# Patient Record
Sex: FEMALE | Race: WHITE | NOT HISPANIC OR LATINO | Employment: UNEMPLOYED | URBAN - METROPOLITAN AREA
[De-identification: names, ages, dates, MRNs, and addresses within clinical notes are randomized per-mention and may not be internally consistent; named-entity substitution may affect disease eponyms.]

---

## 2017-01-17 ENCOUNTER — ALLSCRIPTS OFFICE VISIT (OUTPATIENT)
Dept: OTHER | Facility: OTHER | Age: 57
End: 2017-01-17

## 2017-01-24 ENCOUNTER — TRANSCRIBE ORDERS (OUTPATIENT)
Dept: ADMINISTRATIVE | Facility: HOSPITAL | Age: 57
End: 2017-01-24

## 2017-01-24 DIAGNOSIS — M76.70 PERONEAL TENDINITIS, UNSPECIFIED LATERALITY: Primary | ICD-10-CM

## 2017-01-31 ENCOUNTER — HOSPITAL ENCOUNTER (OUTPATIENT)
Dept: RADIOLOGY | Facility: HOSPITAL | Age: 57
Discharge: HOME | End: 2017-01-31
Attending: INTERNAL MEDICINE
Payer: COMMERCIAL

## 2017-01-31 ENCOUNTER — TRANSCRIBE ORDERS (OUTPATIENT)
Dept: RADIOLOGY | Facility: HOSPITAL | Age: 57
End: 2017-01-31

## 2017-01-31 DIAGNOSIS — M79.0 RHEUMATISM, UNSPECIFIED AND FIBROSITIS: Primary | ICD-10-CM

## 2017-01-31 DIAGNOSIS — M79.0 RHEUMATISM, UNSPECIFIED AND FIBROSITIS: ICD-10-CM

## 2017-01-31 DIAGNOSIS — M79.7 RHEUMATISM, UNSPECIFIED AND FIBROSITIS: Primary | ICD-10-CM

## 2017-01-31 DIAGNOSIS — M76.70 PERONEAL TENDINITIS, UNSPECIFIED LATERALITY: ICD-10-CM

## 2017-01-31 DIAGNOSIS — M79.7 RHEUMATISM, UNSPECIFIED AND FIBROSITIS: ICD-10-CM

## 2017-01-31 PROCEDURE — 72131 CT LUMBAR SPINE W/O DYE: CPT

## 2017-01-31 PROCEDURE — 71020 HB CHEST X-RAY 2VW FRONTAL&LATL: CPT

## 2017-01-31 PROCEDURE — 72192 CT PELVIS W/O DYE: CPT

## 2017-01-31 PROCEDURE — 71110 X-RAY EXAM RIBS BIL 3 VIEWS: CPT

## 2017-02-07 ENCOUNTER — TRANSCRIBE ORDERS (OUTPATIENT)
Dept: ADMINISTRATIVE | Age: 57
End: 2017-02-07

## 2017-02-07 ENCOUNTER — APPOINTMENT (OUTPATIENT)
Dept: LAB | Age: 57
End: 2017-02-07
Payer: MEDICARE

## 2017-02-07 DIAGNOSIS — D80.1 HYPOGAMMAGLOBULINAEMIA, UNSPECIFIED: Primary | ICD-10-CM

## 2017-02-07 DIAGNOSIS — D80.1 HYPOGAMMAGLOBULINAEMIA, UNSPECIFIED: ICD-10-CM

## 2017-02-07 PROCEDURE — 36415 COLL VENOUS BLD VENIPUNCTURE: CPT

## 2017-02-07 PROCEDURE — 83883 ASSAY NEPHELOMETRY NOT SPEC: CPT

## 2017-02-08 LAB
KAPPA LC FREE SER-MCNC: 22.31 MG/L (ref 3.3–19.4)
KAPPA LC FREE/LAMBDA FREE SER: 1.48 {RATIO} (ref 0.26–1.65)
LAMBDA LC FREE SERPL-MCNC: 15.05 MG/L (ref 5.71–26.3)

## 2017-02-17 ENCOUNTER — ALLSCRIPTS OFFICE VISIT (OUTPATIENT)
Dept: OTHER | Facility: OTHER | Age: 57
End: 2017-02-17

## 2017-05-04 ENCOUNTER — LAB REQUISITION (OUTPATIENT)
Dept: LAB | Facility: HOSPITAL | Age: 57
End: 2017-05-04
Payer: MEDICARE

## 2017-05-04 DIAGNOSIS — N39.0 URINARY TRACT INFECTION: ICD-10-CM

## 2017-05-04 PROCEDURE — 87086 URINE CULTURE/COLONY COUNT: CPT | Performed by: UROLOGY

## 2017-05-05 LAB — BACTERIA UR CULT: NORMAL

## 2017-05-13 ENCOUNTER — TRANSCRIBE ORDERS (OUTPATIENT)
Dept: URGENT CARE | Age: 57
End: 2017-05-13

## 2017-05-13 ENCOUNTER — APPOINTMENT (OUTPATIENT)
Dept: LAB | Age: 57
End: 2017-05-13
Payer: MEDICARE

## 2017-05-13 ENCOUNTER — OFFICE VISIT (OUTPATIENT)
Dept: URGENT CARE | Age: 57
End: 2017-05-13
Payer: MEDICARE

## 2017-05-13 DIAGNOSIS — J02.9 SORE THROAT: Primary | ICD-10-CM

## 2017-05-13 DIAGNOSIS — J02.9 SORE THROAT: ICD-10-CM

## 2017-05-13 DIAGNOSIS — J02.9 ACUTE PHARYNGITIS: ICD-10-CM

## 2017-05-13 PROCEDURE — 87430 STREP A AG IA: CPT | Performed by: FAMILY MEDICINE

## 2017-05-13 PROCEDURE — 87070 CULTURE OTHR SPECIMN AEROBIC: CPT

## 2017-05-13 PROCEDURE — 99213 OFFICE O/P EST LOW 20 MIN: CPT | Performed by: FAMILY MEDICINE

## 2017-05-13 PROCEDURE — G0463 HOSPITAL OUTPT CLINIC VISIT: HCPCS | Performed by: FAMILY MEDICINE

## 2017-05-15 LAB — BACTERIA THROAT CULT: NORMAL

## 2017-06-20 ENCOUNTER — GENERIC CONVERSION - ENCOUNTER (OUTPATIENT)
Dept: OTHER | Facility: OTHER | Age: 57
End: 2017-06-20

## 2017-06-28 ENCOUNTER — GENERIC CONVERSION - ENCOUNTER (OUTPATIENT)
Dept: OTHER | Facility: OTHER | Age: 57
End: 2017-06-28

## 2017-06-28 PROCEDURE — 88342 IMHCHEM/IMCYTCHM 1ST ANTB: CPT | Performed by: INTERNAL MEDICINE

## 2017-06-28 PROCEDURE — 88305 TISSUE EXAM BY PATHOLOGIST: CPT | Performed by: INTERNAL MEDICINE

## 2017-06-28 PROCEDURE — 88313 SPECIAL STAINS GROUP 2: CPT | Performed by: INTERNAL MEDICINE

## 2017-06-29 ENCOUNTER — LAB REQUISITION (OUTPATIENT)
Dept: LAB | Facility: HOSPITAL | Age: 57
End: 2017-06-29
Payer: MEDICARE

## 2017-06-29 DIAGNOSIS — K22.70 BARRETT'S ESOPHAGUS WITHOUT DYSPLASIA: ICD-10-CM

## 2017-06-29 DIAGNOSIS — K21.9 GASTRO-ESOPHAGEAL REFLUX DISEASE WITHOUT ESOPHAGITIS: ICD-10-CM

## 2017-06-29 DIAGNOSIS — R10.13 EPIGASTRIC PAIN: ICD-10-CM

## 2017-07-06 ENCOUNTER — GENERIC CONVERSION - ENCOUNTER (OUTPATIENT)
Dept: OTHER | Facility: OTHER | Age: 57
End: 2017-07-06

## 2017-08-16 ENCOUNTER — APPOINTMENT (OUTPATIENT)
Dept: LAB | Age: 57
End: 2017-08-16
Attending: PHYSICIAN ASSISTANT
Payer: MEDICARE

## 2017-08-16 ENCOUNTER — OFFICE VISIT (OUTPATIENT)
Dept: URGENT CARE | Age: 57
End: 2017-08-16
Payer: MEDICARE

## 2017-08-16 ENCOUNTER — TRANSCRIBE ORDERS (OUTPATIENT)
Dept: URGENT CARE | Age: 57
End: 2017-08-16

## 2017-08-16 DIAGNOSIS — R30.9 PAINFUL MICTURITION: ICD-10-CM

## 2017-08-16 PROCEDURE — G0463 HOSPITAL OUTPT CLINIC VISIT: HCPCS | Performed by: FAMILY MEDICINE

## 2017-08-16 PROCEDURE — 87077 CULTURE AEROBIC IDENTIFY: CPT

## 2017-08-16 PROCEDURE — 87186 SC STD MICRODIL/AGAR DIL: CPT

## 2017-08-16 PROCEDURE — 87086 URINE CULTURE/COLONY COUNT: CPT

## 2017-08-16 PROCEDURE — 99213 OFFICE O/P EST LOW 20 MIN: CPT | Performed by: FAMILY MEDICINE

## 2017-08-18 LAB — BACTERIA UR CULT: NORMAL

## 2017-11-22 ENCOUNTER — OFFICE VISIT (OUTPATIENT)
Dept: URGENT CARE | Age: 57
End: 2017-11-22
Payer: MEDICARE

## 2017-11-22 PROCEDURE — 81002 URINALYSIS NONAUTO W/O SCOPE: CPT | Performed by: FAMILY MEDICINE

## 2017-11-22 PROCEDURE — G0463 HOSPITAL OUTPT CLINIC VISIT: HCPCS | Performed by: FAMILY MEDICINE

## 2017-11-22 PROCEDURE — 99213 OFFICE O/P EST LOW 20 MIN: CPT | Performed by: FAMILY MEDICINE

## 2017-11-23 NOTE — PROGRESS NOTES
Assessment    1  Acute UTI (599 0) (N39 0)    Plan  Acute UTI    · Ciprofloxacin HCl - 250 MG Oral Tablet (Cipro); TAKE 1 TABLET EVERY 12 HOURSDAILY  Sore throat, Urinary frequency    · Rapid StrepA- POC; Source:Throat; Status:Resulted - Requires Verification,RetrospectiveBy Protocol Authorization;   Done: [id number] 01:46PM  Urinary frequency    · Urine Dip Non-Automated- POC; Status:Resulted - Requires Verification,RetrospectiveBy Protocol Authorization;   Done: 05PBX2887 01:43PM    Discussion/Summary  Discussion Summary:   Take antibiotic as directed until completed  Take antibiotic with food and full glass of water  Take a probiotic while taking this medication  Patient fluids and follow-up prevention mechanisms as directed  If symptoms worsen or if not resolving, call PCP or urologist, or go to the ER   encouraged follow-up with PCP or urologist  Precautions given  All questions answered  Medication Side Effects Reviewed: Possible side effects of new medications were reviewed with the patient/guardian today  Understands and agrees with treatment plan: The treatment plan was reviewed with the patient/guardian  The patient/guardian understands and agrees with the treatment plan   Counseling Documentation With Imm: The patient was counseled regarding instructions for management,-- risk factor reductions,-- patient and family education,-- impressions  Follow Up Instructions: Follow Up with your Primary Care Provider in 10 days  If your symptoms worsen, go to the [place] [address] Emergency Department  Chief Complaint    1  Sore Throat   2  Urinary Frequency  Chief Complaint Free Text Note Form: sore throat and urinary frequency for 3 week  History of Present Illness  HPI: Patient is a 64-year-old female compress intake with complaint of dysuria, frequency, and urgency for the past week  she describes a brief postvoid tingling sensation extending into her lateral pelvis   She denies any gross blood in the urine  No radiation of pain into her back  No vaginal discharge or abnormal bleeding  Reports solo kidney and multiple recent UTIs  She is seeing a urologist and has had recent success with [facility] Based Practices Required Assessment:  Pain Assessment  the patient states they have pain  (on a scale of 0 to 10, the patient rates the pain at 0 )  Abuse And Domestic Violence Screen   Yes, the patient is safe at home  -- The patient states no one is hurting them  Depression And Suicide Screen  No, the patient has not had thoughts of hurting themself  No, the patient has not felt depressed in the past 7 days  Prefered Language is  english  Review of Systems  Focused-Female:  Constitutional: as noted in HPI,-- no fever-- and-- no chills  Cardiovascular: no chest pain-- and-- no palpitations  Respiratory: no shortness of breath,-- no cough-- and-- no wheezing  Gastrointestinal: no abdominal pain,-- no nausea,-- no vomiting-- and-- no diarrhea  Genitourinary: as noted in HPI  Integumentary: no rashes  Neurological: as noted in HPI  ROS Reviewed:   ROS reviewed  Active Problems  1  Acute streptococcal tonsillitis, not specified as recurrent or not (034 0) (J03 00)   2  Acute UTI (599 0) (N39 0)   3  Anxiety (300 00) (F41 9)   4  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)   5  Cephalalgia (784 0) (R51)   6  Contact dermatitis (692 9) (L25 9)   7  Dehydration (276 51) (E86 0)   8  Depression (311) (F32 9)   9  Dry skin (701 1) (L85 3)   10  Fibromyalgia (729 1) (M79 7)   11  Hair loss (704 00) (L65 9)   12  Head injury (959 01) (S09 90XA)   13  Muscle tension headache (307 81) (G44 209)   14  Osteoarthritis (715 90) (M19 90)   15  Pelvic pain (R10 2)   16  Sciatica of left side (724 3) (M54 32)   17  Stress-related physiological response affecting medical condition (316) (F54)   18  Urination pain (788 1) (R30 9)   19  Viral pharyngitis (462) (J02 9)   20   Vision changes (368 9) (H53 9) 21  Vision problem (V41 0) (H54 7)   22  Vitamin D deficiency (268 9) (E55 9)    Past Medical History    1  History of Abdominal pain, epigastric (789 06) (R10 13)   2  History of Abdominal pain, left upper quadrant (789 02) (R10 12)   3  History of Abdominal pain, RUQ (789 01) (R10 11)   4  History of Acute cervical adenitis (683) (L04 0)   5  History of Aphthous ulcer (528 2) (K12 0)   6  History of Bunion, unspecified laterality   7  History of Closed head injury (959 01) (S09 90XA)   8  History of Cough (786 2) (R05)   9  History of Dermatitis fungal (111 9) (B36 9)   10  Dysuria (788 1) (R30 0)   11  History of Fall down steps (E880 9) (W10 8XXA)   12  History of Herniated cervical disc (722 0) (M50 20)   13  History of fatigue (V13 89) (Z87 898)   14  History of low back pain (V13 59) (Z87 39)   15  History of migraine (V12 49) (Z86 69)   16  History of tendinitis (V13 59) (Z87 39)   17  History of viral gastroenteritis (V12 09) (Z86 19)   18  History of Intolerance To Milk Products   19  History of Irritable bowel syndrome (564 1) (K58 9)   20  History of Muscle ache (729 1) (M79 1)   21  Nausea with vomiting (787 01) (R11 2)   22  History of Neck pain (723 1) (M54 2)   23  History of Noninflammatory Disorder Of The Vulva And Perineum (624 9)   24  History of Pap test, as part of routine gynecological examination (V76 2) (Z01 419)   25  History of Screening for breast cancer (V76 10) (Z12 31)   26  History of Screening for colon cancer (V76 51) (Z12 11)   27  History of Vaginal Itching (625 8)  Active Problems And Past Medical History Reviewed: The active problems and past medical history were reviewed and updated today  Family History  Mother    1  Family history of diabetes mellitus (V18 0) (Z83 3)   2  Family history of malignant neoplasm (V16 9) (Z80 9)  Father    3  Family history of hypertension (V17 49) (Z82 49)  Brother    4   Family history of malignant neoplasm (V16 9) (Z80 9)  Family History 5  Family history of Arthritis (V17 7)   6  Family history of Cancer   7  Family history of Diabetes Mellitus (V18 0)   8  Family history of Heart Disease (V17 49)   9  Family history of Hypertension (V17 49)   10  Family history of Migraine Headache   11  Family history of Thyroid Disorder (V18 19)  Family History Reviewed: The family history was reviewed and updated today  Social History   · Denied: History of Alcohol Use (History)   · Never A Smoker  Social History Reviewed: The social history was reviewed and updated today  The social history was reviewed and is unchanged  Surgical History    1  History of Foot Surgery   2  History of Total Abdominal Hysterectomy With Removal Of Both Ovaries   3  History of Tubal Ligation   4  History of Varicose Vein Ligation   5  History of Wrist Surgery  Surgical History Reviewed: The surgical history was reviewed and updated today  Current Meds   1  Gabapentin 300 MG Oral Capsule; TAKE 1 CAPSULE 3 TIMES DAILY; Therapy: (Recorded:70Xni9180) to Recorded   2  Omeprazole 40 MG Oral Capsule Delayed Release; Therapy: (Recorded:33Fhw7154) to Recorded   3  Paxil 40 MG Oral Tablet; Therapy: (Recorded:78Ods5936) to Recorded  Medication List Reviewed: The medication list was reviewed and updated today  Allergies  1  Codeine Sulfate TABS   2  Morphine Sulfate SOLN   3  Penicillins    4  Seasonal    Vitals  Signs   Recorded: 22Nov2017 01:28PM   Temperature: 98 3 F, Temporal  Heart Rate: 76  Respiration: 20  Systolic: 693  Diastolic: 72  Height: 5 ft 7 in  Weight: 180 lb   BMI Calculated: 28 19  BSA Calculated: 1 93  O2 Saturation: 98  Pain Scale: 10    Physical Exam   Constitutional  General appearance: No acute distress, well appearing and well nourished  Pulmonary  Respiratory effort: No increased work of breathing or signs of respiratory distress  Auscultation of lungs: Clear to auscultation     Cardiovascular  Auscultation of heart: Normal rate and rhythm, normal S1 and S2, without murmurs  Abdomen  Abdomen: Non-tender, no masses  -- No suprapubic or CVA tenderness  Musculoskeletal  Gait and station: Normal    Digits and nails: Normal without clubbing or cyanosis  Neurologic No focal deficit  Reflexes: 2+ and symmetric  Sensation: No sensory loss     Psychiatric  Orientation to person, place, and time: Normal    Mood and affect: Normal        Results/Data  Rapid StrepA- POC [id number] 01:46PM [facility]     Test Name Result Flag Reference   Rapid Strep Negative                   Urine Dip Non-Automated- POC [id number] 01:43PM [facility]     Test Name Result Flag Reference   Color Yellow       Clarity Transparent     Leukocytes large     Nitrite pos     Blood large     Bilirubin neg     Urobilinogen 0 2     Protein neg     Ph 6 5     Specific Gravity 1 010     Ketone neg     Glucose neg     Color Yellow       Clarity Transparent     Leukocytes large     Nitrite pos     Blood large     Bilirubin neg     Urobilinogen 0 2     Protein neg     Ph 6 5     Specific Gravity 1 010     Ketone neg     Glucose neg                 Signatures   Electronically signed by : Stacy Dias [facility]; Nov 22 2017  3:04PM EST                       (Author)    Electronically signed by : Ninoska Miller DO; Nov 22 2017  4:57PM EST                       (Co-author)

## 2017-12-27 ENCOUNTER — OFFICE VISIT (OUTPATIENT)
Dept: URGENT CARE | Age: 57
End: 2017-12-27
Payer: MEDICARE

## 2017-12-27 PROCEDURE — G0463 HOSPITAL OUTPT CLINIC VISIT: HCPCS | Performed by: FAMILY MEDICINE

## 2017-12-27 PROCEDURE — 99213 OFFICE O/P EST LOW 20 MIN: CPT | Performed by: FAMILY MEDICINE

## 2017-12-29 NOTE — PROGRESS NOTES
Assessment   1  Acute upper respiratory infection (465 9) (J06 9)    Plan   Acute upper respiratory infection    · Fluticasone Propionate 50 MCG/ACT Nasal Suspension (Flonase Allergy Relief);    USE 1 TO 2 SPRAYS IN EACH NOSTRIL ONCE DAILY    Discussion/Summary   Discussion Summary:    Take Mucinex as directed, for congestion  Take Advil or Tylenol as directed for muscle aches and fever  Heat and ice for muscle aches as discussed  Use OTC nasal steroid such as Flonase, and cough medicine as directed  Use cool mist humidifier, turning on hours prior to bedtime for maximum relief  Take all medications with food and a full glass of water  Get plenty of rest and lots of fluids  Use throat lozenges, saltwater gargle, and warm honey water as needed for throat relief  If symptoms worsen or if not resolving, call PCP or go to the ER  Medication Side Effects Reviewed: Possible side effects of new medications were reviewed with the patient/guardian today  Understands and agrees with treatment plan: The treatment plan was reviewed with the patient/guardian  The patient/guardian understands and agrees with the treatment plan    Counseling Documentation With Imm: The patient was counseled regarding instructions for management,-- risk factor reductions,-- patient and family education,-- impressions  Follow Up Instructions: Follow Up with your Primary Care Provider in 7 days  If your symptoms worsen, go to the nearest [place] [address] Emergency Department  Chief Complaint   1  Fever   2  Vomiting  Chief Complaint Free Text Note Form: Fever since Sunday - today 102 - took Tylenol at 8 am  Also has been vomiting - yesterday x 4; today x 1 - last at 11:30 (has been taking liquids)  congested cough and throat feels tight  No chest tightness  Flu vaccine  History of Present Illness   HPI: 61 y/o female with complaint of fever, tmax 102, sore throat, headache, dizziness, and vomiting x 3 days  Treating with tylenol  Denies sore throat, but has glandular pain  Sx  associated with maxillary sinus pressure, PND, dry, nonproductive cough, and generalized muscle aches  She notes her posterior neck feels tight, noting that she slipped on ice falling onto her buttocks days ago, but denies hitting her head or neck  Granddaughter sick with similar illness 2 weeks ago, has now converted to bacterial infection  No other sick contacts  No recent travel  Hospital Based Practices Required Assessment:      Pain Assessment      the patient states they have pain  The pain is located in the throat and cough  (on a scale of 0 to 10, the patient rates the pain at 7 )      Abuse And Domestic Violence Screen       Yes, the patient is safe at home  -- The patient states no one is hurting them  Depression And Suicide Screen  No, the patient has not had thoughts of hurting themself  No, the patient has not felt depressed in the past 7 days  Prefered Language is  [place]  Primary Language is  English  Review of Systems   Focused-Female:      Constitutional: feeling poorly-- and-- feeling tired, but-- as noted in HPI-- and-- no chills  ENT: as noted in HPI-- and-- no earache  Cardiovascular: no chest pain-- and-- no palpitations  Respiratory: no shortness of breath-- and-- no wheezing  Gastrointestinal: nausea, but-- as noted in HPI,-- no abdominal pain-- and-- no diarrhea  Musculoskeletal: no arthralgias  Integumentary: no rashes  Neurological: No weakness, but-- as noted in HPI-- and-- no fainting  ROS Reviewed:    ROS reviewed  Active Problems   1  Acute streptococcal tonsillitis, not specified as recurrent or not (034 0) (J03 00)   2  Acute UTI (599 0) (N39 0)   3  Anxiety (300 00) (F41 9)   4  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)   5  Cephalalgia (784 0) (R51)   6  Contact dermatitis (692 9) (L25 9)   7  Dehydration (276 51) (E86 0)   8  Depression (311) (F32 9)   9   Dry skin (701  1) (L85 3)   10  Fibromyalgia (729 1) (M79 7)   11  Hair loss (704 00) (L65 9)   12  Head injury (959 01) (S09 90XA)   13  Muscle tension headache (307 81) (G44 209)   14  Osteoarthritis (715 90) (M19 90)   15  Pelvic pain (R10 2)   16  Sciatica of left side (724 3) (M54 32)   17  Sore throat (462) (J02 9)   18  Stress-related physiological response affecting medical condition (316) (F54)   19  Urinary frequency (788 41) (R35 0)   20  Urination pain (788 1) (R30 9)   21  Viral pharyngitis (462) (J02 9)   22  Vision changes (368 9) (H53 9)   23  Vision problem (V41 0) (H54 7)   24  Vitamin D deficiency (268 9) (E55 9)    Past Medical History   1  History of Abdominal pain, epigastric (789 06) (R10 13)   2  History of Abdominal pain, left upper quadrant (789 02) (R10 12)   3  History of Abdominal pain, RUQ (789 01) (R10 11)   4  History of Acute cervical adenitis (683) (L04 0)   5  History of Aphthous ulcer (528 2) (K12 0)   6  History of Bunion, unspecified laterality   7  History of Closed head injury (959 01) (S09 90XA)   8  History of Cough (786 2) (R05)   9  History of Dermatitis fungal (111 9) (B36 9)   10  Dysuria (788 1) (R30 0)   11  History of Fall down steps (E880 9) (W10 8XXA)   12  History of Herniated cervical disc (722 0) (M50 20)   13  History of decreased renal function (V13 09) (Z87 448)   14  History of fatigue (V13 89) (Z87 898)   15  History of low back pain (V13 59) (Z87 39)   16  History of migraine (V12 49) (Z86 69)   17  History of sore throat (V12 60) (Z87 09)   18  History of tendinitis (V13 59) (Z87 39)   19  History of viral gastroenteritis (V12 09) (Z86 19)   20  History of Intolerance To Milk Products   21  History of Irritable bowel syndrome (564 1) (K58 9)   22  History of Muscle ache (729 1) (M79 1)   23  Nausea with vomiting (787 01) (R11 2)   24  History of Neck pain (723 1) (M54 2)   25  History of Noninflammatory Disorder Of The Vulva And Perineum (624 9)   26   History of Pap test, as part of routine gynecological examination (V76 2) (Z01 419)   27  History of Screening for breast cancer (V76 10) (Z12 31)   28  History of Screening for colon cancer (V76 51) (Z12 11)   29  History of Vaginal Itching (625 8)  Active Problems And Past Medical History Reviewed: The active problems and past medical history were reviewed and updated today  Family History   Mother    1  Family history of diabetes mellitus (V18 0) (Z83 3)   2  Family history of malignant neoplasm (V16 9) (Z80 9)  Father    3  Family history of hypertension (V17 49) (Z82 49)  Brother    4  Family history of malignant neoplasm (V16 9) (Z80 9)  Family History    5  Family history of Arthritis (V17 7)   6  Family history of Cancer   7  Family history of Diabetes Mellitus (V18 0)   8  Family history of Heart Disease (V17 49)   9  Family history of Hypertension (V17 49)   10  Family history of Migraine Headache   11  Family history of Thyroid Disorder (V18 19)  Family History Reviewed: The family history was reviewed and updated today  Social History    · Denied: History of Alcohol Use (History)   · Denied: History of Drug use   · Never A Smoker  Social History Reviewed: The social history was reviewed and updated today  The social history was reviewed and is unchanged  Surgical History   1  History of Foot Surgery   2  History of Total Abdominal Hysterectomy With Removal Of Both Ovaries   3  History of Tubal Ligation   4  History of Varicose Vein Ligation   5  History of Wrist Surgery  Surgical History Reviewed: The surgical history was reviewed and updated today  Current Meds    1  Gabapentin 300 MG Oral Capsule; TAKE 1 CAPSULE 3 TIMES DAILY; Therapy: (Recorded:80Soj8504) to Recorded   2  Omeprazole 40 MG Oral Capsule Delayed Release; Therapy: (Recorded:70Ety5611) to Recorded   3  Paxil 10 MG Oral Tablet; Therapy: (Recorded:37Rzv4198) to Recorded   4  Paxil 40 MG Oral Tablet;      Therapy: (Recorded:50Egm6017) to Recorded  Medication List Reviewed: The medication list was reviewed and updated today  Allergies   1  Codeine Sulfate TABS   2  Morphine Sulfate SOLN   3  Penicillins  4  Seasonal    Vitals   Signs   Recorded: 69JQL5516 02:57PM   Temperature: 98 8 F, Oral  Heart Rate: 64  Pulse Quality: Regular  Respiration: 18  Systolic: 701, RUE, Sitting  Diastolic: 351, RUE, Sitting  Height: 5 ft 7 in  Weight: 180 lb   BMI Calculated: 28 19  BSA Calculated: 1 93  O2 Saturation: 99  LMP: n/a  Pain Scale: 7    Physical Exam        Constitutional      General appearance: No acute distress, well appearing and well nourished  Eyes      Conjunctiva and lids: No swelling, erythema or discharge  Ears, Nose, Mouth, and Throat      External inspection of ears and nose: Normal        Otoscopic examination: Tympanic membranes translucent with normal light reflex  Canals patent without erythema  -- inflamed nasal mucosa turbinates  Clear nasal drainage present bilaterally  -- Mildly erythematous posterior exudate, edema petechiae lesions  Large amount clear postnasal drainage present in posterior cavity  Pulmonary      Respiratory effort: No increased work of breathing or signs of respiratory distress  Auscultation of lungs: Clear to auscultation  Cardiovascular      Auscultation of heart: Normal rate and rhythm, normal S1 and S2, without murmurs  Abdomen      Abdomen: Non-tender, no masses  Lymphatic Tender anterior and posterior cervical palpable bilaterally  Musculoskeletal      Gait and station: Normal        Digits and nails: Normal without clubbing or cyanosis  Skin      Skin and subcutaneous tissue: Normal without rashes or lesions  Neurologic No focal deficits  Reflexes: 2+ and symmetric         Psychiatric      Orientation to person, place, and time: Normal        Mood and affect: Normal        Provider Comments   Provider Comments: Lengthy discussion regarding symptomatic treatment as well as conversion of viral infections to bacterial infections  Patient was understanding of all information given and in agreement with treatment plan  All questions answered  Precautions given  Message   Return to work or school:    Luis Villarreal is under my professional care  She was seen in my office on 12/27/2017    She is able to return to work on  12/28/2017          MANISH Villafuerte        Signatures    Electronically signed by : Paige Linares, [facility]; Dec 27 2017  4:43PM EST                       (Author)     Electronically signed by : Luis Miguel Winter DO; Dec 28 2017  7:07AM EST                       (Co-author)

## 2018-01-09 ENCOUNTER — LAB REQUISITION (OUTPATIENT)
Dept: LAB | Facility: HOSPITAL | Age: 58
End: 2018-01-09
Payer: MEDICARE

## 2018-01-09 DIAGNOSIS — N39.0 URINARY TRACT INFECTION: ICD-10-CM

## 2018-01-09 PROCEDURE — 87086 URINE CULTURE/COLONY COUNT: CPT | Performed by: UROLOGY

## 2018-01-09 PROCEDURE — 87077 CULTURE AEROBIC IDENTIFY: CPT | Performed by: UROLOGY

## 2018-01-09 PROCEDURE — 87186 SC STD MICRODIL/AGAR DIL: CPT | Performed by: UROLOGY

## 2018-01-11 LAB — BACTERIA UR CULT: ABNORMAL

## 2018-01-11 NOTE — MISCELLANEOUS
Message   Recorded as Task   Date: 06/14/2016 07:54 AM, Created By: Marilu Kenney   Task Name: Go to Result   Assigned To: [facility],Team   Regarding Patient: Chris Zuluaga, Status: In Progress   Comment:    Ashley Vásquez - 14 Jun 2016 7:54 AM     TASK CREATED  Pelvic US is normal but does show a lot of intestinal gas - maybe her pain could be related to gas? 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)   2  Cephalalgia (784 0) (R51)   3  Depression (311) (F32 9)   4  Hair loss (704 00) (L65 9)   5  Hematuria (599 70) (R31 9)   6  Left knee pain (719 46) (M25 562)   7  Osteoarthritis (715 90) (M19 90)   8  Pelvic pain (R10 2)   9  Sciatica of left side (724 3) (M54 32)   10  Stress-related physiological response affecting medical condition (316) (F54)   11  Vitamin D deficiency (268 9) (E55 9)    Current Meds   1  Ciprofloxacin HCl - 250 MG Oral Tablet; Take 1 tablet twice daily; Therapy: 40CQF4330 to (Theodora Kenney)  Requested for: 69SFH0887; Last   Rx:26Gwq0504 Ordered   2  ClonazePAM 0 5 MG Oral Tablet; Therapy: 38Fto7124 to Recorded   3  Effexor  MG Oral Capsule Extended Release 24 Hour (Venlafaxine HCl ER); Therapy: 79Wod0357 to Recorded   4  Gabapentin 300 MG Oral Capsule; TAKE 1 CAPSULE 3 TIMES DAILY; Therapy: (Recorded:36Ped1861) to Recorded   5  LaMICtal 150 MG Oral Tablet (LamoTRIgine); Therapy: (Recorded:05Mar2016) to Recorded   6  Prenatal Vitamins 0 8 MG Oral Tablet; TAKE 1 TABLET DAILY; Therapy: 05CPG4400 to Recorded   7  Vitamin C 500 MG Oral Tablet Chewable; Therapy: (Recorded:03Adi8539) to Recorded   8   Vitamin D 1000 UNIT CAPS; Therapy: (Recorded:87Hjm7565) to Recorded   9  Wellbutrin  MG Oral Tablet Extended Release 12 Hour (BuPROPion HCl ER   (SR)); Therapy: (Recorded:15Pci2083) to Recorded    Allergies    1  Codeine Sulfate TABS   2  Morphine Sulfate SOLN   3  Penicillins    4   Seasonal    Signatures   Electronically signed by : Alek Mcgrath LPN; Jun 14 9926  4:76PV EST                       (Author)

## 2018-01-12 VITALS
SYSTOLIC BLOOD PRESSURE: 122 MMHG | DIASTOLIC BLOOD PRESSURE: 82 MMHG | HEART RATE: 71 BPM | HEIGHT: 67 IN | TEMPERATURE: 97.8 F

## 2018-01-14 VITALS
DIASTOLIC BLOOD PRESSURE: 62 MMHG | HEART RATE: 87 BPM | SYSTOLIC BLOOD PRESSURE: 118 MMHG | OXYGEN SATURATION: 98 % | HEIGHT: 67 IN | TEMPERATURE: 98.4 F

## 2018-01-23 VITALS
RESPIRATION RATE: 18 BRPM | HEART RATE: 64 BPM | SYSTOLIC BLOOD PRESSURE: 130 MMHG | HEIGHT: 67 IN | DIASTOLIC BLOOD PRESSURE: 100 MMHG | BODY MASS INDEX: 28.25 KG/M2 | WEIGHT: 180 LBS | OXYGEN SATURATION: 99 % | TEMPERATURE: 98.8 F

## 2018-01-24 NOTE — MISCELLANEOUS
Message  Return to work or school:   Hammad Karimi is under my professional care  She was seen in my office on 12/27/2017   She is able to return to work on  12/28/2017       Thea Bruno, PAC        Signatures   Electronically signed by : Thea Bruno, [facility]; Dec 27 2017  4:43PM EST                       (Author)

## 2018-03-06 ENCOUNTER — TELEPHONE (OUTPATIENT)
Dept: FAMILY MEDICINE CLINIC | Facility: CLINIC | Age: 58
End: 2018-03-06

## 2018-03-06 NOTE — TELEPHONE ENCOUNTER
Patient called to request results of recent lab tests done 02/20/2018  Test was ordered by psychiatrist Carole Menchaca  Pt has not attempted to call the psychiatrist to get results yet

## 2018-03-09 NOTE — TELEPHONE ENCOUNTER
Called patient per Dr Tony Adair, she stated she did get a call from Dr Taj Poole regarding her lab results and was referred to her urologist  Patient was told if she would like to discuss labs or have any questions, she should schedule an appointment to review  She stated for now, she will see urologist and if she still has questions she will call to schedule with Dr Tony Adair

## 2018-04-23 ENCOUNTER — OFFICE VISIT (OUTPATIENT)
Dept: URGENT CARE | Age: 58
End: 2018-04-23

## 2018-04-23 VITALS
SYSTOLIC BLOOD PRESSURE: 136 MMHG | DIASTOLIC BLOOD PRESSURE: 79 MMHG | HEIGHT: 67 IN | HEART RATE: 77 BPM | TEMPERATURE: 98.1 F

## 2018-04-23 DIAGNOSIS — J02.9 PHARYNGITIS, UNSPECIFIED ETIOLOGY: Primary | ICD-10-CM

## 2018-04-23 DIAGNOSIS — H69.90 DISORDER OF EUSTACHIAN TUBE, UNSPECIFIED LATERALITY: ICD-10-CM

## 2018-04-23 LAB — S PYO AG THROAT QL: NEGATIVE

## 2018-04-23 RX ORDER — GABAPENTIN 300 MG/1
1 CAPSULE ORAL 3 TIMES DAILY
COMMUNITY
End: 2020-02-14 | Stop reason: SDUPTHER

## 2018-04-23 RX ORDER — FEXOFENADINE HCL 180 MG/1
180 TABLET ORAL DAILY
Qty: 30 TABLET | Refills: 0 | Status: SHIPPED | OUTPATIENT
Start: 2018-04-23 | End: 2019-10-11 | Stop reason: ALTCHOICE

## 2018-04-23 RX ORDER — FLUTICASONE PROPIONATE 50 MCG
1 SPRAY, SUSPENSION (ML) NASAL DAILY
Qty: 16 G | Refills: 0 | Status: SHIPPED | OUTPATIENT
Start: 2018-04-23 | End: 2019-10-11 | Stop reason: ALTCHOICE

## 2018-04-23 RX ORDER — HYDROXYZINE 50 MG/1
TABLET, FILM COATED ORAL
COMMUNITY
Start: 2018-03-01

## 2018-04-23 RX ORDER — GABAPENTIN 300 MG/1
CAPSULE ORAL
COMMUNITY
Start: 2018-03-01 | End: 2018-10-26 | Stop reason: SDUPTHER

## 2018-04-23 RX ORDER — PAROXETINE 10 MG/1
TABLET, FILM COATED ORAL
COMMUNITY

## 2018-04-23 RX ORDER — NITROFURANTOIN MACROCRYSTALS 50 MG/1
CAPSULE ORAL
COMMUNITY
Start: 2018-01-25 | End: 2018-12-26

## 2018-04-23 RX ORDER — PAROXETINE HYDROCHLORIDE 40 MG/1
TABLET, FILM COATED ORAL
COMMUNITY

## 2018-04-23 RX ORDER — SPIRONOLACTONE 100 MG/1
TABLET, FILM COATED ORAL
COMMUNITY
Start: 2018-02-12 | End: 2019-10-11 | Stop reason: ALTCHOICE

## 2018-04-23 NOTE — PROGRESS NOTES
[facility]        NAME: Melyssa Neumann is a 59 y o  female  : 1960    MRN: [id number]  DATE: 2018  TIME: 1:54 PM    Assessment and Plan   Pharyngitis, unspecified etiology [J02 9]  1  Pharyngitis, unspecified etiology     2  Disorder of Eustachian tube, unspecified laterality  fluticasone (FLONASE) 50 mcg/act nasal spray    fexofenadine (ALLEGRA) 180 MG tablet         Patient Instructions     Patient Instructions   Rapid strep test is negative  No antibiotic indicated at this time  May continue warm salt water gargles, Tylenol as needed  You may also take an antihistamine plain or with decongestant for any allergy component of your illness  Provider likes the generic Allegra plain or Allegra D (if not contraindicated by your renal doctor)  Also would recommend addition of Flonase or Nasacort AQ spray  Follow up with family doctor as needed  Chief Complaint     Chief Complaint   Patient presents with    Sore Throat     x4 days    Earache         History of Present Illness   Karmen Schmitt Layman presents to the clinic c/o    47-year-old female with sore throat some neck stiffness and postnasal drip that started about 4-5 days ago  Denies any cough but some throat clearing  Ears feel full and she has been doing nasal maneuvers to try equalize her hearing  She notes the headache on the top of her head and in her forehead  She has taken plain Tylenol  Denies fever chills or malaise  Denies history of asthma or pneumonia  She is a nonsmoker  She has been around her granddaughter who was sick with upper respiratory symptoms and an ear infection  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  No strep exposure known  Review of Systems   Review of Systems   Constitutional: Negative  HENT: Positive for ear pain, postnasal drip, sinus pressure and sore throat  Negative for congestion, ear discharge, rhinorrhea and sneezing  Eyes: Negative  Respiratory: Negative  Cardiovascular: Negative  Current Medications     Long-Term Prescriptions   Medication Sig Dispense Refill    fexofenadine (ALLEGRA) 180 MG tablet Take 1 tablet (180 mg total) by mouth daily 30 tablet 0    fluticasone (FLONASE) 50 mcg/act nasal spray 1 spray into each nostril daily 16 g 0    gabapentin (NEURONTIN) 300 mg capsule       gabapentin (NEURONTIN) 300 mg capsule Take 1 capsule by mouth 3 (three) times a day      hydrOXYzine HCL (ATARAX) 50 mg tablet       PARoxetine (PAXIL) 10 mg tablet Take by mouth      PARoxetine (PAXIL) 40 MG tablet Take by mouth      spironolactone (ALDACTONE) 100 mg tablet          Current Allergies     Allergies as of 04/23/2018 - Reviewed 04/23/2018   Allergen Reaction Noted    Codeine  12/27/2016    Morphine  12/27/2016    Penicillins  12/27/2016    Seasonal ic  [cholestatin]  09/11/2013            The following portions of the patient's history were reviewed and updated as appropriate: allergies, current medications, past family history, past medical history, past social history, past surgical history and problem list     Objective   /79   Pulse 77   Temp 98 1 °F (36 7 °C)   Ht 5' 7" (1 702 m)        Physical Exam     Physical Exam   Constitutional: She is oriented to person, place, and time  She appears well-developed and well-nourished  No distress  HENT:   Head: Normocephalic and atraumatic  Right Ear: External ear normal    Left Ear: External ear normal    Mouth/Throat: Oropharynx is clear and moist  No oropharyngeal exudate  Cobblestoning of posterior pharynx with patchy redness  No exudate or fetid breath  Nasal turbinates are pale  Mild decreased patency  No purulent drainage  Eyes: Conjunctivae and EOM are normal  Pupils are equal, round, and reactive to light  Right eye exhibits no discharge  Left eye exhibits no discharge  Neck: Normal range of motion  Neck supple  Cardiovascular: Normal rate, regular rhythm and normal heart sounds  Exam reveals no gallop and no friction rub  No murmur heard  Pulmonary/Chest: Effort normal and breath sounds normal  No respiratory distress  She has no wheezes  She has no rales  Lymphadenopathy:     She has no cervical adenopathy  Neurological: She is alert and oriented to person, place, and time  Skin: Skin is warm and dry  No rash noted  She is not diaphoretic  Psychiatric: She has a normal mood and affect  Nursing note and vitals reviewed

## 2018-04-23 NOTE — LETTER
April 23, 2018     Patient: Ganga Crooks   YOB: 1960   Date of Visit: 4/23/2018       To Whom It May Concern:    Patient seen in office today for medical ailment           Sincerely,        Deepak Blount PA-C    CC: No Recipients

## 2018-04-23 NOTE — PATIENT INSTRUCTIONS
Rapid strep test is negative  No antibiotic indicated at this time  May continue warm salt water gargles, Tylenol as needed  You may also take an antihistamine plain or with decongestant for any allergy component of your illness  Provider likes the generic Allegra plain or Allegra D (if not contraindicated by your renal doctor)  Also would recommend addition of Flonase or Nasacort AQ spray  Follow up with family doctor as needed

## 2018-07-25 ENCOUNTER — OFFICE VISIT (OUTPATIENT)
Dept: URGENT CARE | Age: 58
End: 2018-07-25
Payer: MEDICARE

## 2018-07-25 ENCOUNTER — APPOINTMENT (OUTPATIENT)
Dept: RADIOLOGY | Age: 58
End: 2018-07-25
Payer: MEDICARE

## 2018-07-25 VITALS
SYSTOLIC BLOOD PRESSURE: 140 MMHG | TEMPERATURE: 98.5 F | WEIGHT: 209 LBS | OXYGEN SATURATION: 98 % | DIASTOLIC BLOOD PRESSURE: 85 MMHG | HEIGHT: 67 IN | BODY MASS INDEX: 32.8 KG/M2 | HEART RATE: 90 BPM | RESPIRATION RATE: 16 BRPM

## 2018-07-25 DIAGNOSIS — S90.31XA CONTUSION OF RIGHT FOOT, INITIAL ENCOUNTER: ICD-10-CM

## 2018-07-25 DIAGNOSIS — S99.921A FOOT INJURY, RIGHT, INITIAL ENCOUNTER: ICD-10-CM

## 2018-07-25 DIAGNOSIS — S99.921A FOOT INJURY, RIGHT, INITIAL ENCOUNTER: Primary | ICD-10-CM

## 2018-07-25 PROCEDURE — 99213 OFFICE O/P EST LOW 20 MIN: CPT | Performed by: FAMILY MEDICINE

## 2018-07-25 PROCEDURE — 73630 X-RAY EXAM OF FOOT: CPT

## 2018-07-25 PROCEDURE — G0463 HOSPITAL OUTPT CLINIC VISIT: HCPCS | Performed by: FAMILY MEDICINE

## 2018-07-25 RX ORDER — IBUPROFEN 600 MG/1
600 TABLET ORAL EVERY 6 HOURS PRN
Qty: 30 TABLET | Refills: 0 | Status: ON HOLD | OUTPATIENT
Start: 2018-07-25 | End: 2020-08-03

## 2018-07-25 NOTE — PATIENT INSTRUCTIONS
X-ray right foot performed in office-no acute fractures visualized  Official radiology report pending at this time  Rest, ice, elevate right foot  Prescription sent to pharmacy for ibuprofen 600 mg-use as directed  Follow up with PCP in 3-5 days  Proceed to  ER if symptoms worsen  Foot Contusion   WHAT YOU NEED TO KNOW:   A foot contusion is a bruise to the foot  DISCHARGE INSTRUCTIONS:   Medicines:   · NSAIDs:  These medicines decrease swelling and pain  NSAIDs are available without a doctor's order  Ask your healthcare provider which medicine is right for you  Ask how much to take and when to take it  Take as directed  NSAIDs can cause stomach bleeding and kidney problems if not taken correctly  · Take your medicine as directed  Contact your healthcare provider if you think your medicine is not helping or if you have side effects  Tell him of her if you are allergic to any medicine  Keep a list of the medicines, vitamins, and herbs you take  Include the amounts, and when and why you take them  Bring the list or the pill bottles to follow-up visits  Carry your medicine list with you in case of an emergency  Follow up with your healthcare provider as directed:  Write down your questions so you remember to ask them during your visits  Care for your foot: Follow your treatment plan to help decrease your pain and improve your muscle movement  · Rest:  You will need to rest your foot for 1 to 2 days after your injury  This will help decrease the risk of more damage  · Ice:  Ice helps decrease swelling and pain  Ice may also help prevent tissue damage  Use an ice pack, or put crushed ice in a plastic bag  Cover it with a towel and place it on your foot for 15 to 20 minutes every hour or as directed  · Compression:  Compression (tight hold) provides support and helps decrease swelling and movement so your foot can heal  You may be told to keep your foot wrapped with a tight elastic bandage  Follow instructions about how to apply your bandage  Do not massage your foot  You could cause more damage or pain  · Elevation:  Keep your foot raised above the level of your heart while you are sitting or lying down  This will help decrease or limit swelling  Use pillows, blankets, or rolled towels to elevate your foot comfortably  Exercise your foot:  You may be given gentle exercises to improve your foot movement and help decrease stiffness  Ask when you can return to your normal activities or sports  Prevent another injury:   · Wear equipment to protect yourself when you play sports  · Make sure your shoes fit properly  · Always wear shoes on streets or sidewalks  · Clean spills off the floor right away to avoid slipping or hitting your foot  · Make sure your home is well lit when you get up during the night  This will help you avoid hurting your foot in the dark  Contact your healthcare provider if:   · You have increased swelling on your foot  · You have severe foot pain  · You are not able to move your foot  · You have questions or concerns about your injury or treatment  © 2017 [address] [facility] Information is for End User's use only and may not be sold, redistributed or otherwise used for commercial purposes  All illustrations and images included in CareNotes® are the copyrighted property of [employer]  or [employer]  The above information is an  only  It is not intended as medical advice for individual conditions or treatments  Talk to your doctor, nurse or pharmacist before following any medical regimen to see if it is safe and effective for you

## 2018-07-25 NOTE — LETTER
July 25, 2018     Patient: Keenan Sheikh   YOB: 1960   Date of Visit: 7/25/2018       To Whom it May Concern:    Pinky Heart is under my professional care  She was seen in my office on 7/25/2018  She can return to work 7/26/2018 unless otherwise specified by her physician  If you have any questions or concerns, please don't hesitate to call           Sincerely,          Rekha Epstein PA-C        CC: No Recipients

## 2018-07-25 NOTE — PROGRESS NOTES
[facility]        NAME: Herve Isbell is a 59 y o  female  : 1960    MRN: [id number]  DATE: 2018  TIME: 12:17 PM    Assessment and Plan   Foot injury, right, initial encounter [S99 921A]  1  Foot injury, right, initial encounter  XR foot 3+ vw right         Patient Instructions   X-ray right foot performed in office-no acute fractures visualized  Official radiology report pending at this time  Rest, ice, elevate right foot  Prescription sent to pharmacy for ibuprofen 600 mg-use as directed  Follow up with PCP in 3-5 days  Proceed to  ER if symptoms worsen  Chief Complaint     Chief Complaint   Patient presents with    Foot Injury     right foot,          History of Present Illness   The patient is a 75-year-old female who presents with right foot pain following an injury that occurred yesterday  She states that she dropped a heavy metal gardening hose on to her right foot  Since that time she has had severe pain and difficulty walking  She has a history of lower extremity numbness and tingling and pain secondary to neuropathy and arthritis  She states that aside from the pain the sensation and strength is the same  She denies injury to her ankle or any other part of her body  HPI    Review of Systems   Review of Systems   Respiratory: Negative for shortness of breath  Cardiovascular: Negative for chest pain  Gastrointestinal: Negative for blood in stool  Musculoskeletal:        Right foot pain   Hematological: Does not bruise/bleed easily  All other systems reviewed and are negative          Current Medications       Current Outpatient Prescriptions:     gabapentin (NEURONTIN) 300 mg capsule, , Disp: , Rfl:     gabapentin (NEURONTIN) 300 mg capsule, Take 1 capsule by mouth 3 (three) times a day, Disp: , Rfl:     hydrOXYzine HCL (ATARAX) 50 mg tablet, , Disp: , Rfl:     nitrofurantoin (MACRODANTIN) 50 mg capsule, , Disp: , Rfl:     PARoxetine (PAXIL) 10 mg tablet, Take by mouth, Disp: , Rfl:     PARoxetine (PAXIL) 40 MG tablet, Take by mouth, Disp: , Rfl:     fexofenadine (ALLEGRA) 180 MG tablet, Take 1 tablet (180 mg total) by mouth daily, Disp: 30 tablet, Rfl: 0    fluticasone (FLONASE) 50 mcg/act nasal spray, 1 spray into each nostril daily, Disp: 16 g, Rfl: 0    spironolactone (ALDACTONE) 100 mg tablet, , Disp: , Rfl:     Current Allergies     Allergies as of 07/25/2018 - Reviewed 07/25/2018   Allergen Reaction Noted    Codeine  12/27/2016    Morphine  12/27/2016    Penicillins  12/27/2016    Seasonal ic  [cholestatin]  09/11/2013            The following portions of the patient's history were reviewed and updated as appropriate: allergies, current medications, past family history, past medical history, past social history, past surgical history and problem list      Past Medical History:   Diagnosis Date    Fibromyalgia     Renal disorder        Past Surgical History:   Procedure Laterality Date    FOOT FRACTURE SURGERY      HYSTERECTOMY         Family History   Problem Relation Age of Onset    Heart disease Mother     Cancer Mother     Diabetes Mother     Diabetes Father          Medications have been verified  Objective   /85   Pulse 90   Temp 98 5 °F (36 9 °C)   Resp 16   Ht 5' 7" (1 702 m)   Wt 94 8 kg (209 lb)   SpO2 98%   BMI 32 73 kg/m²        Physical Exam     Physical Exam   Constitutional: She is oriented to person, place, and time  She appears well-developed and well-nourished  No distress  HENT:   Head: Normocephalic and atraumatic  Pulmonary/Chest: Effort normal  No respiratory distress  Musculoskeletal:        Right foot: There is tenderness and bony tenderness  There is normal range of motion, no swelling, normal capillary refill, no crepitus, no deformity and no laceration  Feet:    Neurological: She is alert and oriented to person, place, and time  Skin: Skin is warm and dry  No rash noted   She is not diaphoretic  No erythema  No pallor  Nursing note and vitals reviewed

## 2018-07-27 ENCOUNTER — TRANSCRIBE ORDERS (OUTPATIENT)
Dept: ADMINISTRATIVE | Age: 58
End: 2018-07-27

## 2018-07-27 ENCOUNTER — APPOINTMENT (OUTPATIENT)
Dept: LAB | Age: 58
End: 2018-07-27
Payer: MEDICARE

## 2018-07-27 DIAGNOSIS — N39.0 URINARY TRACT INFECTION WITHOUT HEMATURIA, SITE UNSPECIFIED: Primary | ICD-10-CM

## 2018-07-27 DIAGNOSIS — N39.0 URINARY TRACT INFECTION WITHOUT HEMATURIA, SITE UNSPECIFIED: ICD-10-CM

## 2018-07-27 LAB
BACTERIA UR QL AUTO: ABNORMAL /HPF
BILIRUB UR QL STRIP: NEGATIVE
CLARITY UR: ABNORMAL
COLOR UR: YELLOW
GLUCOSE UR STRIP-MCNC: NEGATIVE MG/DL
HGB UR QL STRIP.AUTO: ABNORMAL
HYALINE CASTS #/AREA URNS LPF: ABNORMAL /LPF
KETONES UR STRIP-MCNC: NEGATIVE MG/DL
LEUKOCYTE ESTERASE UR QL STRIP: ABNORMAL
NITRITE UR QL STRIP: POSITIVE
NON-SQ EPI CELLS URNS QL MICRO: ABNORMAL /HPF
PH UR STRIP.AUTO: 6.5 [PH] (ref 4.5–8)
PROT UR STRIP-MCNC: ABNORMAL MG/DL
RBC #/AREA URNS AUTO: ABNORMAL /HPF
SP GR UR STRIP.AUTO: 1.02 (ref 1–1.03)
UROBILINOGEN UR QL STRIP.AUTO: 0.2 E.U./DL
WBC #/AREA URNS AUTO: ABNORMAL /HPF

## 2018-07-27 PROCEDURE — 87186 SC STD MICRODIL/AGAR DIL: CPT

## 2018-07-27 PROCEDURE — 87086 URINE CULTURE/COLONY COUNT: CPT

## 2018-07-27 PROCEDURE — 81001 URINALYSIS AUTO W/SCOPE: CPT | Performed by: UROLOGY

## 2018-07-27 PROCEDURE — 87077 CULTURE AEROBIC IDENTIFY: CPT

## 2018-07-29 LAB
BACTERIA UR CULT: ABNORMAL
BACTERIA UR CULT: ABNORMAL

## 2018-08-26 ENCOUNTER — APPOINTMENT (OUTPATIENT)
Dept: LAB | Age: 58
End: 2018-08-26
Payer: MEDICARE

## 2018-08-26 DIAGNOSIS — N39.0 URINARY TRACT INFECTION WITHOUT HEMATURIA, SITE UNSPECIFIED: ICD-10-CM

## 2018-08-26 LAB
CREAT SERPL-MCNC: 0.82 MG/DL (ref 0.6–1.3)
GFR SERPL CREATININE-BSD FRML MDRD: 79 ML/MIN/1.73SQ M

## 2018-08-26 PROCEDURE — 36415 COLL VENOUS BLD VENIPUNCTURE: CPT

## 2018-08-26 PROCEDURE — 82565 ASSAY OF CREATININE: CPT

## 2018-10-01 PROCEDURE — 88305 TISSUE EXAM BY PATHOLOGIST: CPT | Performed by: PATHOLOGY

## 2018-10-02 ENCOUNTER — LAB REQUISITION (OUTPATIENT)
Dept: LAB | Facility: HOSPITAL | Age: 58
End: 2018-10-02
Payer: MEDICARE

## 2018-10-02 DIAGNOSIS — K57.30 DIVERTICULOSIS OF LARGE INTESTINE WITHOUT PERFORATION OR ABSCESS WITHOUT BLEEDING: ICD-10-CM

## 2018-10-02 DIAGNOSIS — D12.3 BENIGN NEOPLASM OF TRANSVERSE COLON: ICD-10-CM

## 2018-10-02 DIAGNOSIS — Z86.010 HISTORY OF COLONIC POLYPS: ICD-10-CM

## 2018-10-26 ENCOUNTER — OFFICE VISIT (OUTPATIENT)
Dept: URGENT CARE | Age: 58
End: 2018-10-26
Payer: MEDICARE

## 2018-10-26 VITALS
TEMPERATURE: 97.9 F | SYSTOLIC BLOOD PRESSURE: 141 MMHG | HEIGHT: 67 IN | HEART RATE: 77 BPM | OXYGEN SATURATION: 98 % | WEIGHT: 200 LBS | RESPIRATION RATE: 16 BRPM | BODY MASS INDEX: 31.39 KG/M2 | DIASTOLIC BLOOD PRESSURE: 75 MMHG

## 2018-10-26 DIAGNOSIS — K08.89 PAIN, DENTAL: Primary | ICD-10-CM

## 2018-10-26 DIAGNOSIS — R21 RASH: ICD-10-CM

## 2018-10-26 PROCEDURE — G0463 HOSPITAL OUTPT CLINIC VISIT: HCPCS | Performed by: PHYSICIAN ASSISTANT

## 2018-10-26 PROCEDURE — 99213 OFFICE O/P EST LOW 20 MIN: CPT | Performed by: PHYSICIAN ASSISTANT

## 2018-10-26 RX ORDER — CLINDAMYCIN HYDROCHLORIDE 300 MG/1
300 CAPSULE ORAL 4 TIMES DAILY
Qty: 29 CAPSULE | Refills: 0 | Status: SHIPPED | OUTPATIENT
Start: 2018-10-26 | End: 2018-11-02

## 2018-10-26 RX ORDER — MOMETASONE FUROATE 1 MG/G
CREAM TOPICAL DAILY
Qty: 45 G | Refills: 0 | Status: SHIPPED | OUTPATIENT
Start: 2018-10-26 | End: 2019-10-11 | Stop reason: ALTCHOICE

## 2018-10-26 NOTE — PROGRESS NOTES
[facility]        NAME: Sonia Pelletier is a 59 y o  female  : 1960    MRN: [id number]  DATE: 2018  TIME: 12:49 PM    Assessment and Plan   Pain, dental [K08 89]  1  Pain, dental  clindamycin (CLEOCIN) 300 MG capsule    lidocaine viscous (XYLOCAINE) 2 % mucosal solution   2  Rash  mometasone (ELOCON) 0 1 % cream         Patient Instructions     Use Clindamycin, lidocaine, and Elocon cream as prescribed  Fluids and rest  Salt water gargles   Tylenol for pain/fever  Elocon cream (do not apply for longer than 4 weeks)  Wash hands following application  Follow up with Dentist ASAP  Follow up with PCP in 3-5 days  Proceed to  ER if symptoms worsen  Chief Complaint     Chief Complaint   Patient presents with    Sore Throat     Pt c/o sore throat for a few days  Pt reports she had a temp of 100 2 at 1030 today; did not take any medications  Pt reports right arm pain for a few months  History of Present Illness       Admits to itchy rash over L hand and face without pain  No contacts with similar symptoms, new medications/detergents/foods etc  Last dentist appointment 5 years ago  Admits to R lower tooth pain worsening x 3 days that is not improved with 2 Tylenol and mildly improved with oragel  Dental Pain    This is a new problem  Episode onset: 3 days  The problem occurs constantly  The problem has been waxing and waning  The pain is moderate  Associated symptoms include a fever  Pertinent negatives include no difficulty swallowing, facial pain, oral bleeding, sinus pressure or thermal sensitivity  She has tried acetaminophen (oragel) for the symptoms  The treatment provided mild relief  Review of Systems   Review of Systems   Constitutional: Positive for fever  Negative for activity change, appetite change and chills  HENT: Positive for dental problem  Negative for facial swelling, postnasal drip, rhinorrhea, sinus pain, sinus pressure and sneezing      Eyes: Negative for itching  Cardiovascular: Negative for chest pain and palpitations  Gastrointestinal: Negative for constipation and nausea  Musculoskeletal: Negative for myalgias  Skin: Positive for rash  Neurological: Negative for dizziness, weakness and light-headedness           Current Medications       Current Outpatient Prescriptions:     gabapentin (NEURONTIN) 300 mg capsule, Take 1 capsule by mouth 3 (three) times a day, Disp: , Rfl:     hydrOXYzine HCL (ATARAX) 50 mg tablet, , Disp: , Rfl:     nitrofurantoin (MACRODANTIN) 50 mg capsule, , Disp: , Rfl:     PARoxetine (PAXIL) 10 mg tablet, Take by mouth, Disp: , Rfl:     PARoxetine (PAXIL) 40 MG tablet, Take by mouth, Disp: , Rfl:     clindamycin (CLEOCIN) 300 MG capsule, Take 1 capsule (300 mg total) by mouth 4 (four) times a day for 7 days Take 2 pills (600mg) for first dose , Disp: 29 capsule, Rfl: 0    fexofenadine (ALLEGRA) 180 MG tablet, Take 1 tablet (180 mg total) by mouth daily (Patient not taking: Reported on 10/26/2018 ), Disp: 30 tablet, Rfl: 0    fluticasone (FLONASE) 50 mcg/act nasal spray, 1 spray into each nostril daily (Patient not taking: Reported on 10/26/2018 ), Disp: 16 g, Rfl: 0    ibuprofen (MOTRIN) 600 mg tablet, Take 1 tablet (600 mg total) by mouth every 6 (six) hours as needed for moderate pain (Inflammation) (Patient not taking: Reported on 10/26/2018 ), Disp: 30 tablet, Rfl: 0    lidocaine viscous (XYLOCAINE) 2 % mucosal solution, Swish and spit 15 mL 4 (four) times a day as needed for moderate pain, Disp: 100 mL, Rfl: 0    mometasone (ELOCON) 0 1 % cream, Apply topically daily, Disp: 45 g, Rfl: 0    spironolactone (ALDACTONE) 100 mg tablet, , Disp: , Rfl:     Current Allergies     Allergies as of 10/26/2018 - Reviewed 10/26/2018   Allergen Reaction Noted    Codeine  12/27/2016    Morphine  12/27/2016    Penicillins  12/27/2016    Seasonal ic  [cholestatin]  09/11/2013            The following portions of the patient's history were reviewed and updated as appropriate: allergies, current medications, past family history, past medical history, past social history, past surgical history and problem list      Past Medical History:   Diagnosis Date    Fibromyalgia     Renal disorder        Past Surgical History:   Procedure Laterality Date    FOOT FRACTURE SURGERY      HYSTERECTOMY         Family History   Problem Relation Age of Onset    Heart disease Mother     Cancer Mother     Diabetes Mother     Diabetes Father          Medications have been verified  Objective   /75   Pulse 77   Temp 97 9 °F (36 6 °C) (Tympanic)   Resp 16   Ht 5' 7" (1 702 m)   Wt 90 7 kg (200 lb)   SpO2 98%   BMI 31 32 kg/m²        Physical Exam     Physical Exam   Constitutional: She is oriented to person, place, and time  She appears well-developed and well-nourished  No distress  HENT:   Head: Normocephalic and atraumatic  Right Ear: External ear normal    Left Ear: External ear normal    Mouth/Throat: Oropharynx is clear and moist  No oropharyngeal exudate  Sinuses non-tender to palpation  Cardiovascular: Normal rate, regular rhythm and normal heart sounds  Exam reveals no gallop and no friction rub  No murmur heard  Pulmonary/Chest: Effort normal and breath sounds normal  No respiratory distress  She has no wheezes  She has no rales  She exhibits no tenderness  Lymphadenopathy:     She has no cervical adenopathy  Neurological: She is alert and oriented to person, place, and time  Skin: Skin is warm  Rash noted  Scattered erythematous papules distributed throughout L side of face and L hand over 1st metacarpal     Psychiatric: She has a normal mood and affect  Her behavior is normal  Judgment and thought content normal    Vitals reviewed

## 2018-10-26 NOTE — PATIENT INSTRUCTIONS
Use Clindamycin and Elocon cream as prescribed  Fluids and rest  Salt water gargles   Tylenol for pain/fever  Elocon cream (do not apply for longer than 4 weeks)  Wash hands following application  Follow up with Dentist ASAP  Follow up with PCP in 3-5 days  Proceed to  ER if symptoms worsen  Toothache   WHAT YOU NEED TO KNOW:   A toothache is pain that is caused by irritation of the nerves in the center of your tooth  The irritation may be caused by several problems, such as a cavity, an infection, a cracked tooth, or gum disease  It is very important to follow up with your dentist so the cause of your toothache can be diagnosed and treated  This can help prevent more serious problems  DISCHARGE INSTRUCTIONS:   Medicines: You may  need any of the following:  · NSAIDs  decrease swelling and pain  This medicine can be bought with or without a doctor's order  This medicine can cause stomach bleeding or kidney problems in certain people  If you take blood thinner medicine, always ask your healthcare provider if NSAIDs are safe for you  Always read the medicine label and follow the directions on it before using this medicine  · Acetaminophen  decreases pain  It is available without a doctor's order  Ask how much to take and how often to take it  Follow directions  Acetaminophen can cause liver damage if not taken correctly  · Pain medicine  may be given as a pill or as medicine that you put directly on your tooth or gums  Do not wait until the pain is severe before you take this medicine  · Antibiotics  help fight or prevent an infection caused by bacteria  Take them as directed  · Take your medicine as directed  Contact your healthcare provider if you think your medicine is not helping or if you have side effects  Tell him of her if you are allergic to any medicine  Keep a list of the medicines, vitamins, and herbs you take  Include the amounts, and when and why you take them   Bring the list or the pill bottles to follow-up visits  Carry your medicine list with you in case of an emergency  Follow up with your dentist as directed: You may be referred to a dental surgeon  Write down your questions so you remember to ask them during your visits  Self-care:   · Rinse your mouth with warm salt water 4 times a day or as directed  · You may need to eat soft foods to help relieve pain caused by chewing  Contact your dentist if:   · You have questions or concerns about your condition or care  Return to the emergency department if:   · You have trouble breathing  · You have swelling in your face or neck  · You have a fever and chills  · You have trouble speaking or swallowing  · You have trouble opening or closing your mouth  © 2017 [address] [facility] Information is for End User's use only and may not be sold, redistributed or otherwise used for commercial purposes  All illustrations and images included in CareNotes® are the copyrighted property of [employer]  or [employer]  The above information is an  only  It is not intended as medical advice for individual conditions or treatments  Talk to your doctor, nurse or pharmacist before following any medical regimen to see if it is safe and effective for you

## 2018-11-18 ENCOUNTER — OFFICE VISIT (OUTPATIENT)
Dept: URGENT CARE | Age: 58
End: 2018-11-18
Payer: MEDICARE

## 2018-11-18 VITALS
SYSTOLIC BLOOD PRESSURE: 137 MMHG | TEMPERATURE: 99.2 F | OXYGEN SATURATION: 95 % | HEART RATE: 79 BPM | HEIGHT: 67 IN | RESPIRATION RATE: 18 BRPM | DIASTOLIC BLOOD PRESSURE: 84 MMHG | BODY MASS INDEX: 31.32 KG/M2

## 2018-11-18 DIAGNOSIS — H81.10 BENIGN PAROXYSMAL POSITIONAL VERTIGO, UNSPECIFIED LATERALITY: Primary | ICD-10-CM

## 2018-11-18 PROCEDURE — 99213 OFFICE O/P EST LOW 20 MIN: CPT | Performed by: PHYSICIAN ASSISTANT

## 2018-11-18 PROCEDURE — G0463 HOSPITAL OUTPT CLINIC VISIT: HCPCS | Performed by: PHYSICIAN ASSISTANT

## 2018-11-18 RX ORDER — MECLIZINE HCL 12.5 MG/1
12.5 TABLET ORAL EVERY 8 HOURS PRN
Qty: 30 TABLET | Refills: 0 | Status: SHIPPED | OUTPATIENT
Start: 2018-11-18 | End: 2020-01-28 | Stop reason: SDUPTHER

## 2018-11-18 NOTE — PROGRESS NOTES
[facility]        NAME: Sotero Fagan is a 59 y o  female  : 1960    MRN: [id number]  DATE: 2018  TIME: 10:49 AM    Assessment and Plan   Benign paroxysmal positional vertigo, unspecified laterality [H81 10]  1  Benign paroxysmal positional vertigo, unspecified laterality  meclizine (ANTIVERT) 12 5 MG tablet     Patient is not driving herself home  Patient Instructions     Take Meclizine as prescribed   Stay hydrated (water and pedialyte)  Avoid coffee, caffeinated tea, or drinks high in sugar  Avoid decongestant medications  Trial with Epley Maneuvers 3 times today  Sit up on bed for a few minutes before getting up  Follow up with PCP in 3-5 days  Proceed to  ER if symptoms worsen  Chief Complaint     Chief Complaint   Patient presents with    Dizziness     since yesterday; nausea  Last occurrence was 4 months ago         History of Present Illness       PMH of vertigo, last episode 4 months ago  Dizziness   This is a new problem  The current episode started yesterday  The problem occurs constantly  The problem has been unchanged  Associated symptoms include nausea and vertigo  Pertinent negatives include no abdominal pain, anorexia, arthralgias, change in bowel habit, chest pain, chills, congestion, coughing, diaphoresis, fatigue, fever, headaches, joint swelling, myalgias, neck pain, numbness, rash, sore throat, swollen glands, urinary symptoms, visual change, vomiting or weakness  Exacerbated by: movement, especially turning head  She has tried nothing for the symptoms  Review of Systems   Review of Systems   Constitutional: Negative for activity change, appetite change, chills, diaphoresis, fatigue and fever  HENT: Positive for tinnitus  Negative for congestion, ear discharge, ear pain, hearing loss, postnasal drip, rhinorrhea, sinus pain, sinus pressure, sneezing and sore throat  Eyes: Negative for photophobia and visual disturbance     Respiratory: Negative for cough  Cardiovascular: Negative for chest pain  Gastrointestinal: Positive for nausea  Negative for abdominal pain, anorexia, change in bowel habit and vomiting  Genitourinary: Negative for difficulty urinating  Musculoskeletal: Negative for arthralgias, joint swelling, myalgias, neck pain and neck stiffness  Skin: Negative for rash  Neurological: Positive for dizziness and vertigo  Negative for syncope, facial asymmetry, speech difficulty, weakness, light-headedness, numbness and headaches           Current Medications       Current Outpatient Prescriptions:     fexofenadine (ALLEGRA) 180 MG tablet, Take 1 tablet (180 mg total) by mouth daily (Patient not taking: Reported on 10/26/2018 ), Disp: 30 tablet, Rfl: 0    fluticasone (FLONASE) 50 mcg/act nasal spray, 1 spray into each nostril daily (Patient not taking: Reported on 10/26/2018 ), Disp: 16 g, Rfl: 0    gabapentin (NEURONTIN) 300 mg capsule, Take 1 capsule by mouth 3 (three) times a day, Disp: , Rfl:     hydrOXYzine HCL (ATARAX) 50 mg tablet, , Disp: , Rfl:     ibuprofen (MOTRIN) 600 mg tablet, Take 1 tablet (600 mg total) by mouth every 6 (six) hours as needed for moderate pain (Inflammation) (Patient not taking: Reported on 10/26/2018 ), Disp: 30 tablet, Rfl: 0    lidocaine viscous (XYLOCAINE) 2 % mucosal solution, Swish and spit 15 mL 4 (four) times a day as needed for moderate pain, Disp: 100 mL, Rfl: 0    meclizine (ANTIVERT) 12 5 MG tablet, Take 1 tablet (12 5 mg total) by mouth every 8 (eight) hours as needed for dizziness or nausea, Disp: 30 tablet, Rfl: 0    mometasone (ELOCON) 0 1 % cream, Apply topically daily, Disp: 45 g, Rfl: 0    nitrofurantoin (MACRODANTIN) 50 mg capsule, , Disp: , Rfl:     PARoxetine (PAXIL) 10 mg tablet, Take by mouth, Disp: , Rfl:     PARoxetine (PAXIL) 40 MG tablet, Take by mouth, Disp: , Rfl:     spironolactone (ALDACTONE) 100 mg tablet, , Disp: , Rfl:     Current Allergies Allergies as of 11/18/2018 - Reviewed 10/26/2018   Allergen Reaction Noted    Codeine  12/27/2016    Morphine  12/27/2016    Penicillins  12/27/2016    Seasonal ic  [cholestatin]  09/11/2013            The following portions of the patient's history were reviewed and updated as appropriate: allergies, current medications, past family history, past medical history, past social history, past surgical history and problem list      Past Medical History:   Diagnosis Date    Fibromyalgia     Renal disorder     Vertigo        Past Surgical History:   Procedure Laterality Date    FOOT FRACTURE SURGERY      HYSTERECTOMY         Family History   Problem Relation Age of Onset    Heart disease Mother     Cancer Mother     Diabetes Mother     Diabetes Father          Medications have been verified  Objective   /84   Pulse 79   Temp 99 2 °F (37 3 °C)   Resp 18   Ht 5' 7" (1 702 m)   SpO2 95%   BMI 31 32 kg/m²        Physical Exam     Physical Exam   Constitutional: She is oriented to person, place, and time  She appears well-developed and well-nourished  No distress  HENT:   Head: Normocephalic and atraumatic  Right Ear: External ear normal    Left Ear: External ear normal    Mouth/Throat: Oropharynx is clear and moist  No oropharyngeal exudate  [place]-Hallpike + left beating nystagmus  Epley maneuvers performed 3 times on left side with partial resolution of symptoms  Eyes: Pupils are equal, round, and reactive to light  Conjunctivae and EOM are normal    Neck: Normal range of motion  Cardiovascular: Normal rate, regular rhythm, normal heart sounds and intact distal pulses  Exam reveals no gallop and no friction rub  No murmur heard  Pulmonary/Chest: Effort normal and breath sounds normal  No respiratory distress  She has no wheezes  She has no rales  She exhibits no tenderness  Musculoskeletal: Normal range of motion  UE/LE MS 5/5 bilaterally     Lymphadenopathy:     She has no cervical adenopathy  Neurological: She is alert and oriented to person, place, and time  No cranial nerve deficit  UE/LE light touch sensation intact bilaterally  Skin: Skin is warm and dry  Psychiatric: She has a normal mood and affect  Her behavior is normal  Judgment and thought content normal    Vitals reviewed

## 2018-11-18 NOTE — LETTER
November 18, 2018     Patient: Sue Archuleta   YOB: 1960   Date of Visit: 11/18/2018       To Whom It May Concern:    She was seen on 10/26/2018  It is my medical opinion that Iola Bloch may return to work on 10/27/2018  If you have any questions or concerns, please don't hesitate to call           Sincerely,        Alber Mendoza PA-C    CC: No Recipients

## 2018-11-18 NOTE — LETTER
November 18, 2018     Patient: Shahram Augustin   YOB: 1960   Date of Visit: 11/18/2018       To Whom It May Concern: It is my medical opinion that Regina Navarro may return to work on 11/19/2018  If you have any questions or concerns, please don't hesitate to call           Sincerely,        Brendan Albarran PA-C    CC: No Recipients

## 2018-11-18 NOTE — PATIENT INSTRUCTIONS
Take Meclizine as prescribed   Stay hydrated (water and pedialyte)  Avoid coffee, caffeinated tea, or drinks high in sugar  Avoid decongestant medications  Trial with Epley Maneuvers 3 times today  Sit up on bed for a few minutes before getting up  Follow up with PCP in 3-5 days  Proceed to  ER if symptoms worsen  Benign Paroxysmal Positional Vertigo   WHAT YOU NEED TO KNOW:   BPPV is an inner ear condition that causes you to suddenly feel dizzy  Benign means it is not serious or life-threatening  BPPV is caused by a problem with the nerves and structure of your inner ear  BPPV happens when small pieces of calcium break loose and lump together in one of your inner ear canals  DISCHARGE INSTRUCTIONS:   Return to the emergency department if:   · You fall during a BPPV episode and are injured  · You have a severe headache that does not go away  · You have new changes in your vision or feel weak or confused  · You have problems hearing, or you have ringing or buzzing in your ears  Contact your healthcare provider if:   · Your BPPV symptoms do not go away or they return  · You have problems with your balance, or you are falling often  · You have new or increased nausea or vomiting with vertigo  · You feel anxious or depressed and do not want to leave your home  · You have questions or concerns about your condition or care  Medicines:   · Medicines  may be recommended or prescribed to treat dizziness or nausea  · Take your medicine as directed  Contact your healthcare provider if you think your medicine is not helping or if you have side effects  Tell him of her if you are allergic to any medicine  Keep a list of the medicines, vitamins, and herbs you take  Include the amounts, and when and why you take them  Bring the list or the pill bottles to follow-up visits  Carry your medicine list with you in case of an emergency  Prevent your symptoms:   · Try to avoid sudden head movements  Stand up and lie down slowly  · Raise and support your head when you lie down  Place pillows under your upper back and head or rest in a recliner  · Change your position often when you are lying down  Try not to lie with your head on the same side for long periods of time  Roll over slowly  · Wear protective gear  when you ride a bike or play sports  A helmet helps protect your head from injury  Follow up with your healthcare provider as directed: You may need to return in 1 month to check the progress of your treatment  Write down your questions so you remember to ask them during your visits  © 2017 [employer]  Information is for End User's use only and may not be sold, redistributed or otherwise used for commercial purposes  All illustrations and images included in CareNotes® are the copyrighted property of [employer]  or [employer]  The above information is an  only  It is not intended as medical advice for individual conditions or treatments  Talk to your doctor, nurse or pharmacist before following any medical regimen to see if it is safe and effective for you

## 2018-12-26 ENCOUNTER — OFFICE VISIT (OUTPATIENT)
Dept: URGENT CARE | Age: 58
End: 2018-12-26
Payer: MEDICARE

## 2018-12-26 VITALS
HEART RATE: 96 BPM | TEMPERATURE: 98.6 F | SYSTOLIC BLOOD PRESSURE: 114 MMHG | OXYGEN SATURATION: 96 % | BODY MASS INDEX: 28.25 KG/M2 | DIASTOLIC BLOOD PRESSURE: 71 MMHG | HEIGHT: 67 IN | WEIGHT: 180 LBS

## 2018-12-26 DIAGNOSIS — J11.1 INFLUENZA-LIKE ILLNESS: Primary | ICD-10-CM

## 2018-12-26 PROCEDURE — G0463 HOSPITAL OUTPT CLINIC VISIT: HCPCS | Performed by: NURSE PRACTITIONER

## 2018-12-26 PROCEDURE — 99213 OFFICE O/P EST LOW 20 MIN: CPT | Performed by: NURSE PRACTITIONER

## 2018-12-26 NOTE — PATIENT INSTRUCTIONS
Decongestants, nasal spray  Tylenol / ibuprofen as needed  Cover cough, frequent handwashing  Continue to monitor  Influenza   AMBULATORY CARE:   Influenza  (the flu) is an infection caused by the influenza virus  The flu is easily spread when an infected person coughs, sneezes, or has close contact with others  You may be able to spread the flu to others for 1 week or longer after signs or symptoms appear  Common signs and symptoms include the following:   · Fever and chills    · Headaches, body aches, and muscle or joint pain    · Cough, runny nose, and sore throat    · Loss of appetite, nausea, vomiting, or diarrhea    · Tiredness    · Trouble breathing  Call 911 for any of the following:   · You have trouble breathing, and your lips look purple or blue  · You have a seizure  Seek care immediately if:   · You are dizzy, or you are urinating less or not at all  · You have a headache with a stiff neck, and you feel tired or confused  · You have new pain or pressure in your chest     · Your symptoms, such as shortness of breath, vomiting, or diarrhea, get worse  · Your symptoms, such as fever and coughing, seem to get better, but then get worse  Contact your healthcare provider if:   · You have new muscle pain or weakness  · You have questions or concerns about your condition or care  Treatment for influenza  may include any of the following:  · Acetaminophen  decreases pain and fever  It is available without a doctor's order  Ask how much to take and how often to take it  Follow directions  Acetaminophen can cause liver damage if not taken correctly  · NSAIDs , such as ibuprofen, help decrease swelling, pain, and fever  This medicine is available with or without a doctor's order  NSAIDs can cause stomach bleeding or kidney problems in certain people  If you take blood thinner medicine, always ask your healthcare provider if NSAIDs are safe for you   Always read the medicine label and follow directions  · Antivirals  help fight a viral infection  Manage your symptoms:   · Rest  as much as you can to help you recover  · Drink liquids as directed  to help prevent dehydration  Ask how much liquid to drink each day and which liquids are best for you  Prevent the spread of the flu:   · Wash your hands often  Use soap and water  Wash your hands after you use the bathroom, change a child's diapers, or sneeze  Wash your hands before you prepare or eat food  Use gel hand cleanser when soap and water are not available  Do not touch your eyes, nose, or mouth unless you have washed your hands first            · Cover your mouth when you sneeze or cough  Cough into a tissue or the bend of your arm  · Clean shared items with a germ-killing   Clean table surfaces, doorknobs, and light switches  Do not share towels, silverware, and dishes with people who are sick  Wash bed sheets, towels, silverware, and dishes with soap and water  · Wear a mask  over your mouth and nose if you are sick or are near anyone who is sick  · Stay away from others  if you are sick  · Influenza vaccine  helps prevent influenza (flu)  Everyone older than 6 months should get a yearly influenza vaccine  Get the vaccine as soon as it is available, usually in September or October each year  Follow up with your healthcare provider as directed:  Write down your questions so you remember to ask them during your visits  © 2017 [employer] Information is for End User's use only and may not be sold, redistributed or otherwise used for commercial purposes  All illustrations and images included in CareNotes® are the copyrighted property of [employer]  or [employer]  The above information is an  only  It is not intended as medical advice for individual conditions or treatments   Talk to your doctor, nurse or pharmacist before following any medical regimen to see if it is safe and effective for you

## 2018-12-26 NOTE — LETTER
December 26, 2018     Patient: Morenita Abel   YOB: 1960   Date of Visit: 12/26/2018       To Whom It May Concern:    Please excuse her from work today-Friday  She may return on Monday  If you have any questions or concerns, please don't hesitate to call           Sincerely,        SONIA Baker    CC: No Recipients

## 2018-12-26 NOTE — PROGRESS NOTES
[facility]        NAME: Hunter Sanchez is a 59 y o  female  : 1960    MRN: [id number]  DATE: 2018  TIME: 2:18 PM    Assessment and Plan   Influenza-like illness [R69]  1  Influenza-like illness           Patient Instructions     Decongestants, nasal spray  Tylenol / ibuprofen as needed  Cover cough, frequent handwashing  Continue to monitor  Follow up with PCP in 3-5 days  Proceed to  ER if symptoms worsen  Chief Complaint     Chief Complaint   Patient presents with    Cold Like Symptoms     Pt c/o vomiting, fever, chills, body aches, headache, and stuffiness since yesterday  History of Present Illness       20-year-old female presenting today with c/o body aches, headaches, fatigue, nasal congestion, and f/c starting yesterday  She has been taking Tylenol for discomfort / fevers  She did not receive her influenza vaccination this year  Review of Systems   Review of Systems   Constitutional: Positive for chills, fatigue and fever  HENT: Positive for congestion  Eyes: Negative  Respiratory: Positive for cough  Cardiovascular: Negative  Gastrointestinal: Negative  Genitourinary: Negative  Musculoskeletal: Positive for myalgias           Current Medications       Current Outpatient Prescriptions:     fexofenadine (ALLEGRA) 180 MG tablet, Take 1 tablet (180 mg total) by mouth daily, Disp: 30 tablet, Rfl: 0    fluticasone (FLONASE) 50 mcg/act nasal spray, 1 spray into each nostril daily, Disp: 16 g, Rfl: 0    gabapentin (NEURONTIN) 300 mg capsule, Take 1 capsule by mouth 3 (three) times a day, Disp: , Rfl:     hydrOXYzine HCL (ATARAX) 50 mg tablet, , Disp: , Rfl:     meclizine (ANTIVERT) 12 5 MG tablet, Take 1 tablet (12 5 mg total) by mouth every 8 (eight) hours as needed for dizziness or nausea, Disp: 30 tablet, Rfl: 0    PARoxetine (PAXIL) 10 mg tablet, Take by mouth, Disp: , Rfl:     PARoxetine (PAXIL) 40 MG tablet, Take by mouth, Disp: , Rfl:     ibuprofen (MOTRIN) 600 mg tablet, Take 1 tablet (600 mg total) by mouth every 6 (six) hours as needed for moderate pain (Inflammation) (Patient not taking: Reported on 10/26/2018 ), Disp: 30 tablet, Rfl: 0    lidocaine viscous (XYLOCAINE) 2 % mucosal solution, Swish and spit 15 mL 4 (four) times a day as needed for moderate pain, Disp: 100 mL, Rfl: 0    mometasone (ELOCON) 0 1 % cream, Apply topically daily, Disp: 45 g, Rfl: 0    spironolactone (ALDACTONE) 100 mg tablet, , Disp: , Rfl:     Current Allergies     Allergies as of 12/26/2018 - Reviewed 12/26/2018   Allergen Reaction Noted    Codeine  12/27/2016    Morphine  12/27/2016    Penicillins  12/27/2016    Seasonal ic  [cholestatin]  09/11/2013            The following portions of the patient's history were reviewed and updated as appropriate: allergies, current medications, past family history, past medical history, past social history, past surgical history and problem list      Past Medical History:   Diagnosis Date    Fibromyalgia     Renal disorder     Vertigo        Past Surgical History:   Procedure Laterality Date    FOOT FRACTURE SURGERY      HYSTERECTOMY         Family History   Problem Relation Age of Onset    Heart disease Mother     Cancer Mother     Diabetes Mother     Diabetes Father          Medications have been verified  Objective   /71   Pulse 96   Temp 98 6 °F (37 °C)   Ht 5' 7" (1 702 m)   Wt 81 6 kg (180 lb)   SpO2 96%   BMI 28 19 kg/m²        Physical Exam     Physical Exam   Constitutional: She is oriented to person, place, and time  She appears well-developed and well-nourished  HENT:   Head: Normocephalic  Right Ear: External ear normal    Left Ear: External ear normal    Nose: Nose normal    Mouth/Throat: Oropharynx is clear and moist  No oropharyngeal exudate  Eyes: Conjunctivae are normal    Cardiovascular: Normal rate, regular rhythm and normal heart sounds      Pulmonary/Chest: Effort normal  She has wheezes (mild wheeze RUL)  Abdominal: Soft  Bowel sounds are normal    Lymphadenopathy:     She has no cervical adenopathy  Neurological: She is alert and oriented to person, place, and time  Skin: Skin is warm and dry  Psychiatric: She has a normal mood and affect  Her behavior is normal  Judgment and thought content normal    Nursing note and vitals reviewed

## 2018-12-31 ENCOUNTER — OFFICE VISIT (OUTPATIENT)
Dept: FAMILY MEDICINE CLINIC | Facility: CLINIC | Age: 58
End: 2018-12-31
Payer: MEDICARE

## 2018-12-31 VITALS
HEIGHT: 67 IN | DIASTOLIC BLOOD PRESSURE: 74 MMHG | OXYGEN SATURATION: 97 % | HEART RATE: 100 BPM | BODY MASS INDEX: 28.19 KG/M2 | SYSTOLIC BLOOD PRESSURE: 126 MMHG | TEMPERATURE: 98 F

## 2018-12-31 DIAGNOSIS — R11.2 NAUSEA AND VOMITING, INTRACTABILITY OF VOMITING NOT SPECIFIED, UNSPECIFIED VOMITING TYPE: Primary | ICD-10-CM

## 2018-12-31 DIAGNOSIS — E86.0 DEHYDRATION: ICD-10-CM

## 2018-12-31 PROCEDURE — 99213 OFFICE O/P EST LOW 20 MIN: CPT | Performed by: FAMILY MEDICINE

## 2018-12-31 RX ORDER — ONDANSETRON 4 MG/1
4 TABLET, FILM COATED ORAL EVERY 12 HOURS PRN
Qty: 10 TABLET | Refills: 0 | Status: SHIPPED | OUTPATIENT
Start: 2018-12-31 | End: 2019-10-11 | Stop reason: ALTCHOICE

## 2018-12-31 RX ORDER — OMEPRAZOLE 40 MG/1
40 CAPSULE, DELAYED RELEASE ORAL DAILY
COMMUNITY
Start: 2018-12-01

## 2018-12-31 NOTE — PROGRESS NOTES
Assessment/Plan: pt here today for follow up from [facility] now   Pt stated that she was diagnosed with the flu   Pt c/o vomiting, sob, chest soreness from coughing, chills,body aches and productive cough     No problem-specific Assessment & Plan notes found for this encounter  There are no diagnoses linked to this encounter  Subjective:      Patient ID: Brandon Bennett is a 59 y o  female  Sick for over one week  Today vomiting, nausea, coughing - chest hurts from coughing  Gets light headed with standing, past few days  Not drinking much, some comes back up          The following portions of the patient's history were reviewed and updated as appropriate: allergies, current medications, past family history, past medical history, past social history, past surgical history and problem list     Current Outpatient Prescriptions:     gabapentin (NEURONTIN) 300 mg capsule, Take 1 capsule by mouth 3 (three) times a day, Disp: , Rfl:     GARCINIA CAMBOGIA-CHROMIUM PO, Take by mouth, Disp: , Rfl:     hydrOXYzine HCL (ATARAX) 50 mg tablet, , Disp: , Rfl:     PARoxetine (PAXIL) 10 mg tablet, Take by mouth, Disp: , Rfl:     PARoxetine (PAXIL) 40 MG tablet, Take by mouth, Disp: , Rfl:     fexofenadine (ALLEGRA) 180 MG tablet, Take 1 tablet (180 mg total) by mouth daily (Patient not taking: Reported on 12/31/2018 ), Disp: 30 tablet, Rfl: 0    fluticasone (FLONASE) 50 mcg/act nasal spray, 1 spray into each nostril daily (Patient not taking: Reported on 12/31/2018 ), Disp: 16 g, Rfl: 0    ibuprofen (MOTRIN) 600 mg tablet, Take 1 tablet (600 mg total) by mouth every 6 (six) hours as needed for moderate pain (Inflammation) (Patient not taking: Reported on 10/26/2018 ), Disp: 30 tablet, Rfl: 0    lidocaine viscous (XYLOCAINE) 2 % mucosal solution, Swish and spit 15 mL 4 (four) times a day as needed for moderate pain (Patient not taking: Reported on 12/31/2018 ), Disp: 100 mL, Rfl: 0    meclizine (ANTIVERT) 12 5 MG tablet, Take 1 tablet (12 5 mg total) by mouth every 8 (eight) hours as needed for dizziness or nausea (Patient not taking: Reported on 12/31/2018 ), Disp: 30 tablet, Rfl: 0    mometasone (ELOCON) 0 1 % cream, Apply topically daily (Patient not taking: Reported on 12/31/2018 ), Disp: 45 g, Rfl: 0    omeprazole (PriLOSEC) 40 MG capsule, , Disp: , Rfl:     spironolactone (ALDACTONE) 100 mg tablet, , Disp: , Rfl:     Review of Systems   Constitutional: Positive for chills and fever  HENT: Negative  Respiratory: Positive for cough  Cardiovascular: Negative  Gastrointestinal: Positive for nausea and vomiting  Negative for diarrhea  Neurological: Positive for light-headedness  Psychiatric/Behavioral: Negative  Objective:      /74 (BP Location: Left arm, Patient Position: Sitting, Cuff Size: Standard)   Pulse 100   Temp 98 °F (36 7 °C) (Oral)   Ht 5' 7" (1 702 m)   SpO2 97%   BMI 28 19 kg/m²          Physical Exam   Constitutional: She is oriented to person, place, and time  She appears well-developed and well-nourished  Looks tired  HENT:   Head: Normocephalic and atraumatic  Right Ear: External ear normal    Left Ear: External ear normal    Nose: Nose normal    Mouth/Throat: Oropharynx is clear and moist    Eyes: Pupils are equal, round, and reactive to light  Conjunctivae and EOM are normal    Neck: Normal range of motion  Neck supple  Cardiovascular: Regular rhythm and normal heart sounds  Rate about 100  Pulmonary/Chest: Effort normal and breath sounds normal    Abdominal: Soft  Bowel sounds are normal    Neurological: She is alert and oriented to person, place, and time  She has normal reflexes  Skin: Skin is warm and dry  Psychiatric: She has a normal mood and affect   Her behavior is normal  Judgment and thought content normal

## 2019-01-15 ENCOUNTER — APPOINTMENT (OUTPATIENT)
Dept: LAB | Facility: OTHER | Age: 59
End: 2019-01-15
Payer: MEDICARE

## 2019-01-15 ENCOUNTER — TRANSCRIBE ORDERS (OUTPATIENT)
Dept: LAB | Facility: OTHER | Age: 59
End: 2019-01-15

## 2019-01-15 DIAGNOSIS — N39.0 LOWER URINARY TRACT INFECTIOUS DISEASE: Primary | ICD-10-CM

## 2019-01-15 DIAGNOSIS — N39.0 LOWER URINARY TRACT INFECTIOUS DISEASE: ICD-10-CM

## 2019-01-15 PROCEDURE — 87186 SC STD MICRODIL/AGAR DIL: CPT

## 2019-01-15 PROCEDURE — 87086 URINE CULTURE/COLONY COUNT: CPT

## 2019-01-15 PROCEDURE — 87077 CULTURE AEROBIC IDENTIFY: CPT

## 2019-01-19 LAB
BACTERIA UR CULT: ABNORMAL
BACTERIA UR CULT: ABNORMAL

## 2019-02-19 ENCOUNTER — OFFICE VISIT (OUTPATIENT)
Dept: URGENT CARE | Age: 59
End: 2019-02-19
Payer: MEDICARE

## 2019-02-19 VITALS
RESPIRATION RATE: 16 BRPM | HEART RATE: 102 BPM | HEIGHT: 67 IN | BODY MASS INDEX: 28.19 KG/M2 | DIASTOLIC BLOOD PRESSURE: 72 MMHG | TEMPERATURE: 97.4 F | OXYGEN SATURATION: 97 % | SYSTOLIC BLOOD PRESSURE: 142 MMHG

## 2019-02-19 DIAGNOSIS — R51.9 ACUTE NONINTRACTABLE HEADACHE, UNSPECIFIED HEADACHE TYPE: Primary | ICD-10-CM

## 2019-02-19 PROCEDURE — G0463 HOSPITAL OUTPT CLINIC VISIT: HCPCS | Performed by: NURSE PRACTITIONER

## 2019-02-19 PROCEDURE — 99213 OFFICE O/P EST LOW 20 MIN: CPT | Performed by: NURSE PRACTITIONER

## 2019-02-19 RX ORDER — KETOROLAC TROMETHAMINE 30 MG/ML
30 INJECTION, SOLUTION INTRAMUSCULAR; INTRAVENOUS ONCE
Status: COMPLETED | OUTPATIENT
Start: 2019-02-19 | End: 2019-02-19

## 2019-02-19 RX ORDER — RIZATRIPTAN BENZOATE 5 MG/1
5 TABLET ORAL ONCE AS NEEDED
Qty: 9 TABLET | Refills: 0 | Status: SHIPPED | OUTPATIENT
Start: 2019-02-19 | End: 2019-07-12 | Stop reason: ALTCHOICE

## 2019-02-19 RX ADMIN — KETOROLAC TROMETHAMINE 30 MG: 30 INJECTION, SOLUTION INTRAMUSCULAR; INTRAVENOUS at 12:07

## 2019-02-19 NOTE — PROGRESS NOTES
[facility]        NAME: Pranay Barth is a 59 y o  female  : 1960    MRN: [id number]  DATE: 2019  TIME: 12:13 PM    Assessment and Plan   Acute nonintractable headache, unspecified headache type [R51]  1  Acute nonintractable headache, unspecified headache type  ketorolac (TORADOL) injection 30 mg    rizatriptan (MAXALT) 5 MG tablet         Patient Instructions     Medication as prescribed / discussed  Increase fluids  Continue to monitor  Proceed to ED with worsening symptoms as discussed  Follow up with PCP in 3-5 days  Proceed to  ER if symptoms worsen  Chief Complaint     Chief Complaint   Patient presents with    Headache     Pt c/o headache since Friday, nausea without vomiting, and feeling of "my ears are closed up "         History of Present Illness       68-year-old female presenting today with c/o migraine headache starting 3 days ago  She has been taking tylenol with minimal relief  +photophobia and nausea  She states she feels it's tension-related  History of migraines but has been good for the last 2-3 years, has not seen Neurology recently  She used to be on imitrex but has not needed it and does not have any more  No other complaints  Review of Systems   Review of Systems   Constitutional: Negative  Eyes: Positive for photophobia  Respiratory: Negative  Cardiovascular: Negative  Gastrointestinal: Positive for nausea  Endocrine: Negative  Genitourinary: Negative  Neurological: Positive for headaches  Negative for dizziness, facial asymmetry and weakness           Current Medications       Current Outpatient Medications:     gabapentin (NEURONTIN) 300 mg capsule, Take 1 capsule by mouth 3 (three) times a day, Disp: , Rfl:     hydrOXYzine HCL (ATARAX) 50 mg tablet, , Disp: , Rfl:     meclizine (ANTIVERT) 12 5 MG tablet, Take 1 tablet (12 5 mg total) by mouth every 8 (eight) hours as needed for dizziness or nausea, Disp: 30 tablet, Rfl: 0   omeprazole (PriLOSEC) 40 MG capsule, , Disp: , Rfl:     PARoxetine (PAXIL) 10 mg tablet, Take by mouth, Disp: , Rfl:     PARoxetine (PAXIL) 40 MG tablet, Take by mouth, Disp: , Rfl:     fexofenadine (ALLEGRA) 180 MG tablet, Take 1 tablet (180 mg total) by mouth daily (Patient not taking: Reported on 12/31/2018 ), Disp: 30 tablet, Rfl: 0    fluticasone (FLONASE) 50 mcg/act nasal spray, 1 spray into each nostril daily (Patient not taking: Reported on 12/31/2018 ), Disp: 16 g, Rfl: 0    GARCINIA CAMBOGIA-CHROMIUM PO, Take by mouth, Disp: , Rfl:     ibuprofen (MOTRIN) 600 mg tablet, Take 1 tablet (600 mg total) by mouth every 6 (six) hours as needed for moderate pain (Inflammation) (Patient not taking: Reported on 10/26/2018 ), Disp: 30 tablet, Rfl: 0    lidocaine viscous (XYLOCAINE) 2 % mucosal solution, Swish and spit 15 mL 4 (four) times a day as needed for moderate pain (Patient not taking: Reported on 12/31/2018 ), Disp: 100 mL, Rfl: 0    mometasone (ELOCON) 0 1 % cream, Apply topically daily (Patient not taking: Reported on 12/31/2018 ), Disp: 45 g, Rfl: 0    ondansetron (ZOFRAN) 4 mg tablet, Take 1 tablet (4 mg total) by mouth every 12 (twelve) hours as needed for nausea or vomiting (Patient not taking: Reported on 2/19/2019), Disp: 10 tablet, Rfl: 0    rizatriptan (MAXALT) 5 MG tablet, Take 1 tablet (5 mg total) by mouth once as needed for migraine for up to 1 dose May repeat in 2 hours if needed, Disp: 9 tablet, Rfl: 0    spironolactone (ALDACTONE) 100 mg tablet, , Disp: , Rfl:   No current facility-administered medications for this visit       Current Allergies     Allergies as of 02/19/2019 - Reviewed 02/19/2019   Allergen Reaction Noted    Codeine  12/27/2016    Morphine  12/27/2016    Penicillins  12/27/2016    Seasonal ic  [cholestatin]  09/11/2013            The following portions of the patient's history were reviewed and updated as appropriate: allergies, current medications, past family history, past medical history, past social history, past surgical history and problem list      Past Medical History:   Diagnosis Date    Fibromyalgia     Renal disorder     Vertigo        Past Surgical History:   Procedure Laterality Date    FOOT FRACTURE SURGERY      HYSTERECTOMY         Family History   Problem Relation Age of Onset    Heart disease Mother     Cancer Mother     Diabetes Mother     Diabetes Father          Medications have been verified  Objective   /72   Pulse 102   Temp (!) 97 4 °F (36 3 °C) (Tympanic)   Resp 16   Ht 5' 7" (1 702 m)   SpO2 97%   BMI 28 19 kg/m²        Physical Exam     Physical Exam   Constitutional: She is oriented to person, place, and time  She appears well-developed and well-nourished  Eyes: Pupils are equal, round, and reactive to light  Conjunctivae and EOM are normal    Neck: Normal range of motion  Cardiovascular: Normal rate, regular rhythm and normal heart sounds  Pulmonary/Chest: Effort normal and breath sounds normal    Neurological: She is alert and oriented to person, place, and time  No cranial nerve deficit  Coordination normal    Skin: Skin is warm and dry  Nursing note and vitals reviewed

## 2019-02-19 NOTE — PATIENT INSTRUCTIONS
Medication as prescribed / discussed  Increase fluids  Continue to monitor  Proceed to ED with worsening symptoms as discussed  Migraine Headache   AMBULATORY CARE:   A migraine headache  is a severe headache  The pain can be so severe that it interferes with your daily activities  A migraine can last a few hours up to several days  The exact cause of migraines is not known  Common triggers for a migraine include the following:   · Stress, eye strain, oversleeping, or not getting enough sleep    · Hormone changes in women from birth control pills, pregnancy, menopause, or during a monthly period    · Skipping meals, going too long without eating, or not drinking enough liquids    · Certain foods or drinks such as chocolate, hard cheese, red wine, or drinks that contain caffeine    · Foods that contain gluten, nitrates, MSG, or artificial sweeteners    · Sunlight, bright or flashing lights, loud noises, smoke, or strong smells    · Heat, humidity, or changes in the weather  Common warning signs include the following:  Warning signs usually start 15 to 60 minutes before the headache:  · Visual changes (auras), such as blurred vision, temporary blind or bright spots, lines, or hallucinations    · Unusual tiredness or frequent yawning    · Tingling in an arm or leg  Seek care immediately if:   · You have a headache that seems different or much worse than your usual migraine headache  · You have a severe headache with a fever or a stiff neck  · You have new problems with speech, vision, balance, or movement  · You feel like you are going to faint, you become confused, or you have a seizure  Contact your healthcare provider or neurologist if:   · You have a fever  · Your migraines interfere with your daily activities  · Your medicines or treatments stop working  · You have questions about your condition or care  Treatment:  Migraines cannot be cured   The goal of treatment is to reduce your symptoms  Take medicine as soon as you feel a migraine begin  · Prescription pain medicine  may be given  Do not wait until the pain is severe before you take your medicine  · Migraine medicines  are used to help prevent a migraine or stop it once it starts  · Antinausea medicine  may be given to calm your stomach and to help prevent vomiting  This medicine can also help relieve pain  Manage your symptoms:   · Rest in a dark, quiet room  This will help decrease your pain  Sleep may also help relieve the pain  · Apply ice to decrease pain  Use an ice pack, or put crushed ice in a plastic bag  Cover the ice pack with a towel and place it on your head where it hurts for 15 to 20 minutes every hour  · Apply heat to decrease pain and muscle spasms  Use a small towel dampened with warm water or a heating pad, or sit in a warm bath  Apply heat on the area for 20 to 30 minutes every 2 hours  You may alternate heat and ice  · Keep a migraine record  Write down when your migraines start and stop  Include your symptoms and what you were doing when a migraine began  Record what you ate or drank for 24 hours before the migraine started  Keep track of what you did to treat your migraine and if it worked  Follow up with your healthcare provider as directed:  Bring your migraine record with you  Write down your questions so you remember to ask them during your visits  Prevent another migraine headache:   · Do not smoke  Nicotine and other chemicals in cigarettes and cigars can trigger a migraine and also cause lung damage  Ask your healthcare provider for information if you currently smoke and need help to quit  E-cigarettes or smokeless tobacco still contain nicotine  Talk to your healthcare provider before you use these products  · Do not drink alcohol  Alcohol can trigger a migraine  It can also interfere with the medicines used to treat your migraine  · Exercise regularly    Ask about the best exercise plan for you  · Manage stress  Stress may trigger a migraine  Learn new ways to relax, such as deep breathing  · Follow a sleep schedule  Go to bed and get up at the same time each day  · Eat a variety of healthy foods  Healthy foods include fruit, vegetables, whole-grain breads, low-fat dairy products, beans, lean meats, and fish  Do not have foods or drinks that trigger your migraines  © 2017 [employer] Information is for End User's use only and may not be sold, redistributed or otherwise used for commercial purposes  All illustrations and images included in CareNotes® are the copyrighted property of [employer]  or [employer]  The above information is an  only  It is not intended as medical advice for individual conditions or treatments  Talk to your doctor, nurse or pharmacist before following any medical regimen to see if it is safe and effective for you

## 2019-03-19 ENCOUNTER — TRANSCRIBE ORDERS (OUTPATIENT)
Dept: ADMINISTRATIVE | Facility: HOSPITAL | Age: 59
End: 2019-03-19

## 2019-03-19 DIAGNOSIS — N39.0 URINARY TRACT INFECTION WITHOUT HEMATURIA, SITE UNSPECIFIED: Primary | ICD-10-CM

## 2019-03-21 ENCOUNTER — HOSPITAL ENCOUNTER (OUTPATIENT)
Dept: RADIOLOGY | Facility: HOSPITAL | Age: 59
Discharge: HOME | End: 2019-03-21
Attending: UROLOGY
Payer: MEDICARE

## 2019-03-21 DIAGNOSIS — N39.0 URINARY TRACT INFECTION WITHOUT HEMATURIA, SITE UNSPECIFIED: ICD-10-CM

## 2019-03-21 PROCEDURE — 51798 US URINE CAPACITY MEASURE: CPT

## 2019-03-25 ENCOUNTER — APPOINTMENT (OUTPATIENT)
Dept: LAB | Age: 59
End: 2019-03-25
Payer: MEDICARE

## 2019-03-25 DIAGNOSIS — N39.0 URINARY TRACT INFECTION WITHOUT HEMATURIA, SITE UNSPECIFIED: ICD-10-CM

## 2019-03-25 LAB
BASOPHILS # BLD AUTO: 0.07 THOUSANDS/ΜL (ref 0–0.1)
BASOPHILS NFR BLD AUTO: 1 % (ref 0–1)
BUN SERPL-MCNC: 17 MG/DL (ref 5–25)
CREAT SERPL-MCNC: 0.94 MG/DL (ref 0.6–1.3)
EOSINOPHIL # BLD AUTO: 0.29 THOUSAND/ΜL (ref 0–0.61)
EOSINOPHIL NFR BLD AUTO: 4 % (ref 0–6)
ERYTHROCYTE [DISTWIDTH] IN BLOOD BY AUTOMATED COUNT: 12.6 % (ref 11.6–15.1)
GFR SERPL CREATININE-BSD FRML MDRD: 66 ML/MIN/1.73SQ M
HCT VFR BLD AUTO: 39.3 % (ref 34.8–46.1)
HGB BLD-MCNC: 13.2 G/DL (ref 11.5–15.4)
IMM GRANULOCYTES # BLD AUTO: 0.01 THOUSAND/UL (ref 0–0.2)
IMM GRANULOCYTES NFR BLD AUTO: 0 % (ref 0–2)
LYMPHOCYTES # BLD AUTO: 3.17 THOUSANDS/ΜL (ref 0.6–4.47)
LYMPHOCYTES NFR BLD AUTO: 43 % (ref 14–44)
MCH RBC QN AUTO: 31 PG (ref 26.8–34.3)
MCHC RBC AUTO-ENTMCNC: 33.6 G/DL (ref 31.4–37.4)
MCV RBC AUTO: 92 FL (ref 82–98)
MONOCYTES # BLD AUTO: 0.54 THOUSAND/ΜL (ref 0.17–1.22)
MONOCYTES NFR BLD AUTO: 7 % (ref 4–12)
NEUTROPHILS # BLD AUTO: 3.34 THOUSANDS/ΜL (ref 1.85–7.62)
NEUTS SEG NFR BLD AUTO: 45 % (ref 43–75)
NRBC BLD AUTO-RTO: 0 /100 WBCS
PLATELET # BLD AUTO: 212 THOUSANDS/UL (ref 149–390)
PMV BLD AUTO: 10.2 FL (ref 8.9–12.7)
RBC # BLD AUTO: 4.26 MILLION/UL (ref 3.81–5.12)
WBC # BLD AUTO: 7.42 THOUSAND/UL (ref 4.31–10.16)

## 2019-03-25 PROCEDURE — 84520 ASSAY OF UREA NITROGEN: CPT

## 2019-03-25 PROCEDURE — 85025 COMPLETE CBC W/AUTO DIFF WBC: CPT

## 2019-03-25 PROCEDURE — 82565 ASSAY OF CREATININE: CPT

## 2019-03-25 PROCEDURE — 36415 COLL VENOUS BLD VENIPUNCTURE: CPT

## 2019-05-14 ENCOUNTER — OFFICE VISIT (OUTPATIENT)
Dept: FAMILY MEDICINE CLINIC | Facility: CLINIC | Age: 59
End: 2019-05-14
Payer: MEDICARE

## 2019-05-14 VITALS
HEART RATE: 97 BPM | BODY MASS INDEX: 28.19 KG/M2 | OXYGEN SATURATION: 97 % | HEIGHT: 67 IN | TEMPERATURE: 97.7 F | DIASTOLIC BLOOD PRESSURE: 84 MMHG | SYSTOLIC BLOOD PRESSURE: 132 MMHG

## 2019-05-14 DIAGNOSIS — F43.9 STRESS AT HOME: ICD-10-CM

## 2019-05-14 DIAGNOSIS — R51.9 NONINTRACTABLE EPISODIC HEADACHE, UNSPECIFIED HEADACHE TYPE: ICD-10-CM

## 2019-05-14 DIAGNOSIS — Z91.09 MITE ALLERGY: ICD-10-CM

## 2019-05-14 DIAGNOSIS — L29.9 ITCHY SCALP: Primary | ICD-10-CM

## 2019-05-14 DIAGNOSIS — R09.81 HEAD CONGESTION: ICD-10-CM

## 2019-05-14 PROCEDURE — 99214 OFFICE O/P EST MOD 30 MIN: CPT | Performed by: FAMILY MEDICINE

## 2019-05-14 RX ORDER — SUMATRIPTAN 50 MG/1
50 TABLET, FILM COATED ORAL ONCE AS NEEDED
Qty: 10 TABLET | Refills: 1 | Status: SHIPPED | OUTPATIENT
Start: 2019-05-14 | End: 2019-07-12 | Stop reason: SDUPTHER

## 2019-05-14 RX ORDER — PERMETHRIN 50 MG/G
CREAM TOPICAL ONCE
Qty: 60 G | Refills: 0 | Status: SHIPPED | OUTPATIENT
Start: 2019-05-14 | End: 2019-05-14

## 2019-05-16 ENCOUNTER — TELEPHONE (OUTPATIENT)
Dept: PODIATRY | Facility: CLINIC | Age: 59
End: 2019-05-16

## 2019-05-23 ENCOUNTER — OFFICE VISIT (OUTPATIENT)
Dept: PODIATRY | Facility: CLINIC | Age: 59
End: 2019-05-23
Payer: MEDICARE

## 2019-05-23 VITALS
HEART RATE: 78 BPM | SYSTOLIC BLOOD PRESSURE: 130 MMHG | WEIGHT: 180 LBS | DIASTOLIC BLOOD PRESSURE: 80 MMHG | BODY MASS INDEX: 28.25 KG/M2 | HEIGHT: 67 IN

## 2019-05-23 DIAGNOSIS — G62.9 PERIPHERAL NERVE DISORDER: Primary | ICD-10-CM

## 2019-05-23 PROCEDURE — 99213 OFFICE O/P EST LOW 20 MIN: CPT | Performed by: PODIATRIST

## 2019-05-23 RX ORDER — METHENAMINE HIPPURATE 1000 MG/1
3 TABLET ORAL 2 TIMES DAILY WITH MEALS
COMMUNITY
Start: 2019-05-21

## 2019-05-23 RX ORDER — GABAPENTIN 300 MG/1
900 CAPSULE ORAL 2 TIMES DAILY
Qty: 180 CAPSULE | Refills: 3 | Status: SHIPPED | OUTPATIENT
Start: 2019-05-23 | End: 2019-10-28 | Stop reason: SDUPTHER

## 2019-05-24 ENCOUNTER — OFFICE VISIT (OUTPATIENT)
Dept: FAMILY MEDICINE CLINIC | Facility: CLINIC | Age: 59
End: 2019-05-24
Payer: MEDICARE

## 2019-05-24 VITALS
HEIGHT: 67 IN | DIASTOLIC BLOOD PRESSURE: 74 MMHG | OXYGEN SATURATION: 99 % | BODY MASS INDEX: 28.19 KG/M2 | SYSTOLIC BLOOD PRESSURE: 126 MMHG | TEMPERATURE: 98.1 F | HEART RATE: 83 BPM

## 2019-05-24 DIAGNOSIS — R63.1 EXCESSIVE THIRST: Primary | ICD-10-CM

## 2019-05-24 PROCEDURE — 82948 REAGENT STRIP/BLOOD GLUCOSE: CPT | Performed by: FAMILY MEDICINE

## 2019-05-24 PROCEDURE — 99213 OFFICE O/P EST LOW 20 MIN: CPT | Performed by: FAMILY MEDICINE

## 2019-05-28 LAB — SL AMB POCT GLUCOSE BLD: 117

## 2019-07-12 ENCOUNTER — OFFICE VISIT (OUTPATIENT)
Dept: URGENT CARE | Age: 59
End: 2019-07-12
Payer: MEDICARE

## 2019-07-12 VITALS
HEART RATE: 116 BPM | RESPIRATION RATE: 16 BRPM | OXYGEN SATURATION: 95 % | SYSTOLIC BLOOD PRESSURE: 113 MMHG | DIASTOLIC BLOOD PRESSURE: 83 MMHG | WEIGHT: 180 LBS | TEMPERATURE: 98.4 F | BODY MASS INDEX: 28.25 KG/M2 | HEIGHT: 67 IN

## 2019-07-12 DIAGNOSIS — G43.909 MIGRAINE WITHOUT STATUS MIGRAINOSUS, NOT INTRACTABLE, UNSPECIFIED MIGRAINE TYPE: Primary | ICD-10-CM

## 2019-07-12 DIAGNOSIS — R11.0 NAUSEA: ICD-10-CM

## 2019-07-12 DIAGNOSIS — R51.9 NONINTRACTABLE EPISODIC HEADACHE, UNSPECIFIED HEADACHE TYPE: ICD-10-CM

## 2019-07-12 PROCEDURE — G0463 HOSPITAL OUTPT CLINIC VISIT: HCPCS | Performed by: FAMILY MEDICINE

## 2019-07-12 PROCEDURE — 99213 OFFICE O/P EST LOW 20 MIN: CPT | Performed by: FAMILY MEDICINE

## 2019-07-12 RX ORDER — ONDANSETRON 4 MG/1
4 TABLET, ORALLY DISINTEGRATING ORAL ONCE
Status: COMPLETED | OUTPATIENT
Start: 2019-07-12 | End: 2019-07-12

## 2019-07-12 RX ORDER — SUMATRIPTAN 50 MG/1
50 TABLET, FILM COATED ORAL ONCE AS NEEDED
Qty: 10 TABLET | Refills: 0 | Status: SHIPPED | OUTPATIENT
Start: 2019-07-12 | End: 2020-04-07 | Stop reason: SDUPTHER

## 2019-07-12 RX ORDER — KETOROLAC TROMETHAMINE 30 MG/ML
30 INJECTION, SOLUTION INTRAMUSCULAR; INTRAVENOUS ONCE
Status: COMPLETED | OUTPATIENT
Start: 2019-07-12 | End: 2019-07-12

## 2019-07-12 RX ADMIN — ONDANSETRON 4 MG: 4 TABLET, ORALLY DISINTEGRATING ORAL at 12:59

## 2019-07-12 RX ADMIN — KETOROLAC TROMETHAMINE 30 MG: 30 INJECTION, SOLUTION INTRAMUSCULAR; INTRAVENOUS at 12:59

## 2019-07-12 NOTE — PROGRESS NOTES
[facility]        NAME: Davin Flores is a 59 y o  female  : 1960    MRN: [id number]  DATE: 2019  TIME: 6:48 PM    Assessment and Plan   Migraine without status migrainosus, not intractable, unspecified migraine type [G43 909]  1  Migraine without status migrainosus, not intractable, unspecified migraine type  ketorolac (TORADOL) injection 30 mg   2  Nausea  ondansetron (ZOFRAN-ODT) dispersible tablet 4 mg   3  Nonintractable episodic headache, unspecified headache type  SUMAtriptan (IMITREX) 50 mg tablet     Headache periodically with history of migraines: Patient also started noticing head sores after being diagnosed with psoriasis  On exam, patient appeared to be in pain, with photophobia, erythematous papules with plaques on scalp  Patient received Ketoralac injection and Zofran during this visit  Imitrex was also prescribed during this visit  Advised patient to make sure she follows up with her Dermatology appointment for further evaluation of her scalp source  Patient Instructions     Sumatriptan as directed for headache  Follow up with PCP/dermatology in 3-5 days  Proceed to  ER if symptoms worsen  Chief Complaint     Chief Complaint   Patient presents with    Headache     Pt c/o headche and nausea x 4 days  Pt states "I have sores on my scalp for 4 or 5 months "         History of Present Illness       Headache    This is a chronic problem  The current episode started in the past 7 days  The problem occurs constantly  The problem has been unchanged  The pain is located in the occipital and frontal region  The pain quality is similar to prior headaches  The quality of the pain is described as throbbing  Associated symptoms include nausea, photophobia and scalp tenderness  Pertinent negatives include no eye redness, fever, visual change or weakness  Associated symptoms comments: Patient also reports had so worse for the past 4-5 months    States she was recently diagnosed with psoriasis and is concerned that it might have gone up to her scalp  Patient saw PCP who was not sure of cause of sores  She has an appointment with Dermatology on July 28th  Patient not sure if her headache has anything to do with just her migraine or the source as well  She normally takes Imitrex, has some at home but has not taken them yet, as she only has a few left    Nothing aggravates the symptoms  She has tried acetaminophen for the symptoms  The treatment provided no relief  Her past medical history is significant for migraine headaches  Review of Systems   Review of Systems   Constitutional: Negative for fever  Eyes: Positive for photophobia  Negative for redness  Respiratory: Negative  Cardiovascular: Negative  Gastrointestinal: Positive for nausea  Musculoskeletal: Negative  Skin: Positive for rash  Neurological: Positive for headaches  Negative for weakness and light-headedness           Current Medications       Current Outpatient Medications:     gabapentin (NEURONTIN) 300 mg capsule, Take 1 capsule by mouth 3 (three) times a day, Disp: , Rfl:     gabapentin (NEURONTIN) 300 mg capsule, Take 3 capsules (900 mg total) by mouth 2 (two) times a day for 90 days, Disp: 180 capsule, Rfl: 3    ibuprofen (MOTRIN) 600 mg tablet, Take 1 tablet (600 mg total) by mouth every 6 (six) hours as needed for moderate pain (Inflammation), Disp: 30 tablet, Rfl: 0    meclizine (ANTIVERT) 12 5 MG tablet, Take 1 tablet (12 5 mg total) by mouth every 8 (eight) hours as needed for dizziness or nausea, Disp: 30 tablet, Rfl: 0    methenamine hippurate (HIPREX) 1 g tablet, , Disp: , Rfl:     omeprazole (PriLOSEC) 40 MG capsule, , Disp: , Rfl:     ondansetron (ZOFRAN) 4 mg tablet, Take 1 tablet (4 mg total) by mouth every 12 (twelve) hours as needed for nausea or vomiting, Disp: 10 tablet, Rfl: 0    PARoxetine (PAXIL) 10 mg tablet, Take by mouth, Disp: , Rfl:     PARoxetine (PAXIL) 40 MG tablet, Take by mouth, Disp: , Rfl:     SUMAtriptan (IMITREX) 50 mg tablet, Take 1 tablet (50 mg total) by mouth once as needed for migraine for up to 1 dose May repeat in 2 hours  No more than 200 mg per day , Disp: 10 tablet, Rfl: 0    fexofenadine (ALLEGRA) 180 MG tablet, Take 1 tablet (180 mg total) by mouth daily (Patient not taking: Reported on 12/31/2018 ), Disp: 30 tablet, Rfl: 0    fluticasone (FLONASE) 50 mcg/act nasal spray, 1 spray into each nostril daily (Patient not taking: Reported on 12/31/2018 ), Disp: 16 g, Rfl: 0    GARCINIA CAMBOGIA-CHROMIUM PO, Take by mouth, Disp: , Rfl:     hydrOXYzine HCL (ATARAX) 50 mg tablet, , Disp: , Rfl:     lidocaine viscous (XYLOCAINE) 2 % mucosal solution, Swish and spit 15 mL 4 (four) times a day as needed for moderate pain (Patient not taking: Reported on 12/31/2018 ), Disp: 100 mL, Rfl: 0    mometasone (ELOCON) 0 1 % cream, Apply topically daily (Patient not taking: Reported on 7/12/2019), Disp: 45 g, Rfl: 0    spironolactone (ALDACTONE) 100 mg tablet, , Disp: , Rfl:   No current facility-administered medications for this visit       Current Allergies     Allergies as of 07/12/2019 - Reviewed 07/12/2019   Allergen Reaction Noted    Codeine  12/27/2016    Morphine  12/27/2016    Penicillins  12/27/2016    Seasonal ic  [cholestatin]  09/11/2013            The following portions of the patient's history were reviewed and updated as appropriate: allergies, current medications, past family history, past medical history, past social history, past surgical history and problem list      Past Medical History:   Diagnosis Date    Arthritis     Last assessed 5/3/2011    Cancer [facility] [address] )     Fibromyalgia     Fracture of one or more phalanges of foot     Hypertension     Polyneuropathy     Last assessed 6/23/2016    Renal disorder     Vertigo        Past Surgical History:   Procedure Laterality Date    ABDOMINAL SURGERY  1986    BACK SURGERY      FOOT FRACTURE SURGERY      HYSTERECTOMY      KIDNEY SURGERY  1974       Family History   Problem Relation Age of Onset    Heart disease Mother     Cancer Mother     Diabetes Mother     Diabetes Father     Hypertension Family     Arthritis Family          Medications have been verified  Objective   /83   Pulse (!) 116   Temp 98 4 °F (36 9 °C) (Tympanic)   Resp 16   Ht 5' 7" (1 702 m)   Wt 81 6 kg (180 lb)   SpO2 95%   BMI 28 19 kg/m²        Physical Exam     Physical Exam   Constitutional: She is oriented to person, place, and time  She appears well-developed and well-nourished  No distress  HENT:   Head: Normocephalic and atraumatic  Right Ear: External ear normal    Left Ear: External ear normal    Nose: Nose normal    Mouth/Throat: Oropharynx is clear and moist    Eyes: Pupils are equal, round, and reactive to light  Conjunctivae and EOM are normal    Neck: Normal range of motion  Neck supple  Cardiovascular: Normal rate  Pulmonary/Chest: Effort normal and breath sounds normal  No respiratory distress  She has no wheezes  She has no rales  She exhibits no tenderness  Musculoskeletal: Normal range of motion  She exhibits no edema, tenderness or deformity  Lymphadenopathy:     She has no cervical adenopathy  Neurological: She is alert and oriented to person, place, and time  Skin: Skin is warm and dry  No rash noted  No erythema  No pallor  Erythematous papules with plaques noted on scalp, no purulent drainage noted  Psychiatric: She has a normal mood and affect  Nursing note and vitals reviewed

## 2019-07-12 NOTE — LETTER
July 12, 2019     Patient: Rosy Meade   YOB: 1960   Date of Visit: 7/12/2019       To Whom it May Concern:    Brisa Nora was seen in my clinic on 7/12/2019  If you have any questions or concerns, please don't hesitate to call           Sincerely,          Kati Browne MD        CC: No Recipients

## 2019-09-19 ENCOUNTER — OFFICE VISIT (OUTPATIENT)
Dept: URGENT CARE | Age: 59
End: 2019-09-19
Payer: MEDICARE

## 2019-09-19 VITALS
RESPIRATION RATE: 20 BRPM | HEART RATE: 91 BPM | DIASTOLIC BLOOD PRESSURE: 76 MMHG | BODY MASS INDEX: 28.19 KG/M2 | SYSTOLIC BLOOD PRESSURE: 123 MMHG | TEMPERATURE: 98.5 F | OXYGEN SATURATION: 96 % | HEIGHT: 67 IN

## 2019-09-19 DIAGNOSIS — L23.9 ALLERGIC CONTACT DERMATITIS, UNSPECIFIED TRIGGER: Primary | ICD-10-CM

## 2019-09-19 PROCEDURE — G0463 HOSPITAL OUTPT CLINIC VISIT: HCPCS | Performed by: PHYSICIAN ASSISTANT

## 2019-09-19 PROCEDURE — 99213 OFFICE O/P EST LOW 20 MIN: CPT | Performed by: PHYSICIAN ASSISTANT

## 2019-09-19 RX ORDER — METHYLPREDNISOLONE 4 MG/1
TABLET ORAL
Qty: 21 TABLET | Refills: 0 | Status: SHIPPED | OUTPATIENT
Start: 2019-09-19 | End: 2019-10-11 | Stop reason: ALTCHOICE

## 2019-09-19 RX ORDER — DOXYCYCLINE HYCLATE 20 MG
20 TABLET ORAL 2 TIMES DAILY
Refills: 1 | COMMUNITY
Start: 2019-09-04 | End: 2020-08-18

## 2019-09-19 NOTE — PROGRESS NOTES
[facility]        NAME: oJ Hall is a 59 y o  female  : 1960    MRN: [id number]  DATE: 2019  TIME: 11:03 AM    Assessment and Plan   Allergic contact dermatitis, unspecified trigger [L23 9]  1  Allergic contact dermatitis, unspecified trigger  methylPREDNISolone 4 MG tablet therapy pack         Patient Instructions       Continue to monitor symptoms  If new or worsening symptoms develop, go immediately to Er  Drink plenty of fluids  Follow up with Family Doctor this week  Chief Complaint     Chief Complaint   Patient presents with   [facility] Goodman     Pt reports she was doing yardwork on Saturday and three days ago developed rash on her face, bilateral arms and hands  No ointments applied or Benadryl taken  History of Present Illness       Rash   This is a new problem  Episode onset: 4 days ago, working in yard clearing bushes  The problem has been gradually worsening since onset  Location: chin, hands, arms  Rash characteristics: Red raised itchy  She was exposed to plant contact  Pertinent negatives include no congestion, cough, diarrhea, fatigue, fever, rhinorrhea, shortness of breath or vomiting  Past treatments include nothing  The treatment provided no relief  Review of Systems   Review of Systems   Constitutional: Negative for chills, diaphoresis, fatigue and fever  HENT: Negative for congestion, ear pain, rhinorrhea, sinus pressure and voice change  Eyes: Negative  Respiratory: Negative for cough, chest tightness and shortness of breath  Cardiovascular: Negative for chest pain and palpitations  Gastrointestinal: Negative for constipation, diarrhea, nausea and vomiting  Endocrine: Negative  Genitourinary: Negative for dysuria  Musculoskeletal: Negative for back pain, myalgias and neck pain  Skin: Positive for rash  Negative for pallor  Allergic/Immunologic: Negative  Neurological: Negative for dizziness, syncope and headaches  Hematological: Negative  Psychiatric/Behavioral: Negative            Current Medications       Current Outpatient Medications:     gabapentin (NEURONTIN) 300 mg capsule, Take 1 capsule by mouth 3 (three) times a day, Disp: , Rfl:     hydrOXYzine HCL (ATARAX) 50 mg tablet, , Disp: , Rfl:     omeprazole (PriLOSEC) 40 MG capsule, , Disp: , Rfl:     PARoxetine (PAXIL) 10 mg tablet, Take by mouth, Disp: , Rfl:     PARoxetine (PAXIL) 40 MG tablet, Take by mouth, Disp: , Rfl:     doxycycline (PERIOSTAT) 20 MG tablet, Take 20 mg by mouth 2 (two) times a day, Disp: , Rfl: 1    fexofenadine (ALLEGRA) 180 MG tablet, Take 1 tablet (180 mg total) by mouth daily (Patient not taking: Reported on 12/31/2018 ), Disp: 30 tablet, Rfl: 0    fluticasone (FLONASE) 50 mcg/act nasal spray, 1 spray into each nostril daily (Patient not taking: Reported on 12/31/2018 ), Disp: 16 g, Rfl: 0    gabapentin (NEURONTIN) 300 mg capsule, Take 3 capsules (900 mg total) by mouth 2 (two) times a day for 90 days, Disp: 180 capsule, Rfl: 3    GARCINIA CAMBOGIA-CHROMIUM PO, Take by mouth, Disp: , Rfl:     ibuprofen (MOTRIN) 600 mg tablet, Take 1 tablet (600 mg total) by mouth every 6 (six) hours as needed for moderate pain (Inflammation) (Patient not taking: Reported on 9/19/2019), Disp: 30 tablet, Rfl: 0    lidocaine viscous (XYLOCAINE) 2 % mucosal solution, Swish and spit 15 mL 4 (four) times a day as needed for moderate pain (Patient not taking: Reported on 12/31/2018 ), Disp: 100 mL, Rfl: 0    meclizine (ANTIVERT) 12 5 MG tablet, Take 1 tablet (12 5 mg total) by mouth every 8 (eight) hours as needed for dizziness or nausea (Patient not taking: Reported on 9/19/2019), Disp: 30 tablet, Rfl: 0    methenamine hippurate (HIPREX) 1 g tablet, , Disp: , Rfl:     methylPREDNISolone 4 MG tablet therapy pack, Use as directed on package, Disp: 21 tablet, Rfl: 0    mometasone (ELOCON) 0 1 % cream, Apply topically daily (Patient not taking: Reported on 7/12/2019), Disp: 45 g, Rfl: 0    ondansetron (ZOFRAN) 4 mg tablet, Take 1 tablet (4 mg total) by mouth every 12 (twelve) hours as needed for nausea or vomiting (Patient not taking: Reported on 9/19/2019), Disp: 10 tablet, Rfl: 0    spironolactone (ALDACTONE) 100 mg tablet, , Disp: , Rfl:     SUMAtriptan (IMITREX) 50 mg tablet, Take 1 tablet (50 mg total) by mouth once as needed for migraine for up to 1 dose May repeat in 2 hours  No more than 200 mg per day , Disp: 10 tablet, Rfl: 0    Current Allergies     Allergies as of 09/19/2019 - Reviewed 09/19/2019   Allergen Reaction Noted    Codeine  12/27/2016    Morphine  12/27/2016    Penicillins  12/27/2016    Seasonal ic  [cholestatin]  09/11/2013            The following portions of the patient's history were reviewed and updated as appropriate: allergies, current medications, past family history, past medical history, past social history, past surgical history and problem list      Past Medical History:   Diagnosis Date    Arthritis     Last assessed 5/3/2011    Cancer ([facility] [address] )     Fibromyalgia     Fracture of one or more phalanges of foot     Hypertension     Polyneuropathy     Last assessed 6/23/2016    Renal disorder     Vertigo        Past Surgical History:   Procedure Laterality Date    ABDOMINAL SURGERY  1986    BACK SURGERY      FOOT FRACTURE SURGERY      HYSTERECTOMY      KIDNEY SURGERY  1974       Family History   Problem Relation Age of Onset    Heart disease Mother     Cancer Mother     Diabetes Mother     Diabetes Father     Hypertension Family     Arthritis Family          Medications have been verified  Objective   /76   Pulse 91   Temp 98 5 °F (36 9 °C)   Resp 20   Ht 5' 7" (1 702 m)   SpO2 96%   BMI 28 19 kg/m²        Physical Exam     Physical Exam   Constitutional: She appears well-developed and well-nourished  No distress  HENT:   Head: Normocephalic and atraumatic     Right Ear: External ear normal    Left Ear: External ear normal    Eyes: Conjunctivae are normal  Right eye exhibits no discharge  Left eye exhibits no discharge  Neck: Normal range of motion  Neck supple  Cardiovascular: Normal rate, regular rhythm, normal heart sounds and intact distal pulses  Pulmonary/Chest: Effort normal and breath sounds normal  No respiratory distress  She has no wheezes  She has no rales  Lymphadenopathy:     She has no cervical adenopathy  Skin: Skin is warm  Capillary refill takes less than 2 seconds  Rash (Red raised lash in linear distribution across cheeks and forehead  Hands as well  Pt was wearing gabo jacket at time of contact and pt has no rash to rest of arms or legs) noted  She is not diaphoretic  Nursing note and vitals reviewed

## 2019-09-19 NOTE — LETTER
September 19, 2019     Patient: Nasrin Cerrato   YOB: 1960   Date of Visit: 9/19/2019       To Whom It May Concern: It is my medical opinion that Byron Robertsoner may return to work on 9/20/2019  If you have any questions or concerns, please don't hesitate to call           Sincerely,        Solmon Schwab, PA-C    CC: No Recipients

## 2019-09-19 NOTE — PATIENT INSTRUCTIONS
Take medications as directed  Drink plenty of fluids  Follow up with family doctor this week  Go to ER immediately if new or worsening symptoms occur  Poison Ivy   WHAT YOU NEED TO KNOW:   Poison ivy is a plant that can cause an itchy, uncomfortable rash on your skin  Poison ivy grows as a shrub or vine in woods, fields, and areas of thick [place]  It has 3 bright green leaves on each stem that turn red in jane  DISCHARGE INSTRUCTIONS:   Medicines:   · Antiseptic or drying creams or ointments: These medicines may be used to dry out the rash and decrease the itching  These products may be available without a doctor's order  · Steroids: This medicine helps decrease itching and inflammation  It can be given as a cream to apply to your skin or as a pill  · Antihistamines: This medicine may help decrease itching and help you sleep  It is available without a doctor's order  · Take your medicine as directed  Contact your healthcare provider if you think your medicine is not helping or if you have side effects  Tell him of her if you are allergic to any medicine  Keep a list of the medicines, vitamins, and herbs you take  Include the amounts, and when and why you take them  Bring the list or the pill bottles to follow-up visits  Carry your medicine list with you in case of an emergency  Follow up with your healthcare provider as directed:  Write down your questions so you remember to ask them during your visits  How your poison ivy rash spreads: You cannot spread poison ivy by touching your rash or the liquid from your blisters  Poison ivy is spread only if you scratch your skin while it still has oil on it  You may think your rash is spreading because new rashes appear over a number of days  This happens because areas covered by thin skin break out in a rash first  Your face or forearms may develop a rash before thicker areas, such as the palms of your hands     Self-care:   · Keep your rash clean and dry:  Wash it with soap and water  Gently pat it dry with a clean towel  · Try not to scratch or rub your rash: This can cause your skin to become infected  · Use a compress on your rash:  Dip a clean washcloth in cool water  Wring it out and place it on your rash  Leave the washcloth on your skin for 15 minutes  Do this at least 3 times per day  · Take a cornstarch or oatmeal bath: If your rash is too large to cover with wet washcloths, take 3 or 4 cornstarch baths daily  Mix 1 pound of cornstarch with a little water to make a paste  Add the paste to a tub full of water and mix well  You may also use colloidal oatmeal in the bath water  Use lukewarm water  Avoid hot water because it may cause your itching to increase  Prevent a poison ivy rash in the future:   · Wear skin protection:  Wear long pants, a long-sleeved shirt, and gloves  Use a skin block lotion to protect your skin from poison ivy oil  You can find this at a drugstore without a prescription  · Wash clothing after possible exposure: If you think you have been near a poison ivy plant, wash the clothes you were wearing separately from other clothes  Rinse the washing machine well after you take the clothes out  Scrub boots and shoes with warm, soapy water  Dry clean items and clothing that you cannot wash in water  Poison ivy oil is sticky and can stay on surfaces for a long time  It can cause a new rash even years later  · Bathe your pet:  Use warm water and shampoo on your pet's fur  This will prevent the spread of oil to your skin, car, and home  Wear long sleeves, long pants, and gloves while washing pets or any items that may have oil on them  · Reduce exposure to poison ivy:  Do not touch plants that look like poison ivy  Keep your yard free of poison ivy  While protecting your skin, remove the plant and the roots  Place them in a plastic bag and seal the bag tightly  · Do not burn poison ivy plants:   This can spread the oil through the air  If you breathe the oil into your lungs, you could have swelling and serious breathing problems  Oil that clings to the fire sam can land on your skin and cause a rash  Contact your healthcare provider if:   · You have pus, soft yellow scabs, or tenderness on the rash  · The itching gets worse or keeps you awake at night  · The rash covers more than 1/4 of your skin or spreads to your eyes, mouth, or genital area  · The rash is not better after 2 to 3 weeks  · You have tender, swollen glands on the sides of your neck  · You have swelling in your arms and legs  · You have questions or concerns about your condition or care  Return to the emergency department if:   · You have a fever  · You have redness, swelling, and tenderness around the rash  · You have trouble breathing  © 2017 [employer]  Information is for End User's use only and may not be sold, redistributed or otherwise used for commercial purposes  All illustrations and images included in CareNotes® are the copyrighted property of A D A M [employer] Inc  or [employer]  The above information is an  only  It is not intended as medical advice for individual conditions or treatments  Talk to your doctor, nurse or pharmacist before following any medical regimen to see if it is safe and effective for you

## 2019-10-04 ENCOUNTER — OFFICE VISIT (OUTPATIENT)
Dept: OBGYN CLINIC | Facility: CLINIC | Age: 59
End: 2019-10-04
Payer: MEDICARE

## 2019-10-04 VITALS
DIASTOLIC BLOOD PRESSURE: 82 MMHG | HEIGHT: 66 IN | WEIGHT: 218.6 LBS | BODY MASS INDEX: 35.13 KG/M2 | SYSTOLIC BLOOD PRESSURE: 112 MMHG

## 2019-10-04 DIAGNOSIS — F52.0 HYPOACTIVE SEXUAL DESIRE DISORDER: ICD-10-CM

## 2019-10-04 DIAGNOSIS — Z12.31 ENCOUNTER FOR SCREENING MAMMOGRAM FOR MALIGNANT NEOPLASM OF BREAST: ICD-10-CM

## 2019-10-04 DIAGNOSIS — E89.40 POSTSURGICAL MENOPAUSE: ICD-10-CM

## 2019-10-04 DIAGNOSIS — L90.0 LICHEN SCLEROSUS ET ATROPHICUS: Primary | ICD-10-CM

## 2019-10-04 PROCEDURE — 99203 OFFICE O/P NEW LOW 30 MIN: CPT | Performed by: STUDENT IN AN ORGANIZED HEALTH CARE EDUCATION/TRAINING PROGRAM

## 2019-10-04 RX ORDER — PROPRANOLOL/HYDROCHLOROTHIAZID 40 MG-25MG
TABLET ORAL
COMMUNITY
End: 2019-10-11 | Stop reason: ALTCHOICE

## 2019-10-04 NOTE — PROGRESS NOTES
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  Discussed pelvic pain in setting of Lichen Sclerosus is very common  Would recommend pelvic floor pt once started on steroids to decrease pain associated with therapy  -Given menopausal ~30 years, particularly in setting of hip MSK pain, pt requires DEXA   RTO next available for vulvar biopsy      Diagnoses and all orders for this visit:    Postsurgical menopause  -     DXA bone density spine hip and pelvis; Future    Encounter for screening mammogram for malignant neoplasm of breast  -     Mammo screening bilateral w cad; Future    Encounter for gynecological examination (general) (routine) with abnormal findings    Lichen sclerosus et atrophicus    Other orders  -     Cancel: US pelvis complete w transvaginal; Future  -     APPLE CIDER VINEGAR PO; Take by mouth  -     Turmeric 500 MG CAPS; Take by mouth        Subjective:      Patient ID: Jessica Herrera is a 59 y o  female  She presents today for discussion of multiple symptoms      -She notes severe pelvic discomfort, which she localizes to her inner thighs, but indicates that it radiates into her pelvis  She notes urinary incontinence, which is managed by urology  She denies dysuria/hematuria, constipation  She does have difficulty with diarrhea      -Additionally, she has had no sex drive for the past 6 months  She and her  previously had an active sexual relationship and she is very upset about this change  She does note the association between this change and changes to both her and her 's health   Prior to this change in her desire, intercourse was painful, associated with a tearing sensation and postcoital bleeding  Soon after, her  required a spinal device that made her concerned about the vigor of intercourse  After she became more comfortable with the idea, her desire had evaporated  Despite surgical menopause appx 30 years ago, she notes hot flushes for the past few years that she finds very bothersome  She denies changes in energy, weight, appetite      -Finally, Karmen is frustrated by the full body rash she has been experiencing, for which she is being seen by her primary care physician and a dermatologist      The following portions of the patient's history were reviewed and updated as appropriate: allergies, current medications, past family history, past medical history, past social history, past surgical history and problem list     Review of Systems  as above  Objective:  /82 (BP Location: Left arm, Patient Position: Sitting, Cuff Size: Large)   Ht 5' 6" (1 676 m)   Wt 99 2 kg (218 lb 9 6 oz)   BMI 35 28 kg/m²        Physical Exam   Constitutional: She is oriented to person, place, and time  She appears well-developed and well-nourished  HENT:   Head: Normocephalic  Eyes: EOM are normal    Neck: Normal range of motion  Pulmonary/Chest: Effort normal    Abdominal: Soft  Musculoskeletal: Normal range of motion  Neurological: She is alert and oriented to person, place, and time  Skin: Skin is warm and dry  Psychiatric: She has a normal mood and affect  Her behavior is normal      Pelvic: with thinned, atrophic, crinkled skin overlying bilateral labia  With erosion of the inferior portion of the labia minora bilaterally  Clitoral michel free of adhesions, though retracted  With tethering of posterior fourchette  Anus with atrophic skin, evidence of erosion      Total visit time was 30 minutes, of which >50% was spent in face to face counseling

## 2019-10-11 ENCOUNTER — PROCEDURE VISIT (OUTPATIENT)
Dept: OBGYN CLINIC | Facility: CLINIC | Age: 59
End: 2019-10-11
Payer: MEDICARE

## 2019-10-11 ENCOUNTER — TELEPHONE (OUTPATIENT)
Dept: PODIATRY | Facility: CLINIC | Age: 59
End: 2019-10-11

## 2019-10-11 VITALS — DIASTOLIC BLOOD PRESSURE: 82 MMHG | WEIGHT: 215 LBS | SYSTOLIC BLOOD PRESSURE: 116 MMHG | BODY MASS INDEX: 34.7 KG/M2

## 2019-10-11 DIAGNOSIS — L90.0 LICHEN SCLEROSUS: Primary | ICD-10-CM

## 2019-10-11 DIAGNOSIS — G62.9 PERIPHERAL NERVE DISORDER: Primary | ICD-10-CM

## 2019-10-11 PROCEDURE — 88312 SPECIAL STAINS GROUP 1: CPT | Performed by: PATHOLOGY

## 2019-10-11 PROCEDURE — 56605 BIOPSY OF VULVA/PERINEUM: CPT | Performed by: STUDENT IN AN ORGANIZED HEALTH CARE EDUCATION/TRAINING PROGRAM

## 2019-10-11 PROCEDURE — 88305 TISSUE EXAM BY PATHOLOGIST: CPT | Performed by: PATHOLOGY

## 2019-10-11 RX ORDER — GABAPENTIN 300 MG/1
CAPSULE ORAL
Qty: 540 CAPSULE | Refills: 1 | Status: SHIPPED | OUTPATIENT
Start: 2019-10-11 | End: 2019-10-28 | Stop reason: SDUPTHER

## 2019-10-11 NOTE — PROGRESS NOTES
Biopsy  Date/Time: 10/11/2019 10:17 AM  Performed by: Asa Milton MD  Authorized by: Asa Milton MD     Procedure Details - Skin Biopsy:     Biopsy tissue type: skin and subcutaneous    Biopsy method: punch biopsy      Body area: Anogenital    Anogenital location:  Vulva    Vaginal Lesion: Yes      Initial size (mm):  4    Final defect size (mm):  4    Malignancy: malignancy unknown       Representative area in left posterior vulva (just lateral to tethering of posterior fourchette), in area of resorbed labium, excised  Patient tolerated without any difficulty   No bleeding noted, sliver nitrate applied for hemostasis

## 2019-10-14 ENCOUNTER — TELEPHONE (OUTPATIENT)
Dept: OBGYN CLINIC | Facility: CLINIC | Age: 59
End: 2019-10-14

## 2019-10-14 NOTE — TELEPHONE ENCOUNTER
Called pt back - explained to her that Dr Yanira Flores states the description sounds normal for irritation in the area of the biopsy  Since bleeding has stopped, do not need to intervene  To take NSAIDs for pain and discomfort  Also mention ice pack will help too

## 2019-10-14 NOTE — TELEPHONE ENCOUNTER
We can reassure her that her description sounds normal for irritation in that area after a biopsy  Since the bleeding has stopped, we do not need to intervene   For discomfort, she can take NSAIDs or tylenol

## 2019-10-14 NOTE — TELEPHONE ENCOUNTER
Spoke with pt - she had biopsy on Friday 10/11 with Dr Lizeth Case  On Saturday she had bleeding - bright red blood  No clots  Sunday bleeding was very light  She looked at the biopsy area and stated it had a red ring around it, then white then red  Wants to know if normal  Very minimal bleeding today - saw a spot on her underwear but nothing when she wiped  States she is uncomfortable and worried about biopsy area  Please advise  Thanks!

## 2019-10-14 NOTE — TELEPHONE ENCOUNTER
Patient has a biopsy done on Friday 10/11/2019 and said she is having bleeding and pain  Patient said the area is red and painful and that when she goes to the bathroom there is bright red blood  Please advise

## 2019-10-22 ENCOUNTER — TELEPHONE (OUTPATIENT)
Dept: OBGYN CLINIC | Age: 59
End: 2019-10-22

## 2019-10-23 ENCOUNTER — TELEPHONE (OUTPATIENT)
Dept: OBGYN CLINIC | Facility: CLINIC | Age: 59
End: 2019-10-23

## 2019-10-23 NOTE — TELEPHONE ENCOUNTER
Pt was given results but had more questions about lichen told her it will be discussed at her f/up apt,

## 2019-10-25 ENCOUNTER — OFFICE VISIT (OUTPATIENT)
Dept: OBGYN CLINIC | Facility: CLINIC | Age: 59
End: 2019-10-25
Payer: MEDICARE

## 2019-10-25 ENCOUNTER — TELEPHONE (OUTPATIENT)
Dept: OBGYN CLINIC | Facility: CLINIC | Age: 59
End: 2019-10-25

## 2019-10-25 VITALS — SYSTOLIC BLOOD PRESSURE: 126 MMHG | DIASTOLIC BLOOD PRESSURE: 84 MMHG

## 2019-10-25 DIAGNOSIS — N95.2 POSTMENOPAUSAL ATROPHIC VAGINITIS: Primary | ICD-10-CM

## 2019-10-25 PROCEDURE — 99213 OFFICE O/P EST LOW 20 MIN: CPT | Performed by: STUDENT IN AN ORGANIZED HEALTH CARE EDUCATION/TRAINING PROGRAM

## 2019-10-25 RX ORDER — ESTRADIOL 10 UG/1
1 INSERT VAGINAL DAILY
Qty: 30 TABLET | Refills: 6 | Status: SHIPPED | OUTPATIENT
Start: 2019-10-25 | End: 2020-11-24

## 2019-10-25 RX ORDER — ESTRADIOL 0.1 MG/G
1 CREAM VAGINAL DAILY
Qty: 42.5 G | Refills: 5 | Status: SHIPPED | OUTPATIENT
Start: 2019-10-25 | End: 2019-10-25 | Stop reason: ALTCHOICE

## 2019-10-25 NOTE — TELEPHONE ENCOUNTER
I'll place an Rx for the tablets in a few minutes  If she uses [employer], she can get them much cheaper than the cream  It should have the same effect

## 2019-10-25 NOTE — PATIENT INSTRUCTIONS
Vaginal Atrophy   WHAT YOU NEED TO KNOW:   Vaginal atrophy is a condition that causes thinning, drying, and inflammation of vaginal tissue  This condition is caused by decreased levels of estrogen (a female sex hormone)  Vaginal atrophy can increase your risk for vaginal and urinary tract infections  Vaginal atrophy can worsen over time if not treated  DISCHARGE INSTRUCTIONS:   Contact your healthcare provider if:   · You have a foul-smelling odor coming from your vagina  · You have a thick, cheese-like discharge from your vagina  · You have itching, swelling, or redness in your vagina  · You have pain or burning when you urinate  · Your urine smells bad  · Your symptoms do not improve, or they get worse  · You have questions or concerns about your condition or care  Medicines:   · Over-the counter vaginal moisturizers  can help reduce dryness  Your healthcare provider may recommend that you use a vaginal moisturizer several times each week and during sex  Only use creams that are made for vaginal use  Do  not  use petroleum jelly  Lubricants can be used during sex to decrease pain and discomfort  · Estrogen  may help decrease dryness  It may also lower your risk of vaginal infections if you are going through menopause  It can also help to relieve urinary symptoms  Estrogen may be prescribed in the form of a cream, tablet, or ring  These medicines can be applied or inserted into the vagina  Estrogen can also be prescribed in the form of a pill  · Take your medicine as directed  Contact your healthcare provider if you think your medicine is not helping or if you have side effects  Tell him of her if you are allergic to any medicine  Keep a list of the medicines, vitamins, and herbs you take  Include the amounts, and when and why you take them  Bring the list or the pill bottles to follow-up visits  Carry your medicine list with you in case of an emergency    Follow up with your healthcare provider as directed:  Write down your questions so you remember to ask them during your visits  © 2017 [employer] Information is for End User's use only and may not be sold, redistributed or otherwise used for commercial purposes  All illustrations and images included in CareNotes® are the copyrighted property of A D A M , Inc  or [employer]  The above information is an  only  It is not intended as medical advice for individual conditions or treatments  Talk to your doctor, nurse or pharmacist before following any medical regimen to see if it is safe and effective for you

## 2019-10-25 NOTE — PROGRESS NOTES
Assessment/Plan:    -Discussed vulvovaginal atrophy  Patient upset regarding severe architectural changes  Discussed that this is possible with severe atrophy and unlikely reversible  However, we can improve her symptoms with vaginal estrogen therapy    -Discussed that decreased libido may improve with improvement of atrophic symptoms  Will readdress as necessary   Diagnoses and all orders for this visit:    Postmenopausal atrophic vaginitis  -     estradiol (ESTRACE) 0 1 mg/g vaginal cream; Insert 1 g into the vagina daily For 2 weeks, then twice per week  Use at night before bed and 12 hours before sexual activity       -likely enlarged lymph node on exam, recommended follow up with PCP if this persists    Subjective:      Patient ID: Edgar Izquierdo is a 59 y o  female  HPI She returns to the office today to discuss her pathology results s/p vulvar biopsy performed for severe vulvovaginal atrophy that was concerning for Lichen Sclerosus  She continues to have vaginal dryness, irritation, and dyspareunia  She denies dysuria, hematuria, vaginal bleeding, frequent UTI  The following portions of the patient's history were reviewed and updated as appropriate: allergies, current medications, past family history, past medical history, past social history, past surgical history and problem list     Review of Systems   Constitutional: Negative for activity change, appetite change, chills and fever  HENT:        +right sided neck lump   Respiratory: Negative for shortness of breath  Gastrointestinal: Negative for constipation, diarrhea, nausea and vomiting  Genitourinary: Negative for dysuria, hematuria and urgency  Objective:  /84 (BP Location: Left arm, Patient Position: Sitting, Cuff Size: Standard)   LMP  (LMP Unknown)          Physical Exam   Constitutional: She is oriented to person, place, and time  She appears well-developed and well-nourished  HENT:   Head: Normocephalic         Eyes: EOM are normal    Neck: Normal range of motion  Pulmonary/Chest: Effort normal    Abdominal: Soft  Musculoskeletal: Normal range of motion  Neurological: She is alert and oriented to person, place, and time  Skin: Skin is warm and dry  Psychiatric: She has a normal mood and affect  Her behavior is normal    Vitals reviewed          Total visit time was 15 minutes, of which >50% was spent in face to face counseling

## 2019-10-28 ENCOUNTER — TELEPHONE (OUTPATIENT)
Dept: OBGYN CLINIC | Facility: CLINIC | Age: 59
End: 2019-10-28

## 2019-10-28 ENCOUNTER — OFFICE VISIT (OUTPATIENT)
Dept: FAMILY MEDICINE CLINIC | Facility: CLINIC | Age: 59
End: 2019-10-28
Payer: MEDICARE

## 2019-10-28 VITALS
TEMPERATURE: 97.9 F | OXYGEN SATURATION: 98 % | HEART RATE: 118 BPM | DIASTOLIC BLOOD PRESSURE: 90 MMHG | SYSTOLIC BLOOD PRESSURE: 130 MMHG

## 2019-10-28 DIAGNOSIS — R59.0 POSTERIOR CERVICAL ADENOPATHY: Primary | ICD-10-CM

## 2019-10-28 DIAGNOSIS — Z00.00 MEDICARE ANNUAL WELLNESS VISIT, INITIAL: ICD-10-CM

## 2019-10-28 PROCEDURE — 99213 OFFICE O/P EST LOW 20 MIN: CPT | Performed by: FAMILY MEDICINE

## 2019-10-28 PROCEDURE — G0438 PPPS, INITIAL VISIT: HCPCS | Performed by: FAMILY MEDICINE

## 2019-10-28 NOTE — PROGRESS NOTES
BMI Counseling: There is no height or weight on file to calculate BMI  The BMI is above normal  Nutrition recommendations include consuming healthier snacks, moderation in carbohydrate intake and increasing intake of lean protein

## 2019-10-28 NOTE — PROGRESS NOTES
Assessment and Plan:     Problem List Items Addressed This Visit     None           Preventive health issues were discussed with patient, and age appropriate screening tests were ordered as noted in patient's After Visit Summary  Personalized health advice and appropriate referrals for health education or preventive services given if needed, as noted in patient's After Visit Summary  History of Present Illness:     Patient presents for Initial Medicare visit  Patient Care Team:  Flo Hastings DO as PCP - General  MD Flo Box DO     Review of Systems:     Review of Systems   Constitutional: Negative  HENT: Negative  Eyes: Negative  Respiratory: Negative  Cardiovascular: Negative  Gastrointestinal: Negative  Genitourinary: Negative  Musculoskeletal:        Right posterior lateral upper neck swelling  Skin:        Inflammatory lesions top of head  Neurological: Negative  Psychiatric/Behavioral: Negative         Problem List:     Patient Active Problem List   Diagnosis    Anxiety    Ortega esophagus    Depression      Past Medical and Surgical History:     Past Medical History:   Diagnosis Date    Arthritis     Last assessed 5/3/2011    Cancer ([facility] [address] [facility]     Fibromyalgia     Fracture of one or more phalanges of foot     Hypertension     Polyneuropathy     Last assessed 6/23/2016    Renal disorder     Vertigo      Past Surgical History:   Procedure Laterality Date    ABDOMINAL SURGERY  1986    BACK SURGERY  1990    FOOT FRACTURE SURGERY Bilateral 2004    x 5  surgeries    HYSTERECTOMY  1988    KIDNEY SURGERY  1974      Family History:     Family History   Problem Relation Age of Onset    Heart disease Mother     Cancer Mother     Diabetes Mother     Arthritis Mother     Anxiety disorder Mother     Bleeding Disorder Mother     Clotting disorder Mother     Depression Mother     Hyperlipidemia Mother     Irritable bowel syndrome Mother    Uriel Demarcus Hypertension Mother     Obesity Mother    Jose Luis Flower Osteoporosis Mother     Vaginal cancer Mother     Skin cancer Mother     Thyroid disease Mother     Diabetes Father     Arthritis Father     Hyperlipidemia Father     Vesicoureteral reflux Father     Heart disease Father     Hypertension Father     Migraines Father     Obesity Father     Hypertension Family     Arthritis Family     Arthritis Brother     Anxiety disorder Brother     Diabetes Brother     Hyperlipidemia Brother     Vesicoureteral reflux Brother     Heart disease Brother     Irritable bowel syndrome Brother     Hypertension Brother     Migraines Brother     Thyroid disease Brother     Migraines Daughter     Asthma Son     Migraines Son     Diabetes Maternal Grandmother     Vaginal cancer Maternal Grandmother     Infertility Paternal Grandmother     Arthritis Maternal Aunt     Anxiety disorder Maternal Aunt     Depression Maternal Aunt     Diabetes Maternal Aunt     Vaginal cancer Maternal Aunt     Fibroids Paternal Aunt       Social History:     Social History     Socioeconomic History    Marital status: /Civil Union     Spouse name: None    Number of children: None    Years of education: None    Highest education level: None   Occupational History    Occupation: CNA   Social Needs    Financial resource strain: None    Food insecurity:     Worry: None     Inability: None    Transportation needs:     Medical: None     Non-medical: None   Tobacco Use    Smoking status: Never Smoker    Smokeless tobacco: Never Used   Substance and Sexual Activity    Alcohol use: No    Drug use: No    Sexual activity: Not Currently   Lifestyle    Physical activity:     Days per week: None     Minutes per session: None    Stress: None   Relationships    Social connections:     Talks on phone: None     Gets together: None     Attends [employer] service: None     Active member of club or organization: None     Attends meetings of clubs or organizations: None     Relationship status: None    Intimate partner violence:     Fear of current or ex partner: None     Emotionally abused: None     Physically abused: None     Forced sexual activity: None   Other Topics Concern    None   Social History Narrative    None      Medications and Allergies:     Current Outpatient Medications   Medication Sig Dispense Refill    APPLE CIDER VINEGAR PO Take by mouth      doxycycline (PERIOSTAT) 20 MG tablet Take 20 mg by mouth 2 (two) times a day  1    gabapentin (NEURONTIN) 300 mg capsule Take 1 capsule by mouth 3 (three) times a day      hydrOXYzine HCL (ATARAX) 50 mg tablet       methenamine hippurate (HIPREX) 1 g tablet       omeprazole (PriLOSEC) 40 MG capsule       PARoxetine (PAXIL) 10 mg tablet Take by mouth      PARoxetine (PAXIL) 40 MG tablet Take by mouth      estradiol (VAGIFEM, YUVAFEM) 10 MCG TABS vaginal tablet Insert 1 tablet (10 mcg total) into the vagina daily For two weeks  Then twice weekly at night  Use 12 hours prior to intercourse (Patient not taking: Reported on 10/28/2019) 30 tablet 6    ibuprofen (MOTRIN) 600 mg tablet Take 1 tablet (600 mg total) by mouth every 6 (six) hours as needed for moderate pain (Inflammation) (Patient not taking: Reported on 10/28/2019) 30 tablet 0    meclizine (ANTIVERT) 12 5 MG tablet Take 1 tablet (12 5 mg total) by mouth every 8 (eight) hours as needed for dizziness or nausea (Patient not taking: Reported on 10/25/2019) 30 tablet 0    SUMAtriptan (IMITREX) 50 mg tablet Take 1 tablet (50 mg total) by mouth once as needed for migraine for up to 1 dose May repeat in 2 hours  No more than 200 mg per day  (Patient not taking: Reported on 10/25/2019) 10 tablet 0     No current facility-administered medications for this visit        Allergies   Allergen Reactions    Codeine     Morphine     Penicillins     Seasonal Ic  [Cholestatin]       Immunizations:     Immunization History Administered Date(s) Administered    Tuberculin Skin Test-PPD Intradermal 12/12/2012, 12/26/2012    Zoster 10/12/2016      Health Maintenance:         Topic Date Due    Hepatitis C Screening  1960    MAMMOGRAM  1960    Cervical Cancer Screening  12/30/1979    CRC Screening: Colonoscopy  10/01/2023         Topic Date Due    Pneumococcal Vaccine: Pediatrics (0 to 5 Years) and At-Risk Patients (6 to 59 Years) (1 of 3 - PCV13) 12/30/1964    DTaP,Tdap,and Td Vaccines (1 - Tdap) 12/30/1979      Medicare Screening Tests and Risk Assessments:     Karmen is here for her Initial Wellness visit  Health Risk Assessment:   Patient rates overall health as good  Patient feels that their physical health rating is slightly worse  Eyesight was rated as much worse  Hearing was rated as same  Patient feels that their emotional and mental health rating is slightly better  Pain experienced in the last 7 days has been a lot  Patient's pain rating has been 8/10  Patient states that she has experienced no weight loss or gain in last 6 months  Fall Risk Screening: In the past year, patient has experienced: history of falling in past year    Number of falls: 2 or more  Injured during fall?: Yes    Feels unsteady when standing or walking?: Yes    Worried about falling?: Yes      Urinary Incontinence Screening:   Patient has leaked urine accidently in the last six months  Home Safety:  Patient does not have trouble with stairs inside or outside of their home  Patient has working smoke alarms Home safety hazards include: none  Nutrition:   Current diet is Low Carb, Low Cholesterol and Other (please comment)  Vegetarian diet    Medications:   Patient is currently taking over-the-counter supplements  OTC medications include: see medication list  Patient is able to manage medications       Activities of Daily Living (ADLs)/Instrumental Activities of Daily Living (IADLs):   Walk and transfer into and out of bed and chair?: Yes  Dress and groom yourself?: Yes    Bathe or shower yourself?: Yes    Feed yourself?  Yes  Do your laundry/housekeeping?: Yes  Manage your money, pay your bills and track your expenses?: Yes  Make your own meals?: Yes    Do your own shopping?: Yes    Previous Hospitalizations:   Any hospitalizations or ED visits within the last 12 months?: No      Advance Care Planning:   Living will: No    Durable POA for healthcare: No    Advanced directive: No      PREVENTIVE SCREENINGS      Cardiovascular Screening:    General: Screening Current      Diabetes Screening:     General: Screening Current      Colorectal Cancer Screening:     General: Screening Current      Breast Cancer Screening:     General: Screening Current      Cervical Cancer Screening:    General: Risks and Benefits Discussed      Osteoporosis Screening:    General: Risks and Benefits Discussed and Screening Current      Abdominal Aortic Aneurysm (AAA) Screening:        General: Screening Not Indicated      Lung Cancer Screening:     General: Screening Not Indicated      Hepatitis C Screening:    General: Risks and Benefits Discussed    Hep C Screening Accepted: Yes      No exam data present     Physical Exam:     /90 (BP Location: Left arm, Patient Position: Sitting, Cuff Size: Standard)   Pulse (!) 118   Temp 97 9 °F (36 6 °C) (Oral)   LMP  (LMP Unknown)   SpO2 98%     Physical Exam    Clarissa Willoughby DO

## 2019-10-28 NOTE — PROGRESS NOTES
Chief Complaint   Patient presents with    Neck Pain     has some swelling, pain started 1 week ago       Assessment/Plan:  These are reactive nodes due to lesions on top of head  Reassurance given  Diagnoses and all orders for this visit:    Posterior cervical adenopathy    Medicare annual wellness visit, initial          Subjective:      Patient ID: Sheryl Fraire is a 59 y o  female  Here for right upper cervical neck swelling  Seeing Derm for sore's on top of her head  The following portions of the patient's history were reviewed and updated as appropriate: allergies, current medications, past family history, past medical history, past social history, past surgical history and problem list     Review of Systems   Constitutional: Negative  HENT: Negative  Eyes: Negative  Respiratory: Negative  Cardiovascular: Negative  Gastrointestinal: Negative  Genitourinary: Negative  Skin:        inflammatory lesions on top of head, seeing derm  Neurological: Negative  Psychiatric/Behavioral: Negative            Objective:      /90 (BP Location: Left arm, Patient Position: Sitting, Cuff Size: Standard)   Pulse (!) 118   Temp 97 9 °F (36 6 °C) (Oral)   LMP  (LMP Unknown)   SpO2 98%       Current Outpatient Medications:     APPLE CIDER VINEGAR PO, Take by mouth, Disp: , Rfl:     doxycycline (PERIOSTAT) 20 MG tablet, Take 20 mg by mouth 2 (two) times a day, Disp: , Rfl: 1    gabapentin (NEURONTIN) 300 mg capsule, Take 1 capsule by mouth 3 (three) times a day, Disp: , Rfl:     hydrOXYzine HCL (ATARAX) 50 mg tablet, , Disp: , Rfl:     methenamine hippurate (HIPREX) 1 g tablet, , Disp: , Rfl:     omeprazole (PriLOSEC) 40 MG capsule, , Disp: , Rfl:     PARoxetine (PAXIL) 10 mg tablet, Take by mouth, Disp: , Rfl:     PARoxetine (PAXIL) 40 MG tablet, Take by mouth, Disp: , Rfl:     estradiol (VAGIFEM, YUVAFEM) 10 MCG TABS vaginal tablet, Insert 1 tablet (10 mcg total) into the vagina daily For two weeks  Then twice weekly at night  Use 12 hours prior to intercourse (Patient not taking: Reported on 10/28/2019), Disp: 30 tablet, Rfl: 6    ibuprofen (MOTRIN) 600 mg tablet, Take 1 tablet (600 mg total) by mouth every 6 (six) hours as needed for moderate pain (Inflammation) (Patient not taking: Reported on 10/28/2019), Disp: 30 tablet, Rfl: 0    meclizine (ANTIVERT) 12 5 MG tablet, Take 1 tablet (12 5 mg total) by mouth every 8 (eight) hours as needed for dizziness or nausea (Patient not taking: Reported on 10/25/2019), Disp: 30 tablet, Rfl: 0    SUMAtriptan (IMITREX) 50 mg tablet, Take 1 tablet (50 mg total) by mouth once as needed for migraine for up to 1 dose May repeat in 2 hours  No more than 200 mg per day  (Patient not taking: Reported on 10/25/2019), Disp: 10 tablet, Rfl: 0     Physical Exam   Constitutional: She is oriented to person, place, and time  She appears well-developed and well-nourished  HENT:   Head: Normocephalic and atraumatic  Right Ear: External ear normal    Left Ear: External ear normal    Nose: Nose normal    Mouth/Throat: Oropharynx is clear and moist    Eyes: Pupils are equal, round, and reactive to light  Conjunctivae and EOM are normal    Neck: Normal range of motion  Neck supple  Few posterior cervical nodes BL  Cardiovascular: Normal rate, regular rhythm, normal heart sounds and intact distal pulses  Pulmonary/Chest: Effort normal and breath sounds normal    Abdominal: Soft  Bowel sounds are normal    Lymphadenopathy:     She has cervical adenopathy  Neurological: She is alert and oriented to person, place, and time  She has normal reflexes  Skin: Skin is warm and dry  Dry skin dermatitis scalp  Psychiatric: She has a normal mood and affect   Her behavior is normal  Judgment and thought content normal

## 2019-10-29 ENCOUNTER — TELEPHONE (OUTPATIENT)
Dept: OBGYN CLINIC | Facility: CLINIC | Age: 59
End: 2019-10-29

## 2019-10-29 NOTE — TELEPHONE ENCOUNTER
Called pt insurance and priced out several meds including vagifem, premarin, estring and the Least expensive was the estrace cream for a 90 day supply, told pt we can send to her mail order if she wishes

## 2019-11-27 ENCOUNTER — HOSPITAL ENCOUNTER (OUTPATIENT)
Dept: RADIOLOGY | Age: 59
Discharge: HOME | End: 2019-11-27
Payer: MEDICARE

## 2019-11-27 VITALS — BODY MASS INDEX: 34.55 KG/M2 | WEIGHT: 215 LBS | HEIGHT: 66 IN

## 2019-11-27 DIAGNOSIS — Z12.31 ENCOUNTER FOR SCREENING MAMMOGRAM FOR MALIGNANT NEOPLASM OF BREAST: ICD-10-CM

## 2019-11-27 DIAGNOSIS — E89.40 POSTSURGICAL MENOPAUSE: ICD-10-CM

## 2019-11-27 PROCEDURE — 77067 SCR MAMMO BI INCL CAD: CPT

## 2019-11-27 PROCEDURE — 77080 DXA BONE DENSITY AXIAL: CPT

## 2019-12-31 ENCOUNTER — OFFICE VISIT (OUTPATIENT)
Dept: FAMILY MEDICINE CLINIC | Facility: CLINIC | Age: 59
End: 2019-12-31
Payer: MEDICARE

## 2019-12-31 VITALS
DIASTOLIC BLOOD PRESSURE: 90 MMHG | TEMPERATURE: 98.5 F | OXYGEN SATURATION: 98 % | HEART RATE: 113 BPM | SYSTOLIC BLOOD PRESSURE: 134 MMHG | RESPIRATION RATE: 17 BRPM

## 2019-12-31 DIAGNOSIS — K52.9 GE (GASTROENTERITIS): Primary | ICD-10-CM

## 2019-12-31 DIAGNOSIS — R11.2 NON-INTRACTABLE VOMITING WITH NAUSEA, UNSPECIFIED VOMITING TYPE: ICD-10-CM

## 2019-12-31 PROCEDURE — 99213 OFFICE O/P EST LOW 20 MIN: CPT | Performed by: FAMILY MEDICINE

## 2019-12-31 RX ORDER — ONDANSETRON 4 MG/1
4 TABLET, FILM COATED ORAL EVERY 8 HOURS PRN
Qty: 10 TABLET | Refills: 0 | Status: SHIPPED | OUTPATIENT
Start: 2019-12-31 | End: 2020-11-24

## 2019-12-31 NOTE — PROGRESS NOTES
Chief Complaint   Patient presents with    Chills    Vomiting     started sunday, off and on    Diarrhea    Headache    Low grade fever    Sore Throat         Assessment/Plan:  Water and electrolytes  Immodium AD as directed  Diagnoses and all orders for this visit:    GE (gastroenteritis)    Non-intractable vomiting with nausea, unspecified vomiting type  -     ondansetron (ZOFRAN) 4 mg tablet; Take 1 tablet (4 mg total) by mouth every 8 (eight) hours as needed for nausea or vomiting          Subjective:      Patient ID: Bashir Stallworth is a 62 y o  female  Started Sunday with N/V D  Then sore throat, chills, low garad fevers, achy  The following portions of the patient's history were reviewed and updated as appropriate: allergies, current medications, past social history and problem list     Review of Systems   Constitutional: Positive for chills and fever  HENT: Negative  Eyes: Negative  Respiratory: Negative  Cardiovascular: Negative  Gastrointestinal: Positive for diarrhea, nausea and vomiting  Genitourinary: Negative  Musculoskeletal: Negative  Skin: Negative  Neurological: Negative  Psychiatric/Behavioral: Negative            Objective:      /90 (BP Location: Left arm, Patient Position: Sitting, Cuff Size: Standard)   Pulse (!) 113   Temp 98 5 °F (36 9 °C) (Oral)   Resp 17   LMP  (LMP Unknown)   SpO2 98%       Current Outpatient Medications:     doxycycline (PERIOSTAT) 20 MG tablet, Take 20 mg by mouth 2 (two) times a day, Disp: , Rfl: 1    gabapentin (NEURONTIN) 300 mg capsule, Take 1 capsule by mouth 3 (three) times a day, Disp: , Rfl:     hydrOXYzine HCL (ATARAX) 50 mg tablet, , Disp: , Rfl:     omeprazole (PriLOSEC) 40 MG capsule, , Disp: , Rfl:     PARoxetine (PAXIL) 10 mg tablet, Take by mouth, Disp: , Rfl:     PARoxetine (PAXIL) 40 MG tablet, Take by mouth, Disp: , Rfl:     APPLE CIDER VINEGAR PO, Take by mouth, Disp: , Rfl:    estradiol (VAGIFEM, YUVAFEM) 10 MCG TABS vaginal tablet, Insert 1 tablet (10 mcg total) into the vagina daily For two weeks  Then twice weekly at night  Use 12 hours prior to intercourse (Patient not taking: Reported on 10/28/2019), Disp: 30 tablet, Rfl: 6    ibuprofen (MOTRIN) 600 mg tablet, Take 1 tablet (600 mg total) by mouth every 6 (six) hours as needed for moderate pain (Inflammation) (Patient not taking: Reported on 10/28/2019), Disp: 30 tablet, Rfl: 0    meclizine (ANTIVERT) 12 5 MG tablet, Take 1 tablet (12 5 mg total) by mouth every 8 (eight) hours as needed for dizziness or nausea (Patient not taking: Reported on 10/25/2019), Disp: 30 tablet, Rfl: 0    methenamine hippurate (HIPREX) 1 g tablet, , Disp: , Rfl:     SUMAtriptan (IMITREX) 50 mg tablet, Take 1 tablet (50 mg total) by mouth once as needed for migraine for up to 1 dose May repeat in 2 hours  No more than 200 mg per day  (Patient not taking: Reported on 10/25/2019), Disp: 10 tablet, Rfl: 0     Physical Exam   Constitutional: She is oriented to person, place, and time  She appears well-developed and well-nourished  HENT:   Head: Normocephalic and atraumatic  Right Ear: External ear normal    Left Ear: External ear normal    Nose: Nose normal    Mouth/Throat: Oropharynx is clear and moist    Eyes: Pupils are equal, round, and reactive to light  Conjunctivae and EOM are normal    Neck: Normal range of motion  Neck supple  Cardiovascular: Normal rate, regular rhythm and normal heart sounds  Pulmonary/Chest: Effort normal and breath sounds normal    Abdominal: Soft  Bowel sounds are normal    Neurological: She is alert and oriented to person, place, and time  She has normal reflexes  Skin: Skin is warm and dry  Psychiatric: She has a normal mood and affect   Her behavior is normal  Judgment and thought content normal

## 2020-01-28 ENCOUNTER — TELEPHONE (OUTPATIENT)
Dept: FAMILY MEDICINE CLINIC | Facility: CLINIC | Age: 60
End: 2020-01-28

## 2020-01-28 DIAGNOSIS — H81.10 BENIGN PAROXYSMAL POSITIONAL VERTIGO, UNSPECIFIED LATERALITY: ICD-10-CM

## 2020-01-28 RX ORDER — MECLIZINE HCL 12.5 MG/1
12.5 TABLET ORAL EVERY 8 HOURS PRN
Qty: 30 TABLET | Refills: 0 | Status: SHIPPED | OUTPATIENT
Start: 2020-01-28

## 2020-01-28 NOTE — TELEPHONE ENCOUNTER
Called pt gave instructions to meclizine 12 5 MG tablet take 1 tablet every 8 hours PRN  Pt understood advised pt to call back if symptoms worsen or do not resolve

## 2020-02-01 ENCOUNTER — OFFICE VISIT (OUTPATIENT)
Dept: URGENT CARE | Age: 60
End: 2020-02-01
Payer: MEDICARE

## 2020-02-01 ENCOUNTER — APPOINTMENT (EMERGENCY)
Dept: CT IMAGING | Facility: HOSPITAL | Age: 60
End: 2020-02-01
Payer: MEDICARE

## 2020-02-01 ENCOUNTER — HOSPITAL ENCOUNTER (EMERGENCY)
Facility: HOSPITAL | Age: 60
Discharge: HOME | End: 2020-02-01
Attending: EMERGENCY MEDICINE | Admitting: EMERGENCY MEDICINE
Payer: MEDICARE

## 2020-02-01 VITALS
OXYGEN SATURATION: 95 % | SYSTOLIC BLOOD PRESSURE: 94 MMHG | WEIGHT: 211.86 LBS | RESPIRATION RATE: 18 BRPM | HEART RATE: 86 BPM | DIASTOLIC BLOOD PRESSURE: 52 MMHG | TEMPERATURE: 97.7 F | BODY MASS INDEX: 34.2 KG/M2

## 2020-02-01 VITALS
DIASTOLIC BLOOD PRESSURE: 98 MMHG | TEMPERATURE: 99.8 F | SYSTOLIC BLOOD PRESSURE: 164 MMHG | RESPIRATION RATE: 30 BRPM | OXYGEN SATURATION: 97 % | BODY MASS INDEX: 34.55 KG/M2 | WEIGHT: 215 LBS | HEIGHT: 66 IN | HEART RATE: 102 BPM

## 2020-02-01 DIAGNOSIS — R93.7 ABNORMAL CT SCAN, LUMBAR SPINE: ICD-10-CM

## 2020-02-01 DIAGNOSIS — M54.50 ACUTE LEFT-SIDED LOW BACK PAIN WITHOUT SCIATICA: Primary | ICD-10-CM

## 2020-02-01 DIAGNOSIS — R11.0 NAUSEA: ICD-10-CM

## 2020-02-01 DIAGNOSIS — K80.20 GALLSTONES: ICD-10-CM

## 2020-02-01 DIAGNOSIS — R17 ELEVATED BILIRUBIN: ICD-10-CM

## 2020-02-01 DIAGNOSIS — M54.50 LOW BACK PAIN: Primary | ICD-10-CM

## 2020-02-01 LAB
ALBUMIN SERPL BCP-MCNC: 3.9 G/DL (ref 3.5–5)
ALP SERPL-CCNC: 90 U/L (ref 46–116)
ALT SERPL W P-5'-P-CCNC: 16 U/L (ref 12–78)
ANION GAP SERPL CALCULATED.3IONS-SCNC: 16 MMOL/L (ref 4–13)
AST SERPL W P-5'-P-CCNC: 28 U/L (ref 5–45)
BACTERIA UR QL AUTO: ABNORMAL /HPF
BASOPHILS # BLD AUTO: 0.05 THOUSANDS/ΜL (ref 0–0.1)
BASOPHILS NFR BLD AUTO: 0 % (ref 0–1)
BILIRUB DIRECT SERPL-MCNC: 0.38 MG/DL (ref 0–0.2)
BILIRUB SERPL-MCNC: 2.74 MG/DL (ref 0.2–1)
BILIRUB UR QL STRIP: NEGATIVE
BUN SERPL-MCNC: 12 MG/DL (ref 5–25)
CALCIUM SERPL-MCNC: 9.5 MG/DL (ref 8.3–10.1)
CHLORIDE SERPL-SCNC: 99 MMOL/L (ref 100–108)
CK MB SERPL-MCNC: 1.2 NG/ML (ref 0–5)
CK MB SERPL-MCNC: <1 % (ref 0–2.5)
CK SERPL-CCNC: 216 U/L (ref 26–192)
CLARITY UR: CLEAR
CO2 SERPL-SCNC: 22 MMOL/L (ref 21–32)
COLOR UR: YELLOW
CREAT SERPL-MCNC: 1.13 MG/DL (ref 0.6–1.3)
EOSINOPHIL # BLD AUTO: 0 THOUSAND/ΜL (ref 0–0.61)
EOSINOPHIL NFR BLD AUTO: 0 % (ref 0–6)
ERYTHROCYTE [DISTWIDTH] IN BLOOD BY AUTOMATED COUNT: 13 % (ref 11.6–15.1)
GFR SERPL CREATININE-BSD FRML MDRD: 53 ML/MIN/1.73SQ M
GLUCOSE SERPL-MCNC: 133 MG/DL (ref 65–140)
GLUCOSE UR STRIP-MCNC: NEGATIVE MG/DL
HCT VFR BLD AUTO: 41.3 % (ref 34.8–46.1)
HGB BLD-MCNC: 14.1 G/DL (ref 11.5–15.4)
HGB UR QL STRIP.AUTO: ABNORMAL
IMM GRANULOCYTES # BLD AUTO: 0.07 THOUSAND/UL (ref 0–0.2)
IMM GRANULOCYTES NFR BLD AUTO: 1 % (ref 0–2)
KETONES UR STRIP-MCNC: ABNORMAL MG/DL
LEUKOCYTE ESTERASE UR QL STRIP: ABNORMAL
LIPASE SERPL-CCNC: 158 U/L (ref 73–393)
LYMPHOCYTES # BLD AUTO: 1.94 THOUSANDS/ΜL (ref 0.6–4.47)
LYMPHOCYTES NFR BLD AUTO: 13 % (ref 14–44)
MAGNESIUM SERPL-MCNC: 1.7 MG/DL (ref 1.6–2.6)
MCH RBC QN AUTO: 29.9 PG (ref 26.8–34.3)
MCHC RBC AUTO-ENTMCNC: 34.1 G/DL (ref 31.4–37.4)
MCV RBC AUTO: 88 FL (ref 82–98)
MONOCYTES # BLD AUTO: 1.77 THOUSAND/ΜL (ref 0.17–1.22)
MONOCYTES NFR BLD AUTO: 11 % (ref 4–12)
NEUTROPHILS # BLD AUTO: 11.64 THOUSANDS/ΜL (ref 1.85–7.62)
NEUTS SEG NFR BLD AUTO: 75 % (ref 43–75)
NITRITE UR QL STRIP: NEGATIVE
NON-SQ EPI CELLS URNS QL MICRO: ABNORMAL /HPF
NRBC BLD AUTO-RTO: 0 /100 WBCS
OTHER STN SPEC: ABNORMAL
PH UR STRIP.AUTO: 7 [PH] (ref 4.5–8)
PLATELET # BLD AUTO: 202 THOUSANDS/UL (ref 149–390)
PMV BLD AUTO: 9.8 FL (ref 8.9–12.7)
POTASSIUM SERPL-SCNC: 3.6 MMOL/L (ref 3.5–5.3)
PROT SERPL-MCNC: 8.1 G/DL (ref 6.4–8.2)
PROT UR STRIP-MCNC: ABNORMAL MG/DL
RBC # BLD AUTO: 4.71 MILLION/UL (ref 3.81–5.12)
RBC #/AREA URNS AUTO: ABNORMAL /HPF
SODIUM SERPL-SCNC: 137 MMOL/L (ref 136–145)
SP GR UR STRIP.AUTO: 1.01 (ref 1–1.03)
UROBILINOGEN UR QL STRIP.AUTO: 1 E.U./DL
WBC # BLD AUTO: 15.47 THOUSAND/UL (ref 4.31–10.16)
WBC #/AREA URNS AUTO: ABNORMAL /HPF

## 2020-02-01 PROCEDURE — 74177 CT ABD & PELVIS W/CONTRAST: CPT

## 2020-02-01 PROCEDURE — 99285 EMERGENCY DEPT VISIT HI MDM: CPT | Performed by: EMERGENCY MEDICINE

## 2020-02-01 PROCEDURE — 80076 HEPATIC FUNCTION PANEL: CPT | Performed by: EMERGENCY MEDICINE

## 2020-02-01 PROCEDURE — G0463 HOSPITAL OUTPT CLINIC VISIT: HCPCS | Performed by: PHYSICIAN ASSISTANT

## 2020-02-01 PROCEDURE — 87086 URINE CULTURE/COLONY COUNT: CPT

## 2020-02-01 PROCEDURE — 93005 ELECTROCARDIOGRAM TRACING: CPT

## 2020-02-01 PROCEDURE — 81001 URINALYSIS AUTO W/SCOPE: CPT

## 2020-02-01 PROCEDURE — 99213 OFFICE O/P EST LOW 20 MIN: CPT | Performed by: PHYSICIAN ASSISTANT

## 2020-02-01 PROCEDURE — 96361 HYDRATE IV INFUSION ADD-ON: CPT

## 2020-02-01 PROCEDURE — 82553 CREATINE MB FRACTION: CPT | Performed by: EMERGENCY MEDICINE

## 2020-02-01 PROCEDURE — 80048 BASIC METABOLIC PNL TOTAL CA: CPT | Performed by: EMERGENCY MEDICINE

## 2020-02-01 PROCEDURE — 83735 ASSAY OF MAGNESIUM: CPT | Performed by: EMERGENCY MEDICINE

## 2020-02-01 PROCEDURE — 96374 THER/PROPH/DIAG INJ IV PUSH: CPT

## 2020-02-01 PROCEDURE — 85025 COMPLETE CBC W/AUTO DIFF WBC: CPT | Performed by: EMERGENCY MEDICINE

## 2020-02-01 PROCEDURE — 82550 ASSAY OF CK (CPK): CPT | Performed by: EMERGENCY MEDICINE

## 2020-02-01 PROCEDURE — 83690 ASSAY OF LIPASE: CPT | Performed by: EMERGENCY MEDICINE

## 2020-02-01 PROCEDURE — 96375 TX/PRO/DX INJ NEW DRUG ADDON: CPT

## 2020-02-01 PROCEDURE — 99284 EMERGENCY DEPT VISIT MOD MDM: CPT

## 2020-02-01 PROCEDURE — 36415 COLL VENOUS BLD VENIPUNCTURE: CPT | Performed by: EMERGENCY MEDICINE

## 2020-02-01 RX ORDER — LIDOCAINE 50 MG/G
1 PATCH TOPICAL DAILY
Qty: 7 PATCH | Refills: 0 | Status: ON HOLD | OUTPATIENT
Start: 2020-02-01 | End: 2020-08-03

## 2020-02-01 RX ORDER — CYCLOBENZAPRINE HCL 10 MG
10 TABLET ORAL 3 TIMES DAILY PRN
Qty: 12 TABLET | Refills: 0 | Status: SHIPPED | OUTPATIENT
Start: 2020-02-01 | End: 2020-07-14 | Stop reason: ALTCHOICE

## 2020-02-01 RX ORDER — HYDROMORPHONE HCL/PF 1 MG/ML
1 SYRINGE (ML) INJECTION ONCE
Status: COMPLETED | OUTPATIENT
Start: 2020-02-01 | End: 2020-02-01

## 2020-02-01 RX ORDER — PREDNISONE 20 MG/1
40 TABLET ORAL DAILY
Qty: 10 TABLET | Refills: 0 | Status: SHIPPED | OUTPATIENT
Start: 2020-02-01 | End: 2020-02-06

## 2020-02-01 RX ORDER — FENTANYL CITRATE 50 UG/ML
25 INJECTION, SOLUTION INTRAMUSCULAR; INTRAVENOUS ONCE
Status: COMPLETED | OUTPATIENT
Start: 2020-02-01 | End: 2020-02-01

## 2020-02-01 RX ORDER — KETOROLAC TROMETHAMINE 30 MG/ML
15 INJECTION, SOLUTION INTRAMUSCULAR; INTRAVENOUS ONCE
Status: COMPLETED | OUTPATIENT
Start: 2020-02-01 | End: 2020-02-01

## 2020-02-01 RX ORDER — ONDANSETRON 2 MG/ML
4 INJECTION INTRAMUSCULAR; INTRAVENOUS ONCE
Status: COMPLETED | OUTPATIENT
Start: 2020-02-01 | End: 2020-02-01

## 2020-02-01 RX ADMIN — KETOROLAC TROMETHAMINE 15 MG: 30 INJECTION, SOLUTION INTRAMUSCULAR at 12:00

## 2020-02-01 RX ADMIN — FENTANYL CITRATE 25 MCG: 50 INJECTION INTRAMUSCULAR; INTRAVENOUS at 12:02

## 2020-02-01 RX ADMIN — ONDANSETRON 4 MG: 2 INJECTION INTRAMUSCULAR; INTRAVENOUS at 11:59

## 2020-02-01 RX ADMIN — SODIUM CHLORIDE 1000 ML: 0.9 INJECTION, SOLUTION INTRAVENOUS at 12:45

## 2020-02-01 RX ADMIN — IOHEXOL 100 ML: 350 INJECTION, SOLUTION INTRAVENOUS at 13:03

## 2020-02-01 RX ADMIN — HYDROMORPHONE HYDROCHLORIDE 1 MG: 1 INJECTION, SOLUTION INTRAMUSCULAR; INTRAVENOUS; SUBCUTANEOUS at 12:46

## 2020-02-01 NOTE — ED PROVIDER NOTES
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 Taking? APPLE CIDER VINEGAR PO  Self Yes No   Sig: Take by mouth   PARoxetine (PAXIL) 10 mg tablet  Self Yes No   Sig: Take by mouth   PARoxetine (PAXIL) 40 MG tablet  Self Yes No   Sig: Take by mouth   SUMAtriptan (IMITREX) 50 mg tablet  Self No No   Sig: Take 1 tablet (50 mg total) by mouth once as needed for migraine for up to 1 dose May repeat in 2 hours  No more than 200 mg per day  Patient not taking: Reported on 10/25/2019   doxycycline (PERIOSTAT) 20 MG tablet  Self Yes No   Sig: Take 20 mg by mouth 2 (two) times a day   estradiol (VAGIFEM, YUVAFEM) 10 MCG TABS vaginal tablet   No No   Sig: Insert 1 tablet (10 mcg total) into the vagina daily For two weeks  Then twice weekly at night   Use 12 hours prior to intercourse   gabapentin (NEURONTIN) 300 mg capsule  Self Yes No   Sig: Take 1 capsule by mouth 3 (three) times a day   hydrOXYzine HCL (ATARAX) 50 mg tablet  Self Yes No   ibuprofen (MOTRIN) 600 mg tablet  Self No No   Sig: Take 1 tablet (600 mg total) by mouth every 6 (six) hours as needed for moderate pain (Inflammation)   Patient not taking: Reported on 10/28/2019   meclizine (ANTIVERT) 12 5 MG tablet   No No   Sig: Take 1 tablet (12 5 mg total) by mouth every 8 (eight) hours as needed for dizziness or nausea   methenamine hippurate (HIPREX) 1 g tablet  Self Yes No   omeprazole (PriLOSEC) 40 MG capsule  Self Yes No   ondansetron (ZOFRAN) 4 mg tablet   No No   Sig: Take 1 tablet (4 mg total) by mouth every 8 (eight) hours as needed for nausea or vomiting      Facility-Administered Medications: None       Past Medical History:   Diagnosis Date    Arthritis     Last assessed 5/3/2011    Cancer ([facility] [address] )     Fibromyalgia     Fracture of one or more phalanges of foot     Hypertension     Ovarian cancer [facility]) 1987    age 27    Polyneuropathy     Last assessed 6/23/2016    Renal disorder     Vertigo        Past Surgical History:   Procedure Laterality Date    ABDOMINAL SURGERY  1986    BACK SURGERY  1990    FOOT FRACTURE SURGERY Bilateral 2004    x 5  surgeries    KIDNEY SURGERY  1974    TOTAL ABDOMINAL HYSTERECTOMY  26    age 27   [place] TOTAL ABDOMINAL HYSTERECTOMY W/ BILATERAL SALPINGOOPHORECTOMY Bilateral 1987    age 27       Family History   Problem Relation Age of Onset    Heart disease Mother     Cancer Mother     Diabetes Mother     Arthritis Mother     Anxiety disorder Mother     Bleeding Disorder Mother     Clotting disorder Mother     Depression Mother     Hyperlipidemia Mother     Irritable bowel syndrome Mother     Hypertension Mother     Obesity Mother     Osteoporosis Mother     Vaginal cancer Mother 27    Skin cancer Mother     Thyroid disease Mother     Diabetes Father     Arthritis Father     Hyperlipidemia Father    [place] Vesicoureteral reflux Father     Heart disease Father     Hypertension Father     Migraines Father     Obesity Father     Hypertension Family     Arthritis Family     Arthritis Brother     Anxiety disorder Brother     Diabetes Brother     Hyperlipidemia Brother     Vesicoureteral reflux Brother     Heart disease Brother     Irritable bowel syndrome Brother     Hypertension Brother     Migraines Brother     Thyroid disease Brother     Pancreatic cancer Brother 54    Migraines Daughter     Asthma Son     Migraines Son     Diabetes Maternal Grandmother     Vaginal cancer Maternal Grandmother 48    Infertility Paternal Grandmother     Arthritis Maternal Aunt     Anxiety disorder Maternal Aunt     Depression Maternal Aunt     Diabetes Maternal Aunt     Vaginal cancer Maternal Aunt 48    Fibroids Paternal Aunt     No Known Problems Maternal Grandfather     No Known Problems Paternal Grandfather     No Known Problems Maternal Aunt     No Known Problems Maternal Aunt     No Known Problems Maternal Aunt      I have reviewed and agree with the history as documented  Social History     Tobacco Use    Smoking status: Never Smoker    Smokeless tobacco: Never Used   Substance Use Topics    Alcohol use: No    Drug use: No        Review of Systems   Constitutional: Negative for fever  Respiratory: Negative for cough, chest tightness and shortness of breath  Cardiovascular: Negative for chest pain  Gastrointestinal: Positive for nausea and vomiting  Negative for abdominal pain and diarrhea  Genitourinary: Negative for difficulty urinating and dysuria  Musculoskeletal: Positive for back pain  Negative for gait problem  Skin: Negative for rash  Neurological: Negative for weakness, numbness and headaches  All other systems reviewed and are negative  Physical Exam  Physical Exam   Constitutional: She appears well-developed and well-nourished  She is cooperative    Non-toxic appearance  She does not have a sickly appearance  She does not appear ill  She appears distressed  HENT:   Head: Normocephalic and atraumatic  Right Ear: Hearing normal    Left Ear: Hearing normal    Mouth/Throat: Mucous membranes are normal    Eyes: Conjunctivae and lids are normal  Right eye exhibits no discharge  Left eye exhibits no discharge  Neck: Trachea normal and normal range of motion  Cardiovascular: Normal rate, regular rhythm, normal heart sounds, intact distal pulses and normal pulses  Exam reveals no gallop and no friction rub  No murmur heard  Pulmonary/Chest: Effort normal and breath sounds normal  No stridor  No respiratory distress  She has no wheezes  She has no rales  Abdominal: Soft  Normal appearance  There is tenderness in the left lower quadrant  There is no rebound, no guarding and no CVA tenderness  Musculoskeletal: She exhibits no edema or deformity  Cervical back: Normal         Thoracic back: Normal         Lumbar back: She exhibits decreased range of motion, tenderness and pain  She exhibits no bony tenderness, no swelling, no edema, no deformity and no spasm  Negative straight leg   Neurological: She is alert  She has normal strength and normal reflexes  She displays normal reflexes  No sensory deficit  She exhibits normal muscle tone  GCS eye subscore is 4  GCS verbal subscore is 5  GCS motor subscore is 6  Reflex Scores:       Patellar reflexes are 2+ on the right side and 2+ on the left side  Achilles reflexes are 2+ on the right side and 2+ on the left side  Skin: Skin is warm, dry and intact  No rash noted  She is not diaphoretic  No pallor  Psychiatric: She has a normal mood and affect  Her speech is normal  Cognition and memory are normal    Nursing note and vitals reviewed        Vital Signs  ED Triage Vitals [02/01/20 1050]   Temperature Pulse Respirations Blood Pressure SpO2   97 7 °F (36 5 °C) 91 20 163/96 100 %      Temp Source Heart Rate Source Patient Position - Orthostatic VS BP Location FiO2 (%)   Oral Monitor Lying Right arm --      Pain Score       Worst Possible Pain           Vitals:    02/01/20 1050 02/01/20 1349   BP: 163/96 94/52   Pulse: 91 86   Patient Position - Orthostatic VS: Lying Lying         Visual Acuity      ED Medications  Medications   fentanyl citrate (PF) 100 MCG/2ML 25 mcg (25 mcg Intravenous Given 2/1/20 1202)   ketorolac (TORADOL) injection 15 mg (15 mg Intravenous Given 2/1/20 1200)   ondansetron (ZOFRAN) injection 4 mg (4 mg Intravenous Given 2/1/20 1159)   HYDROmorphone (DILAUDID) injection 1 mg (1 mg Intravenous Given 2/1/20 1246)   sodium chloride 0 9 % bolus 1,000 mL (0 mL Intravenous Stopped 2/1/20 1440)   iohexol (OMNIPAQUE) 350 MG/ML injection (MULTI-DOSE) 100 mL (100 mL Intravenous Given 2/1/20 1303)       Diagnostic Studies  Results Reviewed     Procedure Component Value Units Date/Time    Lipase [[id number]]  (Normal) Collected:  02/01/20 1158    Lab Status:  Final result Specimen:  Blood from Arm, Right Updated:  02/01/20 1251     Lipase 158 u/L     CKMB [[id number]]  (Normal) Collected:  02/01/20 1158    Lab Status:  Final result Specimen:  Blood from Arm, Right Updated:  02/01/20 1251     CK-MB Index <1 0 %      CK-MB 1 2 ng/mL     Hepatic function panel [[id number]]  (Abnormal) Collected:  02/01/20 1158    Lab Status:  Final result Specimen:  Blood from Arm, Right Updated:  02/01/20 1251     Total Bilirubin 2 74 mg/dL      Bilirubin, Direct 0 38 mg/dL      Alkaline Phosphatase 90 U/L      AST 28 U/L      ALT 16 U/L      Total Protein 8 1 g/dL      Albumin 3 9 g/dL     Magnesium [[id number]]  (Normal) Collected:  02/01/20 1158    Lab Status:  Final result Specimen:  Blood from Arm, Right Updated:  02/01/20 1251     Magnesium 1 7 mg/dL     CK Total with Reflex CKMB [[id number]]  (Abnormal) Collected:  02/01/20 1158    Lab Status:  Final result Specimen:  Blood from Arm, Right Updated:  02/01/20 1250     Total CK 216 U/L     Basic metabolic panel [[id number]]  (Abnormal) Collected:  02/01/20 1158    Lab Status:  Final result Specimen:  Blood from Arm, Right Updated:  02/01/20 1231     Sodium 137 mmol/L      Potassium 3 6 mmol/L      Chloride 99 mmol/L      CO2 22 mmol/L      ANION GAP 16 mmol/L      BUN 12 mg/dL      Creatinine 1 13 mg/dL      Glucose 133 mg/dL      Calcium 9 5 mg/dL      eGFR 53 ml/min/1 73sq m     Narrative:       [place] guidelines for Chronic Kidney Disease (CKD):     Stage 1 with normal or high GFR (GFR > 90 mL/min/1 73 square meters)    Stage 2 Mild CKD (GFR = 60-89 mL/min/1 73 square meters)    Stage 3A Moderate CKD (GFR = 45-59 mL/min/1 73 square meters)    Stage 3B Moderate CKD (GFR = 30-44 mL/min/1 73 square meters)    Stage 4 Severe CKD (GFR = 15-29 mL/min/1 73 square meters)    Stage 5 End Stage CKD (GFR <15 mL/min/1 73 square meters)  Note: GFR calculation is accurate only with a steady state creatinine    CBC and differential [[id number]]  (Abnormal) Collected:  02/01/20 1158    Lab Status:  Final result Specimen:  Blood from Arm, Right Updated:  02/01/20 1214     WBC 15 47 Thousand/uL      RBC 4 71 Million/uL      Hemoglobin 14 1 g/dL      Hematocrit 41 3 %      MCV 88 fL      MCH 29 9 pg      MCHC 34 1 g/dL      RDW 13 0 %      MPV 9 8 fL      Platelets 615 Thousands/uL      nRBC 0 /100 WBCs      Neutrophils Relative 75 %      Immat GRANS % 1 %      Lymphocytes Relative 13 %      Monocytes Relative 11 %      Eosinophils Relative 0 %      Basophils Relative 0 %      Neutrophils Absolute 11 64 Thousands/µL      Immature Grans Absolute 0 07 Thousand/uL      Lymphocytes Absolute 1 94 Thousands/µL      Monocytes Absolute 1 77 Thousand/µL      Eosinophils Absolute 0 00 Thousand/µL      Basophils Absolute 0 05 Thousands/µL     Urine Microscopic [[id number]]  (Abnormal) Collected:  02/01/20 1133    Lab Status:  Final result Specimen:  Urine, Clean Catch Updated: 02/01/20 1155     RBC, UA 1-2 /hpf      WBC, UA 10-20 /hpf      Epithelial Cells Occasional /hpf      Bacteria, UA Occasional /hpf      OTHER OBSERVATIONS Renal Epithelial Cells Present    Urine culture [[id number]] Collected:  02/01/20 1133    Lab Status: In process Specimen:  Urine, Clean Catch Updated:  02/01/20 1155    POCT urinalysis dipstick [[id number]]  (Abnormal) Resulted:  02/01/20 1152    Lab Status:  Final result Specimen:  Urine Updated:  02/01/20 1152    Urine Macroscopic, POC [[id number]]  (Abnormal) Collected:  02/01/20 1133    Lab Status:  Final result Specimen:  Urine Updated:  02/01/20 1134     Color, UA Yellow     Clarity, UA Clear     pH, UA 7 0     Leukocytes, UA Small     Nitrite, UA Negative     Protein,  (2+) mg/dl      Glucose, UA Negative mg/dl      Ketones, UA 40 (2+) mg/dl      Urobilinogen, UA 1 0 E U /dl      Bilirubin, UA Negative     Blood, UA Small     Specific Benld, UA 1 015    Narrative:       CLINITEK RESULT                 CT abdomen pelvis with contrast   Final Result by Yin Winters MD (02/01 0526)      Cholelithiasis without cholecystitis  No evidence of diverticulitis  Atrophic right kidney with chronic cortical scarring  Hysterectomy  Workstation performed: [id number]         CT recon only lumbar spine (No Charge)   Final Result by Yin Winters MD (02/01 5629)      No fracture or traumatic subluxation  Degenerative discogenic disease with disc space narrowing and endplate sclerosis with osteophytosis at L5-S1  Lower lumbar degenerative discogenic disease and facet arthropathy as described above  Left foraminal protrusion, correlate for left L3 radiculopathy  Additional discogenic disease resulting in left greater than right subarticular recess stenosis,    correlate for left L5 radiculopathy              Workstation performed: [id number]                    Procedures  Procedures         ED Course MDM  Number of Diagnoses or Management Options  Abnormal CT scan, lumbar spine:   Elevated bilirubin:   Gallstones:   Low back pain:   Diagnosis management comments: I did go over the results of the patient's CT scans with her  She does have gallstones but no abdominal pain  She did have a slight elevation in the bilirubin  She may in the past have had pain in the right upper quadrant but does not have any presently  She can follow up with a surgeon for this  She has low back pain and abnormal CT of the spine which I reviewed with the patient  She currently has no radicular symptoms and is neurovascularly intact  I gave her referral to [facility] in pain seems a little improved after some treatment  Will send her with a muscle relaxer and anti-inflammatory and Lidoderm  She has no signs or symptoms of urinary tract infection  There is no kidney stone noted on CT  Amount and/or Complexity of Data Reviewed  Clinical lab tests: reviewed and ordered  Tests in the radiology section of CPT®: ordered and reviewed    Patient Progress  Patient progress: stable        Disposition  Final diagnoses:   Low back pain   Abnormal CT scan, lumbar spine   Gallstones   Elevated bilirubin     Time reflects when diagnosis was documented in both MDM as applicable and the Disposition within this note     Time User Action Codes Description Comment    2/1/2020  2:39 PM [address] Add [M54 5] Low back pain     2/1/2020  2:39 PM [address] J Add [R93 7] Abnormal CT scan, lumbar spine     2/1/2020  2:39 PM [address] Add [K80 20] Gallstones     2/1/2020  2:40 PM [address] Add [R17] Elevated bilirubin       ED Disposition     ED Disposition Condition Date/Time Comment    Discharge Stable Sat Feb 1, 2020  2:39 PM [address] discharge to home/self care              Follow-up Information     Follow up With Specialties Details Why Contact Info Additional Information    Ailson Turner DO Family Medicine Schedule an appointment as soon as possible for a visit in 1 week For follow-up of your back pain as well as a gallbladder an elevated bilirubin [address]  [facility] Physical Therapy Schedule an appointment as soon as possible for a visit  For your back pain [phone]    Cecil Rodriguez MD General Surgery, Wound Care Schedule an appointment as soon as possible for a visit  For follow-up with your gallbladder [address] [facility]  [phone]             Patient's Medications   Discharge Prescriptions    CYCLOBENZAPRINE (FLEXERIL) 10 MG TABLET    Take 1 tablet (10 mg total) by mouth 3 (three) times a day as needed for muscle spasms for up to 10 days       Start Date: 2/1/2020  End Date: 2/11/2020       Order Dose: 10 mg       Quantity: 12 tablet    Refills: 0    LIDOCAINE (LIDODERM) 5 %    Apply 1 patch topically daily Remove & Discard patch within 12 hours or as directed by MD       Start Date: 2/1/2020  End Date: --       Order Dose: 1 patch       Quantity: 7 patch    Refills: 0    PREDNISONE 20 MG TABLET    Take 2 tablets (40 mg total) by mouth daily for 5 days       Start Date: 2/1/2020  End Date: 2/6/2020       Order Dose: 40 mg       Quantity: 10 tablet    Refills: 0         ED Provider  Electronically Signed by           Nadja Mclain MD  02/01/20 6608

## 2020-02-01 NOTE — PROGRESS NOTES
[facility]      NAME: David Jones is a 62 y o  female  : 1960    MRN: [id number]  DATE: 2020  TIME: 10:14 AM    Assessment and Plan   Acute left-sided low back pain without sciatica [M54 5]  1  Acute left-sided low back pain without sciatica  Transfer to other facility   2  Nausea       Ambulance called at 9:58 a m  Ambulance arrived at 10:12 a m  O2 at 2L administered patient  Patient Instructions     Patient transferred to ED by ambulance  Patient's  initially was resistant to ambulance  Patient ultimately agreed to ambulance transfer    Chief Complaint     Chief Complaint   Patient presents with    Back Pain     Pain feels like a burning pain, primarily in left lower back  Symptoms present for 3 days  Patient sighing inbetween words as she speaks  Reported vomiting due to pain  History of Present Illness       Patient 64-year-old female presenting the office for back pain nausea vomiting  Patient states that symptoms been ongoing past 3 days in duration  Patient states that her pain started off is minor has since progressively got worse over time  Patient states she has been having 3 episodes of vomiting daily  Patient states that she does not know last time she ate anything  Patient states she is able tolerate fluids well  Patient states that her back pain is mostly on her left side  Patient states she has a prior medical history of 3 back surgeries  Patient states that this pain is substantially different than her back pain that she usually has  Patient denies any trauma or injury to the site  Patient does admit to having fevers and chills  Patient denies any problems arise ears nose or throat acutely  Patient does admit to having shortness of breath and chest tightness currently  Patient does admit to having diarrhea as well  Patient states that the diarrhea has "a lot of mucus in it"    Patient states she has been taking Tylenol minimal relief of symptoms  Patient does admit to having numbness tingling in her legs which is not new in severity  Patient offers no other complaints at this time  Back Pain   This is a chronic problem  The current episode started in the past 7 days  The problem occurs constantly  The problem has been rapidly worsening since onset  The pain is present in the lumbar spine  The quality of the pain is described as stabbing and shooting  The pain does not radiate  The pain is at a severity of 10/10  The pain is severe  The pain is the same all the time  Associated symptoms include abdominal pain and a fever  Review of Systems   Review of Systems   Constitutional: Positive for chills, diaphoresis, fatigue and fever  Negative for activity change, appetite change and unexpected weight change  HENT: Negative  Eyes: Negative  Respiratory: Positive for chest tightness and shortness of breath  Negative for apnea, cough, choking, wheezing and stridor  Cardiovascular: Negative  Gastrointestinal: Positive for abdominal pain  Endocrine: Negative  Genitourinary: Negative  Musculoskeletal: Positive for back pain  Negative for arthralgias, gait problem, joint swelling, myalgias, neck pain and neck stiffness  Skin: Negative  Allergic/Immunologic: Negative  Neurological: Negative  Hematological: Negative  Psychiatric/Behavioral: Negative  Current Medications       Current Outpatient Medications:     APPLE CIDER VINEGAR PO, Take by mouth, Disp: , Rfl:     doxycycline (PERIOSTAT) 20 MG tablet, Take 20 mg by mouth 2 (two) times a day, Disp: , Rfl: 1    estradiol (VAGIFEM, YUVAFEM) 10 MCG TABS vaginal tablet, Insert 1 tablet (10 mcg total) into the vagina daily For two weeks  Then twice weekly at night   Use 12 hours prior to intercourse, Disp: 30 tablet, Rfl: 6    gabapentin (NEURONTIN) 300 mg capsule, Take 1 capsule by mouth 3 (three) times a day, Disp: , Rfl:     hydrOXYzine HCL (ATARAX) 50 mg tablet, , Disp: , Rfl:     meclizine (ANTIVERT) 12 5 MG tablet, Take 1 tablet (12 5 mg total) by mouth every 8 (eight) hours as needed for dizziness or nausea, Disp: 30 tablet, Rfl: 0    methenamine hippurate (HIPREX) 1 g tablet, , Disp: , Rfl:     omeprazole (PriLOSEC) 40 MG capsule, , Disp: , Rfl:     ondansetron (ZOFRAN) 4 mg tablet, Take 1 tablet (4 mg total) by mouth every 8 (eight) hours as needed for nausea or vomiting, Disp: 10 tablet, Rfl: 0    PARoxetine (PAXIL) 10 mg tablet, Take by mouth, Disp: , Rfl:     PARoxetine (PAXIL) 40 MG tablet, Take by mouth, Disp: , Rfl:     ibuprofen (MOTRIN) 600 mg tablet, Take 1 tablet (600 mg total) by mouth every 6 (six) hours as needed for moderate pain (Inflammation) (Patient not taking: Reported on 10/28/2019), Disp: 30 tablet, Rfl: 0    SUMAtriptan (IMITREX) 50 mg tablet, Take 1 tablet (50 mg total) by mouth once as needed for migraine for up to 1 dose May repeat in 2 hours  No more than 200 mg per day   (Patient not taking: Reported on 10/25/2019), Disp: 10 tablet, Rfl: 0    Current Allergies     Allergies as of 02/01/2020 - Reviewed 02/01/2020   Allergen Reaction Noted    Codeine  12/27/2016    Morphine  12/27/2016    Penicillins  12/27/2016    Seasonal ic  [cholestatin]  09/11/2013            The following portions of the patient's history were reviewed and updated as appropriate: allergies, current medications, past family history, past medical history, past social history, past surgical history and problem list      Past Medical History:   Diagnosis Date    Arthritis     Last assessed 5/3/2011    Cancer ([facility] [address] )     Fibromyalgia     Fracture of one or more phalanges of foot     Hypertension     Ovarian cancer [facility]) 1987    age 27    Polyneuropathy     Last assessed 6/23/2016    Renal disorder     Vertigo        Past Surgical History:   Procedure Laterality Date   [address] [facility] SURGERY Bilateral 2004    x 5  surgeries    KIDNEY SURGERY  1974    TOTAL ABDOMINAL HYSTERECTOMY  1987    age 27   [facility] TOTAL ABDOMINAL HYSTERECTOMY W/ BILATERAL SALPINGOOPHORECTOMY Bilateral 1987    age 27       Family History   Problem Relation Age of Onset    Heart disease Mother     Cancer Mother     Diabetes Mother     Arthritis Mother     Anxiety disorder Mother     Bleeding Disorder Mother     Clotting disorder Mother     Depression Mother     Hyperlipidemia Mother     Irritable bowel syndrome Mother     Hypertension Mother     Obesity Mother     Osteoporosis Mother     Vaginal cancer Mother 27    Skin cancer Mother     Thyroid disease Mother     Diabetes Father     Arthritis Father     Hyperlipidemia Father     Vesicoureteral reflux Father     Heart disease Father     Hypertension Father     Migraines Father     Obesity Father     Hypertension Family     Arthritis Family     Arthritis Brother     Anxiety disorder Brother     Diabetes Brother     Hyperlipidemia Brother     Vesicoureteral reflux Brother     Heart disease Brother     Irritable bowel syndrome Brother     Hypertension Brother     Migraines Brother     Thyroid disease Brother     Pancreatic cancer Brother 54    Migraines Daughter     Asthma Son     Migraines Son     Diabetes Maternal Grandmother     Vaginal cancer Maternal Grandmother 48    Infertility Paternal Grandmother     Arthritis Maternal Aunt     Anxiety disorder Maternal Aunt     Depression Maternal Aunt     Diabetes Maternal Aunt     Vaginal cancer Maternal Aunt 48    Fibroids Paternal Aunt     No Known Problems Maternal Grandfather     No Known Problems Paternal Grandfather     No Known Problems Maternal Aunt     No Known Problems Maternal Aunt     No Known Problems Maternal Aunt          Medications have been verified          Objective   /98   Pulse 102   Temp 99 8 °F (37 7 °C) (Tympanic)   Resp (!) 30   Ht 5' 6" (1 676 m) Wt 97 5 kg (215 lb)   LMP  (LMP Unknown)   SpO2 97%   BMI 34 70 kg/m²        Physical Exam     Physical Exam   Constitutional: She is oriented to person, place, and time  She appears well-developed and well-nourished  She is not intubated  HENT:   Head: Normocephalic  Eyes: Pupils are equal, round, and reactive to light  Conjunctivae and EOM are normal  Right eye exhibits no discharge  Left eye exhibits no discharge  No scleral icterus  Neck: Normal range of motion  Cardiovascular: Normal rate, regular rhythm, normal heart sounds and intact distal pulses  Pulmonary/Chest: No accessory muscle usage or stridor  Tachypnea noted  No apnea and no bradypnea  She is not intubated  She is in respiratory distress  She has wheezes (ASSOCIATED WITH PAIN)  She has no rhonchi  She has no rales  She exhibits no tenderness  Abdominal: Normal appearance  She exhibits no distension, no pulsatile liver, no fluid wave, no ascites, no pulsatile midline mass and no mass  There is no hepatosplenomegaly, splenomegaly or hepatomegaly  There is tenderness  There is guarding and tenderness at McBurney's point  There is no rigidity, no rebound, no CVA tenderness and negative Ann's sign  No hernia  Hernia confirmed negative in the ventral area, confirmed negative in the right inguinal area and confirmed negative in the left inguinal area  Neurological: She is alert and oriented to person, place, and time  Skin: Skin is warm  Capillary refill takes less than 2 seconds  Psychiatric: She has a normal mood and affect  Her behavior is normal  Judgment and thought content normal    Nursing note and vitals reviewed

## 2020-02-01 NOTE — PATIENT INSTRUCTIONS
Patient transferred to ED by ambulance  Patient's  initially was resistant to ambulance    Patient ultimately agreed to ambulance transfer

## 2020-02-02 LAB — BACTERIA UR CULT: NORMAL

## 2020-02-03 ENCOUNTER — OFFICE VISIT (OUTPATIENT)
Dept: FAMILY MEDICINE CLINIC | Facility: CLINIC | Age: 60
End: 2020-02-03
Payer: MEDICARE

## 2020-02-03 ENCOUNTER — NURSE TRIAGE (OUTPATIENT)
Dept: PHYSICAL THERAPY | Facility: OTHER | Age: 60
End: 2020-02-03

## 2020-02-03 VITALS
SYSTOLIC BLOOD PRESSURE: 150 MMHG | TEMPERATURE: 98.7 F | HEIGHT: 66 IN | OXYGEN SATURATION: 97 % | BODY MASS INDEX: 34.2 KG/M2 | HEART RATE: 92 BPM | DIASTOLIC BLOOD PRESSURE: 90 MMHG

## 2020-02-03 DIAGNOSIS — H25.9 AGE-RELATED CATARACT OF BOTH EYES, UNSPECIFIED AGE-RELATED CATARACT TYPE: ICD-10-CM

## 2020-02-03 DIAGNOSIS — K21.9 GASTROESOPHAGEAL REFLUX DISEASE WITHOUT ESOPHAGITIS: ICD-10-CM

## 2020-02-03 DIAGNOSIS — Z01.818 PREOPERATIVE CLEARANCE: Primary | ICD-10-CM

## 2020-02-03 DIAGNOSIS — M62.830 MUSCLE SPASM OF BACK: ICD-10-CM

## 2020-02-03 DIAGNOSIS — F41.9 ANXIETY: ICD-10-CM

## 2020-02-03 DIAGNOSIS — M54.50 ACUTE LEFT-SIDED LOW BACK PAIN WITHOUT SCIATICA: ICD-10-CM

## 2020-02-03 DIAGNOSIS — K22.719 BARRETT'S ESOPHAGUS WITH DYSPLASIA: ICD-10-CM

## 2020-02-03 DIAGNOSIS — F33.1 MODERATE EPISODE OF RECURRENT MAJOR DEPRESSIVE DISORDER (HCC): ICD-10-CM

## 2020-02-03 LAB
ATRIAL RATE: 95 BPM
P AXIS: 35 DEGREES
PR INTERVAL: 142 MS
QRS AXIS: 11 DEGREES
QRSD INTERVAL: 90 MS
QT INTERVAL: 376 MS
QTC INTERVAL: 472 MS
T WAVE AXIS: -60 DEGREES
VENTRICULAR RATE: 95 BPM

## 2020-02-03 PROCEDURE — 99215 OFFICE O/P EST HI 40 MIN: CPT | Performed by: FAMILY MEDICINE

## 2020-02-03 PROCEDURE — 93010 ELECTROCARDIOGRAM REPORT: CPT | Performed by: INTERNAL MEDICINE

## 2020-02-03 PROCEDURE — 1036F TOBACCO NON-USER: CPT | Performed by: FAMILY MEDICINE

## 2020-02-03 RX ORDER — PREDNISONE 10 MG/1
10 TABLET ORAL DAILY
Qty: 20 TABLET | Refills: 0 | Status: ON HOLD | OUTPATIENT
Start: 2020-02-03 | End: 2020-08-03

## 2020-02-03 NOTE — PROGRESS NOTES
Chief Complaint   Patient presents with    Follow-up    Back Pain     from ED     Pre-op Exam     pt is having catarect surgery on 2/25/2020 and 3/17/2020     Assessment/Plan:  Increased dose of tapering Prednisone  Use the muscle relaxants, schedule for PT  Ice to area  Diagnoses and all orders for this visit:    Preoperative clearance    Acute left-sided low back pain without sciatica  -     predniSONE 10 mg tablet; Take 1 tablet (10 mg total) by mouth daily #4 po day #1, #3 po day 2 and 3, #2 daily 4, 5 , 6 then one daily  Muscle spasm of back    Gastroesophageal reflux disease without esophagitis    Ortega's esophagus with dysplasia    Anxiety    Moderate episode of recurrent major depressive disorder (HCC)          Subjective:      Patient ID: Giuliana Rene is a 62 y o  female  Here for couple reasons  Pre op clearance - cataracts, having back pain, ears hurt  Pain started 4 days ago low back  Sleeping on an old worn out cough  Reviewed EKG from past Saturday at ER  Td about 5 years ago  The following portions of the patient's history were reviewed and updated as appropriate: allergies, current medications, past medical history, past social history, past surgical history and problem list     Review of Systems   Constitutional: Negative  HENT: Negative  Eyes: Positive for visual disturbance  BL cataracts  Respiratory: Negative  Cardiovascular: Negative  Gastrointestinal: Negative  Genitourinary: Negative  Musculoskeletal: Positive for back pain  LLB pain and radiates up some  Skin: Negative  Neurological: Negative  Psychiatric/Behavioral: Negative            Objective:      /90 (BP Location: Left arm, Patient Position: Sitting, Cuff Size: Standard)   Pulse (!) 123   Temp 98 7 °F (37 1 °C) (Oral)   Ht 5' 6" (1 676 m)   LMP  (LMP Unknown)   SpO2 97%   BMI 34 20 kg/m²       Current Outpatient Medications:     APPLE CIDER VINEGAR PO, Take by mouth, Disp: , Rfl:     cyclobenzaprine (FLEXERIL) 10 mg tablet, Take 1 tablet (10 mg total) by mouth 3 (three) times a day as needed for muscle spasms for up to 10 days, Disp: 12 tablet, Rfl: 0    doxycycline (PERIOSTAT) 20 MG tablet, Take 20 mg by mouth 2 (two) times a day, Disp: , Rfl: 1    estradiol (VAGIFEM, YUVAFEM) 10 MCG TABS vaginal tablet, Insert 1 tablet (10 mcg total) into the vagina daily For two weeks  Then twice weekly at night  Use 12 hours prior to intercourse, Disp: 30 tablet, Rfl: 6    gabapentin (NEURONTIN) 300 mg capsule, Take 1 capsule by mouth 3 (three) times a day, Disp: , Rfl:     hydrOXYzine HCL (ATARAX) 50 mg tablet, , Disp: , Rfl:     ibuprofen (MOTRIN) 600 mg tablet, Take 1 tablet (600 mg total) by mouth every 6 (six) hours as needed for moderate pain (Inflammation), Disp: 30 tablet, Rfl: 0    meclizine (ANTIVERT) 12 5 MG tablet, Take 1 tablet (12 5 mg total) by mouth every 8 (eight) hours as needed for dizziness or nausea, Disp: 30 tablet, Rfl: 0    methenamine hippurate (HIPREX) 1 g tablet, , Disp: , Rfl:     omeprazole (PriLOSEC) 40 MG capsule, , Disp: , Rfl:     ondansetron (ZOFRAN) 4 mg tablet, Take 1 tablet (4 mg total) by mouth every 8 (eight) hours as needed for nausea or vomiting, Disp: 10 tablet, Rfl: 0    PARoxetine (PAXIL) 10 mg tablet, Take by mouth, Disp: , Rfl:     PARoxetine (PAXIL) 40 MG tablet, Take by mouth, Disp: , Rfl:     predniSONE 20 mg tablet, Take 2 tablets (40 mg total) by mouth daily for 5 days, Disp: 10 tablet, Rfl: 0    SUMAtriptan (IMITREX) 50 mg tablet, Take 1 tablet (50 mg total) by mouth once as needed for migraine for up to 1 dose May repeat in 2 hours    No more than 200 mg per day , Disp: 10 tablet, Rfl: 0    lidocaine (LIDODERM) 5 %, Apply 1 patch topically daily Remove & Discard patch within 12 hours or as directed by MD (Patient not taking: Reported on 2/3/2020), Disp: 7 patch, Rfl: 0     Allergies   Allergen Reactions    Codeine     Morphine     Penicillins     Seasonal Ic  [Cholestatin]         Physical Exam   Constitutional: She is oriented to person, place, and time  She appears well-developed and well-nourished  HENT:   Head: Normocephalic and atraumatic  Right Ear: External ear normal    Left Ear: External ear normal    Nose: Nose normal    Mouth/Throat: Oropharynx is clear and moist    Eyes: Pupils are equal, round, and reactive to light  Conjunctivae and EOM are normal    Neck: Normal range of motion  Neck supple  Cardiovascular: Regular rhythm, normal heart sounds and intact distal pulses  Rate 100  Pulmonary/Chest: Effort normal and breath sounds normal    Abdominal: Soft  Bowel sounds are normal    Musculoskeletal:   Soft tissue tender, spasm  Neurological: She is alert and oriented to person, place, and time  She has normal reflexes  Skin: Skin is warm and dry  Psychiatric: She has a normal mood and affect   Her behavior is normal  Judgment and thought content normal

## 2020-02-03 NOTE — TELEPHONE ENCOUNTER
Called patient per referral   Patients   radha Etienne answered the pahone and stated his wife was lying down and did not want to speak to anyone  Explained to  what call was regarding and he verbally told the patient and she stated she did not wish to do our program     Referral closed

## 2020-02-03 NOTE — PROGRESS NOTES
Patient is here for pre-op clearance for cataract surgery on 2/25/20 with Dr Espinal Other  Assessment/Plan:  Cleared for surgery, form faxed to Opthalmology  Diagnoses and all orders for this visit:    Preoperative clearance    Acute left-sided low back pain without sciatica  -     predniSONE 10 mg tablet; Take 1 tablet (10 mg total) by mouth daily #4 po day #1, #3 po day 2 and 3, #2 daily 4, 5 , 6 then one daily  Muscle spasm of back    Gastroesophageal reflux disease without esophagitis    Ortega's esophagus with dysplasia    Anxiety    Moderate episode of recurrent major depressive disorder (HCC)    Age-related cataract of both eyes, unspecified age-related cataract type          Subjective:      Patient ID: Michelle Lane is a 62 y o  female  Pre op clearance, cataracts - vision changes  Had EKG completed past weekend - reviewed  Also with low back pain, etio ? CM: Reviewed  PSH: Reviewed  Last TD, 5 years ago at work  Continues with Med's for reflux with esophagitis, depression and anxiety - both controlled  Marleta Press GREGG's are sporiatic, no Med changes  The following portions of the patient's history were reviewed and updated as appropriate: allergies, current medications, past medical history, past social history, past surgical history and problem list     Review of Systems   Constitutional: Negative  HENT: Negative  Eyes: Positive for visual disturbance  Respiratory: Negative  Cardiovascular: Negative  Gastrointestinal: Negative  Genitourinary: Negative  Musculoskeletal: Positive for arthralgias and back pain  Skin: Negative  Neurological: Negative  Psychiatric/Behavioral: Negative            Objective:      /90 (BP Location: Left arm, Patient Position: Sitting, Cuff Size: Standard)   Pulse 92   Temp 98 7 °F (37 1 °C) (Oral)   Ht 5' 6" (1 676 m)   LMP  (LMP Unknown)   SpO2 97%   BMI 34 20 kg/m²       Current Outpatient Medications:     APPLE CIDER VINEGAR PO, Take by mouth, Disp: , Rfl:     cyclobenzaprine (FLEXERIL) 10 mg tablet, Take 1 tablet (10 mg total) by mouth 3 (three) times a day as needed for muscle spasms for up to 10 days, Disp: 12 tablet, Rfl: 0    doxycycline (PERIOSTAT) 20 MG tablet, Take 20 mg by mouth 2 (two) times a day, Disp: , Rfl: 1    estradiol (VAGIFEM, YUVAFEM) 10 MCG TABS vaginal tablet, Insert 1 tablet (10 mcg total) into the vagina daily For two weeks  Then twice weekly at night  Use 12 hours prior to intercourse, Disp: 30 tablet, Rfl: 6    gabapentin (NEURONTIN) 300 mg capsule, Take 1 capsule by mouth 3 (three) times a day, Disp: , Rfl:     hydrOXYzine HCL (ATARAX) 50 mg tablet, , Disp: , Rfl:     ibuprofen (MOTRIN) 600 mg tablet, Take 1 tablet (600 mg total) by mouth every 6 (six) hours as needed for moderate pain (Inflammation), Disp: 30 tablet, Rfl: 0    meclizine (ANTIVERT) 12 5 MG tablet, Take 1 tablet (12 5 mg total) by mouth every 8 (eight) hours as needed for dizziness or nausea, Disp: 30 tablet, Rfl: 0    methenamine hippurate (HIPREX) 1 g tablet, , Disp: , Rfl:     omeprazole (PriLOSEC) 40 MG capsule, , Disp: , Rfl:     ondansetron (ZOFRAN) 4 mg tablet, Take 1 tablet (4 mg total) by mouth every 8 (eight) hours as needed for nausea or vomiting, Disp: 10 tablet, Rfl: 0    PARoxetine (PAXIL) 10 mg tablet, Take by mouth, Disp: , Rfl:     PARoxetine (PAXIL) 40 MG tablet, Take by mouth, Disp: , Rfl:     SUMAtriptan (IMITREX) 50 mg tablet, Take 1 tablet (50 mg total) by mouth once as needed for migraine for up to 1 dose May repeat in 2 hours  No more than 200 mg per day , Disp: 10 tablet, Rfl: 0    lidocaine (LIDODERM) 5 %, Apply 1 patch topically daily Remove & Discard patch within 12 hours or as directed by MD (Patient not taking: Reported on 2/3/2020), Disp: 7 patch, Rfl: 0    predniSONE 10 mg tablet, Take 1 tablet (10 mg total) by mouth daily #4 po day #1, #3 po day 2 and 3, #2 daily 4, 5 , 6 then one daily  , Disp: 20 tablet, Rfl: 0     Physical Exam   Constitutional: She is oriented to person, place, and time  She appears well-developed and well-nourished  HENT:   Head: Normocephalic and atraumatic  Right Ear: External ear normal    Left Ear: External ear normal    Nose: Nose normal    Mouth/Throat: Oropharynx is clear and moist    Eyes: Pupils are equal, round, and reactive to light  Conjunctivae and EOM are normal    BL pupil opacities  Neck: Normal range of motion  Neck supple  Cardiovascular: Regular rhythm, normal heart sounds and intact distal pulses  Pulmonary/Chest: Effort normal and breath sounds normal    Abdominal: Soft  Bowel sounds are normal    Neurological: She is alert and oriented to person, place, and time  She has normal reflexes  Skin: Skin is warm and dry  Psychiatric: She has a normal mood and affect   Her behavior is normal  Judgment and thought content normal

## 2020-02-07 ENCOUNTER — TELEPHONE (OUTPATIENT)
Dept: FAMILY MEDICINE CLINIC | Facility: CLINIC | Age: 60
End: 2020-02-07

## 2020-02-07 NOTE — TELEPHONE ENCOUNTER
Advised patient to schedule with physiatry  Patient agreed but is unable to shruthi up to write down phone number  Patient stated she will call back on Monday to receive phone number  Patient was asking for muscle relaxer, advised patient to be seen with physiatry/pain management since muscle relaxers are for temporary relief  Advised patient to be seen at ER if pain gets worse  Patient verbalized understanding

## 2020-02-07 NOTE — LETTER
February 7, 2020     Patient: Acacia Soria   YOB: 1960   Date of Visit: 2/7/2020       To Whom it May Concern:    Ainsley Del Castillo is under my professional care  She was seen in my office on 2/3/2020  Please excuse her absence from 2/3/2020-2/13/2020  She may return to work on 2/14/2020  If you have any questions or concerns, please don't hesitate to call  Sincerely,              Fredy LOFTON

## 2020-02-07 NOTE — TELEPHONE ENCOUNTER
Patient c/o still having back pain  She states she can't eat or sleep because pain is constant  She finished taking muscle relaxer's that were prescribed in hospital, she was taking them 3 times a day everyday  Patient wants to know if there is something else she can take? She also needs work excuse extended to 2/14/20

## 2020-02-13 ENCOUNTER — EVALUATION (OUTPATIENT)
Dept: PHYSICAL THERAPY | Facility: REHABILITATION | Age: 60
End: 2020-02-13
Payer: MEDICARE

## 2020-02-13 VITALS — SYSTOLIC BLOOD PRESSURE: 140 MMHG | DIASTOLIC BLOOD PRESSURE: 91 MMHG | HEART RATE: 91 BPM

## 2020-02-13 DIAGNOSIS — M54.16 LUMBAR RADICULOPATHY: Primary | ICD-10-CM

## 2020-02-13 PROCEDURE — 97163 PT EVAL HIGH COMPLEX 45 MIN: CPT | Performed by: PHYSICAL THERAPIST

## 2020-02-13 NOTE — PROGRESS NOTES
PT Evaluation     Today's date: 2020  Patient name: Kandice Mora  : 1960  MRN: [id number]  Referring provider: Claudy Jurado DO  Dx:   Encounter Diagnosis     ICD-10-CM    1  Lumbar radiculopathy M54 [id number]                   Assessment  Assessment details: Pt is a 63 yo female experiencing acute low back and LE pain rated 6-10/10 that has been present for 2 weeks  She presents with right LE weakness which she reports is worsening  She ambulates with a foot drop on the right and reflexes are diminished  She is shifted to the left in sitting and standing and she would not tolerate a trial of shift correction  Lordosis is reduced  Due to the severity of the pain evaluation was very limited  She did note that right side lying will abolish right leg pain  It returned as soon as she was upright  She was advised to assume this position frequently throughout the day to reduce nerve irritation  Due to foot drop and the severity of the symptoms I discussed consult with a physician  The patient has attended pain management in the past and wishes to try this route first and I will put in a referral   At this time what I can do for her in PT is limited however is right sidelying reduces symptoms sufficient for her to move and stand somewhat better we will continue treatment on Monday and her prognosis should improve  Impairments: abnormal gait, abnormal muscle firing, abnormal or restricted ROM, activity intolerance, impaired balance, lacks appropriate home exercise program, pain with function and poor body mechanics    Symptom irritability: highUnderstanding of Dx/Px/POC: excellent   Prognosis: good    Goals  STG: in 4 weeks   Centralize pain      Increase LE strength 1/2 grade  Increase lumbar ROM  To WNL  LTG: by discharge  Return to work  Able to perform housekeeping activities  Pain <4/10    Plan  Patient would benefit from: skilled physical therapy  Referral necessary: Yes  Planned modality interventions: TENS and thermotherapy: hydrocollator packs  Planned therapy interventions: neuromuscular re-education, manual therapy, strengthening, therapeutic exercise, home exercise program, stretching, body mechanics training and postural training  Frequency: 2x week  Duration in weeks: 12  Treatment plan discussed with: patient        Subjective Evaluation    History of Present Illness  Date of onset: 2020  Mechanism of injury: 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 and sitting on a poor chair and pain began  Has a history of LBP radiating into the thigh  Had injections in the past with one episode of it hitting the nerve  Pain  Current pain ratin  At best pain ratin  At worst pain rating: 10  Location: Left low back pain that radiates to the knee and right foot pain and swelling  Quality: radiating  Aggravating factors: standing and walking    Social Support  Stairs in house: yes   Lives in: multiple-level home  Lives with: spouse      Diagnostic Tests  CT scan: abnormal  Treatments  Previous treatment: medication  Patient Goals  Patient goals for therapy: independence with ADLs/IADLs and decreased pain  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, 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  Objective     Concurrent Complaints  Positive for disturbed sleep and history of cancer  Negative for night pain, bladder dysfunction, bowel dysfunction and saddle (S4) numbness    Additional Special Questions  Pt did have a CT scan at the ER    Postural Observations  Seated posture: poor  Standing posture: poor  Correction of posture: makes symptoms worse        Palpation   Left   Hypertonic in the erector spinae, lumbar paraspinals and quadratus lumborum  Tenderness of the erector spinae, lumbar paraspinals and quadratus lumborum       Tenderness     Additional Tenderness Details  Tender over L2- S1  Most tender L3-L5    Neurological Testing     Sensation     Lumbar   Left   Intact: light touch    Right   Intact: light touch    Reflexes   Left   Patellar (L4): normal (2+)  Achilles (S1): trace (1+)    Right   Patellar (L4): trace (1+)  Achilles (S1): trace (1+)    Active Range of Motion     Lumbar   Flexion:  Restriction level: moderate  Extension:  Restriction level: maximal  Left lateral flexion:  Restriction level: minimal  Right lateral flexion:  Restriction level: moderate    Strength/Myotome Testing     Left Hip   Planes of Motion   Flexion: 3+    Right Hip   Planes of Motion   Flexion: 3+    Left Knee   Flexion: 5  Extension: 4+    Right Knee   Flexion: 3+  Extension: 3+    Left Ankle/Foot   Dorsiflexion: 4+  Plantar flexion: 4  Great toe flexion: 5  Great toe extension: 5    Right Ankle/Foot   Dorsiflexion: 0  Plantar flexion: 0  Great toe flexion: 2  Great toe extension: 2    Tests     Lumbar     Left   Negative crossed SLR, passive SLR and slump test      Right   Positive passive SLR and slump test      Left Hip   Negative CLAUS and FADIR                Precautions: HTN  Precautions: HTN    Daily Treatment Diary     Assessment             Eval/Reval             FOTO     **     **   HEP Issued              Manuals                                                                 Exercise Diary              Right side lying                                                                                                                                                                                                                Modalities

## 2020-02-14 ENCOUNTER — CONSULT (OUTPATIENT)
Dept: PAIN MEDICINE | Facility: CLINIC | Age: 60
End: 2020-02-14
Payer: MEDICARE

## 2020-02-14 ENCOUNTER — TELEPHONE (OUTPATIENT)
Dept: PAIN MEDICINE | Facility: CLINIC | Age: 60
End: 2020-02-14

## 2020-02-14 VITALS — TEMPERATURE: 98.6 F | SYSTOLIC BLOOD PRESSURE: 127 MMHG | DIASTOLIC BLOOD PRESSURE: 89 MMHG | HEART RATE: 117 BPM

## 2020-02-14 DIAGNOSIS — M51.36 DDD (DEGENERATIVE DISC DISEASE), LUMBAR: ICD-10-CM

## 2020-02-14 DIAGNOSIS — M48.061 SPINAL STENOSIS OF LUMBAR REGION, UNSPECIFIED WHETHER NEUROGENIC CLAUDICATION PRESENT: ICD-10-CM

## 2020-02-14 DIAGNOSIS — M47.816 LUMBAR SPONDYLOSIS: ICD-10-CM

## 2020-02-14 DIAGNOSIS — M54.16 LUMBAR RADICULOPATHY: Primary | ICD-10-CM

## 2020-02-14 PROBLEM — M51.369 DDD (DEGENERATIVE DISC DISEASE), LUMBAR: Status: ACTIVE | Noted: 2020-02-14

## 2020-02-14 PROCEDURE — 99204 OFFICE O/P NEW MOD 45 MIN: CPT | Performed by: ANESTHESIOLOGY

## 2020-02-14 PROCEDURE — 1036F TOBACCO NON-USER: CPT | Performed by: ANESTHESIOLOGY

## 2020-02-14 RX ORDER — GABAPENTIN 300 MG/1
600 CAPSULE ORAL 3 TIMES DAILY
Qty: 180 CAPSULE | Refills: 1 | Status: SHIPPED | OUTPATIENT
Start: 2020-02-14 | End: 2020-09-14

## 2020-02-14 NOTE — TELEPHONE ENCOUNTER
Scheduled pt for B/L L4 Tfesi for 2/18/20  Pt said Dr Bettye Dickson asked she be squeezed in due to upcoming surgery    Pt denies rx blood thinners  Nothing to eat or drink 1 hr prior to procedure  Need to arrange transportation  Proper clothing for procedure  If ill or placed on antibiotics please call to reschedule

## 2020-02-14 NOTE — PROGRESS NOTES
Assessment  1  Lumbar radiculopathy    2  DDD (degenerative disc disease), lumbar    3  Lumbar spondylosis    4  Spinal stenosis of lumbar region, unspecified whether neurogenic claudication present        Plan  75-year-old female presenting for initial consultation regarding a 2 week history of lumbosacral back pain with radiculopathy in bilateral lower extremities worse on the right than the left with associated right foot drop  She denies any trauma or inciting event  CT of the lumbar spine reveals degenerative disc disease from L3-4-L5-S1 and spondylosis  Disc protrusion at L3-4 causing moderate left foraminal stenosis  Disc bulge at L4-5 causing moderate central and mild left foraminal stenosis  Right recess stenosis was noted at this level as well  Mild bilateral foraminal stenosis noted at L5-S1  The patient was evaluated by physical therapy, however considering the severity of the patient's pain as well as right foot drop it was suggested that she was not a candidate for physical therapy at this time until better pain control was achieved  She is currently taking gabapentin 300 mg t i d  With mild relief  She has tried ibuprofen, oral steroids, cyclobenzaprine, and topical lidocaine without any significant relief  The patient's low back pain does seem multifactorial including myofascial and facet mediated components  Radicular symptoms likely stemming from stenosis at L4-5     1  I will schedule the patient for bilateral L4 TFESI to reduce the inflammatory component of her pain  2  I will increase the patient's gabapentin to 600 mg t i d  For neuropathic complaints and the patient was given a titration schedule at today's visit  3  The patient will continue with her home exercise program  4  Once the patient's pain is better controlled will refer back to physical therapy as I feel she may benefit from [facility] based exercises  5   I will follow up the patient in 4 weeks      Complete risks and benefits including bleeding, infection, tissue reaction, nerve injury and allergic reaction were discussed  The approach was demonstrated using models and literature was provided  Verbal and written consent was obtained  My impressions and treatment recommendations were discussed in detail with the patient who verbalized understanding and had no further questions  Discharge instructions were provided  I personally saw and examined the patient and I agree with the above discussed plan of care  No orders of the defined types were placed in this encounter  No orders of the defined types were placed in this encounter  History of Present Illness    Karmen A [facility] is a 62 y o  female presenting for initial consultation regarding a 2 week history of lumbosacral back pain with radiation into the posterolateral aspects of bilateral lower extremities worse on the right than the left with associated right foot drop  She denies any trauma or inciting event  She denies any bladder or bowel incontinence or saddle anesthesia  CT of the lumbar spine reveals degenerative disc disease from L3-4-L5-S1 and spondylosis  Disc protrusion at L3-4 causing moderate left foraminal stenosis  Disc bulge at L4-5 causing moderate central and mild left foraminal stenosis  Right recess stenosis was noted at this level as well  Mild bilateral foraminal stenosis noted at L5-S1  The patient was evaluated by physical therapy, however considering the severity of the patient's pain as well as right foot drop it was suggested that she was not a candidate for physical therapy at this time until better pain control was achieved  She is currently taking gabapentin 300 mg t i d  With mild relief  She has tried ibuprofen, oral steroids, cyclobenzaprine, and topical lidocaine without any significant relief  The patient rates her pain a 10/10 on the pain is constant  The pain is not follow any particular pattern throughout the day    The pain is described as shooting, burning, sharp, and cutting  The pain is increased with standing, bending, and walking  The pain is alleviated with sitting  She has previously had injections more than 20 years ago in her lumbar spine which did provide relief for similar symptoms  She has also found some relief with heat and ice application  I have personally reviewed and/or updated the patient's past medical history, past surgical history, family history, social history, current medications, allergies, and vital signs today  Other than as stated above, the patient denies any interval changes in medications, medical condition, mental condition, symptoms, or allergies since the last office visit  Review of Systems   Constitutional: Positive for chills, fever and unexpected weight change  HENT: Negative for trouble swallowing  Thirsty   Eyes: Negative for visual disturbance  Respiratory: Negative for shortness of breath and wheezing  Cardiovascular: Negative for chest pain and palpitations  Gastrointestinal: Positive for vomiting  Negative for constipation, diarrhea and nausea  Endocrine: Negative for cold intolerance, heat intolerance and polydipsia  Genitourinary: Negative for difficulty urinating and frequency  Musculoskeletal: Positive for joint swelling and myalgias  Negative for arthralgias and gait problem  Skin: Negative for rash  Neurological: Positive for dizziness  Negative for seizures, syncope, weakness and headaches  Hematological: Does not bruise/bleed easily  Psychiatric/Behavioral: Positive for decreased concentration  Negative for dysphoric mood  Anxiet, depression   All other systems reviewed and are negative        Patient Active Problem List   Diagnosis    Anxiety    Ortega esophagus    Depression       Past Medical History:   Diagnosis Date    Arthritis     Last assessed 5/3/2011    Cancer ([facility] [address] )     Fibromyalgia     Fracture of one or more phalanges of foot     Hypertension     Ovarian cancer [facility]) 1987    age 27    Polyneuropathy     Last assessed 6/23/2016    Renal disorder     Vertigo        Past Surgical History:   Procedure Laterality Date    ABDOMINAL SURGERY  1986    BACK SURGERY  1990    FOOT FRACTURE SURGERY Bilateral 2004    x 5  surgeries    KIDNEY SURGERY  1974    TOTAL ABDOMINAL HYSTERECTOMY  24    age 27   Derik Bones TOTAL ABDOMINAL HYSTERECTOMY W/ BILATERAL SALPINGOOPHORECTOMY Bilateral 1987    age 27       Family History   Problem Relation Age of Onset    Heart disease Mother     Cancer Mother     Diabetes Mother     Arthritis Mother     Anxiety disorder Mother     Bleeding Disorder Mother     Clotting disorder Mother     Depression Mother     Hyperlipidemia Mother     Irritable bowel syndrome Mother     Hypertension Mother     Obesity Mother     Osteoporosis Mother     Vaginal cancer Mother 27    Skin cancer Mother     Thyroid disease Mother     Diabetes Father     Arthritis Father     Hyperlipidemia Father     Vesicoureteral reflux Father     Heart disease Father     Hypertension Father     Migraines Father     Obesity Father     Hypertension Family     Arthritis Family     Arthritis Brother     Anxiety disorder Brother     Diabetes Brother     Hyperlipidemia Brother     Vesicoureteral reflux Brother     Heart disease Brother     Irritable bowel syndrome Brother     Hypertension Brother     Migraines Brother     Thyroid disease Brother     Pancreatic cancer Brother 54    Migraines Daughter     Asthma Son     Migraines Son     Diabetes Maternal Grandmother     Vaginal cancer Maternal Grandmother 48    Infertility Paternal Grandmother     Arthritis Maternal Aunt     Anxiety disorder Maternal Aunt     Depression Maternal Aunt     Diabetes Maternal Aunt     Vaginal cancer Maternal Aunt 48    Fibroids Paternal Aunt     No Known Problems Maternal Grandfather     No Known Problems Paternal Grandfather     No Known Problems Maternal Aunt     No Known Problems Maternal Aunt     No Known Problems Maternal Aunt        Social History     Occupational History    Occupation: CNA   Tobacco Use    Smoking status: Never Smoker    Smokeless tobacco: Never Used   Substance and Sexual Activity    Alcohol use: No    Drug use: No    Sexual activity: Not Currently       Current Outpatient Medications on File Prior to Visit   Medication Sig    gabapentin (NEURONTIN) 300 mg capsule Take 1 capsule by mouth 3 (three) times a day    hydrOXYzine HCL (ATARAX) 50 mg tablet     meclizine (ANTIVERT) 12 5 MG tablet Take 1 tablet (12 5 mg total) by mouth every 8 (eight) hours as needed for dizziness or nausea    methenamine hippurate (HIPREX) 1 g tablet     omeprazole (PriLOSEC) 40 MG capsule     PARoxetine (PAXIL) 10 mg tablet Take by mouth    PARoxetine (PAXIL) 40 MG tablet Take by mouth    APPLE CIDER VINEGAR PO Take by mouth    cyclobenzaprine (FLEXERIL) 10 mg tablet Take 1 tablet (10 mg total) by mouth 3 (three) times a day as needed for muscle spasms for up to 10 days    doxycycline (PERIOSTAT) 20 MG tablet Take 20 mg by mouth 2 (two) times a day    estradiol (VAGIFEM, YUVAFEM) 10 MCG TABS vaginal tablet Insert 1 tablet (10 mcg total) into the vagina daily For two weeks  Then twice weekly at night   Use 12 hours prior to intercourse (Patient not taking: Reported on 2/14/2020)    ibuprofen (MOTRIN) 600 mg tablet Take 1 tablet (600 mg total) by mouth every 6 (six) hours as needed for moderate pain (Inflammation) (Patient not taking: Reported on 2/14/2020)    lidocaine (LIDODERM) 5 % Apply 1 patch topically daily Remove & Discard patch within 12 hours or as directed by MD (Patient not taking: Reported on 2/3/2020)    ondansetron (ZOFRAN) 4 mg tablet Take 1 tablet (4 mg total) by mouth every 8 (eight) hours as needed for nausea or vomiting (Patient not taking: Reported on 2/14/2020)    predniSONE 10 mg tablet Take 1 tablet (10 mg total) by mouth daily #4 po day #1, #3 po day 2 and 3, #2 daily 4, 5 , 6 then one daily  (Patient not taking: Reported on 2/14/2020)    SUMAtriptan (IMITREX) 50 mg tablet Take 1 tablet (50 mg total) by mouth once as needed for migraine for up to 1 dose May repeat in 2 hours  No more than 200 mg per day  (Patient not taking: Reported on 2/14/2020)     No current facility-administered medications on file prior to visit  Allergies   Allergen Reactions    Codeine     Morphine     Penicillins     Seasonal Ic  [Cholestatin]        Physical Exam    /89   Pulse (!) 117   Temp 98 6 °F (37 °C) (Oral)   LMP  (LMP Unknown)     Constitutional: normal, well developed, well nourished, alert, in no distress and non-toxic and no overt pain behavior  Eyes: anicteric  HEENT: grossly intact  Neck: supple, symmetric, trachea midline and no masses   Pulmonary:even and unlabored  Cardiovascular:No edema or pitting edema present  Skin:Normal without rashes or lesions and well hydrated  Psychiatric:Mood and affect appropriate  Neurologic:Cranial Nerves II-XII grossly intact  Musculoskeletal:antalgic gait  Bilateral lumbar paraspinals tender to palpation and ropy in texture from L3-L5  Bilateral SI joints minimally tender to palpation  Bilateral patellar and Achilles reflexes were 2/4 and symmetrical   No clonus was noted bilaterally  Left lower extremity strength 5/5 in all muscle groups  Right iliopsoas 5/5 strength  Right knee extension 5/5 strength  Right knee flexion 4/5 strength  Right foot dorsiflexion and EHL 2/5 strength  Right foot inversion, eversion, and plantar flexion was 5/5 strength  Sensation intact to light touch in L3 through S1 dermatomes bilaterally  Equivocal straight leg raise bilaterally  Negative Theo's test bilaterally      Imaging        PACS Images      Show images for CT recon only lumbar spine (No Charge)   Study Result     CT LUMBAR SPINE     INDICATION: Severe low back pain      COMPARISON:  CT lumbar spine study from January 31, 2017      TECHNIQUE: Axial CT examination of the lumbar spine was obtained utilizing reconstructed images from CT of the chest, abdomen and pelvis performed the same day  Images were reformatted in the sagittal and coronal planes      This examination, like all CT scans performed in the [facility], was performed utilizing techniques to minimize radiation dose exposure, including the use of iterative reconstruction and automated exposure control        FINDINGS:     ALIGNMENT: Mild dextroscoliosis of the lumbar spine without lateral subluxation, spondylolisthesis or spondylolysis  VERTEBRAL BODIES: No fracture  No acute osseous abnormality      DEGENERATIVE CHANGES: Disc space narrowing and endplate sclerosis at B2-D2  Previously noted degenerative intervertebral discal gas at the L4-5 level has resolved  There is mild L4-5 disc space narrowing      Mild disc bulging at L1-L2 and L2-L3 with mild spinal stenosis      L3-L4: Left foraminal protrusion at L3-L4 with moderate left foraminal stenosis, correlate for left L3 radiculopathy  There is bilateral facet arthropathy, more pronounced on the right than the left which is not significantly changed when compared to   the prior study      L4-L5: Disc bulge, eccentric to the left with mild bilateral facet arthropathy  There is mild to moderate spinal stenosis and mild left foraminal encroachment  There is mild left greater than right subarticular recess stenosis, correlate for left L5   radiculopathy      L5-S1: Disc space narrowing, endplate osteophytosis and mild disc bulge without significant spinal canal stenosis    Mild bilateral inferior foraminal encroachment      PREVERTEBRAL AND PARASPINAL SOFT TISSUES: Atrophic appearing right kidney with cortical scarring      IMPRESSION:     No fracture or traumatic subluxation      Degenerative discogenic disease with disc space narrowing and endplate sclerosis with osteophytosis at L5-S1      Lower lumbar degenerative discogenic disease and facet arthropathy as described above  Left foraminal protrusion, correlate for left L3 radiculopathy  Additional discogenic disease resulting in left greater than right subarticular recess stenosis,   correlate for left L5 radiculopathy            Workstation performed: [id number]             PACS Images      Show images for DXA bone density spine hip and pelvis   Study Result     CENTRAL  DXA SCAN     CLINICAL HISTORY:   59year old post-menopausal  female risk factors include history of ovarian carcinoma      TECHNIQUE: Bone densitometry was performed using a H[employer] Horizon A bone densitometer  Regions of interest appear properly placed  There are no obvious fractures or other confounding variables which could limit the study  Degenerative changes of the   lumbar spine and hip  This will falsely elevate the bone mineral densities in these regions       COMPARISON:  June 26, 2008     RESULTS:   LUMBAR SPINE:  L1-L4:  BMD 0 915 gm/cm2  T-score -1 2  Z-score 0 3     LUMBAR SPINE L1 and L3 (average) :   BMD 0 806 gm/sq-cm  T-score is -1 9   Z-score is -0 5            LEFT TOTAL HIP:  BMD 0 849 gm/cm2  T-score -0 8  Z-score 0 2     LEFT FEMORAL NECK:  BMD 0 793 gm/cm2  T-score -0 5  Z-score 0 8           IMPRESSION:  1  Based on the [facility] classification, the T-score of -1 9 in the lumbar spine is consistent with low bone mineral density (OSTEOPENIA)        2  When compared to the prior examination, there has been a 5  % DECREASE in the total bone mineral density of the lumbar spine and a  5 % DECREASE in the total bone mineral density of the left hip         3  Any secondary causes of low bone mineral density should be excluded prior to treatment, if clinically indicated    4   A daily intake of at least 1200 mg calcium and 800 to 1000 IU of Vitamin D, as well as weight bearing and muscle strengthening exercise, fall prevention and avoidance of tobacco and excessive alcohol intake as basic preventive measures are suggested  5   Repeat DXA  in 18 - 24 months, on the same machine, as clinically indicated            The 10 year risk of hip fracture is 0 2%, with the 10 year risk of major osteoporotic fracture being 7%, as calculated by the WHO fracture risk assessment tool (FRAX)    The current NOF guidelines recommend treating patients with FRAX 10 year risk score   of >3% for hip fracture and >20% for major osteoporotic fracture             WHO CLASSIFICATION:  Normal (a T-score of -1 0 or higher)  Low bone mineral density (a T-score of less than -1 0 but higher than -2 5)  Osteoporosis (a T-score of -2 5 or less)  Severe osteoporosis (a T-score of -2 5 or less with a fragility fracture)                     Workstation performed: [id number]

## 2020-02-17 ENCOUNTER — APPOINTMENT (OUTPATIENT)
Dept: PHYSICAL THERAPY | Facility: REHABILITATION | Age: 60
End: 2020-02-17
Payer: MEDICARE

## 2020-02-18 ENCOUNTER — HOSPITAL ENCOUNTER (OUTPATIENT)
Dept: RADIOLOGY | Facility: CLINIC | Age: 60
Discharge: HOME | End: 2020-02-18
Admitting: ANESTHESIOLOGY
Payer: MEDICARE

## 2020-02-18 VITALS
SYSTOLIC BLOOD PRESSURE: 122 MMHG | TEMPERATURE: 98.7 F | DIASTOLIC BLOOD PRESSURE: 78 MMHG | OXYGEN SATURATION: 97 % | HEART RATE: 80 BPM | RESPIRATION RATE: 18 BRPM

## 2020-02-18 DIAGNOSIS — M54.16 LUMBAR RADICULOPATHY: ICD-10-CM

## 2020-02-18 PROCEDURE — 64483 NJX AA&/STRD TFRM EPI L/S 1: CPT | Performed by: ANESTHESIOLOGY

## 2020-02-18 RX ORDER — LIDOCAINE HYDROCHLORIDE 10 MG/ML
5 INJECTION, SOLUTION EPIDURAL; INFILTRATION; INTRACAUDAL; PERINEURAL ONCE
Status: COMPLETED | OUTPATIENT
Start: 2020-02-18 | End: 2020-02-18

## 2020-02-18 RX ORDER — PAPAVERINE HCL 150 MG
20 CAPSULE, EXTENDED RELEASE ORAL ONCE
Status: COMPLETED | OUTPATIENT
Start: 2020-02-18 | End: 2020-02-18

## 2020-02-18 RX ADMIN — DEXAMETHASONE SODIUM PHOSPHATE 15 MG: 10 INJECTION, SOLUTION INTRAMUSCULAR; INTRAVENOUS at 13:58

## 2020-02-18 RX ADMIN — LIDOCAINE HYDROCHLORIDE 2 ML: 20 INJECTION, SOLUTION EPIDURAL; INFILTRATION; INTRACAUDAL; PERINEURAL at 13:58

## 2020-02-18 RX ADMIN — LIDOCAINE HYDROCHLORIDE 4 ML: 10 INJECTION, SOLUTION EPIDURAL; INFILTRATION; INTRACAUDAL; PERINEURAL at 13:52

## 2020-02-18 RX ADMIN — IOHEXOL 2 ML: 300 INJECTION, SOLUTION INTRAVENOUS at 13:56

## 2020-02-18 NOTE — H&P
History of Present Illness: The patient is a 62 y o  female who presents with complaints of low back and leg pain  Patient Active Problem List   Diagnosis    Anxiety    Ortega esophagus    Depression    Lumbar radiculopathy    DDD (degenerative disc disease), lumbar    Lumbar spondylosis    Spinal stenosis of lumbar region       Past Medical History:   Diagnosis Date    Arthritis     Last assessed 5/3/2011    Cancer ([facility] [address]     Fibromyalgia     Fracture of one or more phalanges of foot     Hypertension     Ovarian cancer [facility]) 1987    age 27    Polyneuropathy     Last assessed 6/23/2016    Renal disorder     Vertigo        Past Surgical History:   Procedure Laterality Date    ABDOMINAL SURGERY  1986    BACK SURGERY  1990    FOOT FRACTURE SURGERY Bilateral 2004    x 5  surgeries    KIDNEY SURGERY  1974    TOTAL ABDOMINAL HYSTERECTOMY  26    age 27   [place] TOTAL ABDOMINAL HYSTERECTOMY W/ BILATERAL SALPINGOOPHORECTOMY Bilateral 1987    age 27         Current Outpatient Medications:     APPLE CIDER VINEGAR PO, Take by mouth, Disp: , Rfl:     cyclobenzaprine (FLEXERIL) 10 mg tablet, Take 1 tablet (10 mg total) by mouth 3 (three) times a day as needed for muscle spasms for up to 10 days, Disp: 12 tablet, Rfl: 0    doxycycline (PERIOSTAT) 20 MG tablet, Take 20 mg by mouth 2 (two) times a day, Disp: , Rfl: 1    estradiol (VAGIFEM, YUVAFEM) 10 MCG TABS vaginal tablet, Insert 1 tablet (10 mcg total) into the vagina daily For two weeks  Then twice weekly at night   Use 12 hours prior to intercourse (Patient not taking: Reported on 2/14/2020), Disp: 30 tablet, Rfl: 6    gabapentin (NEURONTIN) 300 mg capsule, Take 2 capsules (600 mg total) by mouth 3 (three) times a day, Disp: 180 capsule, Rfl: 1    hydrOXYzine HCL (ATARAX) 50 mg tablet, , Disp: , Rfl:     ibuprofen (MOTRIN) 600 mg tablet, Take 1 tablet (600 mg total) by mouth every 6 (six) hours as needed for moderate pain (Inflammation) (Patient not taking: Reported on 2/14/2020), Disp: 30 tablet, Rfl: 0    lidocaine (LIDODERM) 5 %, Apply 1 patch topically daily Remove & Discard patch within 12 hours or as directed by MD (Patient not taking: Reported on 2/3/2020), Disp: 7 patch, Rfl: 0    meclizine (ANTIVERT) 12 5 MG tablet, Take 1 tablet (12 5 mg total) by mouth every 8 (eight) hours as needed for dizziness or nausea, Disp: 30 tablet, Rfl: 0    methenamine hippurate (HIPREX) 1 g tablet, , Disp: , Rfl:     omeprazole (PriLOSEC) 40 MG capsule, , Disp: , Rfl:     ondansetron (ZOFRAN) 4 mg tablet, Take 1 tablet (4 mg total) by mouth every 8 (eight) hours as needed for nausea or vomiting (Patient not taking: Reported on 2/14/2020), Disp: 10 tablet, Rfl: 0    PARoxetine (PAXIL) 10 mg tablet, Take by mouth, Disp: , Rfl:     PARoxetine (PAXIL) 40 MG tablet, Take by mouth, Disp: , Rfl:     predniSONE 10 mg tablet, Take 1 tablet (10 mg total) by mouth daily #4 po day #1, #3 po day 2 and 3, #2 daily 4, 5 , 6 then one daily  (Patient not taking: Reported on 2/14/2020), Disp: 20 tablet, Rfl: 0    SUMAtriptan (IMITREX) 50 mg tablet, Take 1 tablet (50 mg total) by mouth once as needed for migraine for up to 1 dose May repeat in 2 hours  No more than 200 mg per day  (Patient not taking: Reported on 2/14/2020), Disp: 10 tablet, Rfl: 0    Allergies   Allergen Reactions    Codeine     Morphine     Penicillins     Seasonal Ic  [Cholestatin]        Physical Exam:   Vitals:    02/18/20 1323   BP: 113/73   Pulse: 84   Resp: 20   Temp: 98 7 °F (37 1 °C)   SpO2: 96%     General: Awake, Alert, Oriented x 3, Mood and affect appropriate  Respiratory: Respirations even and unlabored  Cardiovascular: Peripheral pulses intact; no edema  Musculoskeletal Exam:  Bilateral lumbar paraspinals tender to palpation  ASA Score: 2    Patient/Chart Verification  Patient ID Verified: Verbal  ID Band Applied: No  Consents Confirmed: Procedural  H&P( within 30 days) Verified:  To be obtained in the Pre-Procedure area  Interval H&P(within 24 hr) Complete (required for Outpatients and Surgery Admit only): To be obtained in the Pre-Procedure area  Allergies Reviewed: Yes  Anticoag/NSAID held?: No  Currently on antibiotics?: No  Pre-op Lab/Test Results Available: N/A  Pregnancy Lab Collected: N/A comment    Assessment:   1   Lumbar radiculopathy        Plan: Bilateral L4 TFESI

## 2020-02-18 NOTE — DISCHARGE INSTRUCTIONS
Epidural Steroid Injection   WHAT YOU NEED TO KNOW:   An epidural steroid injection (DANIELLA) is a procedure to inject steroid medicine into the epidural space  The epidural space is between your spinal cord and vertebrae  Steroids reduce inflammation and fluid buildup in your spine that may be causing pain  You may be given pain medicine along with the steroids  ACTIVITY  · Do not drive or operate machinery today  · No strenuous activity today - bending, lifting, etc   · You may resume normal activites starting tomorrow - start slowly and as tolerated  · You may shower today, but no tub baths or hot tubs  · You may have numbness for several hours from the local anesthetic  Please use caution and common sense, especially with weight-bearing activities  CARE OF THE INJECTION SITE  · If you have soreness or pain, apply ice to the area today (20 minutes on/20 minutes off)  · Starting tomorrow, you may use warm, moist heat or ice if needed  · You may have an increase or change in your discomfort for 36-48 hours after your treatment  · Apply ice and continue with any pain medication you have been prescribed  · Notify [facility] if you have any of the following: redness, drainage, swelling, headache, stiff neck or fever above 100°F     SPECIAL INSTRUCTIONS  · Our office will contact you in approximately 7 days for a progress report  MEDICATIONS  · Continue to take all routine medications  · Our office may have instructed you to hold some medications  If you have a problem specifically related to your procedure, please call our office at [phone]  Problems not related to your procedure should be directed to your primary care physician 
no abrasions, no jaundice, no lesions, no pruritis, and no rashes.

## 2020-02-25 ENCOUNTER — TELEPHONE (OUTPATIENT)
Dept: PAIN MEDICINE | Facility: CLINIC | Age: 60
End: 2020-02-25

## 2020-02-26 ENCOUNTER — TELEPHONE (OUTPATIENT)
Dept: FAMILY MEDICINE CLINIC | Facility: CLINIC | Age: 60
End: 2020-02-26

## 2020-02-26 NOTE — TELEPHONE ENCOUNTER
Patient was instructed to come in for a clearance at a certain time because she needed cataract surgery to both eyes but patient came in too soon  [address] called to ask if patient still needs to come in to get cleared for the second eye scheduled on 03/17/2020

## 2020-02-27 NOTE — TELEPHONE ENCOUNTER
Pt reports 90% improvement post inj   Pt said she has no pain on the right side just on the left   Pain level 5 5/10

## 2020-03-02 NOTE — TELEPHONE ENCOUNTER
Patient told needs to schedule an appointment for H&P due to form requires recent date and will not accept form to have last DOS  Patient scheduled for Monday 03/09/2020

## 2020-03-09 ENCOUNTER — OFFICE VISIT (OUTPATIENT)
Dept: FAMILY MEDICINE CLINIC | Facility: CLINIC | Age: 60
End: 2020-03-09
Payer: MEDICARE

## 2020-03-09 VITALS
TEMPERATURE: 98.3 F | HEART RATE: 104 BPM | DIASTOLIC BLOOD PRESSURE: 92 MMHG | SYSTOLIC BLOOD PRESSURE: 126 MMHG | OXYGEN SATURATION: 98 %

## 2020-03-09 DIAGNOSIS — F33.1 MODERATE EPISODE OF RECURRENT MAJOR DEPRESSIVE DISORDER (HCC): ICD-10-CM

## 2020-03-09 DIAGNOSIS — K22.719 BARRETT'S ESOPHAGUS WITH DYSPLASIA: ICD-10-CM

## 2020-03-09 DIAGNOSIS — M48.061 SPINAL STENOSIS OF LUMBAR REGION, UNSPECIFIED WHETHER NEUROGENIC CLAUDICATION PRESENT: ICD-10-CM

## 2020-03-09 DIAGNOSIS — Z01.818 PRE-OPERATIVE CLEARANCE: Primary | ICD-10-CM

## 2020-03-09 DIAGNOSIS — F41.9 ANXIETY: ICD-10-CM

## 2020-03-09 DIAGNOSIS — M54.16 LUMBAR RADICULOPATHY: ICD-10-CM

## 2020-03-09 DIAGNOSIS — H25.9 AGE-RELATED CATARACT OF BOTH EYES, UNSPECIFIED AGE-RELATED CATARACT TYPE: ICD-10-CM

## 2020-03-09 PROCEDURE — 99215 OFFICE O/P EST HI 40 MIN: CPT | Performed by: FAMILY MEDICINE

## 2020-03-09 RX ORDER — GENTAMICIN SULFATE 3 MG/ML
SOLUTION/ DROPS OPHTHALMIC
Status: ON HOLD | COMMUNITY
Start: 2020-03-02 | End: 2020-08-03

## 2020-03-09 RX ORDER — PREDNISOLONE ACETATE 10 MG/ML
SUSPENSION/ DROPS OPHTHALMIC
Status: ON HOLD | COMMUNITY
Start: 2020-03-02 | End: 2020-08-03

## 2020-03-09 NOTE — PROGRESS NOTES
Chief Complaint   Patient presents with    Pre-op Exam     right eye cataract surgery on 3/17/20 with Dr Chandan Coronado       Assessment/Plan:  Cleared for surgery  Reviewed prior labs and EKG  Diagnoses and all orders for this visit:    Pre-operative clearance    Age-related cataract of both eyes, unspecified age-related cataract type    Ortega's esophagus with dysplasia    Lumbar radiculopathy    Anxiety    Moderate episode of recurrent major depressive disorder ([address] )    Spinal stenosis of lumbar region, unspecified whether neurogenic claudication present    Other orders  -     gentamicin (GARAMYCIN) 0 3 % ophthalmic solution  -     prednisoLONE acetate (PRED FORTE) 1 % ophthalmic suspension          Subjective:      Patient ID: Giuliana Rene is a 62 y o  female  Pre op for lens implant right eye  S/P left eye, doing well  Contuinues with Med's for depression, HA's and reflux - all stable  Back pain resolved with local injection  The following portions of the patient's history were reviewed and updated as appropriate: allergies, current medications, past medical history, past social history, past surgical history and problem list   I have spent 40 minutes with Patient and family today in which greater than 50% of this time was spent in counseling/coordination of care regarding Diagnostic results, Intructions for management, Risk factor reductions and Impressions  Review of Systems   Constitutional: Negative  HENT: Negative  Eyes: Positive for visual disturbance  Right eye  Respiratory: Negative  Cardiovascular: Negative  Gastrointestinal: Negative  Genitourinary: Negative  Musculoskeletal: Negative  Skin: Negative  Neurological: Negative  Psychiatric/Behavioral: Negative            Objective:      /92 (BP Location: Left arm, Patient Position: Sitting, Cuff Size: Standard)   Pulse 104   Temp 98 3 °F (36 8 °C) (Oral)   LMP  (LMP Unknown)   SpO2 98% Current Outpatient Medications:     APPLE CIDER VINEGAR PO, Take by mouth, Disp: , Rfl:     doxycycline (PERIOSTAT) 20 MG tablet, Take 20 mg by mouth 2 (two) times a day, Disp: , Rfl: 1    gabapentin (NEURONTIN) 300 mg capsule, Take 2 capsules (600 mg total) by mouth 3 (three) times a day, Disp: 180 capsule, Rfl: 1    gentamicin (GARAMYCIN) 0 3 % ophthalmic solution, , Disp: , Rfl:     hydrOXYzine HCL (ATARAX) 50 mg tablet, , Disp: , Rfl:     methenamine hippurate (HIPREX) 1 g tablet, , Disp: , Rfl:     omeprazole (PriLOSEC) 40 MG capsule, , Disp: , Rfl:     PARoxetine (PAXIL) 10 mg tablet, Take by mouth, Disp: , Rfl:     PARoxetine (PAXIL) 40 MG tablet, Take by mouth, Disp: , Rfl:     prednisoLONE acetate (PRED FORTE) 1 % ophthalmic suspension, , Disp: , Rfl:     cyclobenzaprine (FLEXERIL) 10 mg tablet, Take 1 tablet (10 mg total) by mouth 3 (three) times a day as needed for muscle spasms for up to 10 days, Disp: 12 tablet, Rfl: 0    estradiol (VAGIFEM, YUVAFEM) 10 MCG TABS vaginal tablet, Insert 1 tablet (10 mcg total) into the vagina daily For two weeks  Then twice weekly at night   Use 12 hours prior to intercourse (Patient not taking: Reported on 2/14/2020), Disp: 30 tablet, Rfl: 6    ibuprofen (MOTRIN) 600 mg tablet, Take 1 tablet (600 mg total) by mouth every 6 (six) hours as needed for moderate pain (Inflammation) (Patient not taking: Reported on 2/14/2020), Disp: 30 tablet, Rfl: 0    lidocaine (LIDODERM) 5 %, Apply 1 patch topically daily Remove & Discard patch within 12 hours or as directed by MD (Patient not taking: Reported on 2/3/2020), Disp: 7 patch, Rfl: 0    meclizine (ANTIVERT) 12 5 MG tablet, Take 1 tablet (12 5 mg total) by mouth every 8 (eight) hours as needed for dizziness or nausea, Disp: 30 tablet, Rfl: 0    ondansetron (ZOFRAN) 4 mg tablet, Take 1 tablet (4 mg total) by mouth every 8 (eight) hours as needed for nausea or vomiting (Patient not taking: Reported on 2/14/2020), Disp: 10 tablet, Rfl: 0    predniSONE 10 mg tablet, Take 1 tablet (10 mg total) by mouth daily #4 po day #1, #3 po day 2 and 3, #2 daily 4, 5 , 6 then one daily  (Patient not taking: Reported on 2/14/2020), Disp: 20 tablet, Rfl: 0    SUMAtriptan (IMITREX) 50 mg tablet, Take 1 tablet (50 mg total) by mouth once as needed for migraine for up to 1 dose May repeat in 2 hours  No more than 200 mg per day  (Patient not taking: Reported on 2/14/2020), Disp: 10 tablet, Rfl: 0     Physical Exam   Constitutional: She is oriented to person, place, and time  She appears well-developed and well-nourished  HENT:   Head: Normocephalic and atraumatic  Right Ear: External ear normal    Left Ear: External ear normal    Nose: Nose normal    Mouth/Throat: Oropharynx is clear and moist    Eyes: Pupils are equal, round, and reactive to light  Conjunctivae and EOM are normal    Opacity right eye - pupil  Neck: Normal range of motion  Neck supple  Cardiovascular: Normal rate, regular rhythm, normal heart sounds and intact distal pulses  Pulmonary/Chest: Effort normal and breath sounds normal    Abdominal: Soft  Bowel sounds are normal    Neurological: She is alert and oriented to person, place, and time  She has normal reflexes  Skin: Skin is warm and dry  Psychiatric: She has a normal mood and affect   Her behavior is normal  Judgment and thought content normal

## 2020-03-24 ENCOUNTER — TELEMEDICINE (OUTPATIENT)
Dept: PAIN MEDICINE | Facility: CLINIC | Age: 60
End: 2020-03-24
Payer: MEDICARE

## 2020-03-24 DIAGNOSIS — M48.061 SPINAL STENOSIS OF LUMBAR REGION, UNSPECIFIED WHETHER NEUROGENIC CLAUDICATION PRESENT: ICD-10-CM

## 2020-03-24 DIAGNOSIS — M51.36 DDD (DEGENERATIVE DISC DISEASE), LUMBAR: ICD-10-CM

## 2020-03-24 DIAGNOSIS — M54.16 LUMBAR RADICULOPATHY: Primary | ICD-10-CM

## 2020-03-24 PROCEDURE — G2012 BRIEF CHECK IN BY MD/QHP: HCPCS | Performed by: NURSE PRACTITIONER

## 2020-03-24 NOTE — PATIENT INSTRUCTIONS
Plan:  1  We can repeat bilateral L4 TFESI which can be performed once procedures are able to safely be performed  2  The patient may continue gabapentin 600 mg t i d  She does not need a refill of this medication at this time  3  The patient will continue with her home exercise program  4  At this time, the patient wishes to follow up on an as-needed basis  The patient was advised to contact the office should their symptoms worsen in the interim  The patient was agreeable and verbalized an understanding

## 2020-03-24 NOTE — PROGRESS NOTES
Virtual Regular Visit    Problem List Items Addressed This Visit        Nervous and Auditory    Lumbar radiculopathy - Primary       Musculoskeletal and Integument    DDD (degenerative disc disease), lumbar       Other    Spinal stenosis of lumbar region               Reason for visit is for follow-up after injection    Encounter provider SONIA Beauchamp    Provider located at [address] [place] [address]      Recent Visits  No visits were found meeting these conditions  Showing recent visits within past 7 days and meeting all other requirements     Future Appointments  No visits were found meeting these conditions  Showing future appointments within next 150 days and meeting all other requirements        After connecting through telephone, the patient was identified by name and date of birth  Uriah Lai was informed that this is a telemedicine visit and that the visit is being conducted through telephone which may not be secure and therefore, might not be HIPAA-compliant  My office door was closed  No one else was in the room  She acknowledged consent and understanding of privacy and security of the video platform  The patient has agreed to participate and understands they can discontinue the visit at any time  Subjective  Karmen Villasenor is a 62 y o  female last seen in the office on February 14, 2020 for low back pain that radiates into the bilateral lower extremities, right greater than left secondary to lumbar degenerative disc disease, lumbar spondylosis and stenosis who is currently concerned about the risks of COVID-19, and although not experiencing signs or symptoms of COVID-19, out of fear of exposure, the patient preferred to proceed with virtual visit today  The patient is status post bilateral L3 TFESI with Dr Corby Albert on February 18, 2020 and reports 90% improvement of her pain which is ongoing at this time    She denies bowel or bladder incontinence or saddle anesthesia  She does continue with some left-sided low back pain that will radiate into the buttock, however denies radiation further into her lower extremity  The patient has not participated in physical therapy at this time because her pain was to severe  She is currently taking gabapentin 600 mg t i d  With relief  The patient currently rates her pain a 2/10 on the numeric pain rating scale  Past Medical History:   Diagnosis Date    Arthritis     Last assessed 5/3/2011    Cancer ([facility] [address] )     Fibromyalgia     Fracture of one or more phalanges of foot     Hypertension     Ovarian cancer [facility]) 1987    age 27    Polyneuropathy     Last assessed 6/23/2016    Renal disorder     Vertigo        Past Surgical History:   Procedure Laterality Date    ABDOMINAL SURGERY  1986    BACK SURGERY  1990    FOOT FRACTURE SURGERY Bilateral 2004    x 5  surgeries    KIDNEY SURGERY  1974    TOTAL ABDOMINAL HYSTERECTOMY  26    age 27   Ahmet Hernández TOTAL ABDOMINAL HYSTERECTOMY W/ BILATERAL SALPINGOOPHORECTOMY Bilateral 1987    age 27       Current Outpatient Medications   Medication Sig Dispense Refill    APPLE CIDER VINEGAR PO Take by mouth      cyclobenzaprine (FLEXERIL) 10 mg tablet Take 1 tablet (10 mg total) by mouth 3 (three) times a day as needed for muscle spasms for up to 10 days 12 tablet 0    doxycycline (PERIOSTAT) 20 MG tablet Take 20 mg by mouth 2 (two) times a day  1    estradiol (VAGIFEM, YUVAFEM) 10 MCG TABS vaginal tablet Insert 1 tablet (10 mcg total) into the vagina daily For two weeks  Then twice weekly at night   Use 12 hours prior to intercourse (Patient not taking: Reported on 2/14/2020) 30 tablet 6    gabapentin (NEURONTIN) 300 mg capsule Take 2 capsules (600 mg total) by mouth 3 (three) times a day 180 capsule 1    gentamicin (GARAMYCIN) 0 3 % ophthalmic solution       hydrOXYzine HCL (ATARAX) 50 mg tablet       ibuprofen (MOTRIN) 600 mg tablet Take 1 tablet (600 mg total) by mouth every 6 (six) hours as needed for moderate pain (Inflammation) (Patient not taking: Reported on 2/14/2020) 30 tablet 0    lidocaine (LIDODERM) 5 % Apply 1 patch topically daily Remove & Discard patch within 12 hours or as directed by MD (Patient not taking: Reported on 2/3/2020) 7 patch 0    meclizine (ANTIVERT) 12 5 MG tablet Take 1 tablet (12 5 mg total) by mouth every 8 (eight) hours as needed for dizziness or nausea 30 tablet 0    methenamine hippurate (HIPREX) 1 g tablet       omeprazole (PriLOSEC) 40 MG capsule       ondansetron (ZOFRAN) 4 mg tablet Take 1 tablet (4 mg total) by mouth every 8 (eight) hours as needed for nausea or vomiting (Patient not taking: Reported on 2/14/2020) 10 tablet 0    PARoxetine (PAXIL) 10 mg tablet Take by mouth      PARoxetine (PAXIL) 40 MG tablet Take by mouth      prednisoLONE acetate (PRED FORTE) 1 % ophthalmic suspension       predniSONE 10 mg tablet Take 1 tablet (10 mg total) by mouth daily #4 po day #1, #3 po day 2 and 3, #2 daily 4, 5 , 6 then one daily  (Patient not taking: Reported on 2/14/2020) 20 tablet 0    SUMAtriptan (IMITREX) 50 mg tablet Take 1 tablet (50 mg total) by mouth once as needed for migraine for up to 1 dose May repeat in 2 hours  No more than 200 mg per day  (Patient not taking: Reported on 2/14/2020) 10 tablet 0     No current facility-administered medications for this visit  Allergies   Allergen Reactions    Codeine     Morphine     Penicillins     Seasonal Ic  [Cholestatin]        Plan:  1  We can repeat bilateral L4 TFESI which can be performed once procedures are able to safely be performed  2  The patient may continue gabapentin 600 mg t i d  She does not need a refill of this medication at this time  3  The patient will continue with her home exercise program  4  At this time, the patient wishes to follow up on an as-needed basis    The patient was advised to contact the office should their symptoms worsen in the interim  The patient was agreeable and verbalized an understanding  I spent 8 minutes with the patient during this visit

## 2020-04-07 DIAGNOSIS — R51.9 NONINTRACTABLE EPISODIC HEADACHE, UNSPECIFIED HEADACHE TYPE: ICD-10-CM

## 2020-04-08 RX ORDER — SUMATRIPTAN 50 MG/1
50 TABLET, FILM COATED ORAL ONCE AS NEEDED
Qty: 10 TABLET | Refills: 0 | Status: SHIPPED | OUTPATIENT
Start: 2020-04-08 | End: 2020-05-08 | Stop reason: SDUPTHER

## 2020-05-08 ENCOUNTER — TELEPHONE (OUTPATIENT)
Dept: FAMILY MEDICINE CLINIC | Facility: CLINIC | Age: 60
End: 2020-05-08

## 2020-05-08 ENCOUNTER — TELEPHONE (OUTPATIENT)
Dept: PAIN MEDICINE | Facility: MEDICAL CENTER | Age: 60
End: 2020-05-08

## 2020-05-08 DIAGNOSIS — R51.9 NONINTRACTABLE EPISODIC HEADACHE, UNSPECIFIED HEADACHE TYPE: ICD-10-CM

## 2020-05-08 RX ORDER — SUMATRIPTAN 50 MG/1
50 TABLET, FILM COATED ORAL ONCE AS NEEDED
Qty: 10 TABLET | Refills: 0 | Status: CANCELLED | OUTPATIENT
Start: 2020-05-08

## 2020-05-08 RX ORDER — SUMATRIPTAN 50 MG/1
50 TABLET, FILM COATED ORAL ONCE AS NEEDED
Qty: 10 TABLET | Refills: 2 | Status: SHIPPED | OUTPATIENT
Start: 2020-05-08 | End: 2022-05-16 | Stop reason: SDUPTHER

## 2020-05-09 ENCOUNTER — APPOINTMENT (OUTPATIENT)
Dept: RADIOLOGY | Age: 60
End: 2020-05-09
Payer: OTHER MISCELLANEOUS

## 2020-05-09 ENCOUNTER — APPOINTMENT (OUTPATIENT)
Dept: URGENT CARE | Age: 60
End: 2020-05-09
Payer: OTHER MISCELLANEOUS

## 2020-05-09 DIAGNOSIS — M25.559 HIP PAIN: ICD-10-CM

## 2020-05-09 DIAGNOSIS — M54.50 ACUTE RIGHT-SIDED LOW BACK PAIN WITHOUT SCIATICA: ICD-10-CM

## 2020-05-09 DIAGNOSIS — M54.50 ACUTE RIGHT-SIDED LOW BACK PAIN WITHOUT SCIATICA: Primary | ICD-10-CM

## 2020-05-09 PROCEDURE — 72100 X-RAY EXAM L-S SPINE 2/3 VWS: CPT

## 2020-05-09 PROCEDURE — G0382 LEV 3 HOSP TYPE B ED VISIT: HCPCS | Performed by: PREVENTIVE MEDICINE

## 2020-05-09 PROCEDURE — 99283 EMERGENCY DEPT VISIT LOW MDM: CPT | Performed by: PREVENTIVE MEDICINE

## 2020-05-09 PROCEDURE — 73502 X-RAY EXAM HIP UNI 2-3 VIEWS: CPT

## 2020-05-14 ENCOUNTER — APPOINTMENT (OUTPATIENT)
Dept: URGENT CARE | Age: 60
End: 2020-05-14
Payer: OTHER MISCELLANEOUS

## 2020-05-14 PROCEDURE — 99213 OFFICE O/P EST LOW 20 MIN: CPT | Performed by: PHYSICIAN ASSISTANT

## 2020-05-19 ENCOUNTER — APPOINTMENT (OUTPATIENT)
Dept: URGENT CARE | Age: 60
End: 2020-05-19
Payer: OTHER MISCELLANEOUS

## 2020-05-19 PROCEDURE — 99213 OFFICE O/P EST LOW 20 MIN: CPT | Performed by: NURSE PRACTITIONER

## 2020-05-26 ENCOUNTER — TELEPHONE (OUTPATIENT)
Dept: PAIN MEDICINE | Facility: CLINIC | Age: 60
End: 2020-05-26

## 2020-05-26 ENCOUNTER — TELEMEDICINE (OUTPATIENT)
Dept: PAIN MEDICINE | Facility: CLINIC | Age: 60
End: 2020-05-26
Payer: MEDICARE

## 2020-05-26 DIAGNOSIS — M54.16 LUMBAR RADICULOPATHY: ICD-10-CM

## 2020-05-26 DIAGNOSIS — M48.061 SPINAL STENOSIS OF LUMBAR REGION, UNSPECIFIED WHETHER NEUROGENIC CLAUDICATION PRESENT: ICD-10-CM

## 2020-05-26 DIAGNOSIS — M47.816 LUMBAR SPONDYLOSIS: ICD-10-CM

## 2020-05-26 DIAGNOSIS — M51.36 DDD (DEGENERATIVE DISC DISEASE), LUMBAR: ICD-10-CM

## 2020-05-26 DIAGNOSIS — G89.4 CHRONIC PAIN SYNDROME: Primary | ICD-10-CM

## 2020-05-26 PROCEDURE — 99442 PR PHYS/QHP TELEPHONE EVALUATION 11-20 MIN: CPT | Performed by: NURSE PRACTITIONER

## 2020-06-01 ENCOUNTER — TELEPHONE (OUTPATIENT)
Dept: PAIN MEDICINE | Facility: CLINIC | Age: 60
End: 2020-06-01

## 2020-06-09 ENCOUNTER — HOSPITAL ENCOUNTER (OUTPATIENT)
Dept: RADIOLOGY | Facility: CLINIC | Age: 60
Discharge: HOME | End: 2020-06-09
Attending: ANESTHESIOLOGY | Admitting: ANESTHESIOLOGY
Payer: MEDICARE

## 2020-06-09 VITALS
HEART RATE: 85 BPM | TEMPERATURE: 98.3 F | OXYGEN SATURATION: 96 % | DIASTOLIC BLOOD PRESSURE: 86 MMHG | SYSTOLIC BLOOD PRESSURE: 139 MMHG | RESPIRATION RATE: 20 BRPM

## 2020-06-09 DIAGNOSIS — M54.16 LUMBAR RADICULOPATHY: ICD-10-CM

## 2020-06-09 PROCEDURE — 64483 NJX AA&/STRD TFRM EPI L/S 1: CPT | Performed by: ANESTHESIOLOGY

## 2020-06-09 RX ORDER — PAPAVERINE HCL 150 MG
20 CAPSULE, EXTENDED RELEASE ORAL ONCE
Status: COMPLETED | OUTPATIENT
Start: 2020-06-09 | End: 2020-06-09

## 2020-06-09 RX ORDER — LIDOCAINE HYDROCHLORIDE 10 MG/ML
5 INJECTION, SOLUTION EPIDURAL; INFILTRATION; INTRACAUDAL; PERINEURAL ONCE
Status: COMPLETED | OUTPATIENT
Start: 2020-06-09 | End: 2020-06-09

## 2020-06-09 RX ADMIN — DEXAMETHASONE SODIUM PHOSPHATE 15 MG: 10 INJECTION, SOLUTION INTRAMUSCULAR; INTRAVENOUS at 10:28

## 2020-06-09 RX ADMIN — IOHEXOL 2 ML: 300 INJECTION, SOLUTION INTRAVENOUS at 10:27

## 2020-06-09 RX ADMIN — LIDOCAINE HYDROCHLORIDE 2 ML: 20 INJECTION, SOLUTION EPIDURAL; INFILTRATION; INTRACAUDAL; PERINEURAL at 10:28

## 2020-06-09 RX ADMIN — LIDOCAINE HYDROCHLORIDE 4 ML: 10 INJECTION, SOLUTION EPIDURAL; INFILTRATION; INTRACAUDAL; PERINEURAL at 10:25

## 2020-06-10 ENCOUNTER — TELEPHONE (OUTPATIENT)
Dept: PAIN MEDICINE | Facility: CLINIC | Age: 60
End: 2020-06-10

## 2020-06-10 ENCOUNTER — APPOINTMENT (OUTPATIENT)
Dept: URGENT CARE | Age: 60
End: 2020-06-10
Payer: OTHER MISCELLANEOUS

## 2020-06-10 PROCEDURE — 99213 OFFICE O/P EST LOW 20 MIN: CPT | Performed by: PREVENTIVE MEDICINE

## 2020-06-11 ENCOUNTER — TELEPHONE (OUTPATIENT)
Dept: PAIN MEDICINE | Facility: CLINIC | Age: 60
End: 2020-06-11

## 2020-06-15 ENCOUNTER — TELEPHONE (OUTPATIENT)
Dept: PAIN MEDICINE | Facility: MEDICAL CENTER | Age: 60
End: 2020-06-15

## 2020-06-16 ENCOUNTER — TELEPHONE (OUTPATIENT)
Dept: PAIN MEDICINE | Facility: CLINIC | Age: 60
End: 2020-06-16

## 2020-06-23 ENCOUNTER — TRANSCRIBE ORDERS (OUTPATIENT)
Dept: ADMINISTRATIVE | Facility: HOSPITAL | Age: 60
End: 2020-06-23

## 2020-06-23 ENCOUNTER — APPOINTMENT (OUTPATIENT)
Dept: URGENT CARE | Age: 60
End: 2020-06-23
Payer: OTHER MISCELLANEOUS

## 2020-06-23 ENCOUNTER — APPOINTMENT (OUTPATIENT)
Dept: RADIOLOGY | Age: 60
End: 2020-06-23
Payer: OTHER MISCELLANEOUS

## 2020-06-23 DIAGNOSIS — T14.90XA INJURY: ICD-10-CM

## 2020-06-23 DIAGNOSIS — T14.90XA INJURY: Primary | ICD-10-CM

## 2020-06-23 PROCEDURE — 73564 X-RAY EXAM KNEE 4 OR MORE: CPT

## 2020-06-23 PROCEDURE — 99213 OFFICE O/P EST LOW 20 MIN: CPT | Performed by: PREVENTIVE MEDICINE

## 2020-06-29 ENCOUNTER — TRANSCRIBE ORDERS (OUTPATIENT)
Dept: LAB | Facility: CLINIC | Age: 60
End: 2020-06-29

## 2020-06-29 DIAGNOSIS — Z01.818 OTHER SPECIFIED PRE-OPERATIVE EXAMINATION: Primary | ICD-10-CM

## 2020-07-02 ENCOUNTER — APPOINTMENT (OUTPATIENT)
Dept: URGENT CARE | Age: 60
End: 2020-07-02
Payer: OTHER MISCELLANEOUS

## 2020-07-02 DIAGNOSIS — Z01.818 OTHER SPECIFIED PRE-OPERATIVE EXAMINATION: ICD-10-CM

## 2020-07-02 PROCEDURE — 99213 OFFICE O/P EST LOW 20 MIN: CPT | Performed by: PREVENTIVE MEDICINE

## 2020-07-02 PROCEDURE — U0003 INFECTIOUS AGENT DETECTION BY NUCLEIC ACID (DNA OR RNA); SEVERE ACUTE RESPIRATORY SYNDROME CORONAVIRUS 2 (SARS-COV-2) (CORONAVIRUS DISEASE [COVID-19]), AMPLIFIED PROBE TECHNIQUE, MAKING USE OF HIGH THROUGHPUT TECHNOLOGIES AS DESCRIBED BY CMS-2020-01-R: HCPCS | Performed by: OBSTETRICS & GYNECOLOGY

## 2020-07-07 LAB — SARS-COV-2 RNA SPEC QL NAA+PROBE: NOT DETECTED

## 2020-07-14 ENCOUNTER — OFFICE VISIT (OUTPATIENT)
Dept: FAMILY MEDICINE CLINIC | Facility: CLINIC | Age: 60
End: 2020-07-14
Payer: MEDICARE

## 2020-07-14 VITALS
DIASTOLIC BLOOD PRESSURE: 98 MMHG | SYSTOLIC BLOOD PRESSURE: 142 MMHG | TEMPERATURE: 97.9 F | OXYGEN SATURATION: 97 % | HEART RATE: 86 BPM

## 2020-07-14 DIAGNOSIS — H92.01 RIGHT EAR PAIN: Primary | ICD-10-CM

## 2020-07-14 PROCEDURE — 1036F TOBACCO NON-USER: CPT | Performed by: FAMILY MEDICINE

## 2020-07-14 PROCEDURE — 99213 OFFICE O/P EST LOW 20 MIN: CPT | Performed by: FAMILY MEDICINE

## 2020-07-14 RX ORDER — BUSPIRONE HYDROCHLORIDE 5 MG/1
5 TABLET ORAL 2 TIMES DAILY
COMMUNITY
Start: 2020-06-30 | End: 2021-08-12

## 2020-07-14 NOTE — PROGRESS NOTES
Chief Complaint   Patient presents with    Earache     right outter ear pain x 2 weeks       Assessment/Plan:  Sleep on other side  Need pressure off the ear  BMI Counseling: There is no height or weight on file to calculate BMI  The BMI is above normal  Nutrition recommendations include consuming healthier snacks, moderation in carbohydrate intake and increasing intake of lean protein  Diagnoses and all orders for this visit:    Right ear pain    Other orders  -     busPIRone (BUSPAR) 5 mg tablet; Take 5 mg by mouth 2 (two) times a day          Subjective:      Patient ID: Sonia Pelletier is a 62 y o  female  Right outer ear, tragus with pain that comes and goes  The following portions of the patient's history were reviewed and updated as appropriate: allergies, current medications, past medical history, past social history and problem list     Review of Systems   Constitutional: Negative  HENT: Positive for ear pain  Outer pain   Respiratory: Negative  Cardiovascular: Negative            Objective:      /98 (BP Location: Left arm, Patient Position: Sitting, Cuff Size: Standard)   Pulse 86   Temp 97 9 °F (36 6 °C) (Tympanic)   LMP  (LMP Unknown)   SpO2 97%       Current Outpatient Medications:     gabapentin (NEURONTIN) 300 mg capsule, Take 2 capsules (600 mg total) by mouth 3 (three) times a day, Disp: 180 capsule, Rfl: 1    hydrOXYzine HCL (ATARAX) 50 mg tablet, , Disp: , Rfl:     methenamine hippurate (HIPREX) 1 g tablet, , Disp: , Rfl:     omeprazole (PriLOSEC) 40 MG capsule, Take 40 mg by mouth 2 (two) times a day , Disp: , Rfl:     PARoxetine (PAXIL) 10 mg tablet, Take by mouth, Disp: , Rfl:     PARoxetine (PAXIL) 40 MG tablet, Take by mouth, Disp: , Rfl:     busPIRone (BUSPAR) 5 mg tablet, Take 5 mg by mouth 2 (two) times a day, Disp: , Rfl:     doxycycline (PERIOSTAT) 20 MG tablet, Take 20 mg by mouth 2 (two) times a day, Disp: , Rfl: 1    estradiol (VAGIFEM, YUVAFEM) 10 MCG TABS vaginal tablet, Insert 1 tablet (10 mcg total) into the vagina daily For two weeks  Then twice weekly at night  Use 12 hours prior to intercourse (Patient not taking: Reported on 2/14/2020), Disp: 30 tablet, Rfl: 6    gentamicin (GARAMYCIN) 0 3 % ophthalmic solution, , Disp: , Rfl:     ibuprofen (MOTRIN) 600 mg tablet, Take 1 tablet (600 mg total) by mouth every 6 (six) hours as needed for moderate pain (Inflammation) (Patient not taking: Reported on 2/14/2020), Disp: 30 tablet, Rfl: 0    lidocaine (LIDODERM) 5 %, Apply 1 patch topically daily Remove & Discard patch within 12 hours or as directed by MD (Patient not taking: Reported on 2/3/2020), Disp: 7 patch, Rfl: 0    meclizine (ANTIVERT) 12 5 MG tablet, Take 1 tablet (12 5 mg total) by mouth every 8 (eight) hours as needed for dizziness or nausea (Patient not taking: Reported on 7/14/2020), Disp: 30 tablet, Rfl: 0    ondansetron (ZOFRAN) 4 mg tablet, Take 1 tablet (4 mg total) by mouth every 8 (eight) hours as needed for nausea or vomiting (Patient not taking: Reported on 2/14/2020), Disp: 10 tablet, Rfl: 0    prednisoLONE acetate (PRED FORTE) 1 % ophthalmic suspension, , Disp: , Rfl:     predniSONE 10 mg tablet, Take 1 tablet (10 mg total) by mouth daily #4 po day #1, #3 po day 2 and 3, #2 daily 4, 5 , 6 then one daily  (Patient not taking: Reported on 2/14/2020), Disp: 20 tablet, Rfl: 0    SUMAtriptan (IMITREX) 50 mg tablet, Take 1 tablet (50 mg total) by mouth once as needed for migraine for up to 1 dose May repeat in 2 hours  No more than 200 mg per day  (Patient not taking: Reported on 7/14/2020), Disp: 10 tablet, Rfl: 2      Physical Exam   Constitutional: She appears well-developed and well-nourished  HENT:   Head: Normocephalic and atraumatic  Left Ear: External ear normal    Tragus mild irritation and swelling  Skin: Skin is warm

## 2020-07-15 ENCOUNTER — OFFICE VISIT (OUTPATIENT)
Dept: OBGYN CLINIC | Facility: OTHER | Age: 60
End: 2020-07-15
Payer: OTHER MISCELLANEOUS

## 2020-07-15 VITALS — HEIGHT: 66 IN | WEIGHT: 211 LBS | BODY MASS INDEX: 33.91 KG/M2

## 2020-07-15 DIAGNOSIS — S83.241A OTHER TEAR OF MEDIAL MENISCUS, CURRENT INJURY, RIGHT KNEE, INITIAL ENCOUNTER: Primary | ICD-10-CM

## 2020-07-15 PROCEDURE — 1036F TOBACCO NON-USER: CPT | Performed by: ORTHOPAEDIC SURGERY

## 2020-07-15 PROCEDURE — 99204 OFFICE O/P NEW MOD 45 MIN: CPT | Performed by: ORTHOPAEDIC SURGERY

## 2020-07-15 PROCEDURE — 3008F BODY MASS INDEX DOCD: CPT | Performed by: ORTHOPAEDIC SURGERY

## 2020-07-15 RX ORDER — CEFAZOLIN SODIUM 2 G/50ML
2000 SOLUTION INTRAVENOUS ONCE
Status: CANCELLED | OUTPATIENT
Start: 2020-08-03 | End: 2020-07-15

## 2020-07-15 NOTE — PROGRESS NOTES
Orthopaedic Surgery - Office Note  Shahram Augustin (60 y o  female)   : 1960   MRN: [id number]  Encounter Date: 7/15/2020    Chief Complaint   Patient presents with    Right Knee - Pain       Assessment / Plan  R knee medial and lateral meniscus tears    Due to duration of her symptoms and failure of conservative treatment we are recommending Arthroscopy of the R knee for partial menisectomy  After discussion of risk and benefits the patient agreed to proceed with surgery and consent was signed in the office at this time  Surgery will be scheduled in the near future  Continue with activity as tolerated, continue with light duty as per Work Comp restrictions  Continue with ice and analgesics as needed  Return for Follow-up 4-5 days postoperatively  History of Present Illness  Karmen Lainez is a 62 y o  female who presents for evaluation of R knee pain which started after 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 landing on the R side and possibly twisting her knee  She did go through workers comp where she was placed on light duty and sent for PT  PT seemed to only aggravate her knee and she feels her knee stays swollen constantly occasionally worsening  Most of her pain at this time is in the lateral part of the knee  Occasionally she has some discomfort that goes to the lateral aspect of the R hip  Patient denies any numbness or knee tingling at this time distally  She denies any radiation from her back  Review of Systems  Pertinent items are noted in HPI  All other systems were reviewed and are negative  Physical Exam  Ht 5' 6" (1 676 m)   Wt 95 7 kg (211 lb)   LMP  (LMP Unknown)   BMI 34 06 kg/m²   Cons: Appears well  No apparent distress  Psych: Alert  Oriented x3  Mood and affect normal   Eyes: PERRLA, EOMI  Resp: Normal effort  No audible wheezing or stridor  CV: Palpable pulse  No discernable arrhythmia  No LE edema    Lymph:  No palpable cervical, axillary, or inguinal lymphadenopathy  Skin: Warm  No palpable masses  No visible lesions  Neuro: Normal muscle tone  Normal and symmetric DTR's  Right Knee Exam  Alignment:  Normal knee alignment  Inspection:  Moderate right knee swelling  No erythema  No ecchymosis  No deformity  Palpation:  Moderate tenderness at Lateral joint line  Mild tenderness to the medial joint line  ROM:  Knee Extension 0°  Knee Flexion 120°  Strength:  5/5 quadriceps and hamstrings  Stability:  No objective knee instability  Stable Varus / Valgus stress, Lachman, and Posterior drawer  Tests:  (+) Saúl  Patella:  Patella tracks centrally with crepitus  Neurovascular:  Sensation intact in DP/SP/Saucedo/Sa/T nerve distributions  Sensation intact in all digital nerve distributions  Toes warm and perfused  Gait:  Antalgic  Studies Reviewed  I have personally reviewed pertinent films in PACS  XR of right knee - Shows no notable arthritic changes  No fracture dislocation seen  MRI of right knee - From 06/30/2020 shows joint effusion with medial meniscus tearing and lateral meniscus tearing in the posterior horn  Mild DJD is noted in the medial femoral tibial and patellofemoral joint per radiology  Procedures  No procedures today  Medical, Surgical, Family, and Social History  The patient's medical history, family history, and social history, were reviewed and updated as appropriate      Past Medical History:   Diagnosis Date    Arthritis     Last assessed 5/3/2011    Cancer ([facility] [address] )     Fibromyalgia     Fracture of one or more phalanges of foot     Hypertension     Ovarian cancer [facility]) 1987    age 27    Polyneuropathy     Last assessed 6/23/2016    Renal disorder     Vertigo        Past Surgical History:   Procedure Laterality Date    ABDOMINAL SURGERY  1986    BACK SURGERY  1990    FOOT FRACTURE SURGERY Bilateral 2004    x 5  surgeries    KIDNEY SURGERY  1974    TOTAL ABDOMINAL HYSTERECTOMY  1987    age 29    TOTAL ABDOMINAL HYSTERECTOMY W/ BILATERAL SALPINGOOPHORECTOMY Bilateral 1987    age 27       Family History   Problem Relation Age of Onset    Heart disease Mother     Cancer Mother     Diabetes Mother     Arthritis Mother     Anxiety disorder Mother     Bleeding Disorder Mother     Clotting disorder Mother     Depression Mother     Hyperlipidemia Mother     Irritable bowel syndrome Mother     Hypertension Mother     Obesity Mother     Osteoporosis Mother     Vaginal cancer Mother 27    Skin cancer Mother     Thyroid disease Mother     Diabetes Father     Arthritis Father     Hyperlipidemia Father     Vesicoureteral reflux Father     Heart disease Father     Hypertension Father     Migraines Father     Obesity Father     Hypertension Family     Arthritis Family     Arthritis Brother     Anxiety disorder Brother     Diabetes Brother     Hyperlipidemia Brother     Vesicoureteral reflux Brother     Heart disease Brother     Irritable bowel syndrome Brother     Hypertension Brother     Migraines Brother     Thyroid disease Brother     Pancreatic cancer Brother 54    Migraines Daughter     Asthma Son     Migraines Son     Diabetes Maternal Grandmother     Vaginal cancer Maternal Grandmother 48    Infertility Paternal Grandmother     Arthritis Maternal Aunt     Anxiety disorder Maternal Aunt     Depression Maternal Aunt     Diabetes Maternal Aunt     Vaginal cancer Maternal Aunt 48    Fibroids Paternal Aunt     No Known Problems Maternal Grandfather     No Known Problems Paternal Grandfather     No Known Problems Maternal Aunt     No Known Problems Maternal Aunt     No Known Problems Maternal Aunt        Social History     Occupational History    Occupation: CNA   Tobacco Use    Smoking status: Never Smoker    Smokeless tobacco: Never Used   Substance and Sexual Activity    Alcohol use: No    Drug use: No    Sexual activity: Not Currently       Allergies Allergen Reactions    Codeine     Morphine     Penicillins     Seasonal Ic  [Cholestatin]          Current Outpatient Medications:     busPIRone (BUSPAR) 5 mg tablet, Take 5 mg by mouth 2 (two) times a day, Disp: , Rfl:     doxycycline (PERIOSTAT) 20 MG tablet, Take 20 mg by mouth 2 (two) times a day, Disp: , Rfl: 1    gabapentin (NEURONTIN) 300 mg capsule, Take 2 capsules (600 mg total) by mouth 3 (three) times a day, Disp: 180 capsule, Rfl: 1    gentamicin (GARAMYCIN) 0 3 % ophthalmic solution, , Disp: , Rfl:     hydrOXYzine HCL (ATARAX) 50 mg tablet, , Disp: , Rfl:     methenamine hippurate (HIPREX) 1 g tablet, , Disp: , Rfl:     omeprazole (PriLOSEC) 40 MG capsule, Take 40 mg by mouth 2 (two) times a day , Disp: , Rfl:     PARoxetine (PAXIL) 10 mg tablet, Take by mouth, Disp: , Rfl:     PARoxetine (PAXIL) 40 MG tablet, Take by mouth, Disp: , Rfl:     prednisoLONE acetate (PRED FORTE) 1 % ophthalmic suspension, , Disp: , Rfl:     estradiol (VAGIFEM, YUVAFEM) 10 MCG TABS vaginal tablet, Insert 1 tablet (10 mcg total) into the vagina daily For two weeks  Then twice weekly at night   Use 12 hours prior to intercourse (Patient not taking: Reported on 2/14/2020), Disp: 30 tablet, Rfl: 6    ibuprofen (MOTRIN) 600 mg tablet, Take 1 tablet (600 mg total) by mouth every 6 (six) hours as needed for moderate pain (Inflammation) (Patient not taking: Reported on 2/14/2020), Disp: 30 tablet, Rfl: 0    lidocaine (LIDODERM) 5 %, Apply 1 patch topically daily Remove & Discard patch within 12 hours or as directed by MD (Patient not taking: Reported on 2/3/2020), Disp: 7 patch, Rfl: 0    meclizine (ANTIVERT) 12 5 MG tablet, Take 1 tablet (12 5 mg total) by mouth every 8 (eight) hours as needed for dizziness or nausea (Patient not taking: Reported on 7/14/2020), Disp: 30 tablet, Rfl: 0    ondansetron (ZOFRAN) 4 mg tablet, Take 1 tablet (4 mg total) by mouth every 8 (eight) hours as needed for nausea or vomiting (Patient not taking: Reported on 2/14/2020), Disp: 10 tablet, Rfl: 0    predniSONE 10 mg tablet, Take 1 tablet (10 mg total) by mouth daily #4 po day #1, #3 po day 2 and 3, #2 daily 4, 5 , 6 then one daily  (Patient not taking: Reported on 2/14/2020), Disp: 20 tablet, Rfl: 0    SUMAtriptan (IMITREX) 50 mg tablet, Take 1 tablet (50 mg total) by mouth once as needed for migraine for up to 1 dose May repeat in 2 hours  No more than 200 mg per day   (Patient not taking: Reported on 7/14/2020), Disp: 10 tablet, Rfl: 2      Andrés Lewis PA-C    Scribe Attestation    I,:    am acting as a scribe while in the presence of the attending physician :        I,:    personally performed the services described in this documentation    as scribed in my presence :

## 2020-07-15 NOTE — H&P (VIEW-ONLY)
Orthopaedic Surgery - Office Note  Ann Marie Sommers (60 y o  female)   : 1960   MRN: [id number]  Encounter Date: 7/15/2020    Chief Complaint   Patient presents with    Right Knee - Pain       Assessment / Plan  R knee medial and lateral meniscus tears    Due to duration of her symptoms and failure of conservative treatment we are recommending Arthroscopy of the R knee for partial menisectomy  After discussion of risk and benefits the patient agreed to proceed with surgery and consent was signed in the office at this time  Surgery will be scheduled in the near future  Continue with activity as tolerated, continue with light duty as per Work Comp restrictions  Continue with ice and analgesics as needed  Return for Follow-up 4-5 days postoperatively  History of Present Illness  Karmen Alvarez is a 62 y o  female who presents for evaluation of R knee pain which started after 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 landing on the R side and possibly twisting her knee  She did go through workers comp where she was placed on light duty and sent for PT  PT seemed to only aggravate her knee and she feels her knee stays swollen constantly occasionally worsening  Most of her pain at this time is in the lateral part of the knee  Occasionally she has some discomfort that goes to the lateral aspect of the R hip  Patient denies any numbness or knee tingling at this time distally  She denies any radiation from her back  Review of Systems  Pertinent items are noted in HPI  All other systems were reviewed and are negative  Physical Exam  Ht 5' 6" (1 676 m)   Wt 95 7 kg (211 lb)   LMP  (LMP Unknown)   BMI 34 06 kg/m²   Cons: Appears well  No apparent distress  Psych: Alert  Oriented x3  Mood and affect normal   Eyes: PERRLA, EOMI  Resp: Normal effort  No audible wheezing or stridor  CV: Palpable pulse  No discernable arrhythmia  No LE edema    Lymph:  No palpable cervical, axillary, or inguinal lymphadenopathy  Skin: Warm  No palpable masses  No visible lesions  Neuro: Normal muscle tone  Normal and symmetric DTR's  Right Knee Exam  Alignment:  Normal knee alignment  Inspection:  Moderate right knee swelling  No erythema  No ecchymosis  No deformity  Palpation:  Moderate tenderness at Lateral joint line  Mild tenderness to the medial joint line  ROM:  Knee Extension 0°  Knee Flexion 120°  Strength:  5/5 quadriceps and hamstrings  Stability:  No objective knee instability  Stable Varus / Valgus stress, Lachman, and Posterior drawer  Tests:  (+) Saúl  Patella:  Patella tracks centrally with crepitus  Neurovascular:  Sensation intact in DP/SP/Saucedo/Sa/T nerve distributions  Sensation intact in all digital nerve distributions  Toes warm and perfused  Gait:  Antalgic  Studies Reviewed  I have personally reviewed pertinent films in PACS  XR of right knee - Shows no notable arthritic changes  No fracture dislocation seen  MRI of right knee - From 06/30/2020 shows joint effusion with medial meniscus tearing and lateral meniscus tearing in the posterior horn  Mild DJD is noted in the medial femoral tibial and patellofemoral joint per radiology  Procedures  No procedures today  Medical, Surgical, Family, and Social History  The patient's medical history, family history, and social history, were reviewed and updated as appropriate      Past Medical History:   Diagnosis Date    Arthritis     Last assessed 5/3/2011    Cancer ([facility] [address]     Fibromyalgia     Fracture of one or more phalanges of foot     Hypertension     Ovarian cancer [facility]) 1987    age 27    Polyneuropathy     Last assessed 6/23/2016    Renal disorder     Vertigo        Past Surgical History:   Procedure Laterality Date    ABDOMINAL SURGERY  1986    BACK SURGERY  1990    FOOT FRACTURE SURGERY Bilateral 2004    x 5  surgeries    KIDNEY SURGERY  1974    TOTAL ABDOMINAL HYSTERECTOMY  1987    age 29    TOTAL ABDOMINAL HYSTERECTOMY W/ BILATERAL SALPINGOOPHORECTOMY Bilateral 1987    age 27       Family History   Problem Relation Age of Onset    Heart disease Mother     Cancer Mother     Diabetes Mother     Arthritis Mother     Anxiety disorder Mother     Bleeding Disorder Mother     Clotting disorder Mother     Depression Mother     Hyperlipidemia Mother     Irritable bowel syndrome Mother     Hypertension Mother     Obesity Mother     Osteoporosis Mother     Vaginal cancer Mother 27    Skin cancer Mother     Thyroid disease Mother     Diabetes Father     Arthritis Father     Hyperlipidemia Father     Vesicoureteral reflux Father     Heart disease Father     Hypertension Father     Migraines Father     Obesity Father     Hypertension Family     Arthritis Family     Arthritis Brother     Anxiety disorder Brother     Diabetes Brother     Hyperlipidemia Brother     Vesicoureteral reflux Brother     Heart disease Brother     Irritable bowel syndrome Brother     Hypertension Brother     Migraines Brother     Thyroid disease Brother     Pancreatic cancer Brother 54    Migraines Daughter     Asthma Son     Migraines Son     Diabetes Maternal Grandmother     Vaginal cancer Maternal Grandmother 48    Infertility Paternal Grandmother     Arthritis Maternal Aunt     Anxiety disorder Maternal Aunt     Depression Maternal Aunt     Diabetes Maternal Aunt     Vaginal cancer Maternal Aunt 48    Fibroids Paternal Aunt     No Known Problems Maternal Grandfather     No Known Problems Paternal Grandfather     No Known Problems Maternal Aunt     No Known Problems Maternal Aunt     No Known Problems Maternal Aunt        Social History     Occupational History    Occupation: CNA   Tobacco Use    Smoking status: Never Smoker    Smokeless tobacco: Never Used   Substance and Sexual Activity    Alcohol use: No    Drug use: No    Sexual activity: Not Currently       Allergies Allergen Reactions    Codeine     Morphine     Penicillins     Seasonal Ic  [Cholestatin]          Current Outpatient Medications:     busPIRone (BUSPAR) 5 mg tablet, Take 5 mg by mouth 2 (two) times a day, Disp: , Rfl:     doxycycline (PERIOSTAT) 20 MG tablet, Take 20 mg by mouth 2 (two) times a day, Disp: , Rfl: 1    gabapentin (NEURONTIN) 300 mg capsule, Take 2 capsules (600 mg total) by mouth 3 (three) times a day, Disp: 180 capsule, Rfl: 1    gentamicin (GARAMYCIN) 0 3 % ophthalmic solution, , Disp: , Rfl:     hydrOXYzine HCL (ATARAX) 50 mg tablet, , Disp: , Rfl:     methenamine hippurate (HIPREX) 1 g tablet, , Disp: , Rfl:     omeprazole (PriLOSEC) 40 MG capsule, Take 40 mg by mouth 2 (two) times a day , Disp: , Rfl:     PARoxetine (PAXIL) 10 mg tablet, Take by mouth, Disp: , Rfl:     PARoxetine (PAXIL) 40 MG tablet, Take by mouth, Disp: , Rfl:     prednisoLONE acetate (PRED FORTE) 1 % ophthalmic suspension, , Disp: , Rfl:     estradiol (VAGIFEM, YUVAFEM) 10 MCG TABS vaginal tablet, Insert 1 tablet (10 mcg total) into the vagina daily For two weeks  Then twice weekly at night   Use 12 hours prior to intercourse (Patient not taking: Reported on 2/14/2020), Disp: 30 tablet, Rfl: 6    ibuprofen (MOTRIN) 600 mg tablet, Take 1 tablet (600 mg total) by mouth every 6 (six) hours as needed for moderate pain (Inflammation) (Patient not taking: Reported on 2/14/2020), Disp: 30 tablet, Rfl: 0    lidocaine (LIDODERM) 5 %, Apply 1 patch topically daily Remove & Discard patch within 12 hours or as directed by MD (Patient not taking: Reported on 2/3/2020), Disp: 7 patch, Rfl: 0    meclizine (ANTIVERT) 12 5 MG tablet, Take 1 tablet (12 5 mg total) by mouth every 8 (eight) hours as needed for dizziness or nausea (Patient not taking: Reported on 7/14/2020), Disp: 30 tablet, Rfl: 0    ondansetron (ZOFRAN) 4 mg tablet, Take 1 tablet (4 mg total) by mouth every 8 (eight) hours as needed for nausea or vomiting (Patient not taking: Reported on 2/14/2020), Disp: 10 tablet, Rfl: 0    predniSONE 10 mg tablet, Take 1 tablet (10 mg total) by mouth daily #4 po day #1, #3 po day 2 and 3, #2 daily 4, 5 , 6 then one daily  (Patient not taking: Reported on 2/14/2020), Disp: 20 tablet, Rfl: 0    SUMAtriptan (IMITREX) 50 mg tablet, Take 1 tablet (50 mg total) by mouth once as needed for migraine for up to 1 dose May repeat in 2 hours  No more than 200 mg per day   (Patient not taking: Reported on 7/14/2020), Disp: 10 tablet, Rfl: 2      Josefa Rosales PA-C    Scribe Attestation    I,:    am acting as a scribe while in the presence of the attending physician :        I,:    personally performed the services described in this documentation    as scribed in my presence :

## 2020-07-23 ENCOUNTER — TELEPHONE (OUTPATIENT)
Dept: OBGYN CLINIC | Facility: HOSPITAL | Age: 60
End: 2020-07-23

## 2020-07-23 DIAGNOSIS — S83.241A OTHER TEAR OF MEDIAL MENISCUS, CURRENT INJURY, RIGHT KNEE, INITIAL ENCOUNTER: ICD-10-CM

## 2020-07-23 PROCEDURE — U0003 INFECTIOUS AGENT DETECTION BY NUCLEIC ACID (DNA OR RNA); SEVERE ACUTE RESPIRATORY SYNDROME CORONAVIRUS 2 (SARS-COV-2) (CORONAVIRUS DISEASE [COVID-19]), AMPLIFIED PROBE TECHNIQUE, MAKING USE OF HIGH THROUGHPUT TECHNOLOGIES AS DESCRIBED BY CMS-2020-01-R: HCPCS

## 2020-07-23 NOTE — TELEPHONE ENCOUNTER
Tasia Miller  [phone]    Dr Tashi Corrales    Patient would like call back to verify surgery has been approved by [facility], states she is unable to contact her

## 2020-07-23 NOTE — TELEPHONE ENCOUNTER
Called and spoke to Karmen's   I let them know that I sent all the clinical notes and surgery info to the adjustor last week  I also advised them that I would call the adjustor on 7/27 when she gets back from vacation  Patient satisfied

## 2020-07-24 DIAGNOSIS — M54.16 LUMBAR RADICULOPATHY: ICD-10-CM

## 2020-07-24 DIAGNOSIS — G62.9 PERIPHERAL NERVE DISORDER: Primary | ICD-10-CM

## 2020-07-24 RX ORDER — GABAPENTIN 300 MG/1
300 CAPSULE ORAL 3 TIMES DAILY
Qty: 90 CAPSULE | Refills: 0 | Status: SHIPPED | OUTPATIENT
Start: 2020-07-24 | End: 2021-08-04 | Stop reason: HOSPADM

## 2020-07-24 NOTE — TELEPHONE ENCOUNTER
Looks like we last saw her back in May of last year but there were orders put in back in October   She wanted her gabapentin

## 2020-07-25 LAB — SARS-COV-2 RNA SPEC QL NAA+PROBE: NOT DETECTED

## 2020-07-27 DIAGNOSIS — Z11.59 SCREENING FOR VIRAL DISEASE: ICD-10-CM

## 2020-07-27 PROCEDURE — U0003 INFECTIOUS AGENT DETECTION BY NUCLEIC ACID (DNA OR RNA); SEVERE ACUTE RESPIRATORY SYNDROME CORONAVIRUS 2 (SARS-COV-2) (CORONAVIRUS DISEASE [COVID-19]), AMPLIFIED PROBE TECHNIQUE, MAKING USE OF HIGH THROUGHPUT TECHNOLOGIES AS DESCRIBED BY CMS-2020-01-R: HCPCS

## 2020-07-28 ENCOUNTER — TELEPHONE (OUTPATIENT)
Dept: PODIATRY | Facility: CLINIC | Age: 60
End: 2020-07-28

## 2020-07-28 LAB — SARS-COV-2 RNA SPEC QL NAA+PROBE: NOT DETECTED

## 2020-07-28 RX ORDER — UBIDECARENONE 75 MG
CAPSULE ORAL DAILY
COMMUNITY

## 2020-07-28 NOTE — PRE-PROCEDURE INSTRUCTIONS
Pre-Surgery Instructions:   Medication Instructions    busPIRone (BUSPAR) 5 mg tablet Instructed patient per Anesthesia Guidelines   calcium-vitamin D 250-100 MG-UNIT per tablet Instructed patient per Anesthesia Guidelines   cyanocobalamin (VITAMIN B-12) 100 mcg tablet Instructed patient per Anesthesia Guidelines   gabapentin (NEURONTIN) 300 mg capsule Instructed patient per Anesthesia Guidelines   hydrOXYzine HCL (ATARAX) 50 mg tablet Instructed patient per Anesthesia Guidelines   methenamine hippurate (HIPREX) 1 g tablet Instructed patient per Anesthesia Guidelines   omeprazole (PriLOSEC) 40 MG capsule Instructed patient per Anesthesia Guidelines   PARoxetine (PAXIL) 10 mg tablet Instructed patient per Anesthesia Guidelines   PARoxetine (PAXIL) 40 MG tablet Instructed patient per Anesthesia Guidelines   SUMAtriptan (IMITREX) 50 mg tablet Instructed patient per Anesthesia Guidelines  Pre-op and bathing instructions given  Advised patient to purchase hibiclens

## 2020-07-28 NOTE — TELEPHONE ENCOUNTER
called stating she needs gabapentin 300mg you only gave her 1 capsule 3 times a day and she stated she take 2 capsule 3 times a day   Fabiola Vazquez She does have appointment with you [phone] and she also stated she having knee sx on 8  3 2020

## 2020-07-31 ENCOUNTER — TELEPHONE (OUTPATIENT)
Dept: OBGYN CLINIC | Facility: HOSPITAL | Age: 60
End: 2020-07-31

## 2020-07-31 ENCOUNTER — ANESTHESIA EVENT (OUTPATIENT)
Dept: PERIOP | Facility: HOSPITAL | Age: 60
End: 2020-07-31
Payer: OTHER MISCELLANEOUS

## 2020-07-31 NOTE — TELEPHONE ENCOUNTER
Rachel Omer from [employer] is calling to speak to you  In regards to her surgery on 8/3      #[phone]

## 2020-07-31 NOTE — TELEPHONE ENCOUNTER
Called and spoke to Cj, adjustor  As of now, Workers [employer] can't say whether or not they'll approve the surgery  They need to see the operative report after surgery in order to decide if it is a work related problem  Cj told me that she would be reaching out to Karmen to let her know all of this as well

## 2020-08-03 ENCOUNTER — HOSPITAL ENCOUNTER (OUTPATIENT)
Facility: HOSPITAL | Age: 60
Setting detail: OUTPATIENT SURGERY
Discharge: HOME | End: 2020-08-03
Attending: ORTHOPAEDIC SURGERY | Admitting: ORTHOPAEDIC SURGERY
Payer: OTHER MISCELLANEOUS

## 2020-08-03 ENCOUNTER — ANESTHESIA (OUTPATIENT)
Dept: PERIOP | Facility: HOSPITAL | Age: 60
End: 2020-08-03
Payer: OTHER MISCELLANEOUS

## 2020-08-03 VITALS
BODY MASS INDEX: 28.25 KG/M2 | HEART RATE: 70 BPM | RESPIRATION RATE: 16 BRPM | TEMPERATURE: 97.8 F | DIASTOLIC BLOOD PRESSURE: 60 MMHG | HEIGHT: 67 IN | SYSTOLIC BLOOD PRESSURE: 115 MMHG | WEIGHT: 180 LBS | OXYGEN SATURATION: 98 %

## 2020-08-03 DIAGNOSIS — Z11.59 SCREENING FOR VIRAL DISEASE: Primary | ICD-10-CM

## 2020-08-03 DIAGNOSIS — S83.241A OTHER TEAR OF MEDIAL MENISCUS, CURRENT INJURY, RIGHT KNEE, INITIAL ENCOUNTER: ICD-10-CM

## 2020-08-03 PROBLEM — Z98.890 PONV (POSTOPERATIVE NAUSEA AND VOMITING): Chronic | Status: ACTIVE | Noted: 2020-08-03

## 2020-08-03 PROBLEM — R11.2 PONV (POSTOPERATIVE NAUSEA AND VOMITING): Chronic | Status: ACTIVE | Noted: 2020-08-03

## 2020-08-03 PROCEDURE — 29880 ARTHRS KNE SRG MNISECTMY M&L: CPT | Performed by: ORTHOPAEDIC SURGERY

## 2020-08-03 RX ORDER — LIDOCAINE HYDROCHLORIDE 20 MG/ML
INJECTION, SOLUTION EPIDURAL; INFILTRATION; INTRACAUDAL; PERINEURAL AS NEEDED
Status: DISCONTINUED | OUTPATIENT
Start: 2020-08-03 | End: 2020-08-03

## 2020-08-03 RX ORDER — ASPIRIN 325 MG
325 TABLET, DELAYED RELEASE (ENTERIC COATED) ORAL 2 TIMES DAILY
Qty: 28 TABLET | Refills: 0 | Status: SHIPPED | OUTPATIENT
Start: 2020-08-03 | End: 2020-12-29 | Stop reason: ALTCHOICE

## 2020-08-03 RX ORDER — CYCLOBENZAPRINE HCL 10 MG
10 TABLET ORAL ONCE AS NEEDED
Status: COMPLETED | OUTPATIENT
Start: 2020-08-03 | End: 2020-08-03

## 2020-08-03 RX ORDER — ACETAMINOPHEN 325 MG/1
975 TABLET ORAL ONCE AS NEEDED
Status: COMPLETED | OUTPATIENT
Start: 2020-08-03 | End: 2020-08-03

## 2020-08-03 RX ORDER — SODIUM CHLORIDE 9 MG/ML
125 INJECTION, SOLUTION INTRAVENOUS CONTINUOUS
Status: DISCONTINUED | OUTPATIENT
Start: 2020-08-03 | End: 2020-08-03 | Stop reason: HOSPADM

## 2020-08-03 RX ORDER — NAPROXEN 500 MG/1
500 TABLET ORAL 2 TIMES DAILY WITH MEALS
Qty: 60 TABLET | Refills: 0 | Status: SHIPPED | OUTPATIENT
Start: 2020-08-03 | End: 2020-08-03 | Stop reason: HOSPADM

## 2020-08-03 RX ORDER — PROPOFOL 10 MG/ML
INJECTION, EMULSION INTRAVENOUS AS NEEDED
Status: DISCONTINUED | OUTPATIENT
Start: 2020-08-03 | End: 2020-08-03

## 2020-08-03 RX ORDER — CEFAZOLIN SODIUM 2 G/50ML
2000 SOLUTION INTRAVENOUS ONCE
Status: COMPLETED | OUTPATIENT
Start: 2020-08-03 | End: 2020-08-03

## 2020-08-03 RX ORDER — OXYCODONE HYDROCHLORIDE 5 MG/1
10 TABLET ORAL EVERY 4 HOURS PRN
Status: DISCONTINUED | OUTPATIENT
Start: 2020-08-03 | End: 2020-08-03 | Stop reason: HOSPADM

## 2020-08-03 RX ORDER — OXYCODONE HYDROCHLORIDE 5 MG/1
5 TABLET ORAL EVERY 4 HOURS PRN
Status: DISCONTINUED | OUTPATIENT
Start: 2020-08-03 | End: 2020-08-03 | Stop reason: HOSPADM

## 2020-08-03 RX ORDER — KETOROLAC TROMETHAMINE 30 MG/ML
30 INJECTION, SOLUTION INTRAMUSCULAR; INTRAVENOUS ONCE AS NEEDED
Status: COMPLETED | OUTPATIENT
Start: 2020-08-03 | End: 2020-08-03

## 2020-08-03 RX ORDER — MIDAZOLAM HYDROCHLORIDE 2 MG/2ML
INJECTION, SOLUTION INTRAMUSCULAR; INTRAVENOUS AS NEEDED
Status: DISCONTINUED | OUTPATIENT
Start: 2020-08-03 | End: 2020-08-03

## 2020-08-03 RX ORDER — ONDANSETRON 4 MG/1
4 TABLET, ORALLY DISINTEGRATING ORAL EVERY 8 HOURS PRN
Qty: 15 TABLET | Refills: 0 | Status: SHIPPED | OUTPATIENT
Start: 2020-08-03 | End: 2020-11-24

## 2020-08-03 RX ORDER — OXYCODONE HYDROCHLORIDE 5 MG/1
5 TABLET ORAL EVERY 4 HOURS PRN
Qty: 30 TABLET | Refills: 0 | Status: SHIPPED | OUTPATIENT
Start: 2020-08-03 | End: 2020-08-13

## 2020-08-03 RX ORDER — FENTANYL CITRATE/PF 50 MCG/ML
25 SYRINGE (ML) INJECTION
Status: DISCONTINUED | OUTPATIENT
Start: 2020-08-03 | End: 2020-08-03 | Stop reason: HOSPADM

## 2020-08-03 RX ORDER — ONDANSETRON 2 MG/ML
4 INJECTION INTRAMUSCULAR; INTRAVENOUS ONCE AS NEEDED
Status: COMPLETED | OUTPATIENT
Start: 2020-08-03 | End: 2020-08-03

## 2020-08-03 RX ORDER — SCOLOPAMINE TRANSDERMAL SYSTEM 1 MG/1
1 PATCH, EXTENDED RELEASE TRANSDERMAL ONCE AS NEEDED
Status: DISCONTINUED | OUTPATIENT
Start: 2020-08-03 | End: 2020-08-03 | Stop reason: HOSPADM

## 2020-08-03 RX ORDER — FENTANYL CITRATE 50 UG/ML
INJECTION, SOLUTION INTRAMUSCULAR; INTRAVENOUS AS NEEDED
Status: DISCONTINUED | OUTPATIENT
Start: 2020-08-03 | End: 2020-08-03

## 2020-08-03 RX ORDER — HYDROMORPHONE HCL/PF 1 MG/ML
0.5 SYRINGE (ML) INJECTION
Status: DISCONTINUED | OUTPATIENT
Start: 2020-08-03 | End: 2020-08-03 | Stop reason: HOSPADM

## 2020-08-03 RX ORDER — ONDANSETRON 2 MG/ML
INJECTION INTRAMUSCULAR; INTRAVENOUS AS NEEDED
Status: DISCONTINUED | OUTPATIENT
Start: 2020-08-03 | End: 2020-08-03

## 2020-08-03 RX ORDER — LIDOCAINE HYDROCHLORIDE AND EPINEPHRINE 20; 5 MG/ML; UG/ML
INJECTION, SOLUTION EPIDURAL; INFILTRATION; INTRACAUDAL; PERINEURAL AS NEEDED
Status: DISCONTINUED | OUTPATIENT
Start: 2020-08-03 | End: 2020-08-03

## 2020-08-03 RX ORDER — ROPIVACAINE HYDROCHLORIDE 5 MG/ML
INJECTION, SOLUTION EPIDURAL; INFILTRATION; PERINEURAL AS NEEDED
Status: DISCONTINUED | OUTPATIENT
Start: 2020-08-03 | End: 2020-08-03

## 2020-08-03 RX ADMIN — ACETAMINOPHEN 975 MG: 325 TABLET ORAL at 15:06

## 2020-08-03 RX ADMIN — LIDOCAINE HYDROCHLORIDE 100 MG: 20 INJECTION, SOLUTION EPIDURAL; INFILTRATION; INTRACAUDAL; PERINEURAL at 13:14

## 2020-08-03 RX ADMIN — FENTANYL CITRATE 25 MCG: 50 INJECTION, SOLUTION INTRAMUSCULAR; INTRAVENOUS at 14:13

## 2020-08-03 RX ADMIN — CYCLOBENZAPRINE HYDROCHLORIDE 10 MG: 10 TABLET, FILM COATED ORAL at 15:07

## 2020-08-03 RX ADMIN — KETOROLAC TROMETHAMINE 30 MG: 30 INJECTION, SOLUTION INTRAMUSCULAR at 14:27

## 2020-08-03 RX ADMIN — ONDANSETRON 4 MG: 2 INJECTION INTRAMUSCULAR; INTRAVENOUS at 13:37

## 2020-08-03 RX ADMIN — SODIUM CHLORIDE 125 ML/HR: 0.9 INJECTION, SOLUTION INTRAVENOUS at 10:00

## 2020-08-03 RX ADMIN — CEFAZOLIN SODIUM 2000 MG: 2 SOLUTION INTRAVENOUS at 13:01

## 2020-08-03 RX ADMIN — ONDANSETRON 4 MG: 2 INJECTION INTRAMUSCULAR; INTRAVENOUS at 14:12

## 2020-08-03 RX ADMIN — ROPIVACAINE HYDROCHLORIDE 30 ML: 5 INJECTION, SOLUTION EPIDURAL; INFILTRATION; PERINEURAL at 12:28

## 2020-08-03 RX ADMIN — SCOPALAMINE 1 PATCH: 1 PATCH, EXTENDED RELEASE TRANSDERMAL at 10:07

## 2020-08-03 RX ADMIN — LIDOCAINE HYDROCHLORIDE,EPINEPHRINE BITARTRATE 20 ML: 20; .005 INJECTION, SOLUTION EPIDURAL; INFILTRATION; INTRACAUDAL; PERINEURAL at 12:28

## 2020-08-03 RX ADMIN — PROPOFOL 200 MG: 10 INJECTION, EMULSION INTRAVENOUS at 13:14

## 2020-08-03 RX ADMIN — MIDAZOLAM 2 MG: 1 INJECTION INTRAMUSCULAR; INTRAVENOUS at 12:24

## 2020-08-03 RX ADMIN — FENTANYL CITRATE 100 MCG: 50 INJECTION, SOLUTION INTRAMUSCULAR; INTRAVENOUS at 12:24

## 2020-08-03 RX ADMIN — SODIUM CHLORIDE: 0.9 INJECTION, SOLUTION INTRAVENOUS at 13:36

## 2020-08-03 NOTE — ANESTHESIA PROCEDURE NOTES
Peripheral Block    Patient location during procedure: holding area  Start time: 8/3/2020 12:24 PM  Reason for block: at surgeon's request and post-op pain management  Staffing  Anesthesiologist: Roddy Hi DO  Performed: anesthesiologist   Preanesthetic Checklist  Completed: patient identified, site marked, surgical consent, pre-op evaluation, timeout performed, IV checked, risks and benefits discussed and monitors and equipment checked  Peripheral Block  Patient position: supine  Prep: ChloraPrep  Patient monitoring: heart rate, continuous pulse ox and frequent blood pressure checks  Block type:  Intra-articular  Laterality: right  Injection technique: single-shot  Needle  Needle type: short-bevel   Needle gauge: 18   Needle localization: anatomical landmarks  Assessment  Injection assessment: incremental injection, negative aspiration for heme and no paresthesia on injection  Paresthesia pain: none  Heart rate change: no  Slow fractionated injection: yes  Post-procedure:  site cleaned  patient tolerated the procedure well with no immediate complications

## 2020-08-03 NOTE — ANESTHESIA POSTPROCEDURE EVALUATION
Post-Op Assessment Note    CV Status:  Stable    Pain management: adequate     Mental Status:  Alert and awake   Hydration Status:  Euvolemic   PONV Controlled:  Controlled   Airway Patency:  Patent      Post Op Vitals Reviewed: Yes      Staff: Anesthesiologist         No complications documented      /62 (08/03/20 1351)    Temp 97 5 °F (36 4 °C) (08/03/20 1351)    Pulse 73 (08/03/20 1351)   Resp 14 (08/03/20 1351)    SpO2 94 % (08/03/20 1351)

## 2020-08-03 NOTE — DISCHARGE INSTRUCTIONS
POSTOPERATIVE INSTRUCTIONS following KNEE SURGERY    MEDICATIONS:  · Resume all home medications unless otherwise instructed by your surgeon  · Pain Medication:  Oxycodone 5 mg, 1-3 tablets every 3 hours as needed  · If you were given a regional anesthetic (nerve block), please begin taking the pain medication as soon as you get home, even if you have minimal or no pain  DO NOT WAIT FOR THE NERVE BLOCK TO WEAR OFF  · Possible side effects include nausea, constipation, and urinary retention  If you experience these side effects, please call our office for assistance  · Pain med refills are authorized only during office hours (8am-4pm, Mon-Fri)  · Anti-Inflammatory:  Resume your home anti-inflammatory medication  · Take with food  Stop if you experience nausea, reflux, or stomach pain  · Nausea Medication:  Zofran ODT 4 mg, 1 tablet every 6 hours as needed for nausea  · Fill prescription ONLY if you expericnce severe nausea  · Blood Clot Prevention:  Aspirin 325 mg, 1 tablet twice daily for 2 weeks  · Pump your foot up and down 20 times per hour while you are less mobile  WOUND CARE:  · Keep the dressing clean and dry  Light drainage may occur the first 2 days postop  · You may remove the dressings and get the incision wet in the shower 72 hours after surgery  DO NOT remove steri-strips or sutures  DO NOT immerse the incision under water  Carefully pat the incision dry  If there is wound drainage, re-apply a fresh dry gauze dressing  · Please call our office ([phone]) if you experience either of the following:  · Sudden increase in swelling, redness, or warmth at the surgical site  · Excessive incisional drainage that persists beyond the 3rd day after surgery  · Oral temperature greater than 101 degrees, not relieved with Tylenol  · Shortness of breath, chest pain, nausea, or any other concerning symptoms    SWELLING CONTROL:  · Cold Therapy:   The cold therapy device may be used either continuously or only as needed, according to your preference  Do not let the pad directly touch your skin  Alternatively, apply ice (20 min on, 20 min off) as often as you feel is necessary  · Elevation:  Elevate the entire leg above heart level  Place pillows under your ankle to keep your knee straight  · Compression:  Apply ACE wraps or a thigh-length compression stocking as needed  RANGE OF MOTION:  · You are allowed FULL RANGE OF MOTION as tolerated  IMMOBILIZATION:  · None  You are allowed full range of motion as tolerated  ACTIVITY:   · BEAR FULL WEIGHT AS TOLERATED on the operative leg  Use crutches to assist only as needed  · Using Crutches on Stairs:  Going up, lead with your "good" (nonoperative) leg  Going down, lead with your "bad" (operative) leg  Use a hand rail when available  · Knee Extension:  Place a rolled towel or pillow under your ankle for 20-30 minutes 3-5 times per day  This will help to maintain full knee extension  · Quad Sets:  Sit or lie with your knee straight  Tighten your quadriceps (front thigh) muscle  Hold for 3 seconds, then relax  Repeat 20 times per hour while awake  PHYSICAL THERAPY:  · You will be given a physical therapy prescription when you are seen in the office for your postoperative appointment  FOLLOW-UP APPOINTMENT:  · 4-5 days after surgery with:    Dr Jim Chan, [address] [facility]  [address] [place] [address], [address] [place] [address], [facility], [address] [facility]  [phone] ([facility])  [phone] (After-Hours)

## 2020-08-03 NOTE — INTERVAL H&P NOTE
H&P reviewed  After examining the patient I find no changes in the patients condition since the H&P had been written      Vitals:    08/03/20 1212   BP: 121/70   Pulse: 63   Resp: 16   Temp:    SpO2: 100%

## 2020-08-03 NOTE — OP NOTE
OPERATIVE REPORT    PATIENT NAME: Jessica Nava   :  1960  MRN: [id number]  Pt Location: [facility] OR ROOM 02    SURGERY DATE: 8/3/2020    SURGEON(S) and ROLE:  Primary: Barbara Aceves MD    NOTE:  No qualified resident or physician assistant was available for the case  PREOPERATIVE DIAGNOSES:  Right Knee  Medial Meniscus Tear  Lateral Meniscus Tear    POSTOPERATIVE DIAGNOSES:  Right Knee  Same as Preoperative Diagnosis    PROCEDURES:  Right Knee Arthroscopy with:  Partial Medial Meniscectomy  Partial Lateral Meniscectomy      ANESTHESIA TYPE:  General LMA and Intra-articular block    ANESTHESIA STAFF:   Anesthesiologist: Fallon James DO    ESTIMATED BLOOD LOSS:  5 mL    TOURNIQUET TIME:  Not used    PERIOPERATIVE ANTIBIOTICS:  cefazolin, 2 grams    IMPLANTS:  none    * No implants in log *    SPECIMENS:  * No specimens in log *    DRAINS:  None      OPERATIVE INDICATIONS:  The patient is a 62 y o  female with right knee pain and medial and lateral meniscus tears  Surgical treatment was indicated due to persistent symptoms despite non-surgical treatment  After a thorough discussion of the potential risks, benefits, and alternative treatments, the patient agreed to proceed with surgery  The patient understands that the risks of surgery include, but are not limited to: infection, neurovascular injury, wound healing complications, venous thromboembolism, persistent pain, stiffness, instability, recurrence of symptoms, potential need for additional surgeries, and loss of limb or life  Oral and written consent for surgery was obtained from the patient preoperatively  EXAM UNDER ANESTHESIA:  Neutral alignment  ROM:  0-135 degrees  Ligaments stable to varus stress / valgus stress / Lachman / posterior drawer; negative pivot-shift  Patella tracking normal  without crepitus  PROCEDURE AND TECHNIQUE:  On the day of surgery, the patient was identified in the preoperative holding area    The operative site was marked by the surgeon  The patient was taken into the operating room  A time-out was conducted to confirm the patient's identity, the operative site, and the proposed procedure  The patient was anesthetized, and perioperative antibiotics were administered  The patient was positioned supine on the OR table  All bony prominences were padded  A tourniquet was not used  The operative site was prepped and draped using standard sterile technique  An anterolateral portal was established, and the arthroscope was inserted into the knee joint  An anteromedial portal was established under direct visualization, and diagnostic arthroscopy was performed  The joint demonstrated mild synovitis  In the patellofemoral compartment, the trochlea demonstrated pristine articular cartilage  The patella demonstrated pristine articular cartilage  The patella tracking was normal        In the medial compartment, the medial femoral condyle demonstrated a focal grade 3 chondral defect in the weightbearing portion measuring 6mm x 7 mm, which  was treated with chondroplasty to remove all loose flaps of tissue   The medial tibial plateau demonstrated pristine articular cartilage  The medial meniscus had a complex tear involving the posterior horn  The torn portion of the meniscus was removed and debrided to a stable base with a basket and motorized shaver  60% of the meniscus width remained intact  In the lateral compartment, the lateral femoral condyle demonstrated pristine articular cartilage  The lateral tibial plateau demonstrated pristine articular cartilage  The lateral meniscus had a radial tear involving the posterior horn and posterior root  The torn portion of the meniscus was removed and debrided to a stable base with a basket and motorized shaver  10% of the meniscus width remained intact at the posterior root  In the intercondylar notch, the PCL was intact  The ACL was intact      At the conclusion of the procedure, the instruments were withdrawn  The portals and incisions were closed with absorbable sutures and steri-strips  A sterile dressing was applied  The surgical drapes were removed  A soft bandage was applied to the operative knee  The patient was awakened from anesthesia and transported to the PACU in stable condition        COMPLICATIONS:  None    PATIENT DISPOSITION:  PACU       SIGNATURE:  Wilbert Alejandre MD  DATE:  August 3, 2020  TIME:  1:41 PM

## 2020-08-03 NOTE — ANESTHESIA PREPROCEDURE EVALUATION
Procedure:  KNEE ARTHROSCOPY, partial med  & lat  meniscectomies (Right Knee)    Relevant Problems   ANESTHESIA   (+) PONV (postoperative nausea and vomiting)      MUSCULOSKELETAL   (+) DDD (degenerative disc disease), lumbar   (+) Lumbar spondylosis      NEURO/PSYCH   (+) Anxiety   (+) Depression        Physical Exam    Airway    Mallampati score: II  TM Distance: >3 FB  Neck ROM: full     Dental   No notable dental hx     Cardiovascular  Rhythm: regular, Rate: normal, Cardiovascular exam normal    Pulmonary  Pulmonary exam normal Breath sounds clear to auscultation,     Other Findings        Anesthesia Plan  ASA Score- 2     Anesthesia Type- general and regional with ASA Monitors  Additional Monitors:   Airway Plan: LMA  Comment: SCOP patch ordered  Plan Factors-    Chart reviewed  Patient is not a current smoker  Patient not instructed to abstain from smoking on day of procedure  Patient did not smoke on day of surgery  Induction- intravenous  Postoperative Plan- Plan for postoperative opioid use  Planned trial extubation    Informed Consent- Anesthetic plan and risks discussed with patient and spouse

## 2020-08-05 ENCOUNTER — TELEPHONE (OUTPATIENT)
Dept: OBGYN CLINIC | Facility: MEDICAL CENTER | Age: 60
End: 2020-08-05

## 2020-08-05 NOTE — TELEPHONE ENCOUNTER
Dru sees Dr Lisa Pisano  Patient calling in stating she is in so much pain from her surgery (10)  She cannot get off the couch, she had a fever of 101 7 last evening, feels congested, fell twice last evening  She is asking for a call back       # [phone]

## 2020-08-05 NOTE — TELEPHONE ENCOUNTER
I spoke with patient and she states the temperature has improved 99 9  She is having problems with weakness to her good leg, admits to over doing it yesterday with cleaning up the house  Nerve block wore off and then pain increased  She is having a hard-time bearing weight without pain  Advised that she needs to offload with walker and get herself in a chair that will lift her bottom up higher so she has leverage and can use her good leg and arms to elevate herself from seated position  She will try the recliner as she has been on the couch  Advised that she can loosen the outer ace wrap if it feels too tight for comfort  She should be icing and elevating 20 min on 20 min off, Tylenol 1000mg TID, Oxycodone 5mg every 4hrs prn, ASA 325mg BID  Patient was only taking ASA once daily she will start BID  She will start stool softener 100mg BID to guard against constipation  She will call if pain does not improve

## 2020-08-05 NOTE — TELEPHONE ENCOUNTER
I did speak with the patient who feels her symptoms continue to improve  She is going to take oxycodone 15 mg at this time and then wean down  I also instructed her to remove the bandages so ice can get more directly to her knee  She is going to continue to elevate like Shana Garcia instructed and if her swelling or pain in the lower leg and calf do not improve with rest will consider sending her to the emergency room or order further evaluation with Doppler  She also notify us if any chest pain or shortness of breath occurs since she did feel this briefly last night but now is not at which time it will also be warranted that she goes to the emergency room  Overall she has been improving with regimen that Shana Garcia instructed her

## 2020-08-06 ENCOUNTER — APPOINTMENT (EMERGENCY)
Dept: NON INVASIVE DIAGNOSTICS | Facility: HOSPITAL | Age: 60
End: 2020-08-06
Payer: OTHER MISCELLANEOUS

## 2020-08-06 ENCOUNTER — APPOINTMENT (EMERGENCY)
Dept: RADIOLOGY | Facility: HOSPITAL | Age: 60
End: 2020-08-06
Payer: OTHER MISCELLANEOUS

## 2020-08-06 ENCOUNTER — HOSPITAL ENCOUNTER (EMERGENCY)
Facility: HOSPITAL | Age: 60
Discharge: HOME | End: 2020-08-06
Attending: EMERGENCY MEDICINE | Admitting: EMERGENCY MEDICINE
Payer: OTHER MISCELLANEOUS

## 2020-08-06 VITALS
OXYGEN SATURATION: 97 % | DIASTOLIC BLOOD PRESSURE: 57 MMHG | RESPIRATION RATE: 16 BRPM | SYSTOLIC BLOOD PRESSURE: 115 MMHG | HEART RATE: 71 BPM | TEMPERATURE: 98.2 F

## 2020-08-06 DIAGNOSIS — Z98.890 S/P ARTHROSCOPY OF RIGHT KNEE: ICD-10-CM

## 2020-08-06 DIAGNOSIS — M25.561 ACUTE PAIN OF RIGHT KNEE: ICD-10-CM

## 2020-08-06 DIAGNOSIS — M25.461 EFFUSION, RIGHT KNEE: Primary | ICD-10-CM

## 2020-08-06 LAB
ALBUMIN SERPL BCP-MCNC: 3.2 G/DL (ref 3.5–5)
ALP SERPL-CCNC: 56 U/L (ref 46–116)
ALT SERPL W P-5'-P-CCNC: 16 U/L (ref 12–78)
ANION GAP SERPL CALCULATED.3IONS-SCNC: 7 MMOL/L (ref 4–13)
APPEARANCE FLD: ABNORMAL
AST SERPL W P-5'-P-CCNC: 22 U/L (ref 5–45)
BASOPHILS # BLD AUTO: 0.07 THOUSANDS/ΜL (ref 0–0.1)
BASOPHILS NFR BLD AUTO: 1 % (ref 0–1)
BILIRUB SERPL-MCNC: 0.67 MG/DL (ref 0.2–1)
BUN SERPL-MCNC: 8 MG/DL (ref 5–25)
CALCIUM SERPL-MCNC: 8.7 MG/DL (ref 8.3–10.1)
CHLORIDE SERPL-SCNC: 102 MMOL/L (ref 100–108)
CO2 SERPL-SCNC: 29 MMOL/L (ref 21–32)
COLOR FLD: ABNORMAL
CREAT SERPL-MCNC: 0.99 MG/DL (ref 0.6–1.3)
CRP SERPL QL: 63.1 MG/L
CRYSTALS SNV QL MICRO: NORMAL
EOSINOPHIL # BLD AUTO: 0.39 THOUSAND/ΜL (ref 0–0.61)
EOSINOPHIL NFR BLD AUTO: 5 % (ref 0–6)
ERYTHROCYTE [DISTWIDTH] IN BLOOD BY AUTOMATED COUNT: 13.7 % (ref 11.6–15.1)
ERYTHROCYTE [SEDIMENTATION RATE] IN BLOOD: 15 MM/HOUR (ref 0–29)
GFR SERPL CREATININE-BSD FRML MDRD: 62 ML/MIN/1.73SQ M
GLUCOSE SERPL-MCNC: 95 MG/DL (ref 65–140)
GRAM STN SPEC: NORMAL
HCT VFR BLD AUTO: 36.5 % (ref 34.8–46.1)
HGB BLD-MCNC: 11.7 G/DL (ref 11.5–15.4)
HISTIOCYTES NFR SNV MANUAL: 2 %
IMM GRANULOCYTES # BLD AUTO: 0.03 THOUSAND/UL (ref 0–0.2)
IMM GRANULOCYTES NFR BLD AUTO: 0 % (ref 0–2)
LYMPHOCYTES # BLD AUTO: 2.57 THOUSANDS/ΜL (ref 0.6–4.47)
LYMPHOCYTES # SNV MANUAL: 1 %
LYMPHOCYTES NFR BLD AUTO: 30 % (ref 14–44)
MCH RBC QN AUTO: 30.5 PG (ref 26.8–34.3)
MCHC RBC AUTO-ENTMCNC: 32.1 G/DL (ref 31.4–37.4)
MCV RBC AUTO: 95 FL (ref 82–98)
MONOCYTES # BLD AUTO: 0.76 THOUSAND/ΜL (ref 0.17–1.22)
MONOCYTES NFR BLD AUTO: 9 % (ref 4–12)
MONOCYTES NFR SNV MANUAL: 3 %
NEUTROPHILS # BLD AUTO: 4.9 THOUSANDS/ΜL (ref 1.85–7.62)
NEUTROPHILS NFR SNV MANUAL: 94 %
NEUTS SEG NFR BLD AUTO: 55 % (ref 43–75)
NRBC BLD AUTO-RTO: 0 /100 WBCS
PLATELET # BLD AUTO: 189 THOUSANDS/UL (ref 149–390)
PMV BLD AUTO: 9.5 FL (ref 8.9–12.7)
POTASSIUM SERPL-SCNC: 4.1 MMOL/L (ref 3.5–5.3)
PROT SERPL-MCNC: 6.5 G/DL (ref 6.4–8.2)
RBC # BLD AUTO: 3.83 MILLION/UL (ref 3.81–5.12)
RBC # SNV MANUAL: ABNORMAL /UL (ref 0–10)
SITE: ABNORMAL
SODIUM SERPL-SCNC: 138 MMOL/L (ref 136–145)
TOTAL CELLS COUNTED SPEC: 100
WBC # BLD AUTO: 8.72 THOUSAND/UL (ref 4.31–10.16)
WBC # FLD MANUAL: 2983 /UL (ref 0–200)

## 2020-08-06 PROCEDURE — 99285 EMERGENCY DEPT VISIT HI MDM: CPT | Performed by: EMERGENCY MEDICINE

## 2020-08-06 PROCEDURE — 96374 THER/PROPH/DIAG INJ IV PUSH: CPT

## 2020-08-06 PROCEDURE — 86140 C-REACTIVE PROTEIN: CPT | Performed by: PHYSICIAN ASSISTANT

## 2020-08-06 PROCEDURE — 89050 BODY FLUID CELL COUNT: CPT | Performed by: PHYSICIAN ASSISTANT

## 2020-08-06 PROCEDURE — 85025 COMPLETE CBC W/AUTO DIFF WBC: CPT | Performed by: PHYSICIAN ASSISTANT

## 2020-08-06 PROCEDURE — 87205 SMEAR GRAM STAIN: CPT | Performed by: PHYSICIAN ASSISTANT

## 2020-08-06 PROCEDURE — 89060 EXAM SYNOVIAL FLUID CRYSTALS: CPT | Performed by: PHYSICIAN ASSISTANT

## 2020-08-06 PROCEDURE — 20611 DRAIN/INJ JOINT/BURSA W/US: CPT | Performed by: EMERGENCY MEDICINE

## 2020-08-06 PROCEDURE — 36415 COLL VENOUS BLD VENIPUNCTURE: CPT | Performed by: PHYSICIAN ASSISTANT

## 2020-08-06 PROCEDURE — 93971 EXTREMITY STUDY: CPT

## 2020-08-06 PROCEDURE — 85652 RBC SED RATE AUTOMATED: CPT | Performed by: PHYSICIAN ASSISTANT

## 2020-08-06 PROCEDURE — 89051 BODY FLUID CELL COUNT: CPT | Performed by: PHYSICIAN ASSISTANT

## 2020-08-06 PROCEDURE — 73560 X-RAY EXAM OF KNEE 1 OR 2: CPT

## 2020-08-06 PROCEDURE — 93971 EXTREMITY STUDY: CPT | Performed by: SURGERY

## 2020-08-06 PROCEDURE — 80053 COMPREHEN METABOLIC PANEL: CPT | Performed by: PHYSICIAN ASSISTANT

## 2020-08-06 PROCEDURE — 87070 CULTURE OTHR SPECIMN AEROBIC: CPT | Performed by: PHYSICIAN ASSISTANT

## 2020-08-06 PROCEDURE — 99284 EMERGENCY DEPT VISIT MOD MDM: CPT

## 2020-08-06 RX ORDER — ONDANSETRON 2 MG/ML
4 INJECTION INTRAMUSCULAR; INTRAVENOUS ONCE
Status: COMPLETED | OUTPATIENT
Start: 2020-08-06 | End: 2020-08-06

## 2020-08-06 RX ORDER — LIDOCAINE HYDROCHLORIDE AND EPINEPHRINE 10; 10 MG/ML; UG/ML
1 INJECTION, SOLUTION INFILTRATION; PERINEURAL ONCE
Status: COMPLETED | OUTPATIENT
Start: 2020-08-06 | End: 2020-08-06

## 2020-08-06 RX ORDER — IBUPROFEN 600 MG/1
600 TABLET ORAL ONCE
Status: COMPLETED | OUTPATIENT
Start: 2020-08-06 | End: 2020-08-06

## 2020-08-06 RX ADMIN — ONDANSETRON 4 MG: 2 INJECTION INTRAMUSCULAR; INTRAVENOUS at 13:37

## 2020-08-06 RX ADMIN — IBUPROFEN 600 MG: 600 TABLET, FILM COATED ORAL at 14:15

## 2020-08-06 RX ADMIN — LIDOCAINE HYDROCHLORIDE,EPINEPHRINE BITARTRATE 1 ML: 10; .01 INJECTION, SOLUTION INFILTRATION; PERINEURAL at 15:29

## 2020-08-06 NOTE — TELEPHONE ENCOUNTER
Patient is calling stating that her pain is still very bad  She states that it is a 10/10  She also is asking for her work note to be changed so that she can stay out next week as well

## 2020-08-06 NOTE — ED ATTENDING ATTESTATION
8/6/2020  I, Mark Hess MD, saw and evaluated the patient  I have discussed the patient with the resident/non-physician practitioner and agree with the resident's/non-physician practitioner's findings, Plan of Care, and MDM as documented in the resident's/non-physician practitioner's note, except where noted  All available labs and Radiology studies were reviewed  I was present for key portions of any procedure(s) performed by the resident/non-physician practitioner and I was immediately available to provide assistance  At this point I agree with the current assessment done in the Emergency Department  I have conducted an independent evaluation of this patient a history and physical is as follows:    Final Diagnosis:  1  Effusion, right knee    2  Acute pain of right knee    3  S/P arthroscopy of right knee      Chief Complaint   Patient presents with    Knee Pain     Pt has orthoscopic knee surgery to right knee on Monday  Pt reports on and off fevers and increased pain  Pt denies redness, but swelling from knee down to foot  Dr Pricila Vo was surgeon and office wanted pt to be seen here  This is a 24-year-old female with a history of fibromyalgia, migraines who presents with right lower extremity pain  The patient had a right knee arthroscopic be with partial medial meniscectomy and partial lateral meniscectomy on 8/3/20  The patient was doing well postoperatively  However, several days ago, she started to experience right lower extremity swelling and pain  The patient has been experiencing difficulty bearing weight on the knee  Pain is mainly located in the right calf and right knee  However, she does experience shooting pain up the right leg into the hip  She also admits to fevers as high as 101 7 yesterday  She is taking Tylenol  She did have a fever of 100 2 for which she took Tylenol    Denies nausea/vomiting, lightheadedness/dizziness, numbness/weakness, headache, change in vision, URI symptoms, neck pain, chest pain, palpitations, shortness of breath, cough, back pain, flank pain, abdominal pain, diarrhea, hematochezia, melena, dysuria, hematuria, abnormal vaginal discharge/bleeding  PMH:  - fibromyalgia, migraine  PSH:  - arthroscopy of right knee    PE:   Vitals:    08/06/20 1200 08/06/20 1415 08/06/20 1530 08/06/20 1742   BP: 115/59 115/67 108/61 115/57   BP Location: Right arm Right arm Right arm Right arm   Pulse: 66 73 71 71   Resp: 18 18 16 16   Temp: 98 2 °F (36 8 °C)      TempSrc: Oral      SpO2: 99% 96% 97% 97%       Constitutional: Vital signs are normal  She appears well-developed  She is cooperative  No distress  HENT:   Mouth/Throat: Uvula is midline, oropharynx is clear and moist and mucous membranes are normal    Eyes: Pupils are equal, round, and reactive to light  Conjunctivae and EOM are normal    Neck: Trachea normal  No thyroid mass and no thyromegaly present  Cardiovascular: Normal rate, regular rhythm, normal heart sounds, intact distal pulses and normal pulses  No murmur heard  Pulmonary/Chest: Effort normal and breath sounds normal    Abdominal: Soft  Normal appearance and bowel sounds are normal  There is no tenderness  There is no rebound, no guarding and no CVA tenderness  MSK:  Surgical sites appear clean, dry, intact  Noticeable swelling to right knee and right calf  Erythema noticed anteriorly on right knee and tibia  Tenderness throughout right knee and right calf  Right foot is pink, warm, perfusing well  Neurological: She is alert  Skin: Skin is warm, dry and intact  Psychiatric: She has a normal mood and affect  Her speech is normal and behavior is normal  Thought content normal        A:  - this is a 75-year-old female postop from arthrocentesis who presents with right knee swelling/calf swelling and pain  P:  - plan to check labs, x-ray of the right knee, duplex  Arthrocentesis  Speak with Orthopedics      - 13 point ROS was performed and all are normal unless stated in the history above  - Nursing note reviewed  Vitals reviewed  - Orders placed by myself and/or advanced practitioner / resident     - Previous chart was reviewed  - No language barrier    - History obtained from patient  - There are no limitations to the history obtained  - Critical care time: Not applicable for this patient  Medications   ondansetron (ZOFRAN) injection 4 mg (4 mg Intravenous Given 8/6/20 1337)   ibuprofen (MOTRIN) tablet 600 mg (600 mg Oral Given 8/6/20 1415)   lidocaine-epinephrine (XYLOCAINE/EPINEPHRINE) 1 %-1:100,000 injection 1 mL (1 mL Infiltration Given 8/6/20 4649)     VAS lower limb venous duplex study, unilateral/limited   Final Result      XR knee 1 or 2 views right   Final Result      Degenerative change  Moderate-sized joint effusion  No acute osseous abnormality        Workstation performed: [id number]           Orders Placed This Encounter   Procedures    Arthrocentesis    Synovial fluid, Culture and Gram stain    STAT Gram Stain    XR knee 1 or 2 views right    CBC and differential    Sedimentation rate, automated    C-reactive protein    Comprehensive metabolic panel    Synovial fluid, white cell count w/ diff    Synovial fluid, crystal    Synovial fluid, RBC count    Synovial Fluid Diff    Insert peripheral IV     Labs Reviewed   C-REACTIVE PROTEIN - Abnormal       Result Value Ref Range Status    CRP 63 1 (*) <3 0 mg/L Final    Comment: 15   COMPREHENSIVE METABOLIC PANEL - Abnormal    Sodium 138  136 - 145 mmol/L Final    Potassium 4 1  3 5 - 5 3 mmol/L Final    Chloride 102  100 - 108 mmol/L Final    CO2 29  21 - 32 mmol/L Final    ANION GAP 7  4 - 13 mmol/L Final    BUN 8  5 - 25 mg/dL Final    Creatinine 0 99  0 60 - 1 30 mg/dL Final    Comment: Standardized to IDMS reference method    Glucose 95  65 - 140 mg/dL Final    Comment: If the patient is fasting, the ADA then defines impaired fasting glucose as > 100 mg/dL and diabetes as > or equal to 123 mg/dL  Specimen collection should occur prior to Sulfasalazine administration due to the potential for falsely depressed results  Specimen collection should occur prior to Sulfapyridine administration due to the potential for falsely elevated results  Calcium 8 7  8 3 - 10 1 mg/dL Final    AST 22  5 - 45 U/L Final    Comment: Specimen collection should occur prior to Sulfasalazine administration due to the potential for falsely depressed results  ALT 16  12 - 78 U/L Final    Comment: Specimen collection should occur prior to Sulfasalazine administration due to the potential for falsely depressed results  Alkaline Phosphatase 56  46 - 116 U/L Final    Total Protein 6 5  6 4 - 8 2 g/dL Final    Albumin 3 2 (*) 3 5 - 5 0 g/dL Final    Total Bilirubin 0 67  0 20 - 1 00 mg/dL Final    Comment: Use of this assay is not recommended for patients undergoing treatment with eltrombopag due to the potential for falsely elevated results      eGFR 62  ml/min/1 73sq m Final    Narrative:     [place] guidelines for Chronic Kidney Disease (CKD):     Stage 1 with normal or high GFR (GFR > 90 mL/min/1 73 square meters)    Stage 2 Mild CKD (GFR = 60-89 mL/min/1 73 square meters)    Stage 3A Moderate CKD (GFR = 45-59 mL/min/1 73 square meters)    Stage 3B Moderate CKD (GFR = 30-44 mL/min/1 73 square meters)    Stage 4 Severe CKD (GFR = 15-29 mL/min/1 73 square meters)    Stage 5 End Stage CKD (GFR <15 mL/min/1 73 square meters)  Note: GFR calculation is accurate only with a steady state creatinine   SYNOVIAL FLUID WHITE CELL COUNT W/ DIFF - Abnormal    Site Synovial   Final    Color, Fluid Red (*) Clear, Colorless,Yellow Final    Clarity, Fluid Cloudy (*) Clear Final    WBC, Fluid 2,983 (*) 0 - 200 /ul Final   RED CELL COUNT,SYNOVIAL FLUID - Abnormal    RBC, SYNOVIAL 57,000 (*) 0 - 10 Final   SEDIMENTATION RATE - Normal    Sed Rate 15  0 - 29 mm/hour Final    Narrative:     New method- Test performed using  automated Rheology Technology  If following a patient's inflammatory disease during treatment, a new baseline should be established     BODY FLUID CULTURE AND GRAM STAIN   CBC AND DIFFERENTIAL    WBC 8 72  4 31 - 10 16 Thousand/uL Final    RBC 3 83  3 81 - 5 12 Million/uL Final    Hemoglobin 11 7  11 5 - 15 4 g/dL Final    Hematocrit 36 5  34 8 - 46 1 % Final    MCV 95  82 - 98 fL Final    MCH 30 5  26 8 - 34 3 pg Final    MCHC 32 1  31 4 - 37 4 g/dL Final    RDW 13 7  11 6 - 15 1 % Final    MPV 9 5  8 9 - 12 7 fL Final    Platelets 179  863 - 390 Thousands/uL Final    nRBC 0  /100 WBCs Final    Neutrophils Relative 55  43 - 75 % Final    Immat GRANS % 0  0 - 2 % Final    Lymphocytes Relative 30  14 - 44 % Final    Monocytes Relative 9  4 - 12 % Final    Eosinophils Relative 5  0 - 6 % Final    Basophils Relative 1  0 - 1 % Final    Neutrophils Absolute 4 90  1 85 - 7 62 Thousands/µL Final    Immature Grans Absolute 0 03  0 00 - 0 20 Thousand/uL Final    Lymphocytes Absolute 2 57  0 60 - 4 47 Thousands/µL Final    Monocytes Absolute 0 76  0 17 - 1 22 Thousand/µL Final    Eosinophils Absolute 0 39  0 00 - 0 61 Thousand/µL Final    Basophils Absolute 0 07  0 00 - 0 10 Thousands/µL Final   SYNOVIAL FLUID DIFF    Total Counted 100   Final    Neutrophil % Synovial 94  % Final    Lymph % Synovial 1  % Final    Monocyte % Synovial 3  % Final    Histiocyte % Synovial 2  % Final   SYNOVIAL FLUID, CRYSTAL     Time reflects when diagnosis was documented in both MDM as applicable and the Disposition within this note     Time User Action Codes Description Comment    8/6/2020  5:45 PM Yair Darian Add [M25 [id number]] Effusion, right knee     8/6/2020  5:45 PM Yair Holiday Add [M25 [id number]] Acute pain of right knee     8/6/2020  5:45 PM Yair Holiday Add [Z98 [id number]] S/P arthroscopy of right knee       ED Disposition     ED Disposition Condition Date/Time Comment Discharge Stable Thu Aug 6, 2020  5:45 PM Jamshid Bennett 141 discharge to home/self care  Follow-up Information     Follow up With Specialties Details Why Contact Info Additional [address], DO Family Medicine  As needed [address] [phone]       [facility] Emergency Department Emergency Medicine  If symptoms worsen [facility] [id number]  [phone] [place] ED, [address] , Mj Mansfield MD Orthopedic Surgery Schedule an appointment as soon as possible for a visit   [address] [facility]  [address] [place] [address] [address] [facility]  [phone]           Discharge Medication List as of 8/6/2020  5:47 PM      CONTINUE these medications which have NOT CHANGED    Details   aspirin (ECOTRIN) 325 mg EC tablet Take 1 tablet (325 mg total) by mouth 2 (two) times a day for 14 days, Starting Mon 8/3/2020, Until Mon 8/17/2020, Normal      busPIRone (BUSPAR) 5 mg tablet Take 5 mg by mouth 2 (two) times a day, Starting Tue 6/30/2020, Historical Med      calcium-vitamin D 250-100 MG-UNIT per tablet Take 1 tablet by mouth 2 (two) times a day, Historical Med      cyanocobalamin (VITAMIN B-12) 100 mcg tablet Take by mouth daily, Historical Med      doxycycline (PERIOSTAT) 20 MG tablet Take 20 mg by mouth 2 (two) times a day, Starting Wed 9/4/2019, Historical Med      estradiol (VAGIFEM, YUVAFEM) 10 MCG TABS vaginal tablet Insert 1 tablet (10 mcg total) into the vagina daily For two weeks  Then twice weekly at night   Use 12 hours prior to intercourse, Starting Fri 10/25/2019, Normal      !! gabapentin (NEURONTIN) 300 mg capsule Take 2 capsules (600 mg total) by mouth 3 (three) times a day, Starting Fri 2/14/2020, Normal      !! gabapentin (NEURONTIN) 300 mg capsule Take 1 capsule (300 mg total) by mouth 3 (three) times a day, Starting Fri 7/24/2020, Until Sun 8/23/2020, Normal      hydrOXYzine HCL (ATARAX) 50 mg tablet Starting Thu 3/1/2018, Historical Med      meclizine (ANTIVERT) 12 5 MG tablet Take 1 tablet (12 5 mg total) by mouth every 8 (eight) hours as needed for dizziness or nausea, Starting Tue 1/28/2020, Normal      methenamine hippurate (HIPREX) 1 g tablet 1 g 2 (two) times a day with meals , Starting Tue 5/21/2019, Historical Med      omeprazole (PriLOSEC) 40 MG capsule Take 40 mg by mouth daily , Starting Sat 12/1/2018, Historical Med      ondansetron (ZOFRAN) 4 mg tablet Take 1 tablet (4 mg total) by mouth every 8 (eight) hours as needed for nausea or vomiting, Starting Tue 12/31/2019, Normal      ondansetron (ZOFRAN-ODT) 4 mg disintegrating tablet Take 1 tablet (4 mg total) by mouth every 8 (eight) hours as needed for nausea or vomiting, Starting Mon 8/3/2020, Normal      oxyCODONE (ROXICODONE) 5 mg immediate release tablet Take 1 tablet (5 mg total) by mouth every 4 (four) hours as needed for moderate pain for up to 10 daysMax Daily Amount: 30 mg, Starting Mon 8/3/2020, Until Thu 8/13/2020, Normal      !! PARoxetine (PAXIL) 10 mg tablet Take by mouth, Historical Med      !! PARoxetine (PAXIL) 40 MG tablet Take by mouth, Historical Med      SUMAtriptan (IMITREX) 50 mg tablet Take 1 tablet (50 mg total) by mouth once as needed for migraine for up to 1 dose May repeat in 2 hours  No more than 200 mg per day , Starting Fri 5/8/2020, Normal       !! - Potential duplicate medications found  Please discuss with provider  No discharge procedures on file  Prior to Admission Medications   Prescriptions Last Dose Informant Patient Reported? Taking? PARoxetine (PAXIL) 10 mg tablet  Self Yes No   Sig: Take by mouth   PARoxetine (PAXIL) 40 MG tablet  Self Yes No   Sig: Take by mouth   SUMAtriptan (IMITREX) 50 mg tablet   No No   Sig: Take 1 tablet (50 mg total) by mouth once as needed for migraine for up to 1 dose May repeat in 2 hours  No more than 200 mg per day     aspirin (ECOTRIN) 325 mg EC tablet No No   Sig: Take 1 tablet (325 mg total) by mouth 2 (two) times a day for 14 days   busPIRone (BUSPAR) 5 mg tablet   Yes No   Sig: Take 5 mg by mouth 2 (two) times a day   calcium-vitamin D 250-100 MG-UNIT per tablet   Yes No   Sig: Take 1 tablet by mouth 2 (two) times a day   cyanocobalamin (VITAMIN B-12) 100 mcg tablet   Yes No   Sig: Take by mouth daily   doxycycline (PERIOSTAT) 20 MG tablet  Self Yes No   Sig: Take 20 mg by mouth 2 (two) times a day   estradiol (VAGIFEM, YUVAFEM) 10 MCG TABS vaginal tablet   No No   Sig: Insert 1 tablet (10 mcg total) into the vagina daily For two weeks  Then twice weekly at night   Use 12 hours prior to intercourse   Patient not taking: Reported on 2020   gabapentin (NEURONTIN) 300 mg capsule   No No   Sig: Take 2 capsules (600 mg total) by mouth 3 (three) times a day   gabapentin (NEURONTIN) 300 mg capsule   No No   Sig: Take 1 capsule (300 mg total) by mouth 3 (three) times a day   hydrOXYzine HCL (ATARAX) 50 mg tablet  Self Yes No   meclizine (ANTIVERT) 12 5 MG tablet   No No   Sig: Take 1 tablet (12 5 mg total) by mouth every 8 (eight) hours as needed for dizziness or nausea   Patient not taking: Reported on 2020   methenamine hippurate (HIPREX) 1 g tablet  Self Yes No   Si g 2 (two) times a day with meals    omeprazole (PriLOSEC) 40 MG capsule  Self Yes No   Sig: Take 40 mg by mouth daily    ondansetron (ZOFRAN) 4 mg tablet   No No   Sig: Take 1 tablet (4 mg total) by mouth every 8 (eight) hours as needed for nausea or vomiting   Patient not taking: Reported on 2020   ondansetron (ZOFRAN-ODT) 4 mg disintegrating tablet   No No   Sig: Take 1 tablet (4 mg total) by mouth every 8 (eight) hours as needed for nausea or vomiting   oxyCODONE (ROXICODONE) 5 mg immediate release tablet   No No   Sig: Take 1 tablet (5 mg total) by mouth every 4 (four) hours as needed for moderate pain for up to 10 daysMax Daily Amount: 30 mg      Facility-Administered Medications: None       Portions of the record may have been created with voice recognition software  Occasional wrong word or "sound a like" substitutions may have occurred due to the inherent limitations of voice recognition software  Read the chart carefully and recognize, using context, where substitutions have occurred        ED Course         Critical Care Time  Procedures

## 2020-08-06 NOTE — ED PROVIDER NOTES
History  Chief Complaint   Patient presents with    Knee Pain     Pt has orthoscopic knee surgery to right knee on Monday  Pt reports on and off fevers and increased pain  Pt denies redness, but swelling from knee down to foot  Dr Aris Garay was surgeon and office wanted pt to be seen here  This is a 78-year-old female with a history of fibromyalgia, migraines who presents with right lower extremity pain  The patient had a right knee arthroscopic be with partial medial meniscectomy and partial lateral meniscectomy on 8/3/20  The patient was doing well postoperatively  However, several days ago, she started to experience right lower extremity swelling and pain  The patient has been experiencing difficulty bearing weight on the knee  Pain is mainly located in the right calf and right knee  However, she does experience shooting pain up the right leg into the hip  She also admits to fevers as high as 101 7 yesterday  She is taking Tylenol  She did have a fever of 100 2 for which she took Tylenol  Denies nausea/vomiting, lightheadedness/dizziness, numbness/weakness, headache, change in vision, URI symptoms, neck pain, chest pain, palpitations, shortness of breath, cough, back pain, flank pain, abdominal pain, diarrhea, hematochezia, melena, dysuria, hematuria, abnormal vaginal discharge/bleeding  Prior to Admission Medications   Prescriptions Last Dose Informant Patient Reported? Taking? PARoxetine (PAXIL) 10 mg tablet  Self Yes No   Sig: Take by mouth   PARoxetine (PAXIL) 40 MG tablet  Self Yes No   Sig: Take by mouth   SUMAtriptan (IMITREX) 50 mg tablet   No No   Sig: Take 1 tablet (50 mg total) by mouth once as needed for migraine for up to 1 dose May repeat in 2 hours  No more than 200 mg per day     aspirin (ECOTRIN) 325 mg EC tablet   No No   Sig: Take 1 tablet (325 mg total) by mouth 2 (two) times a day for 14 days   busPIRone (BUSPAR) 5 mg tablet   Yes No   Sig: Take 5 mg by mouth 2 (two) times a day   calcium-vitamin D 250-100 MG-UNIT per tablet   Yes No   Sig: Take 1 tablet by mouth 2 (two) times a day   cyanocobalamin (VITAMIN B-12) 100 mcg tablet   Yes No   Sig: Take by mouth daily   doxycycline (PERIOSTAT) 20 MG tablet  Self Yes No   Sig: Take 20 mg by mouth 2 (two) times a day   estradiol (VAGIFEM, YUVAFEM) 10 MCG TABS vaginal tablet   No No   Sig: Insert 1 tablet (10 mcg total) into the vagina daily For two weeks  Then twice weekly at night   Use 12 hours prior to intercourse   Patient not taking: Reported on 2020   gabapentin (NEURONTIN) 300 mg capsule   No No   Sig: Take 2 capsules (600 mg total) by mouth 3 (three) times a day   gabapentin (NEURONTIN) 300 mg capsule   No No   Sig: Take 1 capsule (300 mg total) by mouth 3 (three) times a day   hydrOXYzine HCL (ATARAX) 50 mg tablet  Self Yes No   meclizine (ANTIVERT) 12 5 MG tablet   No No   Sig: Take 1 tablet (12 5 mg total) by mouth every 8 (eight) hours as needed for dizziness or nausea   Patient not taking: Reported on 2020   methenamine hippurate (HIPREX) 1 g tablet  Self Yes No   Si g 2 (two) times a day with meals    omeprazole (PriLOSEC) 40 MG capsule  Self Yes No   Sig: Take 40 mg by mouth daily    ondansetron (ZOFRAN) 4 mg tablet   No No   Sig: Take 1 tablet (4 mg total) by mouth every 8 (eight) hours as needed for nausea or vomiting   Patient not taking: Reported on 2020   ondansetron (ZOFRAN-ODT) 4 mg disintegrating tablet   No No   Sig: Take 1 tablet (4 mg total) by mouth every 8 (eight) hours as needed for nausea or vomiting   oxyCODONE (ROXICODONE) 5 mg immediate release tablet   No No   Sig: Take 1 tablet (5 mg total) by mouth every 4 (four) hours as needed for moderate pain for up to 10 daysMax Daily Amount: 30 mg      Facility-Administered Medications: None       Past Medical History:   Diagnosis Date    Anxiety     Arthritis     Last assessed 5/3/2011    Ortega's esophagus     Cancer ([facility] [address] ) ovarian cancer at age 26 yo- REMOVAL  B/L    Colon polyp     DDD (degenerative disc disease), lumbar     Depression     Fall     5/2020 right knee meniscus tear- OR repair today 8/3/2020    Fibromyalgia     Fibromyalgia, primary     Fracture of one or more phalanges of foot     GERD (gastroesophageal reflux disease)     H/O bladder infections     chronic    Hypertension     Kidney infection     occasional    Migraines     Ovarian cancer ([facility] [address] ) 1987    age 27    Polyneuropathy     Last assessed 6/23/2016 knees to feet    PONV (postoperative nausea and vomiting)     Patient requests to be pre-medicated prior to surgery prior to surgery    PONV (postoperative nausea and vomiting) 8/3/2020    Renal disorder     Spinal stenosis     Vertigo     Wears glasses     reading       Past Surgical History:   Procedure Laterality Date    ABDOMINAL SURGERY  1986    several   [address] [place]    COLONOSCOPY      FOOT FRACTURE SURGERY Bilateral 2004    x 5  surgeries    KIDNEY SURGERY  1974    at age 13 yo    ID KNEE SCOPE,MED/LAT MENISECTOMY Right 8/3/2020    Procedure: [address];  Surgeon: Rose Dial MD;  Location: [facility];  Service: Orthopedics    TOTAL ABDOMINAL HYSTERECTOMY  1987    age 27   Brigitte Kidd TOTAL ABDOMINAL HYSTERECTOMY W/ BILATERAL SALPINGOOPHORECTOMY Bilateral 1987    age 27       Family History   Problem Relation Age of Onset    Heart disease Mother     Cancer Mother     Diabetes Mother     Arthritis Mother     Anxiety disorder Mother     Bleeding Disorder Mother     Clotting disorder Mother     Depression Mother     Hyperlipidemia Mother     Irritable bowel syndrome Mother     Hypertension Mother     Obesity Mother     Osteoporosis Mother     Vaginal cancer Mother 27    Skin cancer Mother     Thyroid disease Mother     Diabetes Father     Arthritis Father     Hyperlipidemia Father     Vesicoureteral reflux Father    Brigitte Kidd Heart disease Father     Hypertension Father     Migraines Father     Obesity Father     Hypertension Family     Arthritis Family     Arthritis Brother     Anxiety disorder Brother     Diabetes Brother     Hyperlipidemia Brother     Vesicoureteral reflux Brother     Heart disease Brother     Irritable bowel syndrome Brother     Hypertension Brother     Migraines Brother     Thyroid disease Brother     Pancreatic cancer Brother 54    Migraines Daughter     Asthma Son     Migraines Son     Diabetes Maternal Grandmother     Vaginal cancer Maternal Grandmother 48    Infertility Paternal Grandmother     Arthritis Maternal Aunt     Anxiety disorder Maternal Aunt     Depression Maternal Aunt     Diabetes Maternal Aunt     Vaginal cancer Maternal Aunt 48    Fibroids Paternal Aunt     No Known Problems Maternal Grandfather     No Known Problems Paternal Grandfather     No Known Problems Maternal Aunt     No Known Problems Maternal Aunt     No Known Problems Maternal Aunt      I have reviewed and agree with the history as documented  E-Cigarette/Vaping     E-Cigarette/Vaping Substances     Social History     Tobacco Use    Smoking status: Never Smoker    Smokeless tobacco: Never Used   Substance Use Topics    Alcohol use: No    Drug use: No       Review of Systems   Constitutional: Positive for fever  Negative for chills  HENT: Negative for rhinorrhea, sore throat and trouble swallowing  Eyes: Negative for photophobia and visual disturbance  Respiratory: Negative for cough, chest tightness and shortness of breath  Cardiovascular: Negative for chest pain, palpitations and leg swelling  Gastrointestinal: Negative for abdominal pain, blood in stool, diarrhea, nausea and vomiting  Endocrine: Negative for polyuria  Genitourinary: Negative for dysuria, flank pain, hematuria, vaginal bleeding and vaginal discharge  Musculoskeletal: Positive for arthralgias   Negative for back pain and neck pain  Skin: Negative for color change and rash  Allergic/Immunologic: Negative for immunocompromised state  Neurological: Negative for dizziness, weakness, light-headedness, numbness and headaches  All other systems reviewed and are negative  Physical Exam  Physical Exam  Constitutional:       General: She is not in acute distress  Appearance: Normal appearance  She is well-developed  HENT:      Mouth/Throat:      Pharynx: Uvula midline  Eyes:      Conjunctiva/sclera: Conjunctivae normal       Pupils: Pupils are equal, round, and reactive to light  Neck:      Thyroid: No thyroid mass or thyromegaly  Trachea: Trachea normal    Cardiovascular:      Rate and Rhythm: Normal rate and regular rhythm  Pulses: Normal pulses  Heart sounds: Normal heart sounds  No murmur  Pulmonary:      Effort: Pulmonary effort is normal       Breath sounds: Normal breath sounds  Abdominal:      General: Bowel sounds are normal       Palpations: Abdomen is soft  Tenderness: There is no abdominal tenderness  There is no guarding or rebound  Musculoskeletal:      Comments: Surgical sites appear clean, dry, intact  She does have noticeable swelling to right knee and right calf with mild erythema anteriorly over knee  Tenderness over right calf and right knee  Right foot is pink, warm, perfusing well  Skin:     General: Skin is warm and dry  Neurological:      Mental Status: She is alert  Psychiatric:         Speech: Speech normal          Behavior: Behavior normal  Behavior is cooperative  Thought Content:  Thought content normal          Vital Signs  ED Triage Vitals [08/06/20 1200]   Temperature Pulse Respirations Blood Pressure SpO2   98 2 °F (36 8 °C) 66 18 115/59 99 %      Temp Source Heart Rate Source Patient Position - Orthostatic VS BP Location FiO2 (%)   Oral Monitor Sitting Right arm --      Pain Score       Worst Possible Pain           Vitals:    08/06/20 1200   BP: 115/59   Pulse: 66   Patient Position - Orthostatic VS: Sitting         Visual Acuity      ED Medications  Medications   ondansetron (ZOFRAN) injection 4 mg (has no administration in time range)   ibuprofen (MOTRIN) tablet 600 mg (has no administration in time range)       Diagnostic Studies  Results Reviewed     Procedure Component Value Units Date/Time    CBC and differential [[id number]]     Lab Status:  No result Specimen:  Blood     Sedimentation rate, automated [[id number]]     Lab Status:  No result Specimen:  Blood     C-reactive protein [[id number]]     Lab Status:  No result Specimen:  Blood     Comprehensive metabolic panel [[id number]]     Lab Status:  No result Specimen:  Blood                  XR knee 1 or 2 views right    (Results Pending)   VAS lower limb venous duplex study, unilateral/limited    (Results Pending)              Procedures  Procedures         ED Course       US AUDIT      Most Recent Value   Initial Alcohol Screen: US AUDIT-C    1  How often do you have a drink containing alcohol?  0 Filed at: 08/06/2020 1201   Audit-C Score  0 Filed at: 08/06/2020 1201                  JENNIFER/DAST-10      Most Recent Value   How many times in the past year have you    Used an illegal drug or used a prescription medication for non-medical reasons? Never Filed at: 08/06/2020 1201                                MDM  Number of Diagnoses or Management Options  Diagnosis management comments: Plan to check basic labs with CRP and ESR  Will check a duplex of the right lower extremity  X-ray right knee  Will speak with orthopedics regarding for further management and possible arthrocentesis  Disposition  Final diagnoses:   None     ED Disposition     None      Follow-up Information    None         Patient's Medications   Discharge Prescriptions    No medications on file     No discharge procedures on file      PDMP Review       Value Time User    PDMP Reviewed  Yes 8/3/2020  1:49 PM Narciso Hahn Aubrey Ulloa PA-C          ED Provider  Electronically Signed by

## 2020-08-06 NOTE — TELEPHONE ENCOUNTER
I spoke to the patient and she states she is having selling to calf and foot  Pain is going from knee to hip area  She is unable to get up due to pain and weakness  Her  is unable to get her up  I advised that she will need to call 911 to be transported to ER for evaluation and treatment  Patient verbalized understanding and will get to ER

## 2020-08-06 NOTE — DISCHARGE INSTRUCTIONS
Your US today was negative for DVT  Your X-ray showed a moderate effusion which was aspirated  Please continue pain regimen per Orthopedics, icing, and elevating  Follow up with Dr Sae Sandhu as soon as possible  Return to the ED with any new or worsening symptoms

## 2020-08-06 NOTE — ED PROVIDER NOTES
History  Chief Complaint   Patient presents with    Knee Pain     Pt has orthoscopic knee surgery to right knee on Monday  Pt reports on and off fevers and increased pain  Pt denies redness, but swelling from knee down to foot  Dr Kaylynn Astudillo was surgeon and office wanted pt to be seen here  Bart Meade is a 62year old female with PMHx of right medial/lateral meniscus tear s/p right knee arthroscopy with partial medial and lateral meniscectomy on 8/3 presenting to the ED for evaluation of right knee pain and right lower extremity swelling since the procedure  Patient relates that she is experiencing pain diffusely around the right knee which has not significantly changed or worsened since the procedure  Patient additionally reports that she has been having fevers with Tmax 101 7 yesterday evening and another fever this morning of 100 2F for which she took Tylenol  Patient denies extremity numbness but admits that both lower extremities feel weak  Patient admits to being sedentary throughout the week secondary to pain and discomfort  She states that she did experience chest pressure earlier this week which lasted the entire day on Monday and again on Tuesday but is not experiencing any chest pain or pressure currently  She denies shortness of breath or cough  Patient has been eating and drinking well without issues  She admits to some nausea which has been controlled with prescribed zofran, and denies any episodes of vomiting or diarrhea, denies abdominal pain  Denies headache, dizziness, palpitations  Patient has been icing/elevating as instructed without significant improvement  Denies any falls or trauma at home since the procedure        History provided by:  Patient  Knee Pain   Location:  Knee  Knee location:  R knee  Pain details:     Quality:  Aching and pressure    Radiates to:  Does not radiate    Severity:  Moderate    Onset quality:  Gradual    Timing:  Constant    Progression: Unchanged  Chronicity:  New  Dislocation: no    Foreign body present:  No foreign bodies  Prior injury to area: Recent right knee arthroscopy  Worsened by:  Bearing weight  Ineffective treatments: Ice  Associated symptoms: fever    Associated symptoms: no back pain        Prior to Admission Medications   Prescriptions Last Dose Informant Patient Reported? Taking? PARoxetine (PAXIL) 10 mg tablet  Self Yes No   Sig: Take by mouth   PARoxetine (PAXIL) 40 MG tablet  Self Yes No   Sig: Take by mouth   SUMAtriptan (IMITREX) 50 mg tablet   No No   Sig: Take 1 tablet (50 mg total) by mouth once as needed for migraine for up to 1 dose May repeat in 2 hours  No more than 200 mg per day  aspirin (ECOTRIN) 325 mg EC tablet   No No   Sig: Take 1 tablet (325 mg total) by mouth 2 (two) times a day for 14 days   busPIRone (BUSPAR) 5 mg tablet   Yes No   Sig: Take 5 mg by mouth 2 (two) times a day   calcium-vitamin D 250-100 MG-UNIT per tablet   Yes No   Sig: Take 1 tablet by mouth 2 (two) times a day   cyanocobalamin (VITAMIN B-12) 100 mcg tablet   Yes No   Sig: Take by mouth daily   doxycycline (PERIOSTAT) 20 MG tablet  Self Yes No   Sig: Take 20 mg by mouth 2 (two) times a day   estradiol (VAGIFEM, YUVAFEM) 10 MCG TABS vaginal tablet   No No   Sig: Insert 1 tablet (10 mcg total) into the vagina daily For two weeks  Then twice weekly at night   Use 12 hours prior to intercourse   Patient not taking: Reported on 2/14/2020   gabapentin (NEURONTIN) 300 mg capsule   No No   Sig: Take 2 capsules (600 mg total) by mouth 3 (three) times a day   gabapentin (NEURONTIN) 300 mg capsule   No No   Sig: Take 1 capsule (300 mg total) by mouth 3 (three) times a day   hydrOXYzine HCL (ATARAX) 50 mg tablet  Self Yes No   meclizine (ANTIVERT) 12 5 MG tablet   No No   Sig: Take 1 tablet (12 5 mg total) by mouth every 8 (eight) hours as needed for dizziness or nausea   Patient not taking: Reported on 7/14/2020   methenamine hippurate (HIPREX) 1 g tablet  Self Yes No   Si g 2 (two) times a day with meals    omeprazole (PriLOSEC) 40 MG capsule  Self Yes No   Sig: Take 40 mg by mouth daily    ondansetron (ZOFRAN) 4 mg tablet   No No   Sig: Take 1 tablet (4 mg total) by mouth every 8 (eight) hours as needed for nausea or vomiting   Patient not taking: Reported on 2020   ondansetron (ZOFRAN-ODT) 4 mg disintegrating tablet   No No   Sig: Take 1 tablet (4 mg total) by mouth every 8 (eight) hours as needed for nausea or vomiting   oxyCODONE (ROXICODONE) 5 mg immediate release tablet   No No   Sig: Take 1 tablet (5 mg total) by mouth every 4 (four) hours as needed for moderate pain for up to 10 daysMax Daily Amount: 30 mg      Facility-Administered Medications: None       Past Medical History:   Diagnosis Date    Anxiety     Arthritis     Last assessed 5/3/2011    Ortega's esophagus     [facility]     ovarian cancer at age 28 yo- REMOVAL  B/L    Colon polyp     DDD (degenerative disc disease), lumbar     Depression     Fall     2020 right knee meniscus tear- OR repair today 8/3/2020    Fibromyalgia     Fibromyalgia, primary     Fracture of one or more phalanges of foot     GERD (gastroesophageal reflux disease)     H/O bladder infections     chronic    Hypertension     Kidney infection     occasional    Migraines     Ovarian cancer ([facility] ) 26    age 27    Polyneuropathy     Last assessed 2016 knees to feet    PONV (postoperative nausea and vomiting)     Patient requests to be pre-medicated prior to surgery prior to surgery    PONV (postoperative nausea and vomiting) 8/3/2020    Renal disorder     Spinal stenosis     Vertigo     Wears glasses     reading       Past Surgical History:   Procedure Laterality Date    ABDOMINAL SURGERY      several    BACK SURGERY      COLONOSCOPY      FOOT FRACTURE SURGERY Bilateral 2004    x 5  surgeries    KIDNEY SURGERY  1974    at age 13 yo    NH KNEE SCOPE,MED/LAT MENISECTOMY Right 8/3/2020    Procedure: KNEE ARTHROSCOPY,PARTIAL MEDIAL & LATERAL MENISECTOMIES;  Surgeon: Gladys Rogers MD;  Location: [facility];  Service: Orthopedics    TOTAL ABDOMINAL HYSTERECTOMY  1987    age 27    TOTAL ABDOMINAL HYSTERECTOMY W/ BILATERAL SALPINGOOPHORECTOMY Bilateral 1987    age 27       Family History   Problem Relation Age of Onset    Heart disease Mother     Cancer Mother     Diabetes Mother     Arthritis Mother     Anxiety disorder Mother     Bleeding Disorder Mother     Clotting disorder Mother     Depression Mother     Hyperlipidemia Mother     Irritable bowel syndrome Mother     Hypertension Mother     Obesity Mother     Osteoporosis Mother     Vaginal cancer Mother 27    Skin cancer Mother     Thyroid disease Mother     Diabetes Father     Arthritis Father     Hyperlipidemia Father     Vesicoureteral reflux Father     Heart disease Father     Hypertension Father     Migraines Father     Obesity Father     Hypertension Family     Arthritis Family     Arthritis Brother     Anxiety disorder Brother     Diabetes Brother     Hyperlipidemia Brother     Vesicoureteral reflux Brother     Heart disease Brother     Irritable bowel syndrome Brother     Hypertension Brother     Migraines Brother     Thyroid disease Brother     Pancreatic cancer Brother 54    Migraines Daughter     Asthma Son     Migraines Son     Diabetes Maternal Grandmother     Vaginal cancer Maternal Grandmother 48    Infertility Paternal Grandmother     Arthritis Maternal Aunt     Anxiety disorder Maternal Aunt     Depression Maternal Aunt     Diabetes Maternal Aunt     Vaginal cancer Maternal Aunt 48    Fibroids Paternal Aunt     No Known Problems Maternal Grandfather     No Known Problems Paternal Grandfather     No Known Problems Maternal Aunt     No Known Problems Maternal Aunt     No Known Problems Maternal Aunt      I have reviewed and agree with the history as documented  E-Cigarette/Vaping     E-Cigarette/Vaping Substances     Social History     Tobacco Use    Smoking status: Never Smoker    Smokeless tobacco: Never Used   Substance Use Topics    Alcohol use: No    Drug use: No       Review of Systems   Constitutional: Positive for fever  Negative for chills  Respiratory: Negative for shortness of breath  Cardiovascular: Positive for chest pain (chest pressure, resolved) and leg swelling  Negative for palpitations  Gastrointestinal: Positive for nausea  Negative for abdominal pain, diarrhea and vomiting  Musculoskeletal: Positive for arthralgias and joint swelling  Negative for back pain  Right knee pain and swelling   Skin: Positive for wound (incisions from recent right knee arthroscopy)  Neurological: Positive for weakness (ble weakness)  Negative for dizziness and headaches  All other systems reviewed and are negative  Physical Exam  Physical Exam  Vitals signs and nursing note reviewed  Constitutional:       General: She is not in acute distress  Appearance: Normal appearance  She is normal weight  She is not ill-appearing or toxic-appearing  Comments: Well-appearing 62year-old female resting on exam bed in no significant distress   HENT:      Head: Normocephalic and atraumatic  Mouth/Throat:      Mouth: Mucous membranes are moist    Eyes:      Extraocular Movements: Extraocular movements intact  Conjunctiva/sclera: Conjunctivae normal    Neck:      Musculoskeletal: Normal range of motion and neck supple  Cardiovascular:      Rate and Rhythm: Normal rate and regular rhythm  Pulses: Normal pulses  Heart sounds: No murmur  Comments: Bilateral lower extremities appear well-perfused without pallor  Palpable DP pulses bilaterally  Pulmonary:      Effort: Pulmonary effort is normal  No respiratory distress  Breath sounds: Normal breath sounds  No rhonchi or rales     Chest:      Chest wall: No tenderness  Abdominal:      General: Bowel sounds are normal  There is no distension  Palpations: Abdomen is soft  Tenderness: There is no abdominal tenderness  Musculoskeletal:         General: Swelling present  Comments: Generalized edema surrounding the right knee with tenderness to palpation throughout  Right calf circumference greater than left   Skin:     General: Skin is warm and dry  Capillary Refill: Capillary refill takes less than 2 seconds  Findings: No rash  Comments: Incisions to the anterior right knee healing well  The right knee is slightly warm to the touch without significant erythema   Neurological:      General: No focal deficit present  Mental Status: She is alert and oriented to person, place, and time  Mental status is at baseline  Sensory: No sensory deficit        Comments: Wiggles toes bilaterally, sensation intact in bilateral lower extremities   Psychiatric:         Mood and Affect: Mood normal          Vital Signs  ED Triage Vitals [08/06/20 1200]   Temperature Pulse Respirations Blood Pressure SpO2   98 2 °F (36 8 °C) 66 18 115/59 99 %      Temp Source Heart Rate Source Patient Position - Orthostatic VS BP Location FiO2 (%)   Oral Monitor Sitting Right arm --      Pain Score       Worst Possible Pain           Vitals:    08/06/20 1200 08/06/20 1415 08/06/20 1530 08/06/20 1742   BP: 115/59 115/67 108/61 115/57   Pulse: 66 73 71 71   Patient Position - Orthostatic VS: Sitting Lying Lying Lying         Visual Acuity      ED Medications  Medications   ondansetron (ZOFRAN) injection 4 mg (4 mg Intravenous Given 8/6/20 1337)   ibuprofen (MOTRIN) tablet 600 mg (600 mg Oral Given 8/6/20 1415)   lidocaine-epinephrine (XYLOCAINE/EPINEPHRINE) 1 %-1:100,000 injection 1 mL (1 mL Infiltration Given 8/6/20 1529)       Diagnostic Studies  Results Reviewed     Procedure Component Value Units Date/Time    STAT Gram Stain [[id number]] Collected:  08/06/20 2792 Lab Status:  Final result Specimen:  Other from Joint, Right Knee Updated:  08/06/20 1814     Gram Stain Result 2+ Polys      Rare Mononuclear Cells      No bacteria seen    Synovial Fluid Diff [[id number]] Collected:  08/06/20 1611    Lab Status:  Final result Specimen:  Synovial Fluid from Joint, Right Knee Updated:  08/06/20 1729     Total Counted 100     Neutrophil % Synovial 94 %      Lymph % Synovial 1 %      Monocyte % Synovial 3 %      Histiocyte % Synovial 2 %     Synovial fluid, white cell count w/ diff [[id number]]  (Abnormal) Collected:  08/06/20 1611    Lab Status:  Final result Specimen:  Synovial Fluid from Joint, Right Knee Updated:  08/06/20 1728     Site Synovial     Color, Fluid Red     Clarity, Fluid Cloudy     WBC, Fluid 2,983 /ul     Synovial fluid, RBC count [[id number]]  (Abnormal) Collected:  08/06/20 1611    Lab Status:  Final result Specimen:  Synovial Fluid from Joint, Right Knee Updated:  08/06/20 1728     RBC, SYNOVIAL 57,000    Synovial fluid, crystal [[id number]] Collected:  08/06/20 1611    Lab Status: In process Specimen:  Synovial Fluid from Joint, Right Knee Updated:  08/06/20 1622    Synovial fluid, Culture and Gram stain [[id number]] Collected:  08/06/20 1611    Lab Status: In process Specimen:   Body Fluid from Joint, Right Knee Updated:  08/06/20 1621    C-reactive protein [[id number]]  (Abnormal) Collected:  08/06/20 1336    Lab Status:  Final result Specimen:  Blood from Arm, Left Updated:  08/06/20 1431     CRP 63 1 mg/L     Comprehensive metabolic panel [[id number]]  (Abnormal) Collected:  08/06/20 1336    Lab Status:  Final result Specimen:  Blood from Arm, Left Updated:  08/06/20 1401     Sodium 138 mmol/L      Potassium 4 1 mmol/L      Chloride 102 mmol/L      CO2 29 mmol/L      ANION GAP 7 mmol/L      BUN 8 mg/dL      Creatinine 0 99 mg/dL      Glucose 95 mg/dL      Calcium 8 7 mg/dL      AST 22 U/L      ALT 16 U/L      Alkaline Phosphatase 56 U/L      Total Protein 6 5 g/dL Albumin 3 2 g/dL      Total Bilirubin 0 67 mg/dL      eGFR 62 ml/min/1 73sq m     Narrative:       [place] guidelines for Chronic Kidney Disease (CKD):     Stage 1 with normal or high GFR (GFR > 90 mL/min/1 73 square meters)    Stage 2 Mild CKD (GFR = 60-89 mL/min/1 73 square meters)    Stage 3A Moderate CKD (GFR = 45-59 mL/min/1 73 square meters)    Stage 3B Moderate CKD (GFR = 30-44 mL/min/1 73 square meters)    Stage 4 Severe CKD (GFR = 15-29 mL/min/1 73 square meters)    Stage 5 End Stage CKD (GFR <15 mL/min/1 73 square meters)  Note: GFR calculation is accurate only with a steady state creatinine    Sedimentation rate, automated [[id number]]  (Normal) Collected:  08/06/20 1336    Lab Status:  Final result Specimen:  Blood from Arm, Left Updated:  08/06/20 1350     Sed Rate 15 mm/hour     Narrative:       New method- Test performed using  automated Rheology Technology  If following a patient's inflammatory disease during treatment, a new baseline should be established      CBC and differential [[id number]] Collected:  08/06/20 1336    Lab Status:  Final result Specimen:  Blood from Arm, Left Updated:  08/06/20 1346     WBC 8 72 Thousand/uL      RBC 3 83 Million/uL      Hemoglobin 11 7 g/dL      Hematocrit 36 5 %      MCV 95 fL      MCH 30 5 pg      MCHC 32 1 g/dL      RDW 13 7 %      MPV 9 5 fL      Platelets 978 Thousands/uL      nRBC 0 /100 WBCs      Neutrophils Relative 55 %      Immat GRANS % 0 %      Lymphocytes Relative 30 %      Monocytes Relative 9 %      Eosinophils Relative 5 %      Basophils Relative 1 %      Neutrophils Absolute 4 90 Thousands/µL      Immature Grans Absolute 0 03 Thousand/uL      Lymphocytes Absolute 2 57 Thousands/µL      Monocytes Absolute 0 76 Thousand/µL      Eosinophils Absolute 0 39 Thousand/µL      Basophils Absolute 0 07 Thousands/µL                  VAS lower limb venous duplex study, unilateral/limited   Final Result by Deysi Garrett MD (08/06 1513)      XR knee 1 or 2 views right   Final Result by Lucio Azar MD (08/06 1341)      Degenerative change  Moderate-sized joint effusion  No acute osseous abnormality  Workstation performed: [id number]                    Procedures  Arthrocentesis    Date/Time: 8/6/2020 3:45 PM  Performed by: Modesta Link PA-C  Authorized by: Modesta Link PA-C     Location:  ED and bedside  Verbal consent obtained?: Yes    Risks and benefits: Risks, benefits and alternatives were discussed    Consent given by:  Patient  Patient states understanding of procedure being performed: Yes    Patient's understanding of procedure matches consent: Yes    Procedure consent matches procedure scheduled: Yes    Test results available and properly labeled: Yes    Site marked: Yes    Required items: Required blood products, implants, devices and special equipment available    Patient identity confirmed:  Verbally with patient  Time out: Immediately prior to the procedure a time out was called    Indications:  Pain, possible septic joint, diagnostic evaluation, joint swelling and therapeutic  Body area:  Knee  Joint:  Right knee  Local anesthesia used?: Yes    Local anesthetic:  Lidocaine 1% with epinephrine  Anesthetic total (ml):  8  Preparation: Patient was prepped and draped in usual sterile fashion    Needle size:  18 G  Ultrasound guidance: Yes    Approach:  Superior  Aspirate:  Blood-tinged  Patient tolerance:  Patient tolerated the procedure well with no immediate complications             ED Course  ED Course as of Aug 06 2048   Thu Aug 06, 2020   1400 Right knee X-ray IMPRESSION:     Degenerative change  Moderate-sized joint effusion  No acute osseous abnormality        [address] unremarkable, Duplex negative for DVT      1554 Reached out to Dr Lindalee Cockayne to discuss patient's case but no response on TT      1555 Synovial fluid obtained from aspiration, awaiting results      1730 WBC, Fluid(!): 2,983 ED Course as of Aug 06 2027   u Aug 06, 2020   1400 Right knee X-ray IMPRESSION:     Degenerative change  Moderate-sized joint effusion  No acute osseous abnormality  1429 Labs unremarkable, Duplex negative for DVT in the right lower extremity      1554 Reached out to Dr Itz Grady to discuss patient's case but no response on TT      1555 Arthrocentesis performed at bedside with attending physician present  Synovial fluid obtained from aspiration, awaiting results  1730 WBC, Fluid(!): 2,983   1745 Case discussed with Ortho AP Anil Becerril, advised of arthrocentesis findings  He states that these findings are to be anticipated in the immediate post-operative state  No acute concerns  He recommends discharge home with early Orthopedic follow up    1800 Patient and spouse advised of findings and discussion with Orthopedics  They are comfortable and agreeable with plan for discharge with Orthopedic follow up  Patient relates that she has scheduled follow up with Dr Itz Grady on 8/12  Advised to call their office now to schedule sooner follow up if possible but to keep this appointment if not  Return parameters discussed at length  Patient stable for discharge home  US AUDIT      Most Recent Value   Initial Alcohol Screen: US AUDIT-C    1  How often do you have a drink containing alcohol?  0 Filed at: 08/06/2020 1201   Audit-C Score  0 Filed at: 08/06/2020 1201            JENNIFER/DAST-10      Most Recent Value   How many times in the past year have you    Used an illegal drug or used a prescription medication for non-medical reasons? Never Filed at: 08/06/2020 1201                MDM  Number of Diagnoses or Management Options  Acute pain of right knee: new and requires workup  Effusion, right knee: new and requires workup  S/P arthroscopy of right knee: new and does not require workup  Diagnosis management comments:  This is a 62year-old female presenting to the ED for evaluation of right knee pain and right lower extremity swelling s/p arthroscopy on 8/3  Patient reports that there is associated discomfort with ambulating causing her to be more sedentary at home over the past week  She also reports fevers at home with Tmax 101 7 yesterday evening and a fever of 100 2 this morning for which she has been taking Tylenol  Denies any extremity weakness, change in color or sensation, appetite change, chest pain, palpitations, or shortness of breath  Differential diagnosis includes but not limited to: effusion, ligamentous injury, strain, sprain, post-operative pain and swelling, rule out septic joint, rule out DVT    Initial ED plan: basic labs, x-ray, arthrocentesis    Final ED Assessment: Vital signs stable on arrival, afebrile  Patient overall well-appearing  Labs without significant findings, though CRP elevated at 63, sed rate wnl  Duplex negative for acute or chronic DVT in the right lower extremity  X-ray right knee reports moderate sized joint effusion  Arthrocentesis performed by me with US guidance, attending physician present for entire procedure  Procedure was well-tolerated with no immediate complications  Synovial fluid WBC 2,983  This was discussed with Orthopedics AP who related this is to be expected in the immediate post-operative phase with no acute concerns  Patient and spouse advised of all lab and imaging findings with plan for discharge with close Orthopedic follow up  Return parameters discussed at length  Patient and spouse are agreeable with plan of care  Advised continued ice, elevation, and pain control  Patient is stable for discharge home in good condition         Amount and/or Complexity of Data Reviewed  Clinical lab tests: ordered and reviewed  Tests in the radiology section of CPT®: ordered and reviewed  Review and summarize past medical records: yes  Discuss the patient with other providers: yes  Independent visualization of images, tracings, or specimens: yes    Risk of Complications, Morbidity, and/or Mortality  Presenting problems: low  Diagnostic procedures: low  Management options: low    Patient Progress  Patient progress: stable        Disposition  Final diagnoses:   Effusion, right knee   Acute pain of right knee   S/P arthroscopy of right knee     Time reflects when diagnosis was documented in both MDM as applicable and the Disposition within this note     Time User Action Codes Description Comment    8/6/2020  5:45 PM Mikey Colin Add [M25 [id number]] Effusion, right knee     8/6/2020  5:45 PM Mikey Colin Add [M25 [id number]] Acute pain of right knee     8/6/2020  5:45 PM Mikey Colin Add [Y83 [id number]] S/P arthroscopy of right knee       ED Disposition     ED Disposition Condition Date/Time Comment    Discharge Stable u Aug 6, 2020  5:45 PM [address] discharge to home/self care              Follow-up Information     Follow up With Specialties Details Why Contact Info Additional [address]  [facility], DO Family Medicine  As needed [address] [facility] [phone]       [facility] Emergency Department Emergency Medicine  If symptoms worsen [facility] [address]  [phone] [address] [facility] ED, [address] , Laureano Nuñez MD Orthopedic Surgery Schedule an appointment as soon as possible for a visit   [address] [facility]  [address] [place] [id number] [phone]             Discharge Medication List as of 8/6/2020  5:47 PM      CONTINUE these medications which have NOT CHANGED    Details   aspirin (ECOTRIN) 325 mg EC tablet Take 1 tablet (325 mg total) by mouth 2 (two) times a day for 14 days, Starting Mon 8/3/2020, Until Mon 8/17/2020, Normal      busPIRone (BUSPAR) 5 mg tablet Take 5 mg by mouth 2 (two) times a day, Starting Tue 6/30/2020, Historical Med      calcium-vitamin D 250-100 MG-UNIT per tablet Take 1 tablet by mouth 2 (two) times a day, Historical Med cyanocobalamin (VITAMIN B-12) 100 mcg tablet Take by mouth daily, Historical Med      doxycycline (PERIOSTAT) 20 MG tablet Take 20 mg by mouth 2 (two) times a day, Starting Wed 9/4/2019, Historical Med      estradiol (VAGIFEM, YUVAFEM) 10 MCG TABS vaginal tablet Insert 1 tablet (10 mcg total) into the vagina daily For two weeks  Then twice weekly at night  Use 12 hours prior to intercourse, Starting Fri 10/25/2019, Normal      !! gabapentin (NEURONTIN) 300 mg capsule Take 2 capsules (600 mg total) by mouth 3 (three) times a day, Starting Fri 2/14/2020, Normal      !! gabapentin (NEURONTIN) 300 mg capsule Take 1 capsule (300 mg total) by mouth 3 (three) times a day, Starting Fri 7/24/2020, Until Sun 8/23/2020, Normal      hydrOXYzine HCL (ATARAX) 50 mg tablet Starting Thu 3/1/2018, Historical Med      meclizine (ANTIVERT) 12 5 MG tablet Take 1 tablet (12 5 mg total) by mouth every 8 (eight) hours as needed for dizziness or nausea, Starting Tue 1/28/2020, Normal      methenamine hippurate (HIPREX) 1 g tablet 1 g 2 (two) times a day with meals , Starting Tue 5/21/2019, Historical Med      omeprazole (PriLOSEC) 40 MG capsule Take 40 mg by mouth daily , Starting Sat 12/1/2018, Historical Med      ondansetron (ZOFRAN) 4 mg tablet Take 1 tablet (4 mg total) by mouth every 8 (eight) hours as needed for nausea or vomiting, Starting Tue 12/31/2019, Normal      ondansetron (ZOFRAN-ODT) 4 mg disintegrating tablet Take 1 tablet (4 mg total) by mouth every 8 (eight) hours as needed for nausea or vomiting, Starting Mon 8/3/2020, Normal      oxyCODONE (ROXICODONE) 5 mg immediate release tablet Take 1 tablet (5 mg total) by mouth every 4 (four) hours as needed for moderate pain for up to 10 daysMax Daily Amount: 30 mg, Starting Mon 8/3/2020, Until Thu 8/13/2020, Normal      !! PARoxetine (PAXIL) 10 mg tablet Take by mouth, Historical Med      !!  PARoxetine (PAXIL) 40 MG tablet Take by mouth, Historical Med      SUMAtriptan (IMITREX) 50 mg tablet Take 1 tablet (50 mg total) by mouth once as needed for migraine for up to 1 dose May repeat in 2 hours  No more than 200 mg per day , Starting Fri 5/8/2020, Normal       !! - Potential duplicate medications found  Please discuss with provider  No discharge procedures on file      PDMP Review       Value Time User    PDMP Reviewed  Yes 8/3/2020  1:49 PM Regino Kruse PA-C          ED Provider  Electronically Signed by           Zaheer Molina PA-C  08/06/20 2050

## 2020-08-06 NOTE — TELEPHONE ENCOUNTER
Called patient and left VM, in regards where she want us to send the letter  Patient need to provide with fax number or mail address

## 2020-08-06 NOTE — TELEPHONE ENCOUNTER
I did leave a message for the patient stating that Derian Mitchell is recommendation to go to the emergency room should be completed to rule blood clot in for overall evaluation at this time  I also put in a letter stating that the patient should be out at least for next week for recovery purposes  Please contact the patient to find out where they need the letter sent

## 2020-08-07 NOTE — TELEPHONE ENCOUNTER
Patient is calling to have the paperwork mailed to :[address] [facility] [address] [facility] [address]

## 2020-08-09 LAB
BACTERIA SPEC BFLD CULT: NO GROWTH
GRAM STN SPEC: NORMAL

## 2020-08-10 ENCOUNTER — TELEPHONE (OUTPATIENT)
Dept: OBGYN CLINIC | Facility: HOSPITAL | Age: 60
End: 2020-08-10

## 2020-08-10 NOTE — TELEPHONE ENCOUNTER
Patient given an earlier post op appt for evaluation 8/11 in the [facility] office with Dr Cuba Last  She will continue to ice 20 min on 20 min off, elevated, tylenol 1000mg TID and use assistive device for ambulation  Patient was having some serosanguinous drainage  Advised that she should keep the incision covered if draining  Verbalized understanding

## 2020-08-10 NOTE — TELEPHONE ENCOUNTER
Patient is post op, R knee surgery with Dr Delfin Fernandes on 08/03/20  Patient is feeling sick in her stomach  She still has right knee pain  She can't get out of bed (she does so sometimes using the cane and walker) and sometimes can't make it to the bathroom on time and has a bucket by her bed  She can't do exercises  Her next appt is 8/12/20  She would like to speak with the nurse      [phone]

## 2020-08-11 ENCOUNTER — TELEPHONE (OUTPATIENT)
Dept: OBGYN CLINIC | Facility: MEDICAL CENTER | Age: 60
End: 2020-08-11

## 2020-08-11 ENCOUNTER — OFFICE VISIT (OUTPATIENT)
Dept: OBGYN CLINIC | Facility: MEDICAL CENTER | Age: 60
End: 2020-08-11

## 2020-08-11 VITALS
WEIGHT: 180 LBS | SYSTOLIC BLOOD PRESSURE: 133 MMHG | HEART RATE: 77 BPM | DIASTOLIC BLOOD PRESSURE: 90 MMHG | TEMPERATURE: 98.2 F | BODY MASS INDEX: 28.19 KG/M2

## 2020-08-11 DIAGNOSIS — S83.241A OTHER TEAR OF MEDIAL MENISCUS, CURRENT INJURY, RIGHT KNEE, INITIAL ENCOUNTER: Primary | ICD-10-CM

## 2020-08-11 PROCEDURE — 99024 POSTOP FOLLOW-UP VISIT: CPT | Performed by: ORTHOPAEDIC SURGERY

## 2020-08-11 RX ORDER — OXYCODONE HYDROCHLORIDE 5 MG/1
5 TABLET ORAL EVERY 4 HOURS PRN
Qty: 30 TABLET | Refills: 0 | Status: SHIPPED | OUTPATIENT
Start: 2020-08-11 | End: 2020-08-21

## 2020-08-11 NOTE — LETTER
August 11, 2020     Patient: Meeta Amaya   YOB: 1960   Date of Visit: 8/11/2020       To Whom it May Concern:    Noe Henderson is under my professional care  She was seen in my office on 8/11/2020  She may not return to work at this time  She may be out of work for 8-12 weeks  If you have any questions or concerns, please don't hesitate to call           Sincerely,          Yamilex Jarvis MD        CC: No Recipients

## 2020-08-11 NOTE — PROGRESS NOTES
Orthopaedic Surgery - Office Note  Lazarus Coho (60 y o  female)   : 1960   MRN: [id number]  Encounter Date: 2020    Chief Complaint   Patient presents with    Right Knee - Post-op       Assessment / Plan  S/p right knee medial and lateral menisectomy with appropriate progression      · Activity as tolerated  · Begin outpatient PT for Right knee medial and lateral meniscectomy  · Focus on progressing ROM  · Prescription given for Oxycodone prn severe pain  · At this time the patient is not cleared to return to work  I did explain to her that it may be 8-12 weeks before she is ready to return to full duty  The patient was provided with a work note at today's appointment  Return in about 4 weeks (around 2020)  History of Present Illness  Karmen Caballero is a 62 y o  female who presents for follow-up evaluation of right knee, she is status post right knee medial and lateral meniscectomy performed on August 3, 2020  At today's appointment she is in significant pain  She states she has finished her prescription for oxycodone, which did provide her with pain relief  She states her knee is very stiff and feels really tight  She denies any further injury or trauma to her right knee  She denies any distal paresthesias  She denies any fevers or chills  Review of Systems  Pertinent items are noted in HPI  All other systems were reviewed and are negative  Physical Exam  /90   Pulse 77   Temp 98 2 °F (36 8 °C)   Wt 81 6 kg (180 lb)   LMP  (LMP Unknown)   BMI 28 19 kg/m²   Cons: Appears well  No apparent distress  Psych: Alert  Oriented x3  Mood and affect normal   Eyes: PERRLA, EOMI  Resp: Normal effort  No audible wheezing or stridor  CV: Palpable pulse  No discernable arrhythmia  No LE edema  Lymph:  No palpable cervical, axillary, or inguinal lymphadenopathy  Skin: Warm  No palpable masses  No visible lesions  Neuro: Normal muscle tone  Normal and symmetric DTR's  Right Knee Exam  Alignment:  Normal knee alignment  Inspection:  Incisions clean and dry  Palpation:  moderate diffuse knee tenderness  ROM:  Knee Extension 10  Knee Flexion 100  Strength:  Able to actively extend knee against gravity  Stability:  No objective knee instability  Stable Varus / Valgus stress, Lachman, and Posterior drawer  Tests:  No pertinent positive or negative tests  Patella:  Normal patellar mobility  Neurovascular:  Sensation intact in DP/SP/Saucedo/Sa/T nerve distributions  2+ DP & PT pulses  Gait:  Antalgic  Studies Reviewed  No studies to review    Procedures  No procedures today  Medical, Surgical, Family, and Social History  The patient's medical history, family history, and social history, were reviewed and updated as appropriate      Past Medical History:   Diagnosis Date    Anxiety     Arthritis     Last assessed 5/3/2011    Ortega's esophagus     Cancer ([facility] [address] )     ovarian cancer at age 28 yo- REMOVAL  B/L    Colon polyp     DDD (degenerative disc disease), lumbar     Depression     Fall     5/2020 right knee meniscus tear- OR repair today 8/3/2020    Fibromyalgia     Fibromyalgia, primary     Fracture of one or more phalanges of foot     GERD (gastroesophageal reflux disease)     H/O bladder infections     chronic    Hypertension     Kidney infection     occasional    Migraines     Ovarian cancer ([facility] [address] ) 1987    age 27    Polyneuropathy     Last assessed 6/23/2016 knees to feet    PONV (postoperative nausea and vomiting)     Patient requests to be pre-medicated prior to surgery prior to surgery    PONV (postoperative nausea and vomiting) 8/3/2020    Renal disorder     Spinal stenosis     Vertigo     Wears glasses     reading       Past Surgical History:   Procedure Laterality Date   [address]    several    BACK SURGERY  1990    COLONOSCOPY      FOOT FRACTURE SURGERY Bilateral 2004    x 5  surgeries    KIDNEY SURGERY  1974    at age 13 yo    [place] KNEE SCOPE,MED/LAT MENISECTOMY Right 8/3/2020    Procedure: KNEE ARTHROSCOPY,PARTIAL MEDIAL & LATERAL MENISECTOMIES;  Surgeon: Satnam Banegas MD;  Location: [facility];  Service: Orthopedics    TOTAL ABDOMINAL HYSTERECTOMY  1987    age 27   [place] TOTAL ABDOMINAL HYSTERECTOMY W/ BILATERAL SALPINGOOPHORECTOMY Bilateral 1987    age 27       Family History   Problem Relation Age of Onset    Heart disease Mother     Cancer Mother     Diabetes Mother     Arthritis Mother     Anxiety disorder Mother     Bleeding Disorder Mother     Clotting disorder Mother     Depression Mother     Hyperlipidemia Mother     Irritable bowel syndrome Mother     Hypertension Mother     Obesity Mother     Osteoporosis Mother     Vaginal cancer Mother 27    Skin cancer Mother     Thyroid disease Mother     Diabetes Father     Arthritis Father     Hyperlipidemia Father     Vesicoureteral reflux Father     Heart disease Father     Hypertension Father     Migraines Father     Obesity Father     Hypertension Family     Arthritis Family     Arthritis Brother     Anxiety disorder Brother     Diabetes Brother     Hyperlipidemia Brother     Vesicoureteral reflux Brother     Heart disease Brother     Irritable bowel syndrome Brother     Hypertension Brother     Migraines Brother     Thyroid disease Brother     Pancreatic cancer Brother 54    Migraines Daughter     Asthma Son     Migraines Son     Diabetes Maternal Grandmother     Vaginal cancer Maternal Grandmother 48    Infertility Paternal Grandmother     Arthritis Maternal Aunt     Anxiety disorder Maternal Aunt     Depression Maternal Aunt     Diabetes Maternal Aunt     Vaginal cancer Maternal Aunt 48    Fibroids Paternal Aunt     No Known Problems Maternal Grandfather     No Known Problems Paternal Grandfather     No Known Problems Maternal Aunt     No Known Problems Maternal Aunt     No Known Problems Maternal Aunt        Social History     Occupational History    Occupation: CNA   Tobacco Use    Smoking status: Never Smoker    Smokeless tobacco: Never Used   Substance and Sexual Activity    Alcohol use: No    Drug use: No    Sexual activity: Yes       Allergies   Allergen Reactions    Morphine      Acts not like herself and feels agitated and restless    Penicillins Hives     Tongue swells    Codeine Rash         Current Outpatient Medications:     aspirin (ECOTRIN) 325 mg EC tablet, Take 1 tablet (325 mg total) by mouth 2 (two) times a day for 14 days, Disp: 28 tablet, Rfl: 0    busPIRone (BUSPAR) 5 mg tablet, Take 5 mg by mouth 2 (two) times a day, Disp: , Rfl:     calcium-vitamin D 250-100 MG-UNIT per tablet, Take 1 tablet by mouth 2 (two) times a day, Disp: , Rfl:     cyanocobalamin (VITAMIN B-12) 100 mcg tablet, Take by mouth daily, Disp: , Rfl:     doxycycline (PERIOSTAT) 20 MG tablet, Take 20 mg by mouth 2 (two) times a day, Disp: , Rfl: 1    estradiol (VAGIFEM, YUVAFEM) 10 MCG TABS vaginal tablet, Insert 1 tablet (10 mcg total) into the vagina daily For two weeks  Then twice weekly at night   Use 12 hours prior to intercourse, Disp: 30 tablet, Rfl: 6    gabapentin (NEURONTIN) 300 mg capsule, Take 2 capsules (600 mg total) by mouth 3 (three) times a day, Disp: 180 capsule, Rfl: 1    gabapentin (NEURONTIN) 300 mg capsule, Take 1 capsule (300 mg total) by mouth 3 (three) times a day, Disp: 90 capsule, Rfl: 0    hydrOXYzine HCL (ATARAX) 50 mg tablet, , Disp: , Rfl:     meclizine (ANTIVERT) 12 5 MG tablet, Take 1 tablet (12 5 mg total) by mouth every 8 (eight) hours as needed for dizziness or nausea, Disp: 30 tablet, Rfl: 0    methenamine hippurate (HIPREX) 1 g tablet, 1 g 2 (two) times a day with meals , Disp: , Rfl:     omeprazole (PriLOSEC) 40 MG capsule, Take 40 mg by mouth daily , Disp: , Rfl:     ondansetron (ZOFRAN) 4 mg tablet, Take 1 tablet (4 mg total) by mouth every 8 (eight) hours as needed for nausea or vomiting, Disp: 10 tablet, Rfl: 0    ondansetron (ZOFRAN-ODT) 4 mg disintegrating tablet, Take 1 tablet (4 mg total) by mouth every 8 (eight) hours as needed for nausea or vomiting, Disp: 15 tablet, Rfl: 0    oxyCODONE (ROXICODONE) 5 mg immediate release tablet, Take 1 tablet (5 mg total) by mouth every 4 (four) hours as needed for moderate pain for up to 10 daysMax Daily Amount: 30 mg, Disp: 30 tablet, Rfl: 0    PARoxetine (PAXIL) 10 mg tablet, Take by mouth, Disp: , Rfl:     PARoxetine (PAXIL) 40 MG tablet, Take by mouth, Disp: , Rfl:     SUMAtriptan (IMITREX) 50 mg tablet, Take 1 tablet (50 mg total) by mouth once as needed for migraine for up to 1 dose May repeat in 2 hours    No more than 200 mg per day , Disp: 10 tablet, Rfl: 2      Jasmin Palangio    Scribe Attestation    I,:   Nancy Rojas am acting as a scribe while in the presence of the attending physician :        I,:   Gómez Vazquez MD personally performed the services described in this documentation    as scribed in my presence :

## 2020-08-12 ENCOUNTER — TELEPHONE (OUTPATIENT)
Dept: PODIATRY | Facility: CLINIC | Age: 60
End: 2020-08-12

## 2020-08-12 ENCOUNTER — TELEPHONE (OUTPATIENT)
Dept: OBGYN CLINIC | Facility: HOSPITAL | Age: 60
End: 2020-08-12

## 2020-08-12 DIAGNOSIS — G62.9 PERIPHERAL NERVE DISORDER: Primary | ICD-10-CM

## 2020-08-12 RX ORDER — GABAPENTIN 300 MG/1
CAPSULE ORAL
Qty: 180 CAPSULE | Refills: 0 | Status: SHIPPED | OUTPATIENT
Start: 2020-08-12 | End: 2020-09-14

## 2020-08-12 NOTE — TELEPHONE ENCOUNTER
Chase Almanzar [phone]    Jhon Bro is asking for the script for the patient's post op pt  Jhon Bro would like that faxed to [phone]  Script is faxed

## 2020-08-13 NOTE — TELEPHONE ENCOUNTER
Amina Almazan from [employer] called to let Dr Noe Silence team know to addend her work note  They cannot accept a note covering her for the entirety of 8-12 weeks  They can accept a note saying she can be out of work until her next appt for eval on 9/9/2020 and go from there   Once done, I can fax to [phone]

## 2020-08-18 ENCOUNTER — OFFICE VISIT (OUTPATIENT)
Dept: PODIATRY | Facility: CLINIC | Age: 60
End: 2020-08-18
Payer: MEDICARE

## 2020-08-18 VITALS — HEART RATE: 93 BPM | DIASTOLIC BLOOD PRESSURE: 85 MMHG | SYSTOLIC BLOOD PRESSURE: 133 MMHG

## 2020-08-18 DIAGNOSIS — G62.9 PERIPHERAL NERVE DISORDER: Primary | ICD-10-CM

## 2020-08-18 DIAGNOSIS — M54.16 LUMBAR RADICULOPATHY: ICD-10-CM

## 2020-08-18 PROCEDURE — 1036F TOBACCO NON-USER: CPT | Performed by: PODIATRIST

## 2020-08-18 PROCEDURE — 99213 OFFICE O/P EST LOW 20 MIN: CPT | Performed by: PODIATRIST

## 2020-08-18 RX ORDER — GABAPENTIN 300 MG/1
CAPSULE ORAL
Qty: 540 CAPSULE | Refills: 3 | Status: SHIPPED | OUTPATIENT
Start: 2020-08-18 | End: 2020-10-27

## 2020-08-18 NOTE — PROGRESS NOTES
Assessment/Plan:  Explained to patient that her peripheral neuropathy is due to her back disorder in all likelihood  Patient given prescription for gabapentin as follows:  2 tablets t i d  She will be rescheduled for 1 year  No problem-specific Assessment & Plan notes found for this encounter  Diagnoses and all orders for this visit:    Peripheral nerve disorder  -     gabapentin (NEURONTIN) 300 mg capsule; Take 2 tablets tid    Lumbar radiculopathy  -     gabapentin (NEURONTIN) 300 mg capsule; Take 2 tablets tid          Subjective:      Patient ID: Ling Morfin is a 62 y o  female  HPI     Patient presents for assessment  Patient is a 64-year-old female with known history of lower lumbar radiculopathy  He has burning and tingling in her feet  It is controlled with gabapentin 1800 mg daily  Patient takes 2-300 mg tablets t i d   Patient is here so that she may have medication for the next year  The following portions of the patient's history were reviewed and updated as appropriate: allergies, current medications, past family history, past medical history, past social history, past surgical history and problem list     Review of Systems   Cardiovascular: Negative  Gastrointestinal: Negative  Musculoskeletal:        Right knee pain   Neurological:        Paresthesia             Objective:      /85   Pulse 93   LMP  (LMP Unknown)          Physical Exam  Cardiovascular:      Pulses: Normal pulses  Comments: Temperature and turgor within normal limits  Musculoskeletal: Normal range of motion  General: No deformity  Skin:     General: Skin is warm  Findings: No erythema or rash  Neurological:      General: No focal deficit present  Mental Status: She is oriented to person, place, and time  Comments: Sensorium intact per [place] monofilament with the exception of each heel which is numb

## 2020-09-02 ENCOUNTER — TELEPHONE (OUTPATIENT)
Dept: OBGYN CLINIC | Facility: HOSPITAL | Age: 60
End: 2020-09-02

## 2020-09-02 NOTE — TELEPHONE ENCOUNTER
Patient sees Dr Ara Feng  She had surgery on 08/03  She is still having pain especially after PT  She cancelled her appt for today  Also, she fell on her knee two days ago on the stairs  She is asking what to do?

## 2020-09-02 NOTE — TELEPHONE ENCOUNTER
Left msg for patient to call office to schedule earlier appt  Advised there is currently a 3:30 available in [facility]

## 2020-09-02 NOTE — TELEPHONE ENCOUNTER
I did call and speak to the patient  She is going to be more diligent with the ice  She is also going to add an anti-inflammatory to her regimen she has currently been taking only Tylenol  I did offer a sooner appointment but she feels that her appointment on 09/09/2020 will be sufficient  She will call us back if there is any change  I also did recommend to continue with physical therapy but let them know if anything is bothering her specifically while she is doing at

## 2020-09-09 ENCOUNTER — OFFICE VISIT (OUTPATIENT)
Dept: OBGYN CLINIC | Facility: OTHER | Age: 60
End: 2020-09-09

## 2020-09-09 ENCOUNTER — APPOINTMENT (OUTPATIENT)
Dept: RADIOLOGY | Facility: OTHER | Age: 60
End: 2020-09-09
Payer: MEDICARE

## 2020-09-09 ENCOUNTER — TELEPHONE (OUTPATIENT)
Dept: PAIN MEDICINE | Facility: CLINIC | Age: 60
End: 2020-09-09

## 2020-09-09 VITALS — TEMPERATURE: 97.9 F | BODY MASS INDEX: 28.19 KG/M2 | WEIGHT: 180 LBS

## 2020-09-09 DIAGNOSIS — S83.241D TEAR OF MEDIAL MENISCUS OF RIGHT KNEE, CURRENT, SUBSEQUENT ENCOUNTER: ICD-10-CM

## 2020-09-09 DIAGNOSIS — M25.561 CHRONIC PAIN OF RIGHT KNEE: ICD-10-CM

## 2020-09-09 DIAGNOSIS — Z01.89 ENCOUNTER FOR LOWER EXTREMITY COMPARISON IMAGING STUDY: Primary | ICD-10-CM

## 2020-09-09 DIAGNOSIS — G89.29 CHRONIC PAIN OF RIGHT KNEE: ICD-10-CM

## 2020-09-09 DIAGNOSIS — Z01.89 ENCOUNTER FOR LOWER EXTREMITY COMPARISON IMAGING STUDY: ICD-10-CM

## 2020-09-09 PROCEDURE — 73562 X-RAY EXAM OF KNEE 3: CPT

## 2020-09-09 PROCEDURE — 99024 POSTOP FOLLOW-UP VISIT: CPT | Performed by: ORTHOPAEDIC SURGERY

## 2020-09-09 NOTE — TELEPHONE ENCOUNTER
PT called in  to schedule her next round of injections  Please be advise thank you        Please call patient back @ [phone]

## 2020-09-09 NOTE — PROGRESS NOTES
Orthopaedic Surgery - Office Note  Pranay Tab (56 y o  female)   : 1960   MRN: [id number]  Encounter Date: 2020    Chief Complaint   Patient presents with    Right Knee - Follow-up       Assessment / Plan  S/p right knee medial and lateral menisectomy (DOS: 2020)    · Ordered bilateral knee xrays  Mild right knee arthritis noted with no significant change since procedure when compared to prior study  Results reviewed and discussed with patient  · Continue outpatient PT and home stretching/exercise regimen  · Focus on progressing strength  · Focus on progressing ROM   · Ice right knee as needed for swelling  · Consider repeat back injection as back pain is affecting knee rehab  · Patient not cleared at this time to return to work  Will provide note for an additional 2 weeks until patient can be re-evaluated  · Instructed patient to bring MRI disc to review during next visit  Return in about 2 weeks (around 2020)  History of Present Illness  Karmen Willis is a 62 y o  female who presents for follow up s/p right knee medial and lateral menisectomy  Patient states that she has been experiencing terrible pain since Monday, , after working with PT  States that they had her working on squatting, which caused 10/10 pain at the right knee  Patient states that pain is mostly lateral to joint line with occasional tingling down her right leg  States that her back pain has gotten worse since starting PT  Denies swelling  She takes Motrin and Tylenol for pain, which is effective at times  Review of Systems  Pertinent items are noted in HPI  All other systems were reviewed and are negative  Physical Exam  Temp 97 9 °F (36 6 °C)   Wt 81 6 kg (180 lb)   LMP  (LMP Unknown)   BMI 28 19 kg/m²   Cons: Appears well  No apparent distress  Psych: Alert  Oriented x3  Mood and affect normal   Eyes: PERRLA, EOMI  Resp: Normal effort  No audible wheezing or stridor  CV: Palpable pulse    No discernable arrhythmia  No LE edema  Lymph:  No palpable cervical, axillary, or inguinal lymphadenopathy  Skin: Warm  No palpable masses  No visible lesions  Neuro: Normal muscle tone  Normal and symmetric DTR's  Right Knee Exam  Alignment:  Mild genu valgus  Inspection:  No edema  No erythema  No ecchymosis  Incisions healed  No signs of infection  Palpation:  Lateral joint line tenderness  Small effusion  ROM:  Knee Extension 10  Knee Flexion 105  Strength:  5/5 quadriceps and hamstrings  Able to SLR without lag  Stability:  No objective knee instability  Stable Varus / Valgus stress, Lachman, and Posterior drawer  Tests:  (-) Straight leg raise  Patella:  Patella tracks centrally without crepitus  Neurovascular:  Sensation intact in DP/SP/Saucedo/Sa/T nerve distributions  2+ DP & PT pulses  Gait:  Mild limp  Ambulates with cane  Studies Reviewed  XR knee series - 09/09/2020 reveals mild right knee arthritis  There is no significant change since procedure when compared to prior studies  Procedures  No procedures today  Medical, Surgical, Family, and Social History  The patient's medical history, family history, and social history, were reviewed and updated as appropriate      Past Medical History:   Diagnosis Date    Anxiety     Arthritis     Last assessed 5/3/2011    Ortega's esophagus     [facility]     ovarian cancer at age 28 yo- REMOVAL  B/L    Colon polyp     DDD (degenerative disc disease), lumbar     Depression     Fall     5/2020 right knee meniscus tear- OR repair today 8/3/2020    Fibromyalgia     Fibromyalgia, primary     Fracture of one or more phalanges of foot     GERD (gastroesophageal reflux disease)     H/O bladder infections     chronic    Hypertension     Kidney infection     occasional    Migraines     Ovarian cancer ([facility] ) 1987    age 27    Polyneuropathy     Last assessed 6/23/2016 knees to feet    PONV (postoperative nausea and vomiting) Patient requests to be pre-medicated prior to surgery prior to surgery    PONV (postoperative nausea and vomiting) 8/3/2020    Renal disorder     Spinal stenosis     Vertigo     Wears glasses     reading       Past Surgical History:   Procedure Laterality Date    ABDOMINAL SURGERY  1986    several    BACK SURGERY  1990    COLONOSCOPY      FOOT FRACTURE SURGERY Bilateral 2004    x 5  surgeries    KIDNEY SURGERY  1974    at age 13 yo    WI KNEE SCOPE,MED/LAT MENISECTOMY Right 8/3/2020    Procedure: KNEE ARTHROSCOPY,PARTIAL MEDIAL & LATERAL MENISECTOMIES;  Surgeon: Alicia Kirk MD;  [facility]: [facility];  Service: Orthopedics    TOTAL ABDOMINAL HYSTERECTOMY  1987    age 27   Fred Polio TOTAL ABDOMINAL HYSTERECTOMY W/ BILATERAL SALPINGOOPHORECTOMY Bilateral 1987    age 27       Family History   Problem Relation Age of Onset    Heart disease Mother     Cancer Mother     Diabetes Mother     Arthritis Mother     Anxiety disorder Mother     Bleeding Disorder Mother     Clotting disorder Mother     Depression Mother     Hyperlipidemia Mother     Irritable bowel syndrome Mother     Hypertension Mother     Obesity Mother     Osteoporosis Mother     Vaginal cancer Mother 27    Skin cancer Mother     Thyroid disease Mother     Diabetes Father     Arthritis Father     Hyperlipidemia Father     Vesicoureteral reflux Father     Heart disease Father     Hypertension Father     Migraines Father     Obesity Father     Hypertension Family     Arthritis Family     Arthritis Brother     Anxiety disorder Brother     Diabetes Brother     Hyperlipidemia Brother     Vesicoureteral reflux Brother     Heart disease Brother     Irritable bowel syndrome Brother     Hypertension Brother     Migraines Brother     Thyroid disease Brother     Pancreatic cancer Brother 54    Migraines Daughter     Asthma Son     Migraines Son     Diabetes Maternal Grandmother     Vaginal cancer Maternal Grandmother 48  Infertility Paternal Grandmother     Arthritis Maternal Aunt     Anxiety disorder Maternal Aunt     Depression Maternal Aunt     Diabetes Maternal Aunt     Vaginal cancer Maternal Aunt 48    Fibroids Paternal Aunt     No Known Problems Maternal Grandfather     No Known Problems Paternal Grandfather     No Known Problems Maternal Aunt     No Known Problems Maternal Aunt     No Known Problems Maternal Aunt        Social History     Occupational History    Occupation: CNA   Tobacco Use    Smoking status: Never Smoker    Smokeless tobacco: Never Used   Substance and Sexual Activity    Alcohol use: No    Drug use: No    Sexual activity: Yes       Allergies   Allergen Reactions    Morphine      Acts not like herself and feels agitated and restless    Penicillins Hives     Tongue swells    Codeine Rash         Current Outpatient Medications:     busPIRone (BUSPAR) 5 mg tablet, Take 5 mg by mouth 2 (two) times a day, Disp: , Rfl:     calcium-vitamin D 250-100 MG-UNIT per tablet, Take 1 tablet by mouth 2 (two) times a day, Disp: , Rfl:     cyanocobalamin (VITAMIN B-12) 100 mcg tablet, Take by mouth daily, Disp: , Rfl:     estradiol (VAGIFEM, YUVAFEM) 10 MCG TABS vaginal tablet, Insert 1 tablet (10 mcg total) into the vagina daily For two weeks  Then twice weekly at night   Use 12 hours prior to intercourse, Disp: 30 tablet, Rfl: 6    gabapentin (NEURONTIN) 300 mg capsule, Take 2 capsules (600 mg total) by mouth 3 (three) times a day, Disp: 180 capsule, Rfl: 1    gabapentin (NEURONTIN) 300 mg capsule, Take 2 tablets tid, Disp: 180 capsule, Rfl: 0    gabapentin (NEURONTIN) 300 mg capsule, Take 2 tablets tid, Disp: 540 capsule, Rfl: 3    hydrOXYzine HCL (ATARAX) 50 mg tablet, , Disp: , Rfl:     meclizine (ANTIVERT) 12 5 MG tablet, Take 1 tablet (12 5 mg total) by mouth every 8 (eight) hours as needed for dizziness or nausea, Disp: 30 tablet, Rfl: 0    methenamine hippurate (HIPREX) 1 g tablet, 1 g 2 (two) times a day with meals , Disp: , Rfl:     omeprazole (PriLOSEC) 40 MG capsule, Take 40 mg by mouth daily , Disp: , Rfl:     ondansetron (ZOFRAN) 4 mg tablet, Take 1 tablet (4 mg total) by mouth every 8 (eight) hours as needed for nausea or vomiting, Disp: 10 tablet, Rfl: 0    ondansetron (ZOFRAN-ODT) 4 mg disintegrating tablet, Take 1 tablet (4 mg total) by mouth every 8 (eight) hours as needed for nausea or vomiting, Disp: 15 tablet, Rfl: 0    PARoxetine (PAXIL) 10 mg tablet, Take by mouth, Disp: , Rfl:     PARoxetine (PAXIL) 40 MG tablet, Take by mouth, Disp: , Rfl:     SUMAtriptan (IMITREX) 50 mg tablet, Take 1 tablet (50 mg total) by mouth once as needed for migraine for up to 1 dose May repeat in 2 hours    No more than 200 mg per day , Disp: 10 tablet, Rfl: 2    aspirin (ECOTRIN) 325 mg EC tablet, Take 1 tablet (325 mg total) by mouth 2 (two) times a day for 14 days, Disp: 28 tablet, Rfl: 0    gabapentin (NEURONTIN) 300 mg capsule, Take 1 capsule (300 mg total) by mouth 3 (three) times a day, Disp: 90 capsule, Rfl: 0      Zena Schilder, DPM    Scribe Attestation    I,:    am acting as a scribe while in the presence of the attending physician :        I,:    personally performed the services described in this documentation    as scribed in my presence :

## 2020-09-09 NOTE — TELEPHONE ENCOUNTER
Last injection was in June  Looks like she was supposed to make a F/U office visit  Please advise   thanks

## 2020-09-09 NOTE — LETTER
September 9, 2020     Patient: Sonia Pelletier   YOB: 1960   Date of Visit: 9/9/2020       To Whom it May Concern:    Yousif Leonela is under my professional care  She was seen in my office on 9/9/2020  She should not return to work for 2 weeks or until cleared by a physician  If you have any questions or concerns, please don't hesitate to call  Sincerely,          Anatoliy Doss MD        CC: Karmen Pittman

## 2020-09-11 ENCOUNTER — TELEPHONE (OUTPATIENT)
Dept: OBGYN CLINIC | Facility: MEDICAL CENTER | Age: 60
End: 2020-09-11

## 2020-09-11 DIAGNOSIS — S83.241D TEAR OF MEDIAL MENISCUS OF RIGHT KNEE, CURRENT, SUBSEQUENT ENCOUNTER: Primary | ICD-10-CM

## 2020-09-11 RX ORDER — TRAMADOL HYDROCHLORIDE 50 MG/1
50 TABLET ORAL EVERY 8 HOURS PRN
Qty: 30 TABLET | Refills: 0 | Status: SHIPPED | OUTPATIENT
Start: 2020-09-11 | End: 2020-11-27 | Stop reason: HOSPADM

## 2020-09-11 NOTE — TELEPHONE ENCOUNTER
Patient sees Dr Merrill Cifuentes  Patient is calling in with a lot of pain  She is taking Tylenol and Motrin and the prescriptions is not helping, pain is about a 10, she needs something stronger  She is receiving a shot on Monday for her back  She uses [facility] in [place] on file      CB # [phone]

## 2020-09-11 NOTE — TELEPHONE ENCOUNTER
Dariusz Martinez from [facility] has some questions she needs to ask about the Tramadol ordered for [facility]  Please call [phone]    Thank you

## 2020-09-14 ENCOUNTER — OFFICE VISIT (OUTPATIENT)
Dept: PAIN MEDICINE | Facility: CLINIC | Age: 60
End: 2020-09-14
Payer: MEDICARE

## 2020-09-14 VITALS
WEIGHT: 180 LBS | HEIGHT: 67 IN | SYSTOLIC BLOOD PRESSURE: 139 MMHG | HEART RATE: 97 BPM | DIASTOLIC BLOOD PRESSURE: 95 MMHG | TEMPERATURE: 98.6 F | BODY MASS INDEX: 28.25 KG/M2

## 2020-09-14 DIAGNOSIS — M47.816 LUMBAR SPONDYLOSIS: ICD-10-CM

## 2020-09-14 DIAGNOSIS — M48.061 SPINAL STENOSIS OF LUMBAR REGION, UNSPECIFIED WHETHER NEUROGENIC CLAUDICATION PRESENT: ICD-10-CM

## 2020-09-14 DIAGNOSIS — M51.36 DDD (DEGENERATIVE DISC DISEASE), LUMBAR: ICD-10-CM

## 2020-09-14 DIAGNOSIS — M54.16 LUMBAR RADICULOPATHY: Primary | ICD-10-CM

## 2020-09-14 PROCEDURE — 99214 OFFICE O/P EST MOD 30 MIN: CPT | Performed by: NURSE PRACTITIONER

## 2020-09-14 NOTE — PROGRESS NOTES
Assessment:  1  Lumbar radiculopathy    2  DDD (degenerative disc disease), lumbar    3  Lumbar spondylosis    4  Spinal stenosis of lumbar region, unspecified whether neurogenic claudication present        Plan:  1  I will schedule the patient for a right L4 and L5 TFESI to address the inflammatory component the patient's pain  Complete risks and benefits including bleeding, infection, tissue reaction, nerve injury and allergic reaction were discussed  The patient was agreeable and verbalized an understanding  2  Patient may continue gabapentin 600 mg 3 times a day as prescribed by podiatry  3  Patient may benefit from a trial of duloxetine, however she is currently on paroxetine by her PCP for mood therefore this rotation will be up to their discretion  4  The patient will continue with physical therapy for her right knee as directed by Orthopedics  5  The patient will follow-up in 4 weeks after the procedure for medication prescription refill and reevaluation  The patient was advised to contact the office should their symptoms worsen in the interim  The patient was agreeable and verbalized an understanding  M*[employer] software was used to dictate this note  It may contain errors with dictating incorrect words or incorrect spelling  Please contact the provider directly with any questions  History of Present Illness: The patient is a 62 y o  female last seen on 5/26/20 who presents for a follow up office visit in regards to chronic lumbosacral back pain that radiates in the L4 and L5 distributions of the right lower extremity secondary to lumbar degenerative disc disease, lumbar spondylosis and stenosis  The patient denies bowel or bladder incontinence or saddle anesthesia  The patient recently had right partial medial and lateral menisectomies with orthopedics on 8/3/20    She has been in physical therapy for her right knee as directed by Orthopedics, however states that the pain in the right leg has been inhibiting her knee rehab  Per Orthopedics note, they do feel she would benefit from a repeat epidural injection to improve her pain to also in turn improve her knee rehab  Patient has had bilateral L4 TFESI in the past with a 90% improvement of her pain  She reports today that her left lower extremity pain is mild and very intermittent and would like to focus on her right lower extremity pain  The patient rates her pain a 6 to 7/10 in her knee on the numeric pain rating scale  She states her pain is constant nature bothersome the morning at night  She characterizes the pain as sharp, throbbing and shooting  Current pain medications includes:  Gabapentin 600 mg 3 times a day as prescribed by podiatry, and tramadol 50 mg p r n  As prescribed by Orthopedics    The patient reports that this regimen is providing 50% pain relief  The patient is reporting no side effects from this pain medication regimen  I have personally reviewed and/or updated the patient's past medical history, past surgical history, family history, social history, current medications, allergies, and vital signs today  Review of Systems:    Review of Systems   Respiratory: Negative for shortness of breath  Cardiovascular: Negative for chest pain  Gastrointestinal: Negative for constipation, diarrhea, nausea and vomiting  Musculoskeletal: Negative for arthralgias, gait problem, joint swelling and myalgias  Skin: Negative for rash  Neurological: Negative for dizziness, seizures and weakness  All other systems reviewed and are negative          Past Medical History:   Diagnosis Date    Anxiety     Arthritis     Last assessed 5/3/2011    Ortega's esophagus     Cancer ([facility] )     ovarian cancer at age 28 yo- REMOVAL  B/L    Colon polyp     DDD (degenerative disc disease), lumbar     Depression     Fall     5/2020 right knee meniscus tear- OR repair today 8/3/2020    Fibromyalgia     Fibromyalgia, primary     Fracture of one or more phalanges of foot     GERD (gastroesophageal reflux disease)     H/O bladder infections     chronic    Hypertension     Kidney infection     occasional    Migraines     O[facility] ([facility] 1987    age 27    Polyneuropathy     Last assessed 6/23/2016 knees to feet    PONV (postoperative nausea and vomiting)     Patient requests to be pre-medicated prior to surgery prior to surgery    PONV (postoperative nausea and vomiting) 8/3/2020    Renal disorder     Spinal stenosis     Vertigo     Wears glasses     reading       Past Surgical History:   Procedure Laterality Date    ABDOMINAL SURGERY  1986    several   [facility]      FOOT FRACTURE SURGERY Bilateral 2004    x 5  surgeries    KIDNEY SURGERY  1974    at age 13 yo    [place] KNEE SCOPE,MED/LAT MENISECTOMY Right 8/3/2020    Procedure: [address];  Surgeon: Josh Willams MD;  [facility];  Service: Orthopedics    [address] [place] [address]    age 27   Patrice Vasquez TOTAL ABDOMINAL HYSTERECTOMY W/ BILATERAL SALPINGOOPHORECTOMY Bilateral 1987    age 27       Family History   Problem Relation Age of Onset    Heart disease Mother     Cancer Mother     Diabetes Mother     Arthritis Mother     Anxiety disorder Mother     Bleeding Disorder Mother     Clotting disorder Mother     Depression Mother     Hyperlipidemia Mother     Irritable bowel syndrome Mother    Patrice Vasquez Hypertension Mother     Obesity Mother     Osteoporosis Mother     Vaginal cancer Mother 27    Skin cancer Mother     Thyroid disease Mother     Diabetes Father     Arthritis Father     Hyperlipidemia Father     Vesicoureteral reflux Father     Heart disease Father     Hypertension Father     Migraines Father     Obesity Father     Hypertension Family     Arthritis Family     Arthritis Brother     Anxiety disorder Brother     Diabetes Brother     Hyperlipidemia Brother     Vesicoureteral reflux Brother     Heart disease Brother     Irritable bowel syndrome Brother     Hypertension Brother     Migraines Brother     Thyroid disease Brother     Pancreatic cancer Brother 54    Migraines Daughter     Asthma Son     Migraines Son     Diabetes Maternal Grandmother     Vaginal cancer Maternal Grandmother 48    Infertility Paternal Grandmother     Arthritis Maternal Aunt     Anxiety disorder Maternal Aunt     Depression Maternal Aunt     Diabetes Maternal Aunt     Vaginal cancer Maternal Aunt 48    Fibroids Paternal Aunt     No Known Problems Maternal Grandfather     No Known Problems Paternal Grandfather     No Known Problems Maternal Aunt     No Known Problems Maternal Aunt     No Known Problems Maternal Aunt        Social History     Occupational History    Occupation: CNA   Tobacco Use    Smoking status: Never Smoker    Smokeless tobacco: Never Used   Substance and Sexual Activity    Alcohol use: No    Drug use: No    Sexual activity: Yes         Current Outpatient Medications:     gabapentin (NEURONTIN) 300 mg capsule, Take 2 tablets tid, Disp: 540 capsule, Rfl: 3    PARoxetine (PAXIL) 10 mg tablet, Take by mouth, Disp: , Rfl:     PARoxetine (PAXIL) 40 MG tablet, Take by mouth, Disp: , Rfl:     SUMAtriptan (IMITREX) 50 mg tablet, Take 1 tablet (50 mg total) by mouth once as needed for migraine for up to 1 dose May repeat in 2 hours    No more than 200 mg per day , Disp: 10 tablet, Rfl: 2    traMADol (ULTRAM) 50 mg tablet, Take 1 tablet (50 mg total) by mouth every 8 (eight) hours as needed for moderate pain, Disp: 30 tablet, Rfl: 0    aspirin (ECOTRIN) 325 mg EC tablet, Take 1 tablet (325 mg total) by mouth 2 (two) times a day for 14 days, Disp: 28 tablet, Rfl: 0    busPIRone (BUSPAR) 5 mg tablet, Take 5 mg by mouth 2 (two) times a day, Disp: , Rfl:     calcium-vitamin D 250-100 MG-UNIT per tablet, Take 1 tablet by mouth 2 (two) times a day, Disp: , Rfl:     cyanocobalamin (VITAMIN B-12) 100 mcg tablet, Take by mouth daily, Disp: , Rfl:     estradiol (VAGIFEM, YUVAFEM) 10 MCG TABS vaginal tablet, Insert 1 tablet (10 mcg total) into the vagina daily For two weeks  Then twice weekly at night  Use 12 hours prior to intercourse, Disp: 30 tablet, Rfl: 6    gabapentin (NEURONTIN) 300 mg capsule, Take 1 capsule (300 mg total) by mouth 3 (three) times a day, Disp: 90 capsule, Rfl: 0    hydrOXYzine HCL (ATARAX) 50 mg tablet, , Disp: , Rfl:     meclizine (ANTIVERT) 12 5 MG tablet, Take 1 tablet (12 5 mg total) by mouth every 8 (eight) hours as needed for dizziness or nausea (Patient not taking: Reported on 9/14/2020), Disp: 30 tablet, Rfl: 0    methenamine hippurate (HIPREX) 1 g tablet, 1 g 2 (two) times a day with meals , Disp: , Rfl:     omeprazole (PriLOSEC) 40 MG capsule, Take 40 mg by mouth daily , Disp: , Rfl:     ondansetron (ZOFRAN) 4 mg tablet, Take 1 tablet (4 mg total) by mouth every 8 (eight) hours as needed for nausea or vomiting (Patient not taking: Reported on 9/14/2020), Disp: 10 tablet, Rfl: 0    ondansetron (ZOFRAN-ODT) 4 mg disintegrating tablet, Take 1 tablet (4 mg total) by mouth every 8 (eight) hours as needed for nausea or vomiting (Patient not taking: Reported on 9/14/2020), Disp: 15 tablet, Rfl: 0    Allergies   Allergen Reactions    Morphine      Acts not like herself and feels agitated and restless    Penicillins Hives     Tongue swells    Codeine Rash       Physical Exam:    /95   Pulse 97   Temp 98 6 °F (37 °C)   Ht 5' 7" (1 702 m)   Wt 81 6 kg (180 lb)   LMP  (LMP Unknown)   BMI 28 19 kg/m²     Constitutional:normal, well developed, well nourished, alert, in no distress and non-toxic and no overt pain behavior    Eyes:anicteric  HEENT:grossly intact  Neck:supple, symmetric, trachea midline and no masses   Pulmonary:even and unlabored  Cardiovascular:No edema or pitting edema present  Skin:Normal without rashes or lesions and well hydrated  Psychiatric:Mood and affect appropriate  Neurologic:Cranial Nerves II-XII grossly intact  Musculoskeletal:Antalgic gait but steady without the use of assistive devices  Equivocal seated straight leg raise on the right and negative on the left      Imaging  FL spine and pain procedure    (Results Pending)     Study Result     CT LUMBAR SPINE     INDICATION: Severe low back pain      COMPARISON:  CT lumbar spine study from January 31, 2017      TECHNIQUE: Axial CT examination of the lumbar spine was obtained utilizing reconstructed images from CT of the chest, abdomen and pelvis performed the same day  Images were reformatted in the sagittal and coronal planes      This examination, like all CT scans performed in the [facility], was performed utilizing techniques to minimize radiation dose exposure, including the use of iterative reconstruction and automated exposure control        FINDINGS:     ALIGNMENT: Mild dextroscoliosis of the lumbar spine without lateral subluxation, spondylolisthesis or spondylolysis  VERTEBRAL BODIES: No fracture  No acute osseous abnormality      DEGENERATIVE CHANGES: Disc space narrowing and endplate sclerosis at A3-Z2  Previously noted degenerative intervertebral discal gas at the L4-5 level has resolved  There is mild L4-5 disc space narrowing      Mild disc bulging at L1-L2 and L2-L3 with mild spinal stenosis      L3-L4: Left foraminal protrusion at L3-L4 with moderate left foraminal stenosis, correlate for left L3 radiculopathy  There is bilateral facet arthropathy, more pronounced on the right than the left which is not significantly changed when compared to   the prior study      L4-L5: Disc bulge, eccentric to the left with mild bilateral facet arthropathy  There is mild to moderate spinal stenosis and mild left foraminal encroachment    There is mild left greater than right subarticular recess stenosis, correlate for left L5 radiculopathy      L5-S1: Disc space narrowing, endplate osteophytosis and mild disc bulge without significant spinal canal stenosis  Mild bilateral inferior foraminal encroachment      PREVERTEBRAL AND PARASPINAL SOFT TISSUES: Atrophic appearing right kidney with cortical scarring      IMPRESSION:     No fracture or traumatic subluxation      Degenerative discogenic disease with disc space narrowing and endplate sclerosis with osteophytosis at L5-S1      Lower lumbar degenerative discogenic disease and facet arthropathy as described above  Left foraminal protrusion, correlate for left L3 radiculopathy  Additional discogenic disease resulting in left greater than right subarticular recess stenosis,   correlate for left L5 radiculopathy           Orders Placed This Encounter   Procedures    FL spine and pain procedure

## 2020-09-15 ENCOUNTER — TELEPHONE (OUTPATIENT)
Dept: RADIOLOGY | Facility: CLINIC | Age: 60
End: 2020-09-15

## 2020-09-15 NOTE — TELEPHONE ENCOUNTER
Patient contacted and scheduled for  Right L4 and L5 TFESI  All pre procedure instructions were given to patient   Nothing to eat or drink for 1 hour prior  Loose fitting clothing   Denies NSAIDS   Denies Antibx   Needs    Patient instructed to contact our office if becomes sick   Refrain from any vaccines 2 weeks before & 2 weeks after  Insurance auth received but is not a guarantee of payment per your insurance company's authorization disclaimer and it is your responsibility to verify your benefits   COVID -19 screening complete

## 2020-09-16 ENCOUNTER — HOSPITAL ENCOUNTER (OUTPATIENT)
Dept: RADIOLOGY | Facility: CLINIC | Age: 60
Discharge: HOME | End: 2020-09-16
Attending: ANESTHESIOLOGY | Admitting: ANESTHESIOLOGY
Payer: MEDICARE

## 2020-09-16 VITALS
RESPIRATION RATE: 20 BRPM | OXYGEN SATURATION: 97 % | SYSTOLIC BLOOD PRESSURE: 115 MMHG | HEART RATE: 72 BPM | DIASTOLIC BLOOD PRESSURE: 70 MMHG | TEMPERATURE: 97.6 F

## 2020-09-16 DIAGNOSIS — M54.16 LUMBAR RADICULOPATHY: ICD-10-CM

## 2020-09-16 PROCEDURE — 64484 NJX AA&/STRD TFRM EPI L/S EA: CPT | Performed by: ANESTHESIOLOGY

## 2020-09-16 PROCEDURE — 64483 NJX AA&/STRD TFRM EPI L/S 1: CPT | Performed by: ANESTHESIOLOGY

## 2020-09-16 RX ORDER — PAPAVERINE HCL 150 MG
20 CAPSULE, EXTENDED RELEASE ORAL ONCE
Status: COMPLETED | OUTPATIENT
Start: 2020-09-16 | End: 2020-09-16

## 2020-09-16 RX ORDER — LIDOCAINE HYDROCHLORIDE 10 MG/ML
5 INJECTION, SOLUTION EPIDURAL; INFILTRATION; INTRACAUDAL; PERINEURAL ONCE
Status: COMPLETED | OUTPATIENT
Start: 2020-09-16 | End: 2020-09-16

## 2020-09-16 RX ADMIN — DEXAMETHASONE SODIUM PHOSPHATE 15 MG: 10 INJECTION, SOLUTION INTRAMUSCULAR; INTRAVENOUS at 09:11

## 2020-09-16 RX ADMIN — LIDOCAINE HYDROCHLORIDE 4 ML: 10 INJECTION, SOLUTION EPIDURAL; INFILTRATION; INTRACAUDAL; PERINEURAL at 09:07

## 2020-09-16 RX ADMIN — IOHEXOL 2 ML: 300 INJECTION, SOLUTION INTRAVENOUS at 09:10

## 2020-09-16 RX ADMIN — LIDOCAINE HYDROCHLORIDE 2 ML: 20 INJECTION, SOLUTION EPIDURAL; INFILTRATION; INTRACAUDAL; PERINEURAL at 09:11

## 2020-09-16 NOTE — H&P
History of Present Illness: The patient is a 62 y o  female who presents with complaints of low back and right leg pain      Patient Active Problem List   Diagnosis    Anxiety    Ortega esophagus    Depression    Lumbar radiculopathy    DDD (degenerative disc disease), lumbar    Lumbar spondylosis    Spinal stenosis of lumbar region    Other tear of medial meniscus, current injury, right knee, initial encounter    PONV (postoperative nausea and vomiting)       Past Medical History:   Diagnosis Date    Anxiety     Arthritis     Last assessed 5/3/2011    Ortgea's esophagus     Cancer ([facility] [address] )     ovarian cancer at age 26 yo- REMOVAL  B/L    Colon polyp     DDD (degenerative disc disease), lumbar     Depression     Fall     5/2020 right knee meniscus tear- OR repair today 8/3/2020    Fibromyalgia     Fibromyalgia, primary     Fracture of one or more phalanges of foot     GERD (gastroesophageal reflux disease)     H/O bladder infections     chronic    Hypertension     Kidney infection     occasional    Migraines     Ovarian cancer ([facility] [address] ) 1987    age 27    Polyneuropathy     Last assessed 6/23/2016 knees to feet    PONV (postoperative nausea and vomiting)     Patient requests to be pre-medicated prior to surgery prior to surgery    PONV (postoperative nausea and vomiting) 8/3/2020    Renal disorder     Spinal stenosis     Vertigo     Wears glasses     reading       Past Surgical History:   Procedure Laterality Date   [address]    several   [address]    COLONOSCOPY      FOOT FRACTURE SURGERY Bilateral 2004    x 5  surgeries    KIDNEY SURGERY  1974    at age 13 yo    [place] KNEE SCOPE,MED/LAT MENISECTOMY Right 8/3/2020    Procedure: KNEE ARTHROSCOPY,PARTIAL MEDIAL & LATERAL MENISECTOMIES;  Surgeon: Federico Gallardo MD;  [facility];  Service: Orthopedics    TOTAL ABDOMINAL HYSTERECTOMY  1987    age 27   [facility] TOTAL ABDOMINAL HYSTERECTOMY W/ BILATERAL SALPINGOOPHORECTOMY Bilateral 1987    age 27         Current Outpatient Medications:     aspirin (ECOTRIN) 325 mg EC tablet, Take 1 tablet (325 mg total) by mouth 2 (two) times a day for 14 days, Disp: 28 tablet, Rfl: 0    busPIRone (BUSPAR) 5 mg tablet, Take 5 mg by mouth 2 (two) times a day, Disp: , Rfl:     calcium-vitamin D 250-100 MG-UNIT per tablet, Take 1 tablet by mouth 2 (two) times a day, Disp: , Rfl:     cyanocobalamin (VITAMIN B-12) 100 mcg tablet, Take by mouth daily, Disp: , Rfl:     estradiol (VAGIFEM, YUVAFEM) 10 MCG TABS vaginal tablet, Insert 1 tablet (10 mcg total) into the vagina daily For two weeks  Then twice weekly at night   Use 12 hours prior to intercourse, Disp: 30 tablet, Rfl: 6    gabapentin (NEURONTIN) 300 mg capsule, Take 1 capsule (300 mg total) by mouth 3 (three) times a day, Disp: 90 capsule, Rfl: 0    gabapentin (NEURONTIN) 300 mg capsule, Take 2 tablets tid, Disp: 540 capsule, Rfl: 3    hydrOXYzine HCL (ATARAX) 50 mg tablet, , Disp: , Rfl:     meclizine (ANTIVERT) 12 5 MG tablet, Take 1 tablet (12 5 mg total) by mouth every 8 (eight) hours as needed for dizziness or nausea (Patient not taking: Reported on 9/14/2020), Disp: 30 tablet, Rfl: 0    methenamine hippurate (HIPREX) 1 g tablet, 1 g 2 (two) times a day with meals , Disp: , Rfl:     omeprazole (PriLOSEC) 40 MG capsule, Take 40 mg by mouth daily , Disp: , Rfl:     ondansetron (ZOFRAN) 4 mg tablet, Take 1 tablet (4 mg total) by mouth every 8 (eight) hours as needed for nausea or vomiting (Patient not taking: Reported on 9/14/2020), Disp: 10 tablet, Rfl: 0    ondansetron (ZOFRAN-ODT) 4 mg disintegrating tablet, Take 1 tablet (4 mg total) by mouth every 8 (eight) hours as needed for nausea or vomiting (Patient not taking: Reported on 9/14/2020), Disp: 15 tablet, Rfl: 0    PARoxetine (PAXIL) 10 mg tablet, Take by mouth, Disp: , Rfl:     PARoxetine (PAXIL) 40 MG tablet, Take by mouth, Disp: , Rfl:     SUMAtriptan (IMITREX) 50 mg tablet, Take 1 tablet (50 mg total) by mouth once as needed for migraine for up to 1 dose May repeat in 2 hours  No more than 200 mg per day , Disp: 10 tablet, Rfl: 2    traMADol (ULTRAM) 50 mg tablet, Take 1 tablet (50 mg total) by mouth every 8 (eight) hours as needed for moderate pain, Disp: 30 tablet, Rfl: 0    Allergies   Allergen Reactions    Morphine      Acts not like herself and feels agitated and restless    Penicillins Hives     Tongue swells    Codeine Rash       Physical Exam:   Vitals:    09/16/20 0847   BP: 138/91   Pulse: 90   Resp: 20   Temp: 97 6 °F (36 4 °C)   SpO2: 98%     General: Awake, Alert, Oriented x 3, Mood and affect appropriate  Respiratory: Respirations even and unlabored  Cardiovascular: Peripheral pulses intact; no edema  Musculoskeletal Exam:  Right lumbar paraspinals tender to palpation  ASA Score: 2    Patient/Chart Verification  Patient ID Verified: Verbal  ID Band Applied: No  Consents Confirmed: Procedural  H&P( within 30 days) Verified: To be obtained in the Pre-Procedure area  Interval H&P(within 24 hr) Complete (required for Outpatients and Surgery Admit only): To be obtained in the Pre-Procedure area  Allergies Reviewed: Yes  Anticoag/NSAID held?: No  Currently on antibiotics?: No    Assessment:   1   Lumbar radiculopathy        Plan: Right L4 and L5 TFESI

## 2020-09-16 NOTE — DISCHARGE INSTR - LAB
Epidural Steroid Injection   WHAT YOU NEED TO KNOW:   An epidural steroid injection (DANIELLA) is a procedure to inject steroid medicine into the epidural space  The epidural space is between your spinal cord and vertebrae  Steroids reduce inflammation and fluid buildup in your spine that may be causing pain  You may be given pain medicine along with the steroids  ACTIVITY  · Do not drive or operate machinery today  · No strenuous activity today - bending, lifting, etc   · You may resume normal activites starting tomorrow - start slowly and as tolerated  · You may shower today, but no tub baths or hot tubs  · You may have numbness for several hours from the local anesthetic  Please use caution and common sense, especially with weight-bearing activities  CARE OF THE INJECTION SITE  · If you have soreness or pain, apply ice to the area today (20 minutes on/20 minutes off)  · Starting tomorrow, you may use warm, moist heat or ice if needed  · You may have an increase or change in your discomfort for 36-48 hours after your treatment  · Apply ice and continue with any pain medication you have been prescribed  · [facility] if you have any of the following: redness, drainage, swelling, headache, stiff neck or fever above 100°F     SPECIAL INSTRUCTIONS  · Our office will contact you in approximately 7 days for a progress report  MEDICATIONS  · Continue to take all routine medications  · Our office may have instructed you to hold some medications  If you have a problem specifically related to your procedure, please call our office at [phone]  Problems not related to your procedure should be directed to your primary care physician

## 2020-09-23 ENCOUNTER — OFFICE VISIT (OUTPATIENT)
Dept: OBGYN CLINIC | Facility: OTHER | Age: 60
End: 2020-09-23

## 2020-09-23 ENCOUNTER — TELEPHONE (OUTPATIENT)
Dept: PAIN MEDICINE | Facility: CLINIC | Age: 60
End: 2020-09-23

## 2020-09-23 VITALS
TEMPERATURE: 97.1 F | WEIGHT: 180 LBS | DIASTOLIC BLOOD PRESSURE: 89 MMHG | BODY MASS INDEX: 28.19 KG/M2 | SYSTOLIC BLOOD PRESSURE: 145 MMHG | HEART RATE: 85 BPM

## 2020-09-23 DIAGNOSIS — S83.241D TEAR OF MEDIAL MENISCUS OF RIGHT KNEE, CURRENT, SUBSEQUENT ENCOUNTER: Primary | ICD-10-CM

## 2020-09-23 PROCEDURE — 99024 POSTOP FOLLOW-UP VISIT: CPT | Performed by: ORTHOPAEDIC SURGERY

## 2020-09-23 NOTE — PROGRESS NOTES
Orthopaedic Surgery - Office Note  Meeta Amaya (69 y o  female)   : 1960   MRN: [id number]  Encounter Date: 2020    Chief Complaint   Patient presents with    Right Knee - Follow-up       Assessment / Plan  S/P arthroscopic medial and lateral meniscectomy performed on 2020 with improvement  · Activity as tolerated  · Avoid Painful maneuvers  · Neoprene knee sleeve  · Home exercise program reviewed  · Continue outpatient PT  · Focus on progressing strength  · Focus on progressing ROM  · Anti-inflammatories or Tylenol prn pain   · Patient stated that she will return to work on 2020  Will call office if work note is needed  · Consider CSI into the right knee if symptoms persist   Return in about 4 weeks (around 10/21/2020) for Recheck of right knee  History of Present Illness  Tammy A Luke Collet is a 62 y o  female who presents today for postoperative visit for her right knee  She is now almost 2 months status post arthroscopic medial and lateral meniscectomy done on 2020  Patient states that she has noted improvement since her last visit  She also underwent a lumbar DANIELLA on 2020 with some relief of her left knee pain  Patient does note continued intermittent pain about the knee with squatting and prolonged walking and standing  She describes the pain as a intermittent dull, aching sensation  She continues to perform formal physical therapy and supplement with home exercise program as instructed  She reports that she will be returning to work on 2020  Denies numbness and tingling, fever, chills  Review of Systems  Pertinent items are noted in HPI  All other systems were reviewed and are negative  Physical Exam  /89   Pulse 85   Temp (!) 97 1 °F (36 2 °C)   Wt 81 6 kg (180 lb)   LMP  (LMP Unknown)   BMI 28 19 kg/m²   Cons: Appears well  No apparent distress  Psych: Alert  Oriented x3    Mood and affect normal   Eyes: PERRLA, EOMI  Resp: Normal effort  No audible wheezing or stridor  CV: Palpable pulse  No discernable arrhythmia  No LE edema  Lymph:  No palpable cervical, axillary, or inguinal lymphadenopathy  Skin: Warm  No palpable masses  No visible lesions  Neuro: Normal muscle tone  Normal and symmetric DTR's  Right Knee Exam  Alignment:  Normal knee alignment  Inspection:  No edema  No erythema  Incision healed  Palpation:  No tenderness  Mild effusion  ROM:  Knee Extension 10  Knee Flexion 115  Strength:  5/5 quadriceps and hamstrings  Stability:  No objective knee instability  Stable Varus / Valgus stress, Lachman, and Posterior drawer  Tests:  No pertinent positive or negative tests  Patella:  Patella tracks centrally without crepitus  Neurovascular:  Sensation intact in DP/SP/Saucedo/Sa/T nerve distributions  2+ DP & PT pulses  Gait:  Normal       Studies Reviewed  No studies to review    Procedures  No procedures today  Medical, Surgical, Family, and Social History  The patient's medical history, family history, and social history, were reviewed and updated as appropriate      Past Medical History:   Diagnosis Date    Anxiety     Arthritis     Last assessed 5/3/2011    Ortega's esophagus     Cancer ([facility] )     ovarian cancer at age 28 yo- REMOVAL  B/L    Colon polyp     DDD (degenerative disc disease), lumbar     Depression     Fall     5/2020 right knee meniscus tear- OR repair today 8/3/2020    Fibromyalgia     Fibromyalgia, primary     Fracture of one or more phalanges of foot     GERD (gastroesophageal reflux disease)     H/O bladder infections     chronic    Hypertension     Kidney infection     occasional    Migraines     [facility] 1987    age 27    Polyneuropathy     Last assessed 6/23/2016 knees to feet    PONV (postoperative nausea and vomiting)     Patient requests to be pre-medicated prior to surgery prior to surgery    PONV (postoperative nausea and vomiting) 8/3/2020    Renal disorder     Spinal stenosis     Vertigo     Wears glasses     reading       Past Surgical History:   Procedure Laterality Date    ABDOMINAL SURGERY  1986    several    BACK SURGERY  1990    COLONOSCOPY      FOOT FRACTURE SURGERY Bilateral 2004    x 5  surgeries    KIDNEY SURGERY  1974    at age 13 yo    [place] KNEE SCOPE,MED/LAT MENISECTOMY Right 8/3/2020    Procedure: KNEE ARTHROSCOPY,PARTIAL MEDIAL & LATERAL MENISECTOMIES;  Surgeon: Josh Willams MD;  [facility];  Service: Orthopedics    TOTAL ABDOMINAL HYSTERECTOMY  1987    age 27   [facility] TOTAL ABDOMINAL HYSTERECTOMY W/ BILATERAL SALPINGOOPHORECTOMY Bilateral 1987    age 27       Family History   Problem Relation Age of Onset    Heart disease Mother     Cancer Mother     Diabetes Mother     Arthritis Mother     Anxiety disorder Mother     Bleeding Disorder Mother     Clotting disorder Mother     Depression Mother     Hyperlipidemia Mother     Irritable bowel syndrome Mother    [facility] Hypertension Mother     Obesity Mother     Osteoporosis Mother     Vaginal cancer Mother 27    Skin cancer Mother     Thyroid disease Mother     Diabetes Father     Arthritis Father     Hyperlipidemia Father     Vesicoureteral reflux Father     Heart disease Father     Hypertension Father     Migraines Father     Obesity Father     Hypertension Family     Arthritis Family     Arthritis Brother     Anxiety disorder Brother     Diabetes Brother     Hyperlipidemia Brother     Vesicoureteral reflux Brother     Heart disease Brother     Irritable bowel syndrome Brother     Hypertension Brother     Migraines Brother     Thyroid disease Brother     Pancreatic cancer Brother 54    Migraines Daughter     Asthma Son     Migraines Son     Diabetes Maternal Grandmother     Vaginal cancer Maternal Grandmother 48    Infertility Paternal Grandmother     Arthritis Maternal Aunt     Anxiety disorder Maternal Aunt     Depression Maternal Aunt     Diabetes Maternal Aunt     Vaginal cancer Maternal Aunt 48    Fibroids Paternal Aunt     No Known Problems Maternal Grandfather     No Known Problems Paternal Grandfather     No Known Problems Maternal Aunt     No Known Problems Maternal Aunt     No Known Problems Maternal Aunt        Social History     Occupational History    Occupation: CNA   Tobacco Use    Smoking status: Never Smoker    Smokeless tobacco: Never Used   Substance and Sexual Activity    Alcohol use: No    Drug use: No    Sexual activity: Yes       Allergies   Allergen Reactions    Morphine      Acts not like herself and feels agitated and restless    Penicillins Hives     Tongue swells    Codeine Rash         Current Outpatient Medications:     busPIRone (BUSPAR) 5 mg tablet, Take 5 mg by mouth 2 (two) times a day, Disp: , Rfl:     calcium-vitamin D 250-100 MG-UNIT per tablet, Take 1 tablet by mouth 2 (two) times a day, Disp: , Rfl:     cyanocobalamin (VITAMIN B-12) 100 mcg tablet, Take by mouth daily, Disp: , Rfl:     estradiol (VAGIFEM, YUVAFEM) 10 MCG TABS vaginal tablet, Insert 1 tablet (10 mcg total) into the vagina daily For two weeks  Then twice weekly at night   Use 12 hours prior to intercourse, Disp: 30 tablet, Rfl: 6    gabapentin (NEURONTIN) 300 mg capsule, Take 2 tablets tid, Disp: 540 capsule, Rfl: 3    hydrOXYzine HCL (ATARAX) 50 mg tablet, , Disp: , Rfl:     meclizine (ANTIVERT) 12 5 MG tablet, Take 1 tablet (12 5 mg total) by mouth every 8 (eight) hours as needed for dizziness or nausea, Disp: 30 tablet, Rfl: 0    methenamine hippurate (HIPREX) 1 g tablet, 1 g 2 (two) times a day with meals , Disp: , Rfl:     omeprazole (PriLOSEC) 40 MG capsule, Take 40 mg by mouth daily , Disp: , Rfl:     ondansetron (ZOFRAN) 4 mg tablet, Take 1 tablet (4 mg total) by mouth every 8 (eight) hours as needed for nausea or vomiting, Disp: 10 tablet, Rfl: 0    ondansetron (ZOFRAN-ODT) 4 mg disintegrating tablet, Take 1 tablet (4 mg total) by mouth every 8 (eight) hours as needed for nausea or vomiting, Disp: 15 tablet, Rfl: 0    PARoxetine (PAXIL) 10 mg tablet, Take by mouth, Disp: , Rfl:     PARoxetine (PAXIL) 40 MG tablet, Take by mouth, Disp: , Rfl:     SUMAtriptan (IMITREX) 50 mg tablet, Take 1 tablet (50 mg total) by mouth once as needed for migraine for up to 1 dose May repeat in 2 hours    No more than 200 mg per day , Disp: 10 tablet, Rfl: 2    traMADol (ULTRAM) 50 mg tablet, Take 1 tablet (50 mg total) by mouth every 8 (eight) hours as needed for moderate pain, Disp: 30 tablet, Rfl: 0    aspirin (ECOTRIN) 325 mg EC tablet, Take 1 tablet (325 mg total) by mouth 2 (two) times a day for 14 days, Disp: 28 tablet, Rfl: 0    gabapentin (NEURONTIN) 300 mg capsule, Take 1 capsule (300 mg total) by mouth 3 (three) times a day, Disp: 90 capsule, Rfl: 0      Bandar Zayas    Scribe Attestation    I,:   Rachel Girard am acting as a scribe while in the presence of the attending physician :        I,:   Gómez Vazquez MD personally performed the services described in this documentation    as scribed in my presence :

## 2020-09-24 ENCOUNTER — TELEPHONE (OUTPATIENT)
Dept: OBGYN CLINIC | Facility: HOSPITAL | Age: 60
End: 2020-09-24

## 2020-09-24 NOTE — TELEPHONE ENCOUNTER
Ya Steele, [employer]   [phone]    Ya Steele is asking that I fax the ovn to [phone], I did fax it  Ya Steele is also asking for a work status note so please prepare & fax it to [phone] Claudia Campo

## 2020-09-25 ENCOUNTER — TELEPHONE (OUTPATIENT)
Dept: OBGYN CLINIC | Facility: HOSPITAL | Age: 60
End: 2020-09-25

## 2020-09-25 NOTE — TELEPHONE ENCOUNTER
Debra Banegas is calling from [employer] in reference to a work note from [employer] 09-23-20  Faxed tp [phone], as requested

## 2020-09-30 NOTE — TELEPHONE ENCOUNTER
Pt reports still about 75% improvement 2wk post inj   Pain level 1/10  She said last night her pain was 10/10   She said at night she has been having pain in her right side of her back which has been radiating down the leg

## 2020-09-30 NOTE — TELEPHONE ENCOUNTER
Called patient- she is was unable to speak as due to being at work  She is going to cb around 4:30pm Please schedule her for a 8-12wk f/u with Carleen Jenkins    Thank you

## 2020-10-07 ENCOUNTER — TELEPHONE (OUTPATIENT)
Dept: OBGYN CLINIC | Facility: MEDICAL CENTER | Age: 60
End: 2020-10-07

## 2020-10-19 ENCOUNTER — TELEPHONE (OUTPATIENT)
Dept: OBGYN CLINIC | Facility: HOSPITAL | Age: 60
End: 2020-10-19

## 2020-10-21 ENCOUNTER — OFFICE VISIT (OUTPATIENT)
Dept: OBGYN CLINIC | Facility: OTHER | Age: 60
End: 2020-10-21
Payer: OTHER MISCELLANEOUS

## 2020-10-21 VITALS
WEIGHT: 180 LBS | HEART RATE: 90 BPM | SYSTOLIC BLOOD PRESSURE: 134 MMHG | BODY MASS INDEX: 28.25 KG/M2 | TEMPERATURE: 97.6 F | DIASTOLIC BLOOD PRESSURE: 85 MMHG | HEIGHT: 67 IN

## 2020-10-21 DIAGNOSIS — S83.241A OTHER TEAR OF MEDIAL MENISCUS, CURRENT INJURY, RIGHT KNEE, INITIAL ENCOUNTER: Primary | ICD-10-CM

## 2020-10-21 PROCEDURE — 99024 POSTOP FOLLOW-UP VISIT: CPT | Performed by: PHYSICIAN ASSISTANT

## 2020-10-21 PROCEDURE — 20610 DRAIN/INJ JOINT/BURSA W/O US: CPT | Performed by: PHYSICIAN ASSISTANT

## 2020-10-21 RX ORDER — BUPIVACAINE HYDROCHLORIDE 2.5 MG/ML
8 INJECTION, SOLUTION INFILTRATION; PERINEURAL
Status: COMPLETED | OUTPATIENT
Start: 2020-10-21 | End: 2020-10-21

## 2020-10-21 RX ORDER — METHYLPREDNISOLONE ACETATE 40 MG/ML
1 INJECTION, SUSPENSION INTRA-ARTICULAR; INTRALESIONAL; INTRAMUSCULAR; SOFT TISSUE
Status: COMPLETED | OUTPATIENT
Start: 2020-10-21 | End: 2020-10-21

## 2020-10-21 RX ADMIN — BUPIVACAINE HYDROCHLORIDE 8 ML: 2.5 INJECTION, SOLUTION INFILTRATION; PERINEURAL at 11:39

## 2020-10-21 RX ADMIN — METHYLPREDNISOLONE ACETATE 1 ML: 40 INJECTION, SUSPENSION INTRA-ARTICULAR; INTRALESIONAL; INTRAMUSCULAR; SOFT TISSUE at 11:39

## 2020-10-23 ENCOUNTER — TELEPHONE (OUTPATIENT)
Dept: OBGYN CLINIC | Facility: MEDICAL CENTER | Age: 60
End: 2020-10-23

## 2020-10-26 ENCOUNTER — TELEPHONE (OUTPATIENT)
Dept: FAMILY MEDICINE CLINIC | Facility: CLINIC | Age: 60
End: 2020-10-26

## 2020-10-27 ENCOUNTER — OFFICE VISIT (OUTPATIENT)
Dept: FAMILY MEDICINE CLINIC | Facility: CLINIC | Age: 60
End: 2020-10-27
Payer: MEDICARE

## 2020-10-27 VITALS
TEMPERATURE: 98.2 F | SYSTOLIC BLOOD PRESSURE: 132 MMHG | BODY MASS INDEX: 28.19 KG/M2 | HEIGHT: 67 IN | HEART RATE: 100 BPM | DIASTOLIC BLOOD PRESSURE: 72 MMHG | OXYGEN SATURATION: 97 %

## 2020-10-27 DIAGNOSIS — F41.9 ANXIETY: ICD-10-CM

## 2020-10-27 DIAGNOSIS — K80.20 CALCULUS OF GALLBLADDER WITHOUT CHOLECYSTITIS WITHOUT OBSTRUCTION: ICD-10-CM

## 2020-10-27 DIAGNOSIS — F33.1 MODERATE EPISODE OF RECURRENT MAJOR DEPRESSIVE DISORDER (HCC): ICD-10-CM

## 2020-10-27 DIAGNOSIS — M51.36 DDD (DEGENERATIVE DISC DISEASE), LUMBAR: ICD-10-CM

## 2020-10-27 DIAGNOSIS — K22.719 BARRETT'S ESOPHAGUS WITH DYSPLASIA: ICD-10-CM

## 2020-10-27 DIAGNOSIS — R10.11 RIGHT UPPER QUADRANT ABDOMINAL PAIN: Primary | ICD-10-CM

## 2020-10-27 PROCEDURE — 99214 OFFICE O/P EST MOD 30 MIN: CPT | Performed by: FAMILY MEDICINE

## 2020-11-03 ENCOUNTER — TRANSCRIBE ORDERS (OUTPATIENT)
Dept: RADIOLOGY | Facility: HOSPITAL | Age: 60
End: 2020-11-03

## 2020-11-03 ENCOUNTER — HOSPITAL ENCOUNTER (OUTPATIENT)
Dept: RADIOLOGY | Facility: HOSPITAL | Age: 60
Discharge: HOME | End: 2020-11-03
Payer: MEDICARE

## 2020-11-03 DIAGNOSIS — K80.20 CALCULUS OF GALLBLADDER WITHOUT CHOLECYSTITIS WITHOUT OBSTRUCTION: ICD-10-CM

## 2020-11-03 DIAGNOSIS — R10.11 RIGHT UPPER QUADRANT ABDOMINAL PAIN: ICD-10-CM

## 2020-11-03 PROCEDURE — 76700 US EXAM ABDOM COMPLETE: CPT

## 2020-11-10 ENCOUNTER — LAB (OUTPATIENT)
Dept: LAB | Age: 60
End: 2020-11-10
Payer: MEDICARE

## 2020-11-10 ENCOUNTER — PREP FOR PROCEDURE (OUTPATIENT)
Dept: SURGERY | Facility: CLINIC | Age: 60
End: 2020-11-10

## 2020-11-10 ENCOUNTER — OFFICE VISIT (OUTPATIENT)
Dept: LAB | Age: 60
End: 2020-11-10
Payer: MEDICARE

## 2020-11-10 ENCOUNTER — OFFICE VISIT (OUTPATIENT)
Dept: SURGERY | Facility: CLINIC | Age: 60
End: 2020-11-10
Payer: MEDICARE

## 2020-11-10 VITALS — WEIGHT: 180 LBS | HEIGHT: 67 IN | BODY MASS INDEX: 28.25 KG/M2 | TEMPERATURE: 96.9 F

## 2020-11-10 DIAGNOSIS — K80.10 CALCULOUS CHOLECYSTITIS: ICD-10-CM

## 2020-11-10 DIAGNOSIS — K80.10 CALCULOUS CHOLECYSTITIS: Primary | ICD-10-CM

## 2020-11-10 DIAGNOSIS — K80.20 CALCULUS OF GALLBLADDER WITHOUT CHOLECYSTITIS WITHOUT OBSTRUCTION: ICD-10-CM

## 2020-11-10 DIAGNOSIS — R10.11 RIGHT UPPER QUADRANT ABDOMINAL PAIN: ICD-10-CM

## 2020-11-10 LAB
ALBUMIN SERPL BCP-MCNC: 3.7 G/DL (ref 3.5–5)
ALP SERPL-CCNC: 101 U/L (ref 46–116)
ALT SERPL W P-5'-P-CCNC: 12 U/L (ref 12–78)
ANION GAP SERPL CALCULATED.3IONS-SCNC: 3 MMOL/L (ref 4–13)
AST SERPL W P-5'-P-CCNC: 9 U/L (ref 5–45)
ATRIAL RATE: 75 BPM
BILIRUB SERPL-MCNC: 0.85 MG/DL (ref 0.2–1)
BUN SERPL-MCNC: 9 MG/DL (ref 5–25)
CALCIUM SERPL-MCNC: 9.3 MG/DL (ref 8.3–10.1)
CHLORIDE SERPL-SCNC: 108 MMOL/L (ref 100–108)
CO2 SERPL-SCNC: 29 MMOL/L (ref 21–32)
CREAT SERPL-MCNC: 0.93 MG/DL (ref 0.6–1.3)
ERYTHROCYTE [DISTWIDTH] IN BLOOD BY AUTOMATED COUNT: 13.5 % (ref 11.6–15.1)
GFR SERPL CREATININE-BSD FRML MDRD: 67 ML/MIN/1.73SQ M
GLUCOSE P FAST SERPL-MCNC: 105 MG/DL (ref 65–99)
HCT VFR BLD AUTO: 39.7 % (ref 34.8–46.1)
HGB BLD-MCNC: 13.1 G/DL (ref 11.5–15.4)
MCH RBC QN AUTO: 30.7 PG (ref 26.8–34.3)
MCHC RBC AUTO-ENTMCNC: 33 G/DL (ref 31.4–37.4)
MCV RBC AUTO: 93 FL (ref 82–98)
P AXIS: 32 DEGREES
PLATELET # BLD AUTO: 246 THOUSANDS/UL (ref 149–390)
PMV BLD AUTO: 9.9 FL (ref 8.9–12.7)
POTASSIUM SERPL-SCNC: 4.5 MMOL/L (ref 3.5–5.3)
PR INTERVAL: 174 MS
PROT SERPL-MCNC: 7.2 G/DL (ref 6.4–8.2)
QRS AXIS: 6 DEGREES
QRSD INTERVAL: 92 MS
QT INTERVAL: 428 MS
QTC INTERVAL: 477 MS
RBC # BLD AUTO: 4.27 MILLION/UL (ref 3.81–5.12)
SODIUM SERPL-SCNC: 140 MMOL/L (ref 136–145)
T WAVE AXIS: 1 DEGREES
VENTRICULAR RATE: 75 BPM
WBC # BLD AUTO: 7.2 THOUSAND/UL (ref 4.31–10.16)

## 2020-11-10 PROCEDURE — 80053 COMPREHEN METABOLIC PANEL: CPT

## 2020-11-10 PROCEDURE — 93010 ELECTROCARDIOGRAM REPORT: CPT | Performed by: INTERNAL MEDICINE

## 2020-11-10 PROCEDURE — 99203 OFFICE O/P NEW LOW 30 MIN: CPT | Performed by: SURGERY

## 2020-11-10 PROCEDURE — 93005 ELECTROCARDIOGRAM TRACING: CPT

## 2020-11-10 PROCEDURE — 85027 COMPLETE CBC AUTOMATED: CPT

## 2020-11-10 PROCEDURE — 36415 COLL VENOUS BLD VENIPUNCTURE: CPT

## 2020-11-11 ENCOUNTER — TRANSCRIBE ORDERS (OUTPATIENT)
Dept: SURGERY | Facility: CLINIC | Age: 60
End: 2020-11-11

## 2020-11-11 DIAGNOSIS — K80.20 CALCULUS OF GALLBLADDER WITHOUT CHOLECYSTITIS WITHOUT OBSTRUCTION: Primary | ICD-10-CM

## 2020-11-16 ENCOUNTER — TRANSCRIBE ORDERS (OUTPATIENT)
Dept: SURGERY | Facility: CLINIC | Age: 60
End: 2020-11-16

## 2020-11-27 ENCOUNTER — ANESTHESIA (OUTPATIENT)
Dept: PERIOP | Facility: HOSPITAL | Age: 60
End: 2020-11-27
Payer: MEDICARE

## 2020-11-27 ENCOUNTER — HOSPITAL ENCOUNTER (OUTPATIENT)
Facility: HOSPITAL | Age: 60
Setting detail: OUTPATIENT SURGERY
Discharge: HOME | End: 2020-11-27
Attending: SURGERY | Admitting: SURGERY
Payer: MEDICARE

## 2020-11-27 ENCOUNTER — ANESTHESIA EVENT (OUTPATIENT)
Dept: PERIOP | Facility: HOSPITAL | Age: 60
End: 2020-11-27
Payer: MEDICARE

## 2020-11-27 VITALS
WEIGHT: 180 LBS | OXYGEN SATURATION: 94 % | SYSTOLIC BLOOD PRESSURE: 131 MMHG | HEART RATE: 63 BPM | DIASTOLIC BLOOD PRESSURE: 71 MMHG | TEMPERATURE: 97.4 F | BODY MASS INDEX: 28.25 KG/M2 | HEIGHT: 67 IN | RESPIRATION RATE: 16 BRPM

## 2020-11-27 VITALS — HEART RATE: 73 BPM

## 2020-11-27 DIAGNOSIS — K80.20 CALCULUS OF GALLBLADDER WITHOUT CHOLECYSTITIS WITHOUT OBSTRUCTION: Primary | ICD-10-CM

## 2020-11-27 DIAGNOSIS — K80.10 CALCULOUS CHOLECYSTITIS: ICD-10-CM

## 2020-11-27 PROCEDURE — 88304 TISSUE EXAM BY PATHOLOGIST: CPT | Performed by: PATHOLOGY

## 2020-11-27 PROCEDURE — 47562 LAPAROSCOPIC CHOLECYSTECTOMY: CPT | Performed by: SURGERY

## 2020-11-27 RX ORDER — FENTANYL CITRATE/PF 50 MCG/ML
25 SYRINGE (ML) INJECTION
Status: DISCONTINUED | OUTPATIENT
Start: 2020-11-27 | End: 2020-11-27 | Stop reason: HOSPADM

## 2020-11-27 RX ORDER — HYDROMORPHONE HCL/PF 1 MG/ML
0.5 SYRINGE (ML) INJECTION
Status: DISCONTINUED | OUTPATIENT
Start: 2020-11-27 | End: 2020-11-27 | Stop reason: HOSPADM

## 2020-11-27 RX ORDER — ROCURONIUM BROMIDE 10 MG/ML
INJECTION, SOLUTION INTRAVENOUS AS NEEDED
Status: DISCONTINUED | OUTPATIENT
Start: 2020-11-27 | End: 2020-11-27

## 2020-11-27 RX ORDER — ONDANSETRON 2 MG/ML
INJECTION INTRAMUSCULAR; INTRAVENOUS AS NEEDED
Status: DISCONTINUED | OUTPATIENT
Start: 2020-11-27 | End: 2020-11-27

## 2020-11-27 RX ORDER — PROPOFOL 10 MG/ML
INJECTION, EMULSION INTRAVENOUS AS NEEDED
Status: DISCONTINUED | OUTPATIENT
Start: 2020-11-27 | End: 2020-11-27

## 2020-11-27 RX ORDER — LABETALOL 20 MG/4 ML (5 MG/ML) INTRAVENOUS SYRINGE
AS NEEDED
Status: DISCONTINUED | OUTPATIENT
Start: 2020-11-27 | End: 2020-11-27

## 2020-11-27 RX ORDER — FENTANYL CITRATE 50 UG/ML
INJECTION, SOLUTION INTRAMUSCULAR; INTRAVENOUS AS NEEDED
Status: DISCONTINUED | OUTPATIENT
Start: 2020-11-27 | End: 2020-11-27

## 2020-11-27 RX ORDER — LIDOCAINE HYDROCHLORIDE 10 MG/ML
INJECTION, SOLUTION EPIDURAL; INFILTRATION; INTRACAUDAL; PERINEURAL AS NEEDED
Status: DISCONTINUED | OUTPATIENT
Start: 2020-11-27 | End: 2020-11-27

## 2020-11-27 RX ORDER — TRAMADOL HYDROCHLORIDE 50 MG/1
50 TABLET ORAL EVERY 6 HOURS PRN
Qty: 10 TABLET | Refills: 0 | Status: SHIPPED | OUTPATIENT
Start: 2020-11-27 | End: 2020-12-07

## 2020-11-27 RX ORDER — KETOROLAC TROMETHAMINE 30 MG/ML
INJECTION, SOLUTION INTRAMUSCULAR; INTRAVENOUS AS NEEDED
Status: DISCONTINUED | OUTPATIENT
Start: 2020-11-27 | End: 2020-11-27

## 2020-11-27 RX ORDER — SODIUM CHLORIDE, SODIUM LACTATE, POTASSIUM CHLORIDE, CALCIUM CHLORIDE 600; 310; 30; 20 MG/100ML; MG/100ML; MG/100ML; MG/100ML
INJECTION, SOLUTION INTRAVENOUS CONTINUOUS PRN
Status: DISCONTINUED | OUTPATIENT
Start: 2020-11-27 | End: 2020-11-27

## 2020-11-27 RX ORDER — HYDROMORPHONE HCL/PF 1 MG/ML
SYRINGE (ML) INJECTION AS NEEDED
Status: DISCONTINUED | OUTPATIENT
Start: 2020-11-27 | End: 2020-11-27

## 2020-11-27 RX ORDER — DEXAMETHASONE SODIUM PHOSPHATE 4 MG/ML
INJECTION, SOLUTION INTRA-ARTICULAR; INTRALESIONAL; INTRAMUSCULAR; INTRAVENOUS; SOFT TISSUE AS NEEDED
Status: DISCONTINUED | OUTPATIENT
Start: 2020-11-27 | End: 2020-11-27

## 2020-11-27 RX ORDER — LIDOCAINE HYDROCHLORIDE 10 MG/ML
0.5 INJECTION, SOLUTION EPIDURAL; INFILTRATION; INTRACAUDAL; PERINEURAL ONCE AS NEEDED
Status: DISCONTINUED | OUTPATIENT
Start: 2020-11-27 | End: 2020-11-27 | Stop reason: HOSPADM

## 2020-11-27 RX ORDER — CLINDAMYCIN PHOSPHATE 600 MG/50ML
600 INJECTION INTRAVENOUS ONCE
Status: COMPLETED | OUTPATIENT
Start: 2020-11-27 | End: 2020-11-27

## 2020-11-27 RX ORDER — ONDANSETRON 2 MG/ML
4 INJECTION INTRAMUSCULAR; INTRAVENOUS EVERY 4 HOURS PRN
Status: DISCONTINUED | OUTPATIENT
Start: 2020-11-27 | End: 2020-11-27 | Stop reason: HOSPADM

## 2020-11-27 RX ORDER — MAGNESIUM HYDROXIDE 1200 MG/15ML
LIQUID ORAL AS NEEDED
Status: DISCONTINUED | OUTPATIENT
Start: 2020-11-27 | End: 2020-11-27 | Stop reason: HOSPADM

## 2020-11-27 RX ORDER — OXYCODONE HYDROCHLORIDE AND ACETAMINOPHEN 5; 325 MG/1; MG/1
1 TABLET ORAL EVERY 4 HOURS PRN
Status: DISCONTINUED | OUTPATIENT
Start: 2020-11-27 | End: 2020-11-27 | Stop reason: HOSPADM

## 2020-11-27 RX ORDER — SODIUM CHLORIDE 9 MG/ML
INJECTION, SOLUTION INTRAVENOUS AS NEEDED
Status: DISCONTINUED | OUTPATIENT
Start: 2020-11-27 | End: 2020-11-27 | Stop reason: HOSPADM

## 2020-11-27 RX ORDER — PROPOFOL 10 MG/ML
INJECTION, EMULSION INTRAVENOUS CONTINUOUS PRN
Status: DISCONTINUED | OUTPATIENT
Start: 2020-11-27 | End: 2020-11-27

## 2020-11-27 RX ORDER — HYDROMORPHONE HCL/PF 1 MG/ML
0.2 SYRINGE (ML) INJECTION
Status: DISCONTINUED | OUTPATIENT
Start: 2020-11-27 | End: 2020-11-27 | Stop reason: HOSPADM

## 2020-11-27 RX ORDER — ONDANSETRON 2 MG/ML
4 INJECTION INTRAMUSCULAR; INTRAVENOUS ONCE AS NEEDED
Status: DISCONTINUED | OUTPATIENT
Start: 2020-11-27 | End: 2020-11-27 | Stop reason: HOSPADM

## 2020-11-27 RX ORDER — BUPIVACAINE HYDROCHLORIDE AND EPINEPHRINE 5; 5 MG/ML; UG/ML
INJECTION, SOLUTION EPIDURAL; INTRACAUDAL; PERINEURAL AS NEEDED
Status: DISCONTINUED | OUTPATIENT
Start: 2020-11-27 | End: 2020-11-27 | Stop reason: HOSPADM

## 2020-11-27 RX ORDER — MIDAZOLAM HYDROCHLORIDE 2 MG/2ML
INJECTION, SOLUTION INTRAMUSCULAR; INTRAVENOUS AS NEEDED
Status: DISCONTINUED | OUTPATIENT
Start: 2020-11-27 | End: 2020-11-27

## 2020-11-27 RX ADMIN — PROPOFOL 200 MG: 10 INJECTION, EMULSION INTRAVENOUS at 11:00

## 2020-11-27 RX ADMIN — ONDANSETRON 4 MG: 2 INJECTION INTRAMUSCULAR; INTRAVENOUS at 11:06

## 2020-11-27 RX ADMIN — DEXAMETHASONE SODIUM PHOSPHATE 4 MG: 4 INJECTION, SOLUTION INTRAMUSCULAR; INTRAVENOUS at 11:07

## 2020-11-27 RX ADMIN — MIDAZOLAM HYDROCHLORIDE 2 MG: 1 INJECTION, SOLUTION INTRAMUSCULAR; INTRAVENOUS at 10:50

## 2020-11-27 RX ADMIN — HYDROMORPHONE HYDROCHLORIDE 0.5 MG: 1 INJECTION, SOLUTION INTRAMUSCULAR; INTRAVENOUS; SUBCUTANEOUS at 11:46

## 2020-11-27 RX ADMIN — KETOROLAC TROMETHAMINE 30 MG: 30 INJECTION, SOLUTION INTRAMUSCULAR at 12:09

## 2020-11-27 RX ADMIN — ROCURONIUM BROMIDE 40 MG: 10 SOLUTION INTRAVENOUS at 11:00

## 2020-11-27 RX ADMIN — FENTANYL CITRATE 100 MCG: 50 INJECTION INTRAMUSCULAR; INTRAVENOUS at 11:00

## 2020-11-27 RX ADMIN — LABETALOL 20 MG/4 ML (5 MG/ML) INTRAVENOUS SYRINGE 5 MG: at 11:57

## 2020-11-27 RX ADMIN — PROPOFOL 150 MCG/KG/MIN: 10 INJECTION, EMULSION INTRAVENOUS at 10:55

## 2020-11-27 RX ADMIN — SODIUM CHLORIDE, SODIUM LACTATE, POTASSIUM CHLORIDE, AND CALCIUM CHLORIDE: .6; .31; .03; .02 INJECTION, SOLUTION INTRAVENOUS at 10:55

## 2020-11-27 RX ADMIN — FENTANYL CITRATE 50 MCG: 50 INJECTION INTRAMUSCULAR; INTRAVENOUS at 11:34

## 2020-11-27 RX ADMIN — LABETALOL 20 MG/4 ML (5 MG/ML) INTRAVENOUS SYRINGE 5 MG: at 11:51

## 2020-11-27 RX ADMIN — CLINDAMYCIN PHOSPHATE 600 MG: 12 INJECTION, SOLUTION INTRAMUSCULAR; INTRAVENOUS at 10:55

## 2020-11-27 RX ADMIN — FENTANYL CITRATE 50 MCG: 50 INJECTION INTRAMUSCULAR; INTRAVENOUS at 11:30

## 2020-11-27 RX ADMIN — LIDOCAINE HYDROCHLORIDE 50 MG: 10 INJECTION, SOLUTION EPIDURAL; INFILTRATION; INTRACAUDAL at 11:00

## 2020-12-04 ENCOUNTER — TELEPHONE (OUTPATIENT)
Dept: PODIATRY | Facility: CLINIC | Age: 60
End: 2020-12-04

## 2020-12-04 DIAGNOSIS — G62.9 PERIPHERAL NERVE DISORDER: Primary | ICD-10-CM

## 2020-12-04 RX ORDER — GABAPENTIN 300 MG/1
CAPSULE ORAL
Qty: 540 CAPSULE | Refills: 0 | Status: SHIPPED | OUTPATIENT
Start: 2020-12-04 | End: 2022-04-05

## 2020-12-09 ENCOUNTER — OFFICE VISIT (OUTPATIENT)
Dept: OBGYN CLINIC | Facility: OTHER | Age: 60
End: 2020-12-09

## 2020-12-09 VITALS
DIASTOLIC BLOOD PRESSURE: 85 MMHG | SYSTOLIC BLOOD PRESSURE: 132 MMHG | WEIGHT: 180 LBS | HEART RATE: 94 BPM | BODY MASS INDEX: 28.19 KG/M2

## 2020-12-09 DIAGNOSIS — M17.11 PRIMARY OSTEOARTHRITIS OF RIGHT KNEE: Primary | ICD-10-CM

## 2020-12-09 DIAGNOSIS — S83.241D TEAR OF MEDIAL MENISCUS OF RIGHT KNEE, CURRENT, SUBSEQUENT ENCOUNTER: ICD-10-CM

## 2020-12-09 PROCEDURE — 99213 OFFICE O/P EST LOW 20 MIN: CPT | Performed by: ORTHOPAEDIC SURGERY

## 2020-12-15 ENCOUNTER — TELEMEDICINE (OUTPATIENT)
Dept: PAIN MEDICINE | Facility: CLINIC | Age: 60
End: 2020-12-15
Payer: MEDICARE

## 2020-12-15 ENCOUNTER — TELEPHONE (OUTPATIENT)
Dept: OBGYN CLINIC | Facility: HOSPITAL | Age: 60
End: 2020-12-15

## 2020-12-15 ENCOUNTER — TELEMEDICINE (OUTPATIENT)
Dept: SURGERY | Facility: CLINIC | Age: 60
End: 2020-12-15

## 2020-12-15 DIAGNOSIS — M51.36 DDD (DEGENERATIVE DISC DISEASE), LUMBAR: ICD-10-CM

## 2020-12-15 DIAGNOSIS — K80.20 CALCULUS OF GALLBLADDER WITHOUT CHOLECYSTITIS WITHOUT OBSTRUCTION: Primary | ICD-10-CM

## 2020-12-15 DIAGNOSIS — M54.16 LUMBAR RADICULOPATHY: ICD-10-CM

## 2020-12-15 DIAGNOSIS — G89.4 CHRONIC PAIN SYNDROME: Primary | ICD-10-CM

## 2020-12-15 DIAGNOSIS — M48.061 SPINAL STENOSIS OF LUMBAR REGION, UNSPECIFIED WHETHER NEUROGENIC CLAUDICATION PRESENT: ICD-10-CM

## 2020-12-15 DIAGNOSIS — M47.816 LUMBAR SPONDYLOSIS: ICD-10-CM

## 2020-12-15 DIAGNOSIS — M54.2 NECK PAIN: ICD-10-CM

## 2020-12-15 PROCEDURE — 99024 POSTOP FOLLOW-UP VISIT: CPT | Performed by: SURGERY

## 2020-12-15 PROCEDURE — 99442 PR PHYS/QHP TELEPHONE EVALUATION 11-20 MIN: CPT | Performed by: NURSE PRACTITIONER

## 2020-12-16 ENCOUNTER — TELEPHONE (OUTPATIENT)
Dept: OBGYN CLINIC | Facility: HOSPITAL | Age: 60
End: 2020-12-16

## 2020-12-22 ENCOUNTER — APPOINTMENT (OUTPATIENT)
Dept: RADIOLOGY | Age: 60
End: 2020-12-22
Payer: MEDICARE

## 2020-12-22 DIAGNOSIS — M54.2 NECK PAIN: ICD-10-CM

## 2020-12-22 PROCEDURE — 72050 X-RAY EXAM NECK SPINE 4/5VWS: CPT

## 2020-12-23 ENCOUNTER — OFFICE VISIT (OUTPATIENT)
Dept: OBGYN CLINIC | Facility: OTHER | Age: 60
End: 2020-12-23
Payer: OTHER MISCELLANEOUS

## 2020-12-23 VITALS
SYSTOLIC BLOOD PRESSURE: 130 MMHG | BODY MASS INDEX: 28.19 KG/M2 | HEART RATE: 94 BPM | DIASTOLIC BLOOD PRESSURE: 81 MMHG | WEIGHT: 180 LBS

## 2020-12-23 DIAGNOSIS — S83.241D TEAR OF MEDIAL MENISCUS OF RIGHT KNEE, CURRENT, SUBSEQUENT ENCOUNTER: ICD-10-CM

## 2020-12-23 DIAGNOSIS — M17.11 PRIMARY OSTEOARTHRITIS OF RIGHT KNEE: Primary | ICD-10-CM

## 2020-12-23 PROCEDURE — 20610 DRAIN/INJ JOINT/BURSA W/O US: CPT | Performed by: PHYSICIAN ASSISTANT

## 2020-12-23 PROCEDURE — 99213 OFFICE O/P EST LOW 20 MIN: CPT | Performed by: PHYSICIAN ASSISTANT

## 2020-12-23 RX ORDER — LIDOCAINE HYDROCHLORIDE 10 MG/ML
5 INJECTION, SOLUTION INFILTRATION; PERINEURAL
Status: COMPLETED | OUTPATIENT
Start: 2020-12-23 | End: 2020-12-23

## 2020-12-23 RX ADMIN — LIDOCAINE HYDROCHLORIDE 5 ML: 10 INJECTION, SOLUTION INFILTRATION; PERINEURAL at 12:21

## 2020-12-28 ENCOUNTER — TELEPHONE (OUTPATIENT)
Dept: OBGYN CLINIC | Facility: HOSPITAL | Age: 60
End: 2020-12-28

## 2020-12-29 ENCOUNTER — TELEPHONE (OUTPATIENT)
Dept: PAIN MEDICINE | Facility: CLINIC | Age: 60
End: 2020-12-29

## 2020-12-29 ENCOUNTER — HOSPITAL ENCOUNTER (OUTPATIENT)
Dept: RADIOLOGY | Facility: CLINIC | Age: 60
Discharge: HOME | End: 2020-12-29
Attending: ANESTHESIOLOGY | Admitting: ANESTHESIOLOGY
Payer: MEDICARE

## 2020-12-29 VITALS
TEMPERATURE: 98 F | RESPIRATION RATE: 20 BRPM | HEART RATE: 74 BPM | OXYGEN SATURATION: 95 % | SYSTOLIC BLOOD PRESSURE: 119 MMHG | DIASTOLIC BLOOD PRESSURE: 74 MMHG

## 2020-12-29 DIAGNOSIS — M54.16 LUMBAR RADICULOPATHY: ICD-10-CM

## 2020-12-29 PROCEDURE — 64483 NJX AA&/STRD TFRM EPI L/S 1: CPT | Performed by: ANESTHESIOLOGY

## 2020-12-29 PROCEDURE — 64484 NJX AA&/STRD TFRM EPI L/S EA: CPT | Performed by: ANESTHESIOLOGY

## 2020-12-29 RX ORDER — LIDOCAINE HYDROCHLORIDE 10 MG/ML
5 INJECTION, SOLUTION EPIDURAL; INFILTRATION; INTRACAUDAL; PERINEURAL ONCE
Status: COMPLETED | OUTPATIENT
Start: 2020-12-29 | End: 2020-12-29

## 2020-12-29 RX ORDER — PAPAVERINE HCL 150 MG
20 CAPSULE, EXTENDED RELEASE ORAL ONCE
Status: COMPLETED | OUTPATIENT
Start: 2020-12-29 | End: 2020-12-29

## 2020-12-29 RX ADMIN — IOHEXOL 2 ML: 300 INJECTION, SOLUTION INTRAVENOUS at 14:06

## 2020-12-29 RX ADMIN — DEXAMETHASONE SODIUM PHOSPHATE 15 MG: 10 INJECTION, SOLUTION INTRAMUSCULAR; INTRAVENOUS at 14:08

## 2020-12-29 RX ADMIN — LIDOCAINE HYDROCHLORIDE 2 ML: 20 INJECTION, SOLUTION EPIDURAL; INFILTRATION; INTRACAUDAL; PERINEURAL at 14:07

## 2020-12-29 RX ADMIN — LIDOCAINE HYDROCHLORIDE 4 ML: 10 INJECTION, SOLUTION EPIDURAL; INFILTRATION; INTRACAUDAL; PERINEURAL at 14:03

## 2020-12-29 NOTE — H&P
History of Present Illness: The patient is a 62 y o  female who presents with complaints of  Low back and leg pain      Patient Active Problem List   Diagnosis    Anxiety    Ortega esophagus    Depression    Lumbar radiculopathy    DDD (degenerative disc disease), lumbar    Lumbar spondylosis    Spinal stenosis of lumbar region    Other tear of medial meniscus, current injury, right knee, initial encounter    PONV (postoperative nausea and vomiting)    Calculus of gallbladder without cholecystitis without obstruction    Chronic pain syndrome       Past Medical History:   Diagnosis Date    Anxiety     Arthritis     Last assessed 5/3/2011    Ortega's esophagus     [facility]     ovarian cancer at age 28 yo- REMOVAL  B/L    Colon polyp     DDD (degenerative disc disease), lumbar     Depression     Fall     5/2020 right knee meniscus tear- OR repair today 8/3/2020    Fibromyalgia     Fibromyalgia, primary     Fracture of one or more phalanges of foot     GERD (gastroesophageal reflux disease)     H/O bladder infections     chronic    Hypertension     Kidney infection     occasional    Migraines     Ovarian cancer ([facility] ) 26    age 27    Polyneuropathy     Last assessed 6/23/2016 knees to feet    PONV (postoperative nausea and vomiting)     Patient requests to be pre-medicated prior to surgery prior to surgery    PONV (postoperative nausea and vomiting) 8/3/2020    Renal disorder     Spinal stenosis     Vertigo     Wears glasses     reading       Past Surgical History:   Procedure Laterality Date   [address]    several    BACK SURGERY  1990    CATARACT EXTRACTION, BILATERAL      COLONOSCOPY      FOOT FRACTURE SURGERY Bilateral 2004    x 5  surgeries    KIDNEY SURGERY  1974    at age 13 yo    KNEE SURGERY Right     [place] KNEE SCOPE,MED/LAT MENISECTOMY Right 8/3/2020    Procedure: KNEE ARTHROSCOPY,PARTIAL MEDIAL & LATERAL MENISECTOMIES;  Surgeon: Ramin Torres MD; [facility];  Service: Orthopedics    HI LAP,CHOLECYSTECTOMY N/A 11/27/2020    Procedure: Kiran Sanchez;  Surgeon: Carina Reeves MD;  [facility];  Service: General    TOTAL ABDOMINAL HYSTERECTOMY  1987    age 27    TOTAL ABDOMINAL HYSTERECTOMY W/ BILATERAL SALPINGOOPHORECTOMY Bilateral 1987    age 27         Current Outpatient Medications:     busPIRone (BUSPAR) 5 mg tablet, Take 5 mg by mouth 2 (two) times a day, Disp: , Rfl:     calcium-vitamin D 250-100 MG-UNIT per tablet, Take 1 tablet by mouth 2 (two) times a day, Disp: , Rfl:     cyanocobalamin (VITAMIN B-12) 100 mcg tablet, Take by mouth daily, Disp: , Rfl:     gabapentin (NEURONTIN) 300 mg capsule, Take 1 capsule (300 mg total) by mouth 3 (three) times a day, Disp: 90 capsule, Rfl: 0    gabapentin (NEURONTIN) 300 mg capsule, Take 2 tablets tid , Disp: 540 capsule, Rfl: 0    hydrOXYzine HCL (ATARAX) 50 mg tablet, , Disp: , Rfl:     meclizine (ANTIVERT) 12 5 MG tablet, Take 1 tablet (12 5 mg total) by mouth every 8 (eight) hours as needed for dizziness or nausea, Disp: 30 tablet, Rfl: 0    methenamine hippurate (HIPREX) 1 g tablet, 1 g 2 (two) times a day with meals , Disp: , Rfl:     omeprazole (PriLOSEC) 40 MG capsule, Take 40 mg by mouth daily , Disp: , Rfl:     PARoxetine (PAXIL) 10 mg tablet, Take by mouth, Disp: , Rfl:     PARoxetine (PAXIL) 40 MG tablet, Take by mouth, Disp: , Rfl:     SUMAtriptan (IMITREX) 50 mg tablet, Take 1 tablet (50 mg total) by mouth once as needed for migraine for up to 1 dose May repeat in 2 hours    No more than 200 mg per day , Disp: 10 tablet, Rfl: 2    Allergies   Allergen Reactions    Morphine      Acts not like herself and feels agitated and restless    Penicillins Hives     Tongue swells       Physical Exam:   Vitals:    12/29/20 1345   BP: 126/85   Pulse: 75   Resp: 20   Temp: 98 °F (36 7 °C)   SpO2: 99%     General: Awake, Alert, Oriented x 3, Mood and affect appropriate  Respiratory: Respirations even and unlabored  Cardiovascular: Peripheral pulses intact; no edema  Musculoskeletal Exam:   Right lumbar paraspinals tender to palpation  ASA Score: 2    Patient/Chart Verification  Patient ID Verified: Verbal  ID Band Applied: No  Consents Confirmed: Procedural  H&P( within 30 days) Verified: To be obtained in the Pre-Procedure area  Interval H&P(within 24 hr) Complete (required for Outpatients and Surgery Admit only): To be obtained in the Pre-Procedure area  Allergies Reviewed: Yes  Anticoag/NSAID held?: NA(pt denies taking any aspirin or blood thinnings meds)  Currently on antibiotics?: No  Pregnancy denied?: NA    Assessment:   1   Lumbar radiculopathy        Plan: Right L4 and L5 TFESI

## 2020-12-29 NOTE — DISCHARGE INSTRUCTIONS
Epidural Steroid Injection   WHAT YOU NEED TO KNOW:   An epidural steroid injection (DANIELLA) is a procedure to inject steroid medicine into the epidural space  The epidural space is between your spinal cord and vertebrae  Steroids reduce inflammation and fluid buildup in your spine that may be causing pain  You may be given pain medicine along with the steroids  ACTIVITY  · Do not drive or operate machinery today  · No strenuous activity today - bending, lifting, etc   · You may resume normal activites starting tomorrow - start slowly and as tolerated  · You may shower today, but no tub baths or hot tubs  · You may have numbness for several hours from the local anesthetic  Please use caution and common sense, especially with weight-bearing activities  CARE OF THE INJECTION SITE  · If you have soreness or pain, apply ice to the area today (20 minutes on/20 minutes off)  · Starting tomorrow, you may use warm, moist heat or ice if needed  · You may have an increase or change in your discomfort for 36-48 hours after your treatment  · Apply ice and continue with any pain medication you have been prescribed  · [facility]ify [facility] if you have any of the following: redness, drainage, swelling, headache, stiff neck or fever above 100°F     SPECIAL INSTRUCTIONS  · Our office will contact you in approximately 7 days for a progress report  MEDICATIONS  · Continue to take all routine medications  · Our office may have instructed you to hold some medications  If you have a problem specifically related to your procedure, please call our office at [phone]  Problems not related to your procedure should be directed to your primary care physician

## 2021-01-05 ENCOUNTER — TELEPHONE (OUTPATIENT)
Dept: PAIN MEDICINE | Facility: CLINIC | Age: 61
End: 2021-01-05

## 2021-01-07 ENCOUNTER — TELEMEDICINE (OUTPATIENT)
Dept: PAIN MEDICINE | Facility: CLINIC | Age: 61
End: 2021-01-07
Payer: MEDICARE

## 2021-01-07 DIAGNOSIS — M51.36 DDD (DEGENERATIVE DISC DISEASE), LUMBAR: ICD-10-CM

## 2021-01-07 DIAGNOSIS — M50.30 DDD (DEGENERATIVE DISC DISEASE), CERVICAL: ICD-10-CM

## 2021-01-07 DIAGNOSIS — M54.2 NECK PAIN: Primary | ICD-10-CM

## 2021-01-07 DIAGNOSIS — G89.4 CHRONIC PAIN SYNDROME: ICD-10-CM

## 2021-01-07 DIAGNOSIS — M47.816 LUMBAR SPONDYLOSIS: ICD-10-CM

## 2021-01-07 DIAGNOSIS — M54.16 LUMBAR RADICULOPATHY: ICD-10-CM

## 2021-01-07 PROCEDURE — 99214 OFFICE O/P EST MOD 30 MIN: CPT | Performed by: ANESTHESIOLOGY

## 2021-01-07 RX ORDER — MELOXICAM 7.5 MG/1
7.5 TABLET ORAL 2 TIMES DAILY PRN
Qty: 60 TABLET | Refills: 1 | Status: SHIPPED | OUTPATIENT
Start: 2021-01-07

## 2021-01-07 NOTE — PROGRESS NOTES
Virtual Regular Visit      Assessment/Plan:    Problem List Items Addressed This Visit        Nervous and Auditory    Lumbar radiculopathy       Musculoskeletal and Integument    DDD (degenerative disc disease), lumbar    Lumbar spondylosis    DDD (degenerative disc disease), cervical       Other    Chronic pain syndrome      Other Visit Diagnoses     Neck pain    -  Primary    Relevant Medications    meloxicam (MOBIC) 7 5 mg tablet               Reason for visit is   Chief Complaint   Patient presents with    Virtual Regular Visit        Encounter provider Arturo Holguin DO    Provider located at [address] [place] [id number]      Recent Visits  Date Type Provider Dept   01/05/21 Telephone Gillian Pedroza MA [facility] [place]   Showing recent visits within past 7 days and meeting all other requirements     Future Appointments  No visits were found meeting these conditions  Showing future appointments within next 150 days and meeting all other requirements        The patient was identified by name and date of birth  Mason Sharp was informed that this is a telemedicine visit and that the visit is being conducted through [employer] and patient was informed that this is a secure, HIPAA-compliant platform  She agrees to proceed     My office door was closed  No one else was in the room  She acknowledged consent and understanding of privacy and security of the video platform  The patient has agreed to participate and understands they can discontinue the visit at any time  Patient is aware this is a billable service  Sam Members is a 56 y o  female  20-year-old female with a history of chronic pain syndrome, lumbar degenerative disc disease, spondylosis, stenosis, lumbar radiculopathy, and fibromyalgia returning for follow-up of chronic neck pain  She denies any radicular symptoms into her upper extremities    The patient does have low back pain radiating into the right lower extremity, but denies any bladder or bowel incontinence or saddle anesthesia  Right lower extremity symptoms are improved since right L4 and L5 TFESI roughly 1 week ago  Patient's right-sided lower back pain does persist   She is currently taking gabapentin 600 mg t i d  With mild relief  She denies any side effects of the medication  The patient rates her pain a 6/10 on the pain does not follow any particular pattern throughout the day  The pain is described as aching  The pain is worse with exercise and alleviated with sitting and lying down  I have personally reviewed and/or updated the patient's past medical history, past surgical history, family history, social history, allergies, and vital signs today  Other than as stated above, the patient denies any interval changes in medications, medical condition, mental condition, symptoms, or allergies since the last office visit              HPI     Past Medical History:   Diagnosis Date    Anxiety     Arthritis     Last assessed 5/3/2011    Ortega's esophagus     Cancer ([facility] [address] )     ovarian cancer at age 26 yo- REMOVAL  B/L    Colon polyp     DDD (degenerative disc disease), lumbar     Depression     Fall     5/2020 right knee meniscus tear- OR repair today 8/3/2020    Fibromyalgia     Fibromyalgia, primary     Fracture of one or more phalanges of foot     GERD (gastroesophageal reflux disease)     H/O bladder infections     chronic    Hypertension     Kidney infection     occasional    Migraines     Ovarian cancer ([facility] [address] ) 1987    age 27    Polyneuropathy     Last assessed 6/23/2016 knees to feet    PONV (postoperative nausea and vomiting)     Patient requests to be pre-medicated prior to surgery prior to surgery    PONV (postoperative nausea and vomiting) 8/3/2020    Renal disorder     Spinal stenosis     Vertigo     Wears glasses     reading       Past Surgical History:   Procedure Laterality Date    ABDOMINAL SURGERY  1986    several    BACK SURGERY  1990    CATARACT EXTRACTION, BILATERAL      COLONOSCOPY      FOOT FRACTURE SURGERY Bilateral 2004    x 5  surgeries    KIDNEY SURGERY  1974    at age 13 yo    KNEE SURGERY Right     AK KNEE SCOPE,MED/LAT MENISECTOMY Right 8/3/2020    Procedure: [address];  Surgeon: Eber Garcia MD;  [facility];  Service: Orthopedics    AK LAP,CHOLECYSTECTOMY N/A 11/27/2020    Procedure: Fabienne Knife;  Surgeon: Carleen Bonilla MD;  [facility];  Service: General    TOTAL ABDOMINAL HYSTERECTOMY  1987    age 27    TOTAL ABDOMINAL HYSTERECTOMY W/ BILATERAL SALPINGOOPHORECTOMY Bilateral 1987    age 27       Current Outpatient Medications   Medication Sig Dispense Refill    busPIRone (BUSPAR) 5 mg tablet Take 5 mg by mouth 2 (two) times a day      calcium-vitamin D 250-100 MG-UNIT per tablet Take 1 tablet by mouth 2 (two) times a day      cyanocobalamin (VITAMIN B-12) 100 mcg tablet Take by mouth daily      gabapentin (NEURONTIN) 300 mg capsule Take 1 capsule (300 mg total) by mouth 3 (three) times a day 90 capsule 0    gabapentin (NEURONTIN) 300 mg capsule Take 2 tablets tid   540 capsule 0    hydrOXYzine HCL (ATARAX) 50 mg tablet       meclizine (ANTIVERT) 12 5 MG tablet Take 1 tablet (12 5 mg total) by mouth every 8 (eight) hours as needed for dizziness or nausea 30 tablet 0    meloxicam (MOBIC) 7 5 mg tablet Take 1 tablet (7 5 mg total) by mouth 2 (two) times a day as needed for moderate pain 60 tablet 1    methenamine hippurate (HIPREX) 1 g tablet 1 g 2 (two) times a day with meals       omeprazole (PriLOSEC) 40 MG capsule Take 40 mg by mouth daily       PARoxetine (PAXIL) 10 mg tablet Take by mouth      PARoxetine (PAXIL) 40 MG tablet Take by mouth      SUMAtriptan (IMITREX) 50 mg tablet Take 1 tablet (50 mg total) by mouth once as needed for migraine for up to 1 dose May repeat in 2 hours  No more than 200 mg per day  10 tablet 2     No current facility-administered medications for this visit  Allergies   Allergen Reactions    Morphine      Acts not like herself and feels agitated and restless    Penicillins Hives     Tongue swells       Review of Systems    Review of systems negative for fevers, chills, shortness of breath, cough, chest pain, nausea, vomiting, diarrhea, constipation, incontinence, memory loss, or difficulty walking  Review of systems positive for neck pain, back pain  Video Exam    There were no vitals filed for this visit  Physical Exam     General:  Alert and oriented x3  In no acute distress  Well-developed and well-nourished  HEENT:  Grossly intact  Eyes:  Anicteric  Respiratory:  Even and unlabored respirations  Psychiatric:  Normal affect  Musculoskeletal:  Full range of motion of cervical spine in all planes  Patient able to move all 4 extremities  Assessment:  Debbi Baker is a 56 y o  female  56-year-old female with a history of chronic pain syndrome, lumbar degenerative disc disease, spondylosis, stenosis, lumbar radiculopathy, and fibromyalgia returning for follow-up of chronic axial neck pain  Right lower extremity symptoms are improved since right L4 and L5 TFESI 1 week ago  Low back pain is still persisting  This may be facet mediated nature  Plan:    1  I will prescribe a trial of meloxicam 7 5 mg b i d  p r n  and the patient should avoid any other NSAIDs while on this medication  2  Patient will continue with gabapentin 600 mg t i d   3  May consider cervical and lumbar medial branch blocks to address the facet mediated component of her neck and low back pain  The patient would like to hold off on interventional therapy at this time and try some more conservative treatment  4  Patient will continue with her home exercise program  5   I will follow up the patient in 6 weeks        I spent 15 minutes directly with the patient during this visit      [id number] acknowledges that she has consented to an online visit or consultation  She understands that the online visit is based solely on information provided by her, and that, in the absence of a face-to-face physical evaluation by the physician, the diagnosis she receives is both limited and provisional in terms of accuracy and completeness  This is not intended to replace a full medical face-to-face evaluation by the physician  Karmen Luna understands and accepts these terms

## 2021-02-05 ENCOUNTER — OFFICE VISIT (OUTPATIENT)
Dept: URGENT CARE | Age: 61
End: 2021-02-05
Payer: MEDICARE

## 2021-02-05 ENCOUNTER — APPOINTMENT (OUTPATIENT)
Dept: RADIOLOGY | Age: 61
End: 2021-02-05
Payer: MEDICARE

## 2021-02-05 VITALS
RESPIRATION RATE: 20 BRPM | HEIGHT: 67 IN | OXYGEN SATURATION: 97 % | BODY MASS INDEX: 28.25 KG/M2 | TEMPERATURE: 98.2 F | WEIGHT: 180 LBS | HEART RATE: 82 BPM | SYSTOLIC BLOOD PRESSURE: 142 MMHG | DIASTOLIC BLOOD PRESSURE: 90 MMHG

## 2021-02-05 DIAGNOSIS — S29.9XXA RIB INJURY: ICD-10-CM

## 2021-02-05 DIAGNOSIS — S20.212A RIB CONTUSION, LEFT, INITIAL ENCOUNTER: Primary | ICD-10-CM

## 2021-02-05 DIAGNOSIS — Z11.59 SPECIAL SCREENING EXAMINATION FOR UNSPECIFIED VIRAL DISEASE: ICD-10-CM

## 2021-02-05 PROCEDURE — U0003 INFECTIOUS AGENT DETECTION BY NUCLEIC ACID (DNA OR RNA); SEVERE ACUTE RESPIRATORY SYNDROME CORONAVIRUS 2 (SARS-COV-2) (CORONAVIRUS DISEASE [COVID-19]), AMPLIFIED PROBE TECHNIQUE, MAKING USE OF HIGH THROUGHPUT TECHNOLOGIES AS DESCRIBED BY CMS-2020-01-R: HCPCS | Performed by: PHYSICIAN ASSISTANT

## 2021-02-05 PROCEDURE — 99213 OFFICE O/P EST LOW 20 MIN: CPT | Performed by: PHYSICIAN ASSISTANT

## 2021-02-05 PROCEDURE — G0463 HOSPITAL OUTPT CLINIC VISIT: HCPCS | Performed by: PHYSICIAN ASSISTANT

## 2021-02-05 PROCEDURE — U0005 INFEC AGEN DETEC AMPLI PROBE: HCPCS | Performed by: PHYSICIAN ASSISTANT

## 2021-02-05 PROCEDURE — 71101 X-RAY EXAM UNILAT RIBS/CHEST: CPT

## 2021-02-05 NOTE — PROGRESS NOTES
[facility]        NAME: Edgar Izquierdo is a 56 y o  female  : 1960    MRN: [id number]  DATE: 2021  TIME: 2:14 PM    Assessment and Plan   Rib contusion, left, initial encounter Lobo Ignacio  1  Rib contusion, left, initial encounter  XR ribs left w pa chest min 3 views   2  Special screening examination for unspecified viral disease  Novel Coronavirus (Covid-19),PCR [facility]         Patient Instructions       Continue to monitor symptoms  If new or worsening symptoms develop, go immediately to Er  Drink plenty of fluids  Follow up with Family Doctor this week  Chief Complaint     Chief Complaint   Patient presents with    Rib Injury     pt is here for a injury to her left ribs from a fall on wednesday in the snow  History of Present Illness       Pt states that 3 days ago she was outside  She slipped on the ice, landed on L knee, fell backwards and hit L side of ribcage on the curb  Since then she has had L sided rib pain, pain with deep inspiration  Pt has no SOB  No dizziness  No palpitations  Pain is 8/10  Pt has been taking tylenol for pain with mild relief  Pt did not hit head  No LOC  Pt states L knee doesn't hurt now, her pain is in her L ribcage    Pt states that a few days before the fall she had some trouble taking deep breaths  No real SOB, no cough, but states she felt like she just wasn't taking full breaths  States that she doesn't really feel that now  States she called her work and her work wants her tested before she comes back  Review of Systems   Review of Systems   Constitutional: Negative  Negative for chills, fatigue and fever  HENT: Negative  Eyes: Negative  Respiratory: Negative  Negative for cough, chest tightness, shortness of breath and wheezing  Cardiovascular: Negative  Negative for palpitations and leg swelling  Gastrointestinal: Negative for abdominal pain, constipation, diarrhea, nausea and vomiting  Endocrine: Negative  Genitourinary: Negative for dysuria, flank pain, frequency, pelvic pain, vaginal discharge and vaginal pain  Musculoskeletal: Negative for back pain, gait problem, neck pain and neck stiffness  Skin: Negative  Negative for pallor and rash  Allergic/Immunologic: Negative  Neurological: Negative  Negative for weakness and numbness  Hematological: Negative  Psychiatric/Behavioral: Negative  Current Medications       Current Outpatient Medications:     busPIRone (BUSPAR) 5 mg tablet, Take 5 mg by mouth 2 (two) times a day, Disp: , Rfl:     calcium-vitamin D 250-100 MG-UNIT per tablet, Take 1 tablet by mouth 2 (two) times a day, Disp: , Rfl:     cyanocobalamin (VITAMIN B-12) 100 mcg tablet, Take by mouth daily, Disp: , Rfl:     gabapentin (NEURONTIN) 300 mg capsule, Take 1 capsule (300 mg total) by mouth 3 (three) times a day, Disp: 90 capsule, Rfl: 0    gabapentin (NEURONTIN) 300 mg capsule, Take 2 tablets tid , Disp: 540 capsule, Rfl: 0    hydrOXYzine HCL (ATARAX) 50 mg tablet, , Disp: , Rfl:     meclizine (ANTIVERT) 12 5 MG tablet, Take 1 tablet (12 5 mg total) by mouth every 8 (eight) hours as needed for dizziness or nausea, Disp: 30 tablet, Rfl: 0    meloxicam (MOBIC) 7 5 mg tablet, Take 1 tablet (7 5 mg total) by mouth 2 (two) times a day as needed for moderate pain, Disp: 60 tablet, Rfl: 1    methenamine hippurate (HIPREX) 1 g tablet, 1 g 2 (two) times a day with meals , Disp: , Rfl:     omeprazole (PriLOSEC) 40 MG capsule, Take 40 mg by mouth daily , Disp: , Rfl:     PARoxetine (PAXIL) 10 mg tablet, Take by mouth, Disp: , Rfl:     PARoxetine (PAXIL) 40 MG tablet, Take by mouth, Disp: , Rfl:     SUMAtriptan (IMITREX) 50 mg tablet, Take 1 tablet (50 mg total) by mouth once as needed for migraine for up to 1 dose May repeat in 2 hours    No more than 200 mg per day , Disp: 10 tablet, Rfl: 2    Current Allergies     Allergies as of 02/05/2021 - Reviewed 02/05/2021   Allergen Reaction Noted    Morphine  12/27/2016    Penicillins Hives 12/27/2016            The following portions of the patient's history were reviewed and updated as appropriate: allergies, current medications, past family history, past medical history, past social history, past surgical history and problem list      Past Medical History:   Diagnosis Date    Anxiety     Arthritis     Last assessed 5/3/2011    Ortega's esophagus     Cancer ([facility] [address] )     ovarian cancer at age 26 yo- REMOVAL  B/L    Colon polyp     DDD (degenerative disc disease), lumbar     Depression     Fall     5/2020 right knee meniscus tear- OR repair today 8/3/2020    Fibromyalgia     Fibromyalgia, primary     Fracture of one or more phalanges of foot     GERD (gastroesophageal reflux disease)     H/O bladder infections     chronic    Hypertension     Kidney infection     occasional    Migraines     [facility]    age 27    Polyneuropathy     Last assessed 6/23/2016 knees to feet    PONV (postoperative nausea and vomiting)     Patient requests to be pre-medicated prior to surgery prior to surgery    PONV (postoperative nausea and vomiting) 8/3/2020    Renal disorder     Spinal stenosis     Vertigo     Wears glasses     reading       Past Surgical History:   Procedure Laterality Date   [address]    several    BACK SURGERY  1990    CATARACT EXTRACTION, BILATERAL      COLONOSCOPY      FOOT FRACTURE SURGERY Bilateral 2004    x 5  surgeries    KIDNEY SURGERY  1974    at age 13 yo    KNEE SURGERY Right     MA KNEE SCOPE,MED/LAT MENISECTOMY Right 8/3/2020    Procedure: KNEE ARTHROSCOPY,PARTIAL MEDIAL & LATERAL MENISECTOMIES;  Surgeon: Eber Garcia MD;  [facility];  Service: Orthopedics    MA LAP,CHOLECYSTECTOMY N/A 11/27/2020    Procedure: LAPAROSCOPIC CHOLECYSTECTOMY;  Surgeon: Carleen Bonilla MD;  [facility];  Service: General    TOTAL ABDOMINAL HYSTERECTOMY  24    age 27    TOTAL ABDOMINAL HYSTERECTOMY W/ BILATERAL SALPINGOOPHORECTOMY Bilateral 1987    age 27       Family History   Problem Relation Age of Onset    Heart disease Mother     Cancer Mother     Diabetes Mother     Arthritis Mother     Anxiety disorder Mother     Bleeding Disorder Mother     Clotting disorder Mother     Depression Mother     Hyperlipidemia Mother     Irritable bowel syndrome Mother     Hypertension Mother     Obesity Mother     Osteoporosis Mother     Vaginal cancer Mother 27    Skin cancer Mother     Thyroid disease Mother     Diabetes Father     Arthritis Father     Hyperlipidemia Father     Vesicoureteral reflux Father     Heart disease Father     Hypertension Father     Migraines Father     Obesity Father     Hypertension Family     Arthritis Family     Arthritis Brother     Anxiety disorder Brother     Diabetes Brother     Hyperlipidemia Brother     Vesicoureteral reflux Brother     Heart disease Brother     Irritable bowel syndrome Brother     Hypertension Brother     Migraines Brother     Thyroid disease Brother     Pancreatic cancer Brother 54    Migraines Daughter     Asthma Son     Migraines Son     Diabetes Maternal Grandmother     Vaginal cancer Maternal Grandmother 48    Infertility Paternal Grandmother     Arthritis Maternal Aunt     Anxiety disorder Maternal Aunt     Depression Maternal Aunt     Diabetes Maternal Aunt     Vaginal cancer Maternal Aunt 48    Fibroids Paternal Aunt     No Known Problems Maternal Grandfather     No Known Problems Paternal Grandfather     No Known Problems Maternal Aunt     No Known Problems Maternal Aunt     No Known Problems Maternal Aunt          Medications have been verified          Objective   /90   Pulse 82   Temp 98 2 °F (36 8 °C)   Resp 20   Ht 5' 7" (1 702 m)   Wt 81 6 kg (180 lb)   LMP  (LMP Unknown)   SpO2 97%   BMI 28 19 kg/m²        Physical Exam Physical Exam  Vitals signs and nursing note reviewed  Constitutional:       General: She is not in acute distress  Appearance: Normal appearance  She is well-developed  She is not ill-appearing or diaphoretic  HENT:      Head: Normocephalic and atraumatic  Right Ear: External ear normal       Left Ear: External ear normal    Eyes:      General:         Right eye: No discharge  Left eye: No discharge  Conjunctiva/sclera: Conjunctivae normal    Neck:      Musculoskeletal: Normal range of motion and neck supple  Cardiovascular:      Rate and Rhythm: Normal rate and regular rhythm  Heart sounds: Normal heart sounds  Pulmonary:      Effort: Pulmonary effort is normal  No respiratory distress  Breath sounds: Normal breath sounds  No wheezing, rhonchi or rales  Chest:      Chest wall: Tenderness present  No mass, deformity, swelling or edema  Lymphadenopathy:      Cervical: No cervical adenopathy  Skin:     General: Skin is warm  Capillary Refill: Capillary refill takes less than 2 seconds  Findings: No rash  Neurological:      Mental Status: She is alert

## 2021-02-05 NOTE — LETTER
February 5, 2021     Patient: Giuliana Rene   YOB: 1960   Date of Visit: 2/5/2021       To Whom It May Concern: It is my medical opinion that [employer]ie Standard should remain out of work until cleared by lab result  If you have any questions or concerns, please don't hesitate to call           Sincerely,        BURKE DANGELO    CC: No Recipients

## 2021-02-05 NOTE — PATIENT INSTRUCTIONS
Continue to monitor symptoms  If new or worsening symptoms develop, go immediately to [facility]  Drink plenty of fluids  Follow up with Family Doctor this week  Rib Contusion   WHAT YOU NEED TO KNOW:   A rib contusion is a bruise on one or more of your ribs  DISCHARGE INSTRUCTIONS:   Return to the emergency department if:   · You have increased chest pain  · You have shortness of breath  · You start to cough up blood  · Your pain does not improve with pain medicine  Contact your healthcare provider if:   · You have a cough  · You have a fever  · You have questions or concerns about your condition or care  Medicines: You may need any of the following:  · NSAIDs , such as ibuprofen, help decrease swelling, pain, and fever  This medicine is available with or without a doctor's order  NSAIDs can cause stomach bleeding or kidney problems in certain people  If you take blood thinner medicine, always ask if NSAIDs are safe for you  Always read the medicine label and follow directions  Do not give these medicines to children under 10months of age without direction from your child's healthcare provider  · Prescription pain medicine  may be given  Ask how to take this medicine safely  · Take your medicine as directed  Contact your healthcare provider if you think your medicine is not helping or if you have side effects  Tell him of her if you are allergic to any medicine  Keep a list of the medicines, vitamins, and herbs you take  Include the amounts, and when and why you take them  Bring the list or the pill bottles to follow-up visits  Carry your medicine list with you in case of an emergency  Deep breathing:   · To help prevent pneumonia, take 10 deep breaths every hour, even when you wake up during the night  Brace your ribs with your hands or a pillow while you take deep breaths or cough  This will help decrease your pain      · You may need to use an incentive spirometer to help you take deeper breaths  Put the plastic piece into your mouth and take a very deep breath  Hold your breath as long as you can  Then let out your breath  Do this 10 times in a row every hour while you are awake  Rest:  Rest your ribs to decrease swelling and allow the injury to heal faster  Avoid activities that may cause more pain or damage to your ribs  As your pain decreases, begin movements slowly  Ice:  Ice helps decrease swelling and pain  Ice may also help prevent tissue damage  Use an ice pack or put crushed ice in a plastic bag  Cover it with a towel and place it on your bruised area for 15 to 20 minutes every hour as directed  Follow up with your healthcare provider as directed:  Write down your questions so you remember to ask them during your visits  © Copyright [address] Information is for End User's use only and may not be sold, redistributed or otherwise used for commercial purposes  All illustrations and images included in CareNotes® are the copyrighted property of [employer]  or [employer]   The above information is an  only  It is not intended as medical advice for individual conditions or treatments  Talk to your doctor, nurse or pharmacist before following any medical regimen to see if it is safe and effective for you  Patient Instructions   COVID testing initiated  Results may take up to 5-10 days to return, but often come back sooner (2-4 days)     If the patient has a [employer] My Chart account, results may be accessed on line  If the patient does not have the [employer] Chart account, please establish one so results can be accessed  This will be the easiest and quickest way to get a copy of your test results if you require printed documentation  If patient is symptomatic and until results are obtained, home quarantine / self isolation strongly encouraged    If testing is done for screening purposes and patient is not symptomatic, we still recommend masking, social distancing, good hygiene practices be followed  If COVID test is positive, patient / care giver will be contacted by ordering provider or designated staff  If COVID test is positive, please call the primary care provider office to inform of positive test and request follow up evaluation appointment  (Generally, primary care providers are doing telemedicine visits with their positive COVID patients )  If COVID test is positive, please again review all information below  Further questions may be addressed by the primary care provider or the [facility] at [phone]  If the patient / caregiver has not heard about test results or has been unable to access results on the patient My Chart account in a timely fashion, please call the provider's office where test was ordered (or Hot Line if applicable)  to inquire about results  If results are negative and patient / care giver has been found to have already accessed results through the [facility]'s My Chart alma, no call will be made  Until results are obtained, home quarantine / self isolation strongly encouraged  If the patient would develop profound weakness, chest pain, shortness of breath please proceed to an emergency room for further evaluation otherwise we do recommend that patient follow-up with their primary care provider in the next 5-7 days if not improving  Symptomatic treatment as needed for symptoms relief based on age / medical status of patient  Things like warm salt water gargles, Tylenol or Ibuprofen (if not contraindicated), drinking plenty of fluids, nasal saline rinses / spray, warm tea with honey (not for patients less than 1 year of age),  etc may provide symptoms relief  [address]     Your healthcare provider and/or public health staff have evaluated you and have determined that you do not need to remain in the hospital at this time    At this time you can be isolated at home where you will be monitored by staff from your local or state health department  You should carefully follow the prevention and isolation steps below until a healthcare provider or local or state health department says that you can return to your normal activities  Stay home except to get medical care     People who are mildly ill with COVID-19 are able to isolate at home during their illness  You should restrict activities outside your home, except for getting medical care  Do not go to work, school, or public areas  Avoid using public transportation, ride-sharing, or taxis  Separate yourself from other people and animals in your home     People: As much as possible, you should stay in a specific room and away from other people in your home  Also, you should use a separate bathroom, if available  Animals: You should restrict contact with pets and other animals while you are sick with COVID-19, just like you would around other people  Although there have not been reports of pets or other animals becoming sick with COVID-19, it is still recommended that people sick with COVID-19 limit contact with animals until more information is known about the virus  When possible, have another member of your household care for your animals while you are sick  If you are sick with COVID-19, avoid contact with your pet, including petting, snuggling, being kissed or licked, and sharing food  If you must care for your pet or be around animals while you are sick, wash your hands before and after you interact with pets and wear a facemask  See COVID-19 and Animals for more information  Call ahead before visiting your doctor     If you have a medical appointment, call the healthcare provider and tell them that you have or may have COVID-19  This will help the healthcare providers office take steps to keep other people from getting infected or exposed       Wear a facemask     You should wear a facemask when you are around other people (e g , sharing a room or vehicle) or pets and before you enter a healthcare providers office  If you are not able to wear a facemask (for example, because it causes trouble breathing), then people who live with you should not stay in the same room with you, or they should wear a facemask if they enter your room  Cover your coughs and sneezes     Cover your mouth and nose with a tissue when you cough or sneeze  Throw used tissues in a lined trash can  Immediately wash your hands with soap and water for at least 20 seconds or, if soap and water are not available, clean your hands with an alcohol-based hand  that contains at least 60% alcohol  Clean your hands often     Wash your hands often with soap and water for at least 20 seconds, especially after blowing your nose, coughing, or sneezing; going to the bathroom; and before eating or preparing food  If soap and water are not readily available, use an alcohol-based hand  with at least 60% alcohol, covering all surfaces of your hands and rubbing them together until they feel dry  Soap and water are the best option if hands are visibly dirty  Avoid touching your eyes, nose, and mouth with unwashed hands  Avoid sharing personal household items     You should not share dishes, drinking glasses, cups, eating utensils, towels, or bedding with other people or pets in your home  After using these items, they should be washed thoroughly with soap and water  Clean all high-touch surfaces everyday     High touch surfaces include counters, tabletops, doorknobs, bathroom fixtures, toilets, phones, keyboards, tablets, and bedside tables  Also, clean any surfaces that may have blood, stool, or body fluids on them  Use a household cleaning spray or wipe, according to the label instructions   Labels contain instructions for safe and effective use of the cleaning product including precautions you should take when applying the product, such as wearing gloves and making sure you have good ventilation during use of the product  Monitor your symptoms     Seek prompt medical attention if your illness is worsening (e g , difficulty breathing)  Before seeking care, call your healthcare provider and tell them that you have, or are being evaluated for, COVID-19  Put on a facemask before you enter the facility  These steps will help the healthcare providers office to keep other people in the office or waiting room from getting infected or exposed  Ask your healthcare provider to call the local or [facility] health department  Persons who are placed under active monitoring or facilitated self-monitoring should follow instructions provided by their local health department or occupational health professionals, as appropriate  If you have a medical emergency and need to call 911, notify the dispatch personnel that you have, or are being evaluated for COVID-19  If possible, put on a facemask before emergency medical services arrive  Discontinuing home isolation     Patients with confirmed COVID-19 should remain under home isolation precautions until the following conditions are met:   § They have had no fever for at least 24 hours (that is one full day of no fever without the use medicine that reduces fevers)  AND  § other symptoms have improved (for example, when their cough or shortness of breath have improved)  AND  § at least 10 days have passed since their symptoms first appeared     Patients with confirmed COVID-19 should also notify close contacts (including their workplace) and ask that they self-quarantine  Currently, close contact is defined as being within 6 feet for for a cumulative total of 15 minutes or more over a 24 hour period starting from 2 days before illness onset  (or, for asymptomatic patients, 2 days prior to test specimen collection)         Close contacts of patients diagnosed with COVID-19 should be instructed by the patient to self-quarantine for 14 days from the last time of their last contact with the patient        Source: [employer]

## 2021-02-06 LAB — SARS-COV-2 RNA RESP QL NAA+PROBE: NEGATIVE

## 2021-02-24 ENCOUNTER — TELEPHONE (OUTPATIENT)
Dept: PODIATRY | Facility: CLINIC | Age: 61
End: 2021-02-24

## 2021-02-24 ENCOUNTER — TELEPHONE (OUTPATIENT)
Dept: OBGYN CLINIC | Facility: HOSPITAL | Age: 61
End: 2021-02-24

## 2021-02-24 NOTE — TELEPHONE ENCOUNTER
Spoke with the patient and she is doing better after her injection  She still gets some stiffness in the mornings but this loosens as she gets moving around  We did briefly discuss treatment going forward for arthritis of the knee  She is of good understanding of her options and will contact us if she starts getting increased discomfort or limitation

## 2021-02-24 NOTE — TELEPHONE ENCOUNTER
Karmen called, she is requesting a refill of the Gabapentin to be sent over to [facility],  [place] in [place]

## 2021-02-24 NOTE — TELEPHONE ENCOUNTER
Patient sees Dr Milo Mendoza  Patient is asking if her [phone] appointment today can be virtual  She said she's been coming for a year and she can let Dr Stephanie Waldron know how its going over the phone rather than coming all the way into the office          CB: [phone]

## 2021-02-25 ENCOUNTER — TELEPHONE (OUTPATIENT)
Dept: PODIATRY | Facility: CLINIC | Age: 61
End: 2021-02-25

## 2021-03-01 NOTE — TELEPHONE ENCOUNTER
Per instruction from Dr Moises Sesay, Verbal script given to pharmacist at [facility] [place] for 2 caps TID qty #540 with 0 refills  LMOM to notify patient

## 2021-03-08 ENCOUNTER — APPOINTMENT (EMERGENCY)
Dept: RADIOLOGY | Facility: HOSPITAL | Age: 61
End: 2021-03-08
Payer: MEDICARE

## 2021-03-08 ENCOUNTER — OFFICE VISIT (OUTPATIENT)
Dept: URGENT CARE | Age: 61
End: 2021-03-08
Payer: MEDICARE

## 2021-03-08 ENCOUNTER — HOSPITAL ENCOUNTER (EMERGENCY)
Facility: HOSPITAL | Age: 61
Discharge: HOME | End: 2021-03-08
Attending: EMERGENCY MEDICINE | Admitting: EMERGENCY MEDICINE
Payer: MEDICARE

## 2021-03-08 VITALS
OXYGEN SATURATION: 98 % | HEART RATE: 85 BPM | DIASTOLIC BLOOD PRESSURE: 93 MMHG | TEMPERATURE: 98.5 F | RESPIRATION RATE: 16 BRPM | SYSTOLIC BLOOD PRESSURE: 168 MMHG | WEIGHT: 180 LBS | BODY MASS INDEX: 28.19 KG/M2

## 2021-03-08 VITALS
RESPIRATION RATE: 20 BRPM | TEMPERATURE: 97.6 F | OXYGEN SATURATION: 97 % | SYSTOLIC BLOOD PRESSURE: 122 MMHG | DIASTOLIC BLOOD PRESSURE: 86 MMHG | HEART RATE: 99 BPM

## 2021-03-08 DIAGNOSIS — R10.9 FLANK PAIN: Primary | ICD-10-CM

## 2021-03-08 DIAGNOSIS — R35.0 URINE FREQUENCY: Primary | ICD-10-CM

## 2021-03-08 DIAGNOSIS — R10.84 GENERALIZED ABDOMINAL PAIN: ICD-10-CM

## 2021-03-08 DIAGNOSIS — N12 PYELONEPHRITIS: ICD-10-CM

## 2021-03-08 DIAGNOSIS — R50.9 FEVER, UNSPECIFIED FEVER CAUSE: ICD-10-CM

## 2021-03-08 LAB
ALBUMIN SERPL BCP-MCNC: 3.9 G/DL (ref 3.5–5)
ALP SERPL-CCNC: 110 U/L (ref 46–116)
ALT SERPL W P-5'-P-CCNC: 18 U/L (ref 12–78)
ANION GAP SERPL CALCULATED.3IONS-SCNC: 7 MMOL/L (ref 4–13)
AST SERPL W P-5'-P-CCNC: 20 U/L (ref 5–45)
BASOPHILS # BLD AUTO: 0.09 THOUSANDS/ΜL (ref 0–0.1)
BASOPHILS NFR BLD AUTO: 1 % (ref 0–1)
BILIRUB SERPL-MCNC: 1.18 MG/DL (ref 0.2–1)
BILIRUB UR QL STRIP: NEGATIVE
BUN SERPL-MCNC: 16 MG/DL (ref 5–25)
CALCIUM SERPL-MCNC: 9.4 MG/DL (ref 8.3–10.1)
CHLORIDE SERPL-SCNC: 107 MMOL/L (ref 100–108)
CLARITY UR: CLEAR
CO2 SERPL-SCNC: 24 MMOL/L (ref 21–32)
COLOR UR: YELLOW
CREAT SERPL-MCNC: 1.19 MG/DL (ref 0.6–1.3)
EOSINOPHIL # BLD AUTO: 0.04 THOUSAND/ΜL (ref 0–0.61)
EOSINOPHIL NFR BLD AUTO: 0 % (ref 0–6)
ERYTHROCYTE [DISTWIDTH] IN BLOOD BY AUTOMATED COUNT: 13.2 % (ref 11.6–15.1)
GFR SERPL CREATININE-BSD FRML MDRD: 49 ML/MIN/1.73SQ M
GLUCOSE SERPL-MCNC: 115 MG/DL (ref 65–140)
GLUCOSE UR STRIP-MCNC: NEGATIVE MG/DL
HCT VFR BLD AUTO: 39.9 % (ref 34.8–46.1)
HGB BLD-MCNC: 13.4 G/DL (ref 11.5–15.4)
HGB UR QL STRIP.AUTO: NEGATIVE
IMM GRANULOCYTES # BLD AUTO: 0.09 THOUSAND/UL (ref 0–0.2)
IMM GRANULOCYTES NFR BLD AUTO: 1 % (ref 0–2)
KETONES UR STRIP-MCNC: NEGATIVE MG/DL
LEUKOCYTE ESTERASE UR QL STRIP: NEGATIVE
LIPASE SERPL-CCNC: 133 U/L (ref 73–393)
LYMPHOCYTES # BLD AUTO: 1.81 THOUSANDS/ΜL (ref 0.6–4.47)
LYMPHOCYTES NFR BLD AUTO: 11 % (ref 14–44)
MCH RBC QN AUTO: 29.7 PG (ref 26.8–34.3)
MCHC RBC AUTO-ENTMCNC: 33.6 G/DL (ref 31.4–37.4)
MCV RBC AUTO: 89 FL (ref 82–98)
MONOCYTES # BLD AUTO: 1.68 THOUSAND/ΜL (ref 0.17–1.22)
MONOCYTES NFR BLD AUTO: 10 % (ref 4–12)
NEUTROPHILS # BLD AUTO: 12.7 THOUSANDS/ΜL (ref 1.85–7.62)
NEUTS SEG NFR BLD AUTO: 77 % (ref 43–75)
NITRITE UR QL STRIP: NEGATIVE
NRBC BLD AUTO-RTO: 0 /100 WBCS
PH UR STRIP.AUTO: 6.5 [PH]
PLATELET # BLD AUTO: 226 THOUSANDS/UL (ref 149–390)
PMV BLD AUTO: 9.9 FL (ref 8.9–12.7)
POTASSIUM SERPL-SCNC: 4 MMOL/L (ref 3.5–5.3)
PROT SERPL-MCNC: 7.9 G/DL (ref 6.4–8.2)
PROT UR STRIP-MCNC: NEGATIVE MG/DL
RBC # BLD AUTO: 4.51 MILLION/UL (ref 3.81–5.12)
SL AMB  POCT GLUCOSE, UA: NEGATIVE
SL AMB LEUKOCYTE ESTERASE,UA: NEGATIVE
SL AMB POCT BILIRUBIN,UA: NEGATIVE
SL AMB POCT BLOOD,UA: ABNORMAL
SL AMB POCT CLARITY,UA: CLEAR
SL AMB POCT COLOR,UA: YELLOW
SL AMB POCT KETONES,UA: NEGATIVE
SL AMB POCT NITRITE,UA: NEGATIVE
SL AMB POCT PH,UA: 6
SL AMB POCT SPECIFIC GRAVITY,UA: 1.01
SL AMB POCT URINE PROTEIN: ABNORMAL
SL AMB POCT UROBILINOGEN: 0.2
SODIUM SERPL-SCNC: 138 MMOL/L (ref 136–145)
SP GR UR STRIP.AUTO: 1.01 (ref 1–1.03)
TROPONIN I SERPL-MCNC: <0.02 NG/ML
UROBILINOGEN UR QL STRIP.AUTO: 0.2 E.U./DL
WBC # BLD AUTO: 16.41 THOUSAND/UL (ref 4.31–10.16)

## 2021-03-08 PROCEDURE — 83690 ASSAY OF LIPASE: CPT | Performed by: EMERGENCY MEDICINE

## 2021-03-08 PROCEDURE — 99285 EMERGENCY DEPT VISIT HI MDM: CPT | Performed by: EMERGENCY MEDICINE

## 2021-03-08 PROCEDURE — 96374 THER/PROPH/DIAG INJ IV PUSH: CPT

## 2021-03-08 PROCEDURE — 93005 ELECTROCARDIOGRAM TRACING: CPT

## 2021-03-08 PROCEDURE — 36415 COLL VENOUS BLD VENIPUNCTURE: CPT | Performed by: EMERGENCY MEDICINE

## 2021-03-08 PROCEDURE — 74176 CT ABD & PELVIS W/O CONTRAST: CPT

## 2021-03-08 PROCEDURE — 80053 COMPREHEN METABOLIC PANEL: CPT | Performed by: EMERGENCY MEDICINE

## 2021-03-08 PROCEDURE — 99284 EMERGENCY DEPT VISIT MOD MDM: CPT

## 2021-03-08 PROCEDURE — 81002 URINALYSIS NONAUTO W/O SCOPE: CPT

## 2021-03-08 PROCEDURE — 85025 COMPLETE CBC W/AUTO DIFF WBC: CPT | Performed by: EMERGENCY MEDICINE

## 2021-03-08 PROCEDURE — G0463 HOSPITAL OUTPT CLINIC VISIT: HCPCS | Performed by: PHYSICIAN ASSISTANT

## 2021-03-08 PROCEDURE — 99213 OFFICE O/P EST LOW 20 MIN: CPT | Performed by: PHYSICIAN ASSISTANT

## 2021-03-08 PROCEDURE — 81003 URINALYSIS AUTO W/O SCOPE: CPT | Performed by: EMERGENCY MEDICINE

## 2021-03-08 PROCEDURE — 96375 TX/PRO/DX INJ NEW DRUG ADDON: CPT

## 2021-03-08 PROCEDURE — 84484 ASSAY OF TROPONIN QUANT: CPT | Performed by: EMERGENCY MEDICINE

## 2021-03-08 PROCEDURE — G1004 CDSM NDSC: HCPCS

## 2021-03-08 RX ORDER — SULFAMETHOXAZOLE AND TRIMETHOPRIM 800; 160 MG/1; MG/1
1 TABLET ORAL ONCE
Status: COMPLETED | OUTPATIENT
Start: 2021-03-08 | End: 2021-03-08

## 2021-03-08 RX ORDER — KETOROLAC TROMETHAMINE 30 MG/ML
15 INJECTION, SOLUTION INTRAMUSCULAR; INTRAVENOUS ONCE
Status: COMPLETED | OUTPATIENT
Start: 2021-03-08 | End: 2021-03-08

## 2021-03-08 RX ORDER — ONDANSETRON 2 MG/ML
4 INJECTION INTRAMUSCULAR; INTRAVENOUS ONCE
Status: COMPLETED | OUTPATIENT
Start: 2021-03-08 | End: 2021-03-08

## 2021-03-08 RX ORDER — SULFAMETHOXAZOLE AND TRIMETHOPRIM 800; 160 MG/1; MG/1
1 TABLET ORAL 2 TIMES DAILY
Qty: 28 TABLET | Refills: 0 | Status: SHIPPED | OUTPATIENT
Start: 2021-03-08 | End: 2021-03-22

## 2021-03-08 RX ORDER — HYDROMORPHONE HCL/PF 1 MG/ML
1 SYRINGE (ML) INJECTION ONCE
Status: DISCONTINUED | OUTPATIENT
Start: 2021-03-08 | End: 2021-03-08

## 2021-03-08 RX ORDER — HYDROMORPHONE HCL/PF 1 MG/ML
0.5 SYRINGE (ML) INJECTION ONCE
Status: COMPLETED | OUTPATIENT
Start: 2021-03-08 | End: 2021-03-08

## 2021-03-08 RX ADMIN — KETOROLAC TROMETHAMINE 15 MG: 30 INJECTION, SOLUTION INTRAMUSCULAR at 21:27

## 2021-03-08 RX ADMIN — HYDROMORPHONE HYDROCHLORIDE 0.5 MG: 1 INJECTION, SOLUTION INTRAMUSCULAR; INTRAVENOUS; SUBCUTANEOUS at 21:30

## 2021-03-08 RX ADMIN — ONDANSETRON 4 MG: 2 INJECTION INTRAMUSCULAR; INTRAVENOUS at 21:27

## 2021-03-08 RX ADMIN — SULFAMETHOXAZOLE AND TRIMETHOPRIM 1 TABLET: 800; 160 TABLET ORAL at 23:19

## 2021-03-09 LAB
ATRIAL RATE: 85 BPM
ATRIAL RATE: 92 BPM
ATRIAL RATE: 93 BPM
P AXIS: 50 DEGREES
P AXIS: 53 DEGREES
P AXIS: 55 DEGREES
PR INTERVAL: 140 MS
PR INTERVAL: 142 MS
QRS AXIS: 6 DEGREES
QRS AXIS: 8 DEGREES
QRS AXIS: 9 DEGREES
QRSD INTERVAL: 82 MS
QRSD INTERVAL: 88 MS
QRSD INTERVAL: 92 MS
QT INTERVAL: 352 MS
QT INTERVAL: 352 MS
QT INTERVAL: 386 MS
QTC INTERVAL: 428 MS
QTC INTERVAL: 435 MS
QTC INTERVAL: 479 MS
T WAVE AXIS: -8 DEGREES
T WAVE AXIS: 22 DEGREES
T WAVE AXIS: 25 DEGREES
VENTRICULAR RATE: 89 BPM
VENTRICULAR RATE: 92 BPM
VENTRICULAR RATE: 93 BPM

## 2021-03-09 PROCEDURE — 93010 ELECTROCARDIOGRAM REPORT: CPT | Performed by: INTERNAL MEDICINE

## 2021-03-09 RX ORDER — GABAPENTIN 300 MG/1
600 CAPSULE ORAL 3 TIMES DAILY
Qty: 180 CAPSULE | Refills: 1 | Status: CANCELLED | OUTPATIENT
Start: 2021-03-09

## 2021-03-09 NOTE — ED NOTES
Patient verbalized understanding of discharge and new medication education      Fred Musa [facility]  03/08/21 3614

## 2021-03-09 NOTE — ED ATTENDING ATTESTATION
Final Diagnosis:  1  Flank pain    2  Pyelonephritis           Mark NG MD, saw and evaluated the patient  All available labs and X-rays were ordered by me or the resident and have been reviewed by myself  I discussed the patient with the resident / non-physician and agree with the resident's / non-physician practitioner's findings and plan as documented in the resident's / non-physician practicitioner's note, except where noted  At this point, I agree with the current assessment done in the ED  I was present during key portions of all procedures performed unless otherwise stated  Chief Complaint   Patient presents with    Back Pain     Pt began with left sided back pain yesterday, today she has increased pain with urination  Was seen at [facility], who said that she had blood in her urine  Pt also stated that she has been having chest pain for two weeks but has not told anyone, but believes that it may be stress  This is a 56 y o  female presenting for evaluation of LEFT flank pain  She says that this started yesterday  She was cleaning grand-daughter's toy room; when laying down last night, she started to have back pain / flank pain  Constant since then  +dysuria today  Not colicky  LEFT flank  +suprapubic pain  Hx of cholecystectomy in the past   No f/ch/v  +nausea  No CP/SOB  Denies any upper respiratory tract infection symptoms (cough, congestion, rhinorrhea, sore throat)  No sick contacts  No diarrhea, constipation, hematochezia  UA as outpatient with hematuria  Nothing amita  No urgency/frequency  No vaginal bleeding or discharge  Also, CP x2 weeks that is worsened with exertion  Mom has hx of MI at 61  She was told he had a heart attack at 35 (?)  CP radiates to LEFT scapula at times, below breast    Pain free between episodes  Worsens with exertion       PMH:   has a past medical history of Anxiety, Arthritis, Ortega's esophagus, Cancer ([address] ), Colon polyp, DDD (degenerative disc disease), lumbar, Depression, Fall, Fibromyalgia, Fibromyalgia, primary, Fracture of one or more phalanges of foot, GERD (gastroesophageal reflux disease), H/O bladder infections, Hypertension, Kidney infection, Migraines, Ovarian cancer ([facility] [address] ) (1987), Polyneuropathy, PONV (postoperative nausea and vomiting), PONV (postoperative nausea and vomiting) (8/3/2020), Renal disorder, Spinal stenosis, Vertigo, and Wears glasses  PSH:   has a past surgical history that includes Kidney surgery (1974); Back surgery (1990); Total abdominal hysterectomy w/ bilateral salpingoophorectomy (Bilateral, 1987); Total abdominal hysterectomy (1987); Foot fracture surgery (Bilateral, 2004); Abdominal surgery (1986); Colonoscopy; pr knee scope,med/lat menisectomy (Right, 8/3/2020); Cataract extraction, bilateral; Knee surgery (Right); and pr lap,cholecystectomy (N/A, 11/27/2020)  Social:  Social History     Substance and Sexual Activity   Alcohol Use Yes    Drinks per session: 1 or 2    Comment: rare     Social History     Tobacco Use   Smoking Status Never Smoker   Smokeless Tobacco Never Used     Social History     Substance and Sexual Activity   Drug Use No     PE:  Vitals:    03/08/21 2026 03/08/21 2028 03/08/21 2235   BP: 158/94  168/93   BP Location:   Right arm   Pulse: 97  85   Resp: 18  16   Temp:  98 5 °F (36 9 °C)    TempSrc:  Oral    SpO2: 97%  98%   Weight: 81 6 kg (180 lb)     General: VSS, NAD, awake, alert  Well-nourished, well-developed  Appears stated age  Head: Normocephalic, atraumatic, nontender  Eyes: PERRL, EOM-I  No diplopia  No hyphema  No subconjunctival hemorrhages  Symmetrical lids  ENTAtraumatic external nose and ears  MMM  No stridor  Normal phonation  No drooling  Base of mouth is soft  No mastoid tenderness  Neck: Symmetric, trachea midline  No JVD  CV: Peripheral pulses +2 throughout  No chest wall tenderness     Lungs:   Unlabored   No retractions  No crepitus  No tachypnea  No paradoxical motion  Abd: +BS, soft, mild suprapubpic tenderness  Heel strike negative  MSK:   FROM   No lower extremity edema  Back:   LEFT flank tenderness mild  Skin: Dry, intact  Neuro: AAOx3, GCS 15, CN II-XII grossly intact  Motor grossly intact  Psychiatric/Behavioral: Appropriate mood and affect   Exam: deferred  A:  - LCVAT  - Dysuria  - Hematuria?  - CP x2 weeks   - Fever (101 5F at home)  P:  - Cardiac workup; single troponin given duration of symptoms    - Story not fitting with stone  But has hx of stone  - Could be MSK given relation to cleaning closet  - CT for stone b/c of possibility of infection   - Patient looks well over-all      - 13 point ROS was performed and all are normal unless stated in the history above  - Nursing note reviewed  Vitals reviewed  - Orders placed by myself and/or advanced practitioner / resident     - Previous chart was reviewed  - No language barrier    - History obtained from patient  - There are no limitations to the history obtained  - Critical care time: Not applicable for this patient  Code Status: No Order  Advance Directive and Living Will:      Power of :    POLST:      Medications   ondansetron (ZOFRAN) injection 4 mg (4 mg Intravenous Given 3/8/21 2127)   ketorolac (TORADOL) injection 15 mg (15 mg Intravenous Given 3/8/21 2127)   HYDROmorphone (DILAUDID) injection 0 5 mg (0 5 mg Intravenous Given 3/8/21 2130)   sulfamethoxazole-trimethoprim (BACTRIM DS) 800-160 mg per tablet 1 tablet (1 tablet Oral Given 3/8/21 2319)     CT renal stone study abdomen pelvis without contrast   Final Result      1  No urolithiasis, hydronephrosis, or other acute abdominopelvic findings  2   Severe asymmetric atrophy of the right kidney with multifocal scarring suggesting chronic recurrent vascular or infectious insults  Appearance is unchanged from prior         Workstation performed: [id number]           Orders Placed This Encounter   Procedures    ED ECG Documentation Only    CT renal stone study abdomen pelvis without contrast    Comprehensive metabolic panel    Lipase    CBC and differential    UA w Reflex to Microscopic w Reflex to Culture    Troponin I    EKG RESULTS    ECG 12 lead    ECG 12 lead    ECG 12 lead    ECG 12 lead     Labs Reviewed   COMPREHENSIVE METABOLIC PANEL - Abnormal       Result Value Ref Range Status    Sodium 138  136 - 145 mmol/L Final    Potassium 4 0  3 5 - 5 3 mmol/L Final    Chloride 107  100 - 108 mmol/L Final    CO2 24  21 - 32 mmol/L Final    ANION GAP 7  4 - 13 mmol/L Final    BUN 16  5 - 25 mg/dL Final    Creatinine 1 19  0 60 - 1 30 mg/dL Final    Comment: Standardized to IDMS reference method    Glucose 115  65 - 140 mg/dL Final    Comment: If the patient is fasting, the ADA then defines impaired fasting glucose as > 100 mg/dL and diabetes as > or equal to 123 mg/dL  Specimen collection should occur prior to Sulfasalazine administration due to the potential for falsely depressed results  Specimen collection should occur prior to Sulfapyridine administration due to the potential for falsely elevated results  Calcium 9 4  8 3 - 10 1 mg/dL Final    AST 20  5 - 45 U/L Final    Comment: Specimen collection should occur prior to Sulfasalazine administration due to the potential for falsely depressed results  ALT 18  12 - 78 U/L Final    Comment: Specimen collection should occur prior to Sulfasalazine and/or Sulfapyridine administration due to the potential for falsely depressed results  Alkaline Phosphatase 110  46 - 116 U/L Final    Total Protein 7 9  6 4 - 8 2 g/dL Final    Albumin 3 9  3 5 - 5 0 g/dL Final    Total Bilirubin 1 18 (*) 0 20 - 1 00 mg/dL Final    Comment: Use of this assay is not recommended for patients undergoing treatment with eltrombopag due to the potential for falsely elevated results      eGFR 49  ml/min/1 73sq m Final    Narrative: National Kidney Disease Foundation guidelines for Chronic Kidney Disease (CKD):     Stage 1 with normal or high GFR (GFR > 90 mL/min/1 73 square meters)    Stage 2 Mild CKD (GFR = 60-89 mL/min/1 73 square meters)    Stage 3A Moderate CKD (GFR = 45-59 mL/min/1 73 square meters)    Stage 3B Moderate CKD (GFR = 30-44 mL/min/1 73 square meters)    Stage 4 Severe CKD (GFR = 15-29 mL/min/1 73 square meters)    Stage 5 End Stage CKD (GFR <15 mL/min/1 73 square meters)  Note: GFR calculation is accurate only with a steady state creatinine   CBC AND DIFFERENTIAL - Abnormal    WBC 16 41 (*) 4 31 - 10 16 Thousand/uL Final    RBC 4 51  3 81 - 5 12 Million/uL Final    Hemoglobin 13 4  11 5 - 15 4 g/dL Final    Hematocrit 39 9  34 8 - 46 1 % Final    MCV 89  82 - 98 fL Final    MCH 29 7  26 8 - 34 3 pg Final    MCHC 33 6  31 4 - 37 4 g/dL Final    RDW 13 2  11 6 - 15 1 % Final    MPV 9 9  8 9 - 12 7 fL Final    Platelets 260  800 - 390 Thousands/uL Final    nRBC 0  /100 WBCs Final    Neutrophils Relative 77 (*) 43 - 75 % Final    Immat GRANS % 1  0 - 2 % Final    Lymphocytes Relative 11 (*) 14 - 44 % Final    Monocytes Relative 10  4 - 12 % Final    Eosinophils Relative 0  0 - 6 % Final    Basophils Relative 1  0 - 1 % Final    Neutrophils Absolute 12 70 (*) 1 85 - 7 62 Thousands/µL Final    Immature Grans Absolute 0 09  0 00 - 0 20 Thousand/uL Final    Lymphocytes Absolute 1 81  0 60 - 4 47 Thousands/µL Final    Monocytes Absolute 1 68 (*) 0 17 - 1 22 Thousand/µL Final    Eosinophils Absolute 0 04  0 00 - 0 61 Thousand/µL Final    Basophils Absolute 0 09  0 00 - 0 10 Thousands/µL Final   LIPASE - Normal    Lipase 133  73 - 393 u/L Final   TROPONIN I - Normal    Troponin I <0 02  <=0 04 ng/mL Final    Comment: Siemens Chemistry analyzer 99% cutoff is > 0 04 ng/mL in network labs     o cTnI 99% cutoff is useful only when applied to patients in the clinical setting of myocardial ischemia   o cTnI 99% cutoff should be interpreted in the context of clinical history, ECG findings and possibly cardiac imaging to establish correct diagnosis  o cTnI 99% cutoff may be suggestive but clearly not indicative of a coronary event without the clinical setting of myocardial ischemia  Time reflects when diagnosis was documented in both MDM as applicable and the Disposition within this note     Time User Action Codes Description Comment    3/8/2021 11:16 PM Juan Pablo Ast Add [R10 9] Flank pain     3/8/2021 11:16 PM Juan Pablo Ast Add [N12] Pyelonephritis       ED Disposition     ED Disposition Condition Date/Time Comment    Discharge Stable Mon Mar 8, 2021 11:16 PM [address] discharge to home/self care              Follow-up Information    None       Discharge Medication List as of 3/8/2021 11:19 PM      START taking these medications    Details   sulfamethoxazole-trimethoprim (BACTRIM DS) 800-160 mg per tablet Take 1 tablet by mouth 2 (two) times a day for 14 days smx-tmp DS (BACTRIM) 800-160 mg tabs (1tab q12 D10), Starting Mon 3/8/2021, Until Mon 3/22/2021, Print         CONTINUE these medications which have NOT CHANGED    Details   busPIRone (BUSPAR) 5 mg tablet Take 5 mg by mouth 2 (two) times a day, Starting Tue 6/30/2020, Historical Med      calcium-vitamin D 250-100 MG-UNIT per tablet Take 1 tablet by mouth 2 (two) times a day, Historical Med      cyanocobalamin (VITAMIN B-12) 100 mcg tablet Take by mouth daily, Historical Med      hydrOXYzine HCL (ATARAX) 50 mg tablet Starting Thu 3/1/2018, Historical Med      meclizine (ANTIVERT) 12 5 MG tablet Take 1 tablet (12 5 mg total) by mouth every 8 (eight) hours as needed for dizziness or nausea, Starting Tue 1/28/2020, Normal      meloxicam (MOBIC) 7 5 mg tablet Take 1 tablet (7 5 mg total) by mouth 2 (two) times a day as needed for moderate pain, Starting Thu 1/7/2021, Normal      methenamine hippurate (HIPREX) 1 g tablet 1 g 2 (two) times a day with meals , Starting Tue 5/21/2019, Historical Med      omeprazole (PriLOSEC) 40 MG capsule Take 40 mg by mouth daily , Starting Sat 12/1/2018, Historical Med      !! PARoxetine (PAXIL) 10 mg tablet Take by mouth, Historical Med      !! PARoxetine (PAXIL) 40 MG tablet Take by mouth, Historical Med      SUMAtriptan (IMITREX) 50 mg tablet Take 1 tablet (50 mg total) by mouth once as needed for migraine for up to 1 dose May repeat in 2 hours  No more than 200 mg per day , Starting Fri 5/8/2020, Normal      gabapentin (NEURONTIN) 300 mg capsule Take 1 capsule (300 mg total) by mouth 3 (three) times a day, Starting Fri 7/24/2020, Until Tue 11/24/2020, Normal      gabapentin (NEURONTIN) 300 mg capsule Take 2 tablets tid , Normal       !! - Potential duplicate medications found  Please discuss with provider  No discharge procedures on file  Prior to Admission Medications   Prescriptions Last Dose Informant Patient Reported? Taking? PARoxetine (PAXIL) 10 mg tablet 3/8/2021 Self Yes Yes   Sig: Take by mouth   PARoxetine (PAXIL) 40 MG tablet 3/8/2021 Self Yes Yes   Sig: Take by mouth   SUMAtriptan (IMITREX) 50 mg tablet 3/8/2021  No Yes   Sig: Take 1 tablet (50 mg total) by mouth once as needed for migraine for up to 1 dose May repeat in 2 hours  No more than 200 mg per day  busPIRone (BUSPAR) 5 mg tablet 3/8/2021  Yes Yes   Sig: Take 5 mg by mouth 2 (two) times a day   calcium-vitamin D 250-100 MG-UNIT per tablet 3/8/2021  Yes Yes   Sig: Take 1 tablet by mouth 2 (two) times a day   cyanocobalamin (VITAMIN B-12) 100 mcg tablet 3/8/2021  Yes Yes   Sig: Take by mouth daily   gabapentin (NEURONTIN) 300 mg capsule   No No   Sig: Take 1 capsule (300 mg total) by mouth 3 (three) times a day   gabapentin (NEURONTIN) 300 mg capsule   No No   Sig: Take 2 tablets tid     hydrOXYzine HCL (ATARAX) 50 mg tablet 3/8/2021 Self Yes Yes   meclizine (ANTIVERT) 12 5 MG tablet 3/8/2021  No Yes   Sig: Take 1 tablet (12 5 mg total) by mouth every 8 (eight) hours as needed for dizziness or nausea   meloxicam (MOBIC) 7 5 mg tablet 3/8/2021  No Yes   Sig: Take 1 tablet (7 5 mg total) by mouth 2 (two) times a day as needed for moderate pain   methenamine hippurate (HIPREX) 1 g tablet 3/8/2021 Self Yes Yes   Si g 2 (two) times a day with meals    omeprazole (PriLOSEC) 40 MG capsule 3/8/2021 Self Yes Yes   Sig: Take 40 mg by mouth daily       Facility-Administered Medications: None       Portions of the record may have been created with voice recognition software  Occasional wrong word or "sound a like" substitutions may have occurred due to the inherent limitations of voice recognition software  Read the chart carefully and recognize, using context, where substitutions have occurred      Electronically signed by:  Emile Kern

## 2021-03-09 NOTE — DISCHARGE INSTRUCTIONS
You were seen in the ER for flank pain  This is due to a urinary tract infection  I have provided you a prescription for Bactrim  Please take the entire course of antibiotic for 14 days  If you develop fevers, chills, and have no improvement of your symptoms in 2-3 days, please return to the emergency department or consult your primary care doctor

## 2021-03-09 NOTE — ED PROVIDER NOTES
History  Chief Complaint   Patient presents with    Back Pain     Pt began with left sided back pain yesterday, today she has increased pain with urination  Was seen at [facility], who said that she had blood in her urine  Pt also stated that she has been having chest pain for two weeks but has not told anyone, but believes that it may be stress  David Jones is a 56year-old woman with a history of nephrolithiasis presenting with left flank pain and abdominal pain from [address] [facility]  Her flank pain started yesterday evening when she lied down to sleep, she had previously been cleaning out her granddaughter's toy room  Her pain persisted through the night, she did not sleep well  She went to work today, her flank pain persisted  She developed some dysuria and suprapubic pain this morning  Her flank pain is constant, it is not colicky  She tried to treat it today with aspirin, which did not help  She cannot identify any ameliorating or aggravating factors  She cannot say if this feels similar to previous nephrolithiasis  She notes no gross hematuria, urinary urgency or frequency  She presented at [address] [facility] for evaluation of these symptoms, and they found microscopic hematuria on UA  They sent her to the ED for further evaluation  She is also nauseous, has vomited  Fevers of 101 5F at home  No melena, blood in stool, diarrhea, vaginal bleeding or discharge  On review of systems, she reports chest pain over the last 2 weeks  It worsens with exertion, resolves between episodes  It is associated with nausea  It radiates across her chest to left back  She has never had this before  She says she was told she had a heart attack in her 35s, but "does not believe it " No history of cardiac stenting  Mother  of "massive" heart attack in early 62s  Prior to Admission Medications   Prescriptions Last Dose Informant Patient Reported? Taking?    PARoxetine (PAXIL) 10 mg tablet 3/8/2021 Self Yes Yes   Sig: Take by mouth   PARoxetine (PAXIL) 40 MG tablet 3/8/2021 Self Yes Yes   Sig: Take by mouth   SUMAtriptan (IMITREX) 50 mg tablet 3/8/2021  No Yes   Sig: Take 1 tablet (50 mg total) by mouth once as needed for migraine for up to 1 dose May repeat in 2 hours  No more than 200 mg per day  busPIRone (BUSPAR) 5 mg tablet 3/8/2021  Yes Yes   Sig: Take 5 mg by mouth 2 (two) times a day   calcium-vitamin D 250-100 MG-UNIT per tablet 3/8/2021  Yes Yes   Sig: Take 1 tablet by mouth 2 (two) times a day   cyanocobalamin (VITAMIN B-12) 100 mcg tablet 3/8/2021  Yes Yes   Sig: Take by mouth daily   gabapentin (NEURONTIN) 300 mg capsule   No No   Sig: Take 1 capsule (300 mg total) by mouth 3 (three) times a day   gabapentin (NEURONTIN) 300 mg capsule   No No   Sig: Take 2 tablets tid     hydrOXYzine HCL (ATARAX) 50 mg tablet 3/8/2021 Self Yes Yes   meclizine (ANTIVERT) 12 5 MG tablet 3/8/2021  No Yes   Sig: Take 1 tablet (12 5 mg total) by mouth every 8 (eight) hours as needed for dizziness or nausea   meloxicam (MOBIC) 7 5 mg tablet 3/8/2021  No Yes   Sig: Take 1 tablet (7 5 mg total) by mouth 2 (two) times a day as needed for moderate pain   methenamine hippurate (HIPREX) 1 g tablet 3/8/2021 Self Yes Yes   Si g 2 (two) times a day with meals    omeprazole (PriLOSEC) 40 MG capsule 3/8/2021 Self Yes Yes   Sig: Take 40 mg by mouth daily       Facility-Administered Medications: None       Past Medical History:   Diagnosis Date    Anxiety     Arthritis     Last assessed 5/3/2011    Ortega's esophagus     Cancer ([facility] [address] )     ovarian cancer at age 26 yo- REMOVAL  B/L    Colon polyp     DDD (degenerative disc disease), lumbar     Depression     Fall     2020 right knee meniscus tear- OR repair today 8/3/2020    Fibromyalgia     Fibromyalgia, primary     Fracture of one or more phalanges of foot     GERD (gastroesophageal reflux disease)     H/O bladder infections     chronic    Hypertension     Kidney infection     occasional    Migraines     Ovar[facility] cancer [facility] 1987    age 27    Polyneuropathy     Last assessed 6/23/2016 knees to feet    PONV (postoperative nausea and vomiting)     Patient requests to be pre-medicated prior to surgery prior to surgery    PONV (postoperative nausea and vomiting) 8/3/2020    Renal disorder     Spinal stenosis     Vertigo     Wears glasses     reading       Past Surgical History:   Procedure Laterality Date    ABDOMINAL SURGERY  1986    several    BACK SURGERY  1990    CATARACT EXTRACTION, BILATERAL      COLONOSCOPY      FOOT FRACTURE SURGERY Bilateral 2004    x 5  surgeries    KIDNEY SURGERY  1974    at age 13 yo    KNEE SURGERY Right     FL KNEE SCOPE,MED/LAT MENISECTOMY Right 8/3/2020    Procedure: [address];  Surgeon: Alicia Schwartz MD;  Location: [facility];  Service: Orthopedics    FL LAP,CHOLECYSTECTOMY N/A 11/27/2020    Procedure: LAPAROSCOPIC CHOLECYSTECTOMY;  Surgeon: Jeniffer Royal MD;  [facility];  Service: General    TOTAL ABDOMINAL HYSTERECTOMY  1987    age 27    TOTAL ABDOMINAL HYSTERECTOMY W/ BILATERAL SALPINGOOPHORECTOMY Bilateral 1987    age 27       Family History   Problem Relation Age of Onset    Heart disease Mother     Cancer Mother     Diabetes Mother     Arthritis Mother     Anxiety disorder Mother     Bleeding Disorder Mother     Clotting disorder Mother     Depression Mother     Hyperlipidemia Mother     Irritable bowel syndrome Mother     Hypertension Mother     Obesity Mother     Osteoporosis Mother     Vaginal cancer Mother 27    Skin cancer Mother     Thyroid disease Mother     Diabetes Father     Arthritis Father     Hyperlipidemia Father     Vesicoureteral reflux Father     Heart disease Father     Hypertension Father     Migraines Father     Obesity Father     Hypertension Family     Arthritis Family     Arthritis Brother     Anxiety disorder Brother     Diabetes Brother     Hyperlipidemia Brother     Vesicoureteral reflux Brother     Heart disease Brother     Irritable bowel syndrome Brother     Hypertension Brother     Migraines Brother     Thyroid disease Brother     Pancreatic cancer Brother 54    Migraines Daughter     Asthma Son     Migraines Son     Diabetes Maternal Grandmother     Vaginal cancer Maternal Grandmother 48    Infertility Paternal Grandmother     Arthritis Maternal Aunt     Anxiety disorder Maternal Aunt     Depression Maternal Aunt     Diabetes Maternal Aunt     Vaginal cancer Maternal Aunt 48    Fibroids Paternal Aunt     No Known Problems Maternal Grandfather     No Known Problems Paternal Grandfather     No Known Problems Maternal Aunt     No Known Problems Maternal Aunt     No Known Problems Maternal Aunt      I have reviewed and agree with the history as documented  E-Cigarette/Vaping    E-Cigarette Use Never User      E-Cigarette/Vaping Substances     Social History     Tobacco Use    Smoking status: Never Smoker    Smokeless tobacco: Never Used   Substance Use Topics    Alcohol use: Yes     Drinks per session: 1 or 2     Comment: rare    Drug use: No        Review of Systems   Constitutional: Positive for chills and fever  Negative for diaphoresis  Respiratory: Negative for shortness of breath  Cardiovascular: Positive for chest pain  Negative for palpitations and leg swelling  Gastrointestinal: Positive for abdominal pain, nausea and vomiting  Negative for anal bleeding, blood in stool, constipation, diarrhea and rectal pain  Genitourinary: Positive for dysuria and flank pain  Negative for frequency, hematuria, urgency, vaginal bleeding and vaginal discharge  Musculoskeletal: Negative for back pain  Neurological: Positive for light-headedness and headaches  Negative for weakness and numbness  Psychiatric/Behavioral: Negative for confusion     All other systems reviewed and are negative  Physical Exam  ED Triage Vitals   Temperature Pulse Respirations Blood Pressure SpO2   03/08/21 2028 03/08/21 2026 03/08/21 2026 03/08/21 2026 03/08/21 2026   98 5 °F (36 9 °C) 97 18 158/94 97 %      Temp Source Heart Rate Source Patient Position - Orthostatic VS BP Location FiO2 (%)   03/08/21 2028 03/08/21 2235 03/08/21 2235 03/08/21 2235 --   Oral Monitor Lying Right arm       Pain Score       03/08/21 2026       Worst Possible Pain             Orthostatic Vital Signs  Vitals:    03/08/21 2026 03/08/21 2235   BP: 158/94 168/93   Pulse: 97 85   Patient Position - Orthostatic VS:  Lying       Physical Exam  Vitals signs reviewed  Constitutional:       General: She is not in acute distress  Appearance: She is not ill-appearing, toxic-appearing or diaphoretic  HENT:      Head: Normocephalic and atraumatic  Right Ear: External ear normal       Left Ear: External ear normal       Mouth/Throat:      Mouth: Mucous membranes are moist    Eyes:      General: No scleral icterus  Extraocular Movements: Extraocular movements intact  Conjunctiva/sclera: Conjunctivae normal    Cardiovascular:      Rate and Rhythm: Normal rate and regular rhythm  Pulses: Normal pulses  Radial pulses are 2+ on the right side and 2+ on the left side  Dorsalis pedis pulses are 2+ on the right side and 2+ on the left side  Heart sounds: No murmur  Pulmonary:      Effort: Pulmonary effort is normal  No respiratory distress  Breath sounds: Normal breath sounds  Abdominal:      General: There is no distension  Palpations: Abdomen is soft  Tenderness: There is abdominal tenderness in the suprapubic area  There is left CVA tenderness  There is no right CVA tenderness or guarding  Negative signs include Ann's sign and McBurney's sign  Musculoskeletal:         General: No deformity  Right lower leg: No edema  Left lower leg: No edema     Skin: General: Skin is warm and dry  Capillary Refill: Capillary refill takes less than 2 seconds  Coloration: Skin is not jaundiced or pale  Neurological:      General: No focal deficit present  Mental Status: She is alert and oriented to person, place, and time           ED Medications  Medications   ondansetron (ZOFRAN) injection 4 mg (4 mg Intravenous Given 3/8/21 2127)   ketorolac (TORADOL) injection 15 mg (15 mg Intravenous Given 3/8/21 2127)   HYDROmorphone (DILAUDID) injection 0 5 mg (0 5 mg Intravenous Given 3/8/21 2130)   sulfamethoxazole-trimethoprim (BACTRIM DS) 800-160 mg per tablet 1 tablet (1 tablet Oral Given 3/8/21 2319)       Diagnostic Studies  Results Reviewed     Procedure Component Value Units Date/Time    UA w Reflex to Microscopic w Reflex to Culture [[id number]] Collected: 03/08/21 2240    Lab Status: Final result Specimen: Urine, Clean Catch Updated: 03/08/21 2339     Color, UA Yellow     Clarity, UA Clear     Specific Gravity, UA 1 012     pH, UA 6 5     Leukocytes, UA Negative     Nitrite, UA Negative     Protein, UA Negative mg/dl      Glucose, UA Negative mg/dl      Ketones, UA Negative mg/dl      Urobilinogen, UA 0 2 E U /dl      Bilirubin, UA Negative     Blood, UA Negative    Troponin I [[id number]]  (Normal) Collected: 03/08/21 2127    Lab Status: Final result Specimen: Blood from Arm, Right Updated: 03/08/21 2217     Troponin I <0 02 ng/mL     Comprehensive metabolic panel [[id number]]  (Abnormal) Collected: 03/08/21 2127    Lab Status: Final result Specimen: Blood from Arm, Right Updated: 03/08/21 2215     Sodium 138 mmol/L      Potassium 4 0 mmol/L      Chloride 107 mmol/L      CO2 24 mmol/L      ANION GAP 7 mmol/L      BUN 16 mg/dL      Creatinine 1 19 mg/dL      Glucose 115 mg/dL      Calcium 9 4 mg/dL      AST 20 U/L      ALT 18 U/L      Alkaline Phosphatase 110 U/L      Total Protein 7 9 g/dL      Albumin 3 9 g/dL      Total Bilirubin 1 18 mg/dL      eGFR 49 ml/min/1 73sq m     Narrative:      National Kidney Disease Foundation guidelines for Chronic Kidney Disease (CKD):     Stage 1 with normal or high GFR (GFR > 90 mL/min/1 73 square meters)    Stage 2 Mild CKD (GFR = 60-89 mL/min/1 73 square meters)    Stage 3A Moderate CKD (GFR = 45-59 mL/min/1 73 square meters)    Stage 3B Moderate CKD (GFR = 30-44 mL/min/1 73 square meters)    Stage 4 Severe CKD (GFR = 15-29 mL/min/1 73 square meters)    Stage 5 End Stage CKD (GFR <15 mL/min/1 73 square meters)  Note: GFR calculation is accurate only with a steady state creatinine    Lipase [[id number]]  (Normal) Collected: 03/08/21 2127    Lab Status: Final result Specimen: Blood from Arm, Right Updated: 03/08/21 2215     Lipase 133 u/L     CBC and differential [[id number]]  (Abnormal) Collected: 03/08/21 2127    Lab Status: Final result Specimen: Blood from Arm, Right Updated: 03/08/21 2154     WBC 16 41 Thousand/uL      RBC 4 51 Million/uL      Hemoglobin 13 4 g/dL      Hematocrit 39 9 %      MCV 89 fL      MCH 29 7 pg      MCHC 33 6 g/dL      RDW 13 2 %      MPV 9 9 fL      Platelets 712 Thousands/uL      nRBC 0 /100 WBCs      Neutrophils Relative 77 %      Immat GRANS % 1 %      Lymphocytes Relative 11 %      Monocytes Relative 10 %      Eosinophils Relative 0 %      Basophils Relative 1 %      Neutrophils Absolute 12 70 Thousands/µL      Immature Grans Absolute 0 09 Thousand/uL      Lymphocytes Absolute 1 81 Thousands/µL      Monocytes Absolute 1 68 Thousand/µL      Eosinophils Absolute 0 04 Thousand/µL      Basophils Absolute 0 09 Thousands/µL                  CT renal stone study abdomen pelvis without contrast   Final Result by Ray Mckeon MD (03/08 2218)      1  No urolithiasis, hydronephrosis, or other acute abdominopelvic findings  2   Severe asymmetric atrophy of the right kidney with multifocal scarring suggesting chronic recurrent vascular or infectious insults  Appearance is unchanged from prior  Workstation performed: [id number]               Procedures  ECG 12 Lead Documentation Only    Date/Time: 3/9/2021 12:10 AM  Performed by: Karmen Osborne MD  Authorized by: Karmen Osborne MD     ECG reviewed by me, the ED Provider: yes    Patient location:  ED  Previous ECG:     Previous ECG:  Compared to current    Comparison ECG info:  11/10/20    Similarity:  No change  Interpretation:     Interpretation: normal    Rate:     ECG rate:  93    ECG rate assessment: normal    Rhythm:     Rhythm: sinus rhythm    Ectopy:     Ectopy: none    QRS:     QRS axis:  Normal  Conduction:     Conduction: normal    ST segments:     ST segments:  Normal  T waves:     T waves: normal            ED Course  ED Course as of Mar 09 0011   Mon Mar 08, 2021   2154 WBC(!): 16 41   2206 Pt reassessed, pain improved  2219 Troponin I: <0 02   2219 Lipase: 133   2219 Creatinine: 1 19 2230 No kidney stone on CT scan  No acute findings  Pt made aware of                                           MDM  Number of Diagnoses or Management Options  Flank pain:   Pyelonephritis:   Diagnosis management comments: 55 yo woman with flank pain, fevers, dysuria, concerning primarily for nephrolithiasis, infected nephrolithiasis, UTI  Chest pain concerning for stable angina  Will evaluate with CMP, CBC, lipase, UA, CT renal stone study, troponin, ECG  Will treat symptoms with dilaudid and zofran  Pain relieved with treatment  CBC shows elevated WBC  UA shows hematuria  CT shows no acute findings  ECG normal  Remainder of labs unremarkable  Given absence of nephrolithiasis and symptoms of UTI, will treat for UTI with bactrim  First dose given in ED  Discharged home with prescription for bactrim         Disposition  Final diagnoses:   Flank pain   Pyelonephritis     Time reflects when diagnosis was documented in both MDM as applicable and the Disposition within this note     Time User Action Codes Description Comment    3/8/2021 11:16 PM Amarilys Huston Add [R10 9] Flank pain     3/8/2021 11:16 PM Gary Spann Add [N12] Pyelonephritis       ED Disposition     ED Disposition Condition Date/Time Comment    Discharge Stable Mon Mar 8, 2021 11:16 PM Karmen Luna discharge to home/self care  Follow-up Information    None         Discharge Medication List as of 3/8/2021 11:19 PM      START taking these medications    Details   sulfamethoxazole-trimethoprim (BACTRIM DS) 800-160 mg per tablet Take 1 tablet by mouth 2 (two) times a day for 14 days smx-tmp DS (BACTRIM) 800-160 mg tabs (1tab q12 D10), Starting Mon 3/8/2021, Until Mon 3/22/2021, Print         CONTINUE these medications which have NOT CHANGED    Details   busPIRone (BUSPAR) 5 mg tablet Take 5 mg by mouth 2 (two) times a day, Starting Tue 6/30/2020, Historical Med      calcium-vitamin D 250-100 MG-UNIT per tablet Take 1 tablet by mouth 2 (two) times a day, Historical Med      cyanocobalamin (VITAMIN B-12) 100 mcg tablet Take by mouth daily, Historical Med      hydrOXYzine HCL (ATARAX) 50 mg tablet Starting Thu 3/1/2018, Historical Med      meclizine (ANTIVERT) 12 5 MG tablet Take 1 tablet (12 5 mg total) by mouth every 8 (eight) hours as needed for dizziness or nausea, Starting Tue 1/28/2020, Normal      meloxicam (MOBIC) 7 5 mg tablet Take 1 tablet (7 5 mg total) by mouth 2 (two) times a day as needed for moderate pain, Starting Thu 1/7/2021, Normal      methenamine hippurate (HIPREX) 1 g tablet 1 g 2 (two) times a day with meals , Starting Tue 5/21/2019, Historical Med      omeprazole (PriLOSEC) 40 MG capsule Take 40 mg by mouth daily , Starting Sat 12/1/2018, Historical Med      !! PARoxetine (PAXIL) 10 mg tablet Take by mouth, Historical Med      !! PARoxetine (PAXIL) 40 MG tablet Take by mouth, Historical Med      SUMAtriptan (IMITREX) 50 mg tablet Take 1 tablet (50 mg total) by mouth once as needed for migraine for up to 1 dose May repeat in 2 hours    No more than 200 mg per day , Starting Fri 5/8/2020, Normal      gabapentin (NEURONTIN) 300 mg capsule Take 1 capsule (300 mg total) by mouth 3 (three) times a day, Starting Fri 7/24/2020, Until Tue 11/24/2020, Normal      gabapentin (NEURONTIN) 300 mg capsule Take 2 tablets tid , Normal       !! - Potential duplicate medications found  Please discuss with provider  No discharge procedures on file  PDMP Review       Value Time User    PDMP Reviewed  Yes 12/15/2020 11:25 AM [address], [address]            ED Provider  Attending physically available and evaluated Jamshid Bennett 141  I managed the patient along with the ED Attending      Electronically Signed by         Vipul Ramos MD  03/09/21 0565

## 2021-03-09 NOTE — PROGRESS NOTES
[facility]        NAME: David Jones is a 56 y o  female  : 1960    MRN: [id number]  DATE: 2021  TIME: 8:02 PM    Assessment and Plan   Urine frequency [R35 0]  1  Urine frequency  POCT urine dip    Transfer to other facility   2  Generalized abdominal pain  Transfer to other facility   3  Fever, unspecified fever cause  Transfer to other facility         Patient Instructions       Patient's urine dip showed mild blood otherwise normal   Due to the patient's pain level, abdominal pain and normal urine recommend she go to the ER for further evaluation  Patient's  would like to transfer her by private vehicle  Follow up with PCP in 3-5 days  Proceed to  ER if symptoms worsen  Chief Complaint     Chief Complaint   Patient presents with    Back Pain     pt c/o low back pain since last pm, today pain became severe, is worse right after she urinates  History of Present Illness       Patient started with lower back pain yesterday  She states the pain is constant and worse today in is worse with urinating  She also had a fever of 101 today  She is also complaining of some abdominal pain and nausea  She does have a history of kidney stones and kidney infections  Review of Systems   Review of Systems   Constitutional: Positive for chills, fatigue and fever  HENT: Negative  Respiratory: Negative  Cardiovascular: Negative  Gastrointestinal: Positive for abdominal pain and nausea  Negative for vomiting  Genitourinary: Positive for dysuria  Negative for flank pain  Musculoskeletal: Positive for back pain  Neurological: Negative  Psychiatric/Behavioral: Negative            Current Medications       Current Outpatient Medications:     busPIRone (BUSPAR) 5 mg tablet, Take 5 mg by mouth 2 (two) times a day, Disp: , Rfl:     calcium-vitamin D 250-100 MG-UNIT per tablet, Take 1 tablet by mouth 2 (two) times a day, Disp: , Rfl:     cyanocobalamin (VITAMIN B-12) 100 mcg tablet, Take by mouth daily, Disp: , Rfl:     gabapentin (NEURONTIN) 300 mg capsule, Take 1 capsule (300 mg total) by mouth 3 (three) times a day, Disp: 90 capsule, Rfl: 0    gabapentin (NEURONTIN) 300 mg capsule, Take 2 tablets tid , Disp: 540 capsule, Rfl: 0    hydrOXYzine HCL (ATARAX) 50 mg tablet, , Disp: , Rfl:     meclizine (ANTIVERT) 12 5 MG tablet, Take 1 tablet (12 5 mg total) by mouth every 8 (eight) hours as needed for dizziness or nausea, Disp: 30 tablet, Rfl: 0    meloxicam (MOBIC) 7 5 mg tablet, Take 1 tablet (7 5 mg total) by mouth 2 (two) times a day as needed for moderate pain, Disp: 60 tablet, Rfl: 1    methenamine hippurate (HIPREX) 1 g tablet, 1 g 2 (two) times a day with meals , Disp: , Rfl:     omeprazole (PriLOSEC) 40 MG capsule, Take 40 mg by mouth daily , Disp: , Rfl:     PARoxetine (PAXIL) 10 mg tablet, Take by mouth, Disp: , Rfl:     PARoxetine (PAXIL) 40 MG tablet, Take by mouth, Disp: , Rfl:     SUMAtriptan (IMITREX) 50 mg tablet, Take 1 tablet (50 mg total) by mouth once as needed for migraine for up to 1 dose May repeat in 2 hours    No more than 200 mg per day , Disp: 10 tablet, Rfl: 2    Current Allergies     Allergies as of 03/08/2021 - Reviewed 03/08/2021   Allergen Reaction Noted    Morphine  12/27/2016    Penicillins Hives 12/27/2016            The following portions of the patient's history were reviewed and updated as appropriate: allergies, current medications, past family history, past medical history, past social history, past surgical history and problem list      Past Medical History:   Diagnosis Date    Anxiety     Arthritis     Last assessed 5/3/2011    Ortega's esophagus     Cancer ([facility] [address] )     ovarian cancer at age 28 yo- REMOVAL  B/L    Colon polyp     DDD (degenerative disc disease), lumbar     Depression     Fall     5/2020 right knee meniscus tear- OR repair today 8/3/2020    Fibromyalgia     Fibromyalgia, primary     Fracture of one or more phalanges of foot     GERD (gastroesophageal reflux disease)     H/O bladder infections     chronic    Hypertension     Kidney infection     occasional    Migraines     Ovarian cancer [facility]) 1987    age 27    Polyneuropathy     Last assessed 6/23/2016 knees to feet    PONV (postoperative nausea and vomiting)     Patient requests to be pre-medicated prior to surgery prior to surgery    PONV (postoperative nausea and vomiting) 8/3/2020    Renal disorder     Spinal stenosis     Vertigo     Wears glasses     reading       Past Surgical History:   Procedure Laterality Date    ABDOMINAL SURGERY  1986    several    BACK SURGERY  1990    CATARACT EXTRACTION, BILATERAL      COLONOSCOPY      FOOT FRACTURE SURGERY Bilateral 2004    x 5  surgeries    KIDNEY SURGERY  1974    at age 13 yo    KNEE SURGERY Right     CA KNEE SCOPE,MED/LAT MENISECTOMY Right 8/3/2020    Procedure: [address];  Surgeon: Kiki Romero MD;  Location: [facility];  Service: Orthopedics    CA LAP,CHOLECYSTECTOMY N/A 11/27/2020    Procedure: LAPAROSCOPIC CHOLECYSTECTOMY;  Surgeon: Shari Morgan MD;  [facility];  Service: General    TOTAL ABDOMINAL HYSTERECTOMY  1987    age 27    TOTAL ABDOMINAL HYSTERECTOMY W/ BILATERAL SALPINGOOPHORECTOMY Bilateral 1987    age 27       Family History   Problem Relation Age of Onset    Heart disease Mother     Cancer Mother     Diabetes Mother     Arthritis Mother     Anxiety disorder Mother     Bleeding Disorder Mother     Clotting disorder Mother     Depression Mother     Hyperlipidemia Mother     Irritable bowel syndrome Mother     Hypertension Mother     Obesity Mother     Osteoporosis Mother     Vaginal cancer Mother 27    Skin cancer Mother     Thyroid disease Mother     Diabetes Father     Arthritis Father     Hyperlipidemia Father     Vesicoureteral reflux Father     Heart disease Father     Hypertension Father     Migraines Father     Obesity Father     Hypertension Family     Arthritis Family     Arthritis Brother     Anxiety disorder Brother     Diabetes Brother     Hyperlipidemia Brother     Vesicoureteral reflux Brother     Heart disease Brother     Irritable bowel syndrome Brother     Hypertension Brother     Migraines Brother     Thyroid disease Brother     Pancreatic cancer Brother 54    Migraines Daughter     Asthma Son     Migraines Son     Diabetes Maternal Grandmother     Vaginal cancer Maternal Grandmother 48    Infertility Paternal Grandmother     Arthritis Maternal Aunt     Anxiety disorder Maternal Aunt     Depression Maternal Aunt     Diabetes Maternal Aunt     Vaginal cancer Maternal Aunt 48    Fibroids Paternal Aunt     No Known Problems Maternal Grandfather     No Known Problems Paternal Grandfather     No Known Problems Maternal Aunt     No Known Problems Maternal Aunt     No Known Problems Maternal Aunt          Medications have been verified  Objective   /86   Pulse 99   Temp 97 6 °F (36 4 °C)   Resp 20   LMP  (LMP Unknown)   SpO2 97%        Physical Exam     Physical Exam  Vitals signs and nursing note reviewed  Constitutional:       General: She is not in acute distress  Appearance: Normal appearance  She is ill-appearing  She is not toxic-appearing or diaphoretic  HENT:      Head: Normocephalic and atraumatic  Cardiovascular:      Rate and Rhythm: Normal rate and regular rhythm  Pulses: Normal pulses  Pulmonary:      Effort: Pulmonary effort is normal       Breath sounds: Normal breath sounds  No wheezing  Abdominal:      General: Abdomen is flat  Bowel sounds are normal       Tenderness: There is abdominal tenderness  Comments: Generalized TTP   Skin:     General: Skin is warm and dry  Neurological:      General: No focal deficit present  Mental Status: She is alert and oriented to person, place, and time  Psychiatric:         Mood and Affect: Mood normal          Behavior: Behavior normal

## 2021-03-12 ENCOUNTER — HOSPITAL ENCOUNTER (EMERGENCY)
Facility: HOSPITAL | Age: 61
Discharge: HOME | End: 2021-03-12
Attending: EMERGENCY MEDICINE | Admitting: EMERGENCY MEDICINE
Payer: MEDICARE

## 2021-03-12 ENCOUNTER — APPOINTMENT (EMERGENCY)
Dept: RADIOLOGY | Facility: HOSPITAL | Age: 61
End: 2021-03-12
Payer: MEDICARE

## 2021-03-12 ENCOUNTER — OFFICE VISIT (OUTPATIENT)
Dept: FAMILY MEDICINE CLINIC | Facility: CLINIC | Age: 61
End: 2021-03-12
Payer: MEDICARE

## 2021-03-12 VITALS
HEIGHT: 67 IN | TEMPERATURE: 97.2 F | HEART RATE: 93 BPM | BODY MASS INDEX: 28.19 KG/M2 | DIASTOLIC BLOOD PRESSURE: 84 MMHG | SYSTOLIC BLOOD PRESSURE: 132 MMHG | OXYGEN SATURATION: 98 %

## 2021-03-12 VITALS
BODY MASS INDEX: 28.25 KG/M2 | SYSTOLIC BLOOD PRESSURE: 125 MMHG | HEART RATE: 91 BPM | OXYGEN SATURATION: 98 % | RESPIRATION RATE: 18 BRPM | WEIGHT: 180 LBS | HEIGHT: 67 IN | TEMPERATURE: 97.7 F | DIASTOLIC BLOOD PRESSURE: 71 MMHG

## 2021-03-12 DIAGNOSIS — R11.2 NAUSEA AND VOMITING, INTRACTABILITY OF VOMITING NOT SPECIFIED, UNSPECIFIED VOMITING TYPE: ICD-10-CM

## 2021-03-12 DIAGNOSIS — D72.829 LEUKOCYTOSIS, UNSPECIFIED TYPE: ICD-10-CM

## 2021-03-12 DIAGNOSIS — E87.6 HYPOKALEMIA: ICD-10-CM

## 2021-03-12 DIAGNOSIS — R11.2 NAUSEA AND VOMITING: ICD-10-CM

## 2021-03-12 DIAGNOSIS — R53.83 TIREDNESS: ICD-10-CM

## 2021-03-12 DIAGNOSIS — M54.50 ACUTE LEFT-SIDED LOW BACK PAIN WITHOUT SCIATICA: ICD-10-CM

## 2021-03-12 DIAGNOSIS — R10.32 LEFT LOWER QUADRANT ABDOMINAL PAIN: Primary | ICD-10-CM

## 2021-03-12 DIAGNOSIS — R10.9 ABDOMINAL PAIN: ICD-10-CM

## 2021-03-12 DIAGNOSIS — E87.1 HYPONATREMIA: Primary | ICD-10-CM

## 2021-03-12 LAB
ALBUMIN SERPL BCP-MCNC: 3.1 G/DL (ref 3.5–5)
ALP SERPL-CCNC: 94 U/L (ref 46–116)
ALT SERPL W P-5'-P-CCNC: 37 U/L (ref 12–78)
ANION GAP SERPL CALCULATED.3IONS-SCNC: 10 MMOL/L (ref 4–13)
AST SERPL W P-5'-P-CCNC: 40 U/L (ref 5–45)
BACTERIA UR QL AUTO: NORMAL /HPF
BASOPHILS # BLD AUTO: 0.03 THOUSANDS/ΜL (ref 0–0.1)
BASOPHILS NFR BLD AUTO: 0 % (ref 0–1)
BILIRUB SERPL-MCNC: 0.99 MG/DL (ref 0.2–1)
BILIRUB UR QL STRIP: NEGATIVE
BUN SERPL-MCNC: 21 MG/DL (ref 5–25)
CALCIUM ALBUM COR SERPL-MCNC: 9.8 MG/DL (ref 8.3–10.1)
CALCIUM SERPL-MCNC: 9.1 MG/DL (ref 8.3–10.1)
CHLORIDE SERPL-SCNC: 100 MMOL/L (ref 100–108)
CLARITY UR: CLEAR
CO2 SERPL-SCNC: 23 MMOL/L (ref 21–32)
COLOR UR: YELLOW
CREAT SERPL-MCNC: 1.05 MG/DL (ref 0.6–1.3)
EOSINOPHIL # BLD AUTO: 0.15 THOUSAND/ΜL (ref 0–0.61)
EOSINOPHIL NFR BLD AUTO: 1 % (ref 0–6)
ERYTHROCYTE [DISTWIDTH] IN BLOOD BY AUTOMATED COUNT: 13.2 % (ref 11.6–15.1)
GFR SERPL CREATININE-BSD FRML MDRD: 57 ML/MIN/1.73SQ M
GLUCOSE SERPL-MCNC: 108 MG/DL (ref 65–140)
GLUCOSE UR STRIP-MCNC: NEGATIVE MG/DL
HCT VFR BLD AUTO: 38.9 % (ref 34.8–46.1)
HGB BLD-MCNC: 12.9 G/DL (ref 11.5–15.4)
HGB UR QL STRIP.AUTO: ABNORMAL
HYALINE CASTS #/AREA URNS LPF: NORMAL /LPF
IMM GRANULOCYTES # BLD AUTO: 0.07 THOUSAND/UL (ref 0–0.2)
IMM GRANULOCYTES NFR BLD AUTO: 1 % (ref 0–2)
KETONES UR STRIP-MCNC: ABNORMAL MG/DL
LEUKOCYTE ESTERASE UR QL STRIP: NEGATIVE
LIPASE SERPL-CCNC: 383 U/L (ref 73–393)
LYMPHOCYTES # BLD AUTO: 1.98 THOUSANDS/ΜL (ref 0.6–4.47)
LYMPHOCYTES NFR BLD AUTO: 17 % (ref 14–44)
MCH RBC QN AUTO: 28.8 PG (ref 26.8–34.3)
MCHC RBC AUTO-ENTMCNC: 33.2 G/DL (ref 31.4–37.4)
MCV RBC AUTO: 87 FL (ref 82–98)
MONOCYTES # BLD AUTO: 1.01 THOUSAND/ΜL (ref 0.17–1.22)
MONOCYTES NFR BLD AUTO: 9 % (ref 4–12)
NEUTROPHILS # BLD AUTO: 8.51 THOUSANDS/ΜL (ref 1.85–7.62)
NEUTS SEG NFR BLD AUTO: 72 % (ref 43–75)
NITRITE UR QL STRIP: NEGATIVE
NON-SQ EPI CELLS URNS QL MICRO: NORMAL /HPF
NRBC BLD AUTO-RTO: 0 /100 WBCS
PH UR STRIP.AUTO: 6.5 [PH]
PLATELET # BLD AUTO: 241 THOUSANDS/UL (ref 149–390)
PMV BLD AUTO: 10.7 FL (ref 8.9–12.7)
POTASSIUM SERPL-SCNC: 3.4 MMOL/L (ref 3.5–5.3)
PROT SERPL-MCNC: 7.1 G/DL (ref 6.4–8.2)
PROT UR STRIP-MCNC: ABNORMAL MG/DL
RBC # BLD AUTO: 4.48 MILLION/UL (ref 3.81–5.12)
RBC #/AREA URNS AUTO: NORMAL /HPF
SODIUM SERPL-SCNC: 133 MMOL/L (ref 136–145)
SP GR UR STRIP.AUTO: 1.01 (ref 1–1.03)
UROBILINOGEN UR QL STRIP.AUTO: 0.2 E.U./DL
WBC # BLD AUTO: 11.75 THOUSAND/UL (ref 4.31–10.16)
WBC #/AREA URNS AUTO: NORMAL /HPF

## 2021-03-12 PROCEDURE — 99285 EMERGENCY DEPT VISIT HI MDM: CPT | Performed by: EMERGENCY MEDICINE

## 2021-03-12 PROCEDURE — 83690 ASSAY OF LIPASE: CPT | Performed by: EMERGENCY MEDICINE

## 2021-03-12 PROCEDURE — 80053 COMPREHEN METABOLIC PANEL: CPT | Performed by: EMERGENCY MEDICINE

## 2021-03-12 PROCEDURE — 96375 TX/PRO/DX INJ NEW DRUG ADDON: CPT

## 2021-03-12 PROCEDURE — 96361 HYDRATE IV INFUSION ADD-ON: CPT

## 2021-03-12 PROCEDURE — 36415 COLL VENOUS BLD VENIPUNCTURE: CPT | Performed by: EMERGENCY MEDICINE

## 2021-03-12 PROCEDURE — 96374 THER/PROPH/DIAG INJ IV PUSH: CPT

## 2021-03-12 PROCEDURE — 96376 TX/PRO/DX INJ SAME DRUG ADON: CPT

## 2021-03-12 PROCEDURE — 99214 OFFICE O/P EST MOD 30 MIN: CPT | Performed by: FAMILY MEDICINE

## 2021-03-12 PROCEDURE — 81001 URINALYSIS AUTO W/SCOPE: CPT | Performed by: EMERGENCY MEDICINE

## 2021-03-12 PROCEDURE — 99284 EMERGENCY DEPT VISIT MOD MDM: CPT

## 2021-03-12 PROCEDURE — 74177 CT ABD & PELVIS W/CONTRAST: CPT

## 2021-03-12 PROCEDURE — 85025 COMPLETE CBC W/AUTO DIFF WBC: CPT | Performed by: EMERGENCY MEDICINE

## 2021-03-12 RX ORDER — HYDROMORPHONE HCL/PF 1 MG/ML
0.5 SYRINGE (ML) INJECTION ONCE
Status: COMPLETED | OUTPATIENT
Start: 2021-03-12 | End: 2021-03-12

## 2021-03-12 RX ORDER — ONDANSETRON 2 MG/ML
4 INJECTION INTRAMUSCULAR; INTRAVENOUS ONCE
Status: COMPLETED | OUTPATIENT
Start: 2021-03-12 | End: 2021-03-12

## 2021-03-12 RX ORDER — HYDROMORPHONE HCL/PF 1 MG/ML
1 SYRINGE (ML) INJECTION ONCE
Status: COMPLETED | OUTPATIENT
Start: 2021-03-12 | End: 2021-03-12

## 2021-03-12 RX ORDER — ONDANSETRON 4 MG/1
4 TABLET, ORALLY DISINTEGRATING ORAL EVERY 6 HOURS PRN
Qty: 20 TABLET | Refills: 0 | Status: SHIPPED | OUTPATIENT
Start: 2021-03-12 | End: 2022-06-05 | Stop reason: SDUPTHER

## 2021-03-12 RX ADMIN — ONDANSETRON 4 MG: 2 INJECTION INTRAMUSCULAR; INTRAVENOUS at 16:43

## 2021-03-12 RX ADMIN — IOHEXOL 100 ML: 350 INJECTION, SOLUTION INTRAVENOUS at 17:08

## 2021-03-12 RX ADMIN — SODIUM CHLORIDE 1000 ML: 0.9 INJECTION, SOLUTION INTRAVENOUS at 15:12

## 2021-03-12 RX ADMIN — HYDROMORPHONE HYDROCHLORIDE 0.5 MG: 1 INJECTION, SOLUTION INTRAMUSCULAR; INTRAVENOUS; SUBCUTANEOUS at 15:13

## 2021-03-12 RX ADMIN — HYDROMORPHONE HYDROCHLORIDE 1 MG: 1 INJECTION, SOLUTION INTRAMUSCULAR; INTRAVENOUS; SUBCUTANEOUS at 16:44

## 2021-03-12 RX ADMIN — ONDANSETRON 4 MG: 2 INJECTION INTRAMUSCULAR; INTRAVENOUS at 15:12

## 2021-03-12 NOTE — ED NOTES
Pt keeps ringing call bell asking for water   Was told that we cannot provide anything to eat or drink until CT scans come back      Isabel Teixeira  03/12/21 [id number]

## 2021-03-12 NOTE — ED PROVIDER NOTES
History  Chief Complaint   Patient presents with    Abdominal Pain     Patient reports continued left lower abdominal pain for 6 days  Seen here 5 days ago without impovement  59-year-old female history of fibromyalgia, kidney surgery when she was young, total abdominal hysterectomy with  bilateral salpingo-oophorectomy,  ovarian cancer, spinal stenosis presents with abdominal pain, nausea, vomiting, flank pain, dysuria  Patient seen at urgent care and the emergency department 4 days ago for dysuria and flank pain  Diagnosed with urinary tract infection plan  Placed on Bactrim bid ten days  Patient notes subjective fevers the last 3 days  Vomits after taking antibiotic  Went to PCP this morning and referred to the emergency department  Urine dip at the urgent care showed trace blood  Urinalysis in the emergency department was negative for acute findings  CT scan renal stone protocol without acute findings  Discharged with a diagnosis of cystitis on Bactrim  Since discharge, patient notes onset of nausea and vomiting, subjective fevers, left lower quadrant abdominal pain  Patient notes abdominal pain starting a few days ago  Located left lower quadrant  Subjective fevers x 5 days  Afebrile at 3 doctor visits, but taking acetaminophen per patient  Abdominal Pain  Pain location:  LLQ  Pain quality: sharp    Pain radiates to:  Does not radiate  Pain severity:  Severe  Onset quality:  Gradual  Timing:  Constant  Progression:  Worsening  Chronicity:  New  Worsened by:  Nothing  Ineffective treatments:  Acetaminophen  Associated symptoms: anorexia, dysuria, nausea and vomiting        Prior to Admission Medications   Prescriptions Last Dose Informant Patient Reported? Taking?    COLLAGEN PO   Yes No   Sig: Take by mouth   Multiple Vitamins-Minerals (ZINC PO)   Yes No   Sig: Take by mouth   PARoxetine (PAXIL) 10 mg tablet  Self Yes No   Sig: Take by mouth   PARoxetine (PAXIL) 40 MG tablet  Self Yes No   Sig: Take by mouth   SUMAtriptan (IMITREX) 50 mg tablet   No No   Sig: Take 1 tablet (50 mg total) by mouth once as needed for migraine for up to 1 dose May repeat in 2 hours  No more than 200 mg per day  busPIRone (BUSPAR) 5 mg tablet   Yes No   Sig: Take 5 mg by mouth 2 (two) times a day   calcium-vitamin D 250-100 MG-UNIT per tablet   Yes No   Sig: Take 1 tablet by mouth 2 (two) times a day   cyanocobalamin (VITAMIN B-12) 100 mcg tablet   Yes No   Sig: Take by mouth daily   gabapentin (NEURONTIN) 300 mg capsule   No No   Sig: Take 1 capsule (300 mg total) by mouth 3 (three) times a day   gabapentin (NEURONTIN) 300 mg capsule   No No   Sig: Take 2 tablets tid     hydrOXYzine HCL (ATARAX) 50 mg tablet  Self Yes No   meclizine (ANTIVERT) 12 5 MG tablet   No No   Sig: Take 1 tablet (12 5 mg total) by mouth every 8 (eight) hours as needed for dizziness or nausea   meloxicam (MOBIC) 7 5 mg tablet   No No   Sig: Take 1 tablet (7 5 mg total) by mouth 2 (two) times a day as needed for moderate pain   methenamine hippurate (HIPREX) 1 g tablet  Self Yes No   Sig: 3 g 2 (two) times a day with meals    omeprazole (PriLOSEC) 40 MG capsule  Self Yes No   Sig: Take 40 mg by mouth daily    sulfamethoxazole-trimethoprim (BACTRIM DS) 800-160 mg per tablet   No No   Sig: Take 1 tablet by mouth 2 (two) times a day for 14 days smx-tmp DS (BACTRIM) 800-160 mg tabs (1tab q12 D10)      Facility-Administered Medications: None       Past Medical History:   Diagnosis Date    Anxiety     Arthritis     Last assessed 5/3/2011    Ortega's esophagus     Cancer ([facility] [address] )     ovarian cancer at age 26 yo- REMOVAL  B/L    Colon polyp     DDD (degenerative disc disease), lumbar     Depression     Fall     5/2020 right knee meniscus tear- OR repair today 8/3/2020    Fibromyalgia     Fibromyalgia, primary     Fracture of one or more phalanges of foot     GERD (gastroesophageal reflux disease)     H/O bladder infections     chronic  Hypertension     Kidney infection     occasional    Migraines     Ovarian cancer [facility]) 1987    age 27    Polyneuropathy     Last assessed 6/23/2016 knees to feet    PONV (postoperative nausea and vomiting)     Patient requests to be pre-medicated prior to surgery prior to surgery    PONV (postoperative nausea and vomiting) 8/3/2020    Renal disorder     Spinal stenosis     Vertigo     Wears glasses     reading       Past Surgical History:   Procedure Laterality Date    ABDOMINAL SURGERY  1986    several    BACK SURGERY  1990    CATARACT EXTRACTION, BILATERAL      COLONOSCOPY      FOOT FRACTURE SURGERY Bilateral 2004    x 5  surgeries    KIDNEY SURGERY  1974    at age 13 yo    KNEE SURGERY Right     [place] KNEE SCOPE,MED/LAT MENISECTOMY Right 8/3/2020    Procedure: [address];  Surgeon: Imer Betts MD;  Location: [facility];  Service: Orthopedics    [place] LAP,CHOLECYSTECTOMY N/A 11/27/2020    Procedure: LAPAROSCOPIC CHOLECYSTECTOMY;  Surgeon: Cristel Canales MD;  [facility];  Service: General    TOTAL ABDOMINAL HYSTERECTOMY  1987    age 27   Emaline Folds TOTAL ABDOMINAL HYSTERECTOMY W/ BILATERAL SALPINGOOPHORECTOMY Bilateral 1987    age 27       Family History   Problem Relation Age of Onset    Heart disease Mother     Cancer Mother     Diabetes Mother     Arthritis Mother     Anxiety disorder Mother     Bleeding Disorder Mother     Clotting disorder Mother     Depression Mother     Hyperlipidemia Mother     Irritable bowel syndrome Mother     Hypertension Mother     Obesity Mother     Osteoporosis Mother     Vaginal cancer Mother 27    Skin cancer Mother     Thyroid disease Mother     Diabetes Father     Arthritis Father     Hyperlipidemia Father     Vesicoureteral reflux Father     Heart disease Father     Hypertension Father     Migraines Father     Obesity Father     Hypertension Family     Arthritis Family     Arthritis Brother     Anxiety disorder Brother     Diabetes Brother     Hyperlipidemia Brother     Vesicoureteral reflux Brother     Heart disease Brother     Irritable bowel syndrome Brother     Hypertension Brother     Migraines Brother     Thyroid disease Brother     Pancreatic cancer Brother 54    Migraines Daughter     Asthma Son     Migraines Son     Diabetes Maternal Grandmother     Vaginal cancer Maternal Grandmother 48    Infertility Paternal Grandmother     Arthritis Maternal Aunt     Anxiety disorder Maternal Aunt     Depression Maternal Aunt     Diabetes Maternal Aunt     Vaginal cancer Maternal Aunt 48    Fibroids Paternal Aunt     No Known Problems Maternal Grandfather     No Known Problems Paternal Grandfather     No Known Problems Maternal Aunt     No Known Problems Maternal Aunt     No Known Problems Maternal Aunt      I have reviewed and agree with the history as documented  E-Cigarette/Vaping    E-Cigarette Use Never User      E-Cigarette/Vaping Substances     Social History     Tobacco Use    Smoking status: Never Smoker    Smokeless tobacco: Never Used   Substance Use Topics    Alcohol use: Yes     Drinks per session: 1 or 2     Comment: rare    Drug use: No        Review of Systems   Gastrointestinal: Positive for abdominal pain, anorexia, nausea and vomiting  Genitourinary: Positive for dysuria  All other systems reviewed and are negative        Physical Exam  ED Triage Vitals   Temperature Pulse Respirations Blood Pressure SpO2   03/12/21 1436 03/12/21 1432 03/12/21 1432 03/12/21 1432 03/12/21 1432   97 7 °F (36 5 °C) 92 18 105/73 97 %      Temp Source Heart Rate Source Patient Position - Orthostatic VS BP Location FiO2 (%)   03/12/21 1436 03/12/21 1432 03/12/21 1432 03/12/21 1432 --   Oral Monitor Lying Right arm       Pain Score       03/12/21 1432       Worst Possible Pain             Orthostatic Vital Signs  Vitals:    03/12/21 1432 03/12/21 1639   BP: 105/73 125/71   Pulse: 92 91   Patient Position - Orthostatic VS: Lying Lying       Physical Exam  Vitals signs and nursing note reviewed  Constitutional:       General: She is not in acute distress  Appearance: Normal appearance  She is not ill-appearing  HENT:      Head: Normocephalic and atraumatic  Right Ear: External ear normal       Left Ear: External ear normal       Nose: Nose normal       Mouth/Throat:      Mouth: Mucous membranes are moist    Eyes:      General:         Right eye: No discharge  Left eye: No discharge  Conjunctiva/sclera: Conjunctivae normal    Neck:      Musculoskeletal: Normal range of motion  Cardiovascular:      Rate and Rhythm: Normal rate and regular rhythm  Pulses: Normal pulses  Heart sounds: No murmur  Pulmonary:      Effort: Pulmonary effort is normal       Breath sounds: Normal breath sounds  Abdominal:      General: Abdomen is flat  There is no distension  Tenderness: There is abdominal tenderness in the left lower quadrant  There is no guarding or rebound  Musculoskeletal: Normal range of motion  Skin:     General: Skin is warm  Capillary Refill: Capillary refill takes less than 2 seconds  Findings: No rash  Neurological:      General: No focal deficit present  Mental Status: She is alert  Mental status is at baseline     Psychiatric:         Mood and Affect: Mood normal          Behavior: Behavior normal          ED Medications  Medications   ondansetron (ZOFRAN) injection 4 mg (4 mg Intravenous Given 3/12/21 1512)   HYDROmorphone (DILAUDID) injection 0 5 mg (0 5 mg Intravenous Given 3/12/21 1513)   sodium chloride 0 9 % bolus 1,000 mL (0 mL Intravenous Stopped 3/12/21 1653)   HYDROmorphone (DILAUDID) injection 1 mg (1 mg Intravenous Given 3/12/21 1644)   ondansetron (ZOFRAN) injection 4 mg (4 mg Intravenous Given 3/12/21 1643)   iohexol (OMNIPAQUE) 350 MG/ML injection (MULTI-DOSE) 100 mL (100 mL Intravenous Given 3/12/21 1708)       Diagnostic Studies  Results Reviewed     Procedure Component Value Units Date/Time    CMP [[id number]]  (Abnormal) Collected: 03/12/21 1515    Lab Status: Final result Specimen: Blood from Arm, Right Updated: 03/12/21 1620     Sodium 133 mmol/L      Potassium 3 4 mmol/L      Chloride 100 mmol/L      CO2 23 mmol/L      ANION GAP 10 mmol/L      BUN 21 mg/dL      Creatinine 1 05 mg/dL      Glucose 108 mg/dL      Calcium 9 1 mg/dL      Corrected Calcium 9 8 mg/dL      AST 40 U/L      ALT 37 U/L      Alkaline Phosphatase 94 U/L      Total Protein 7 1 g/dL      Albumin 3 1 g/dL      Total Bilirubin 0 99 mg/dL      eGFR 57 ml/min/1 73sq m     Narrative:      [place] guidelines for Chronic Kidney Disease (CKD):     Stage 1 with normal or high GFR (GFR > 90 mL/min/1 73 square meters)    Stage 2 Mild CKD (GFR = 60-89 mL/min/1 73 square meters)    Stage 3A Moderate CKD (GFR = 45-59 mL/min/1 73 square meters)    Stage 3B Moderate CKD (GFR = 30-44 mL/min/1 73 square meters)    Stage 4 Severe CKD (GFR = 15-29 mL/min/1 73 square meters)    Stage 5 End Stage CKD (GFR <15 mL/min/1 73 square meters)  Note: GFR calculation is accurate only with a steady state creatinine    Lipase [[id number]]  (Normal) Collected: 03/12/21 1515    Lab Status: Final result Specimen: Blood from Arm, Right Updated: 03/12/21 1620     Lipase 383 u/L     Urine Microscopic [[id number]]  (Normal) Collected: 03/12/21 1542    Lab Status: Final result Specimen: Urine, Other Updated: 03/12/21 1615     RBC, UA 2-4 /hpf      WBC, UA None Seen /hpf      Epithelial Cells None Seen /hpf      Bacteria, UA None Seen /hpf      Hyaline Casts, UA None Seen /lpf     UA w Reflex to Microscopic w Reflex to Culture [[id number]]  (Abnormal) Collected: 03/12/21 1542    Lab Status: Final result Specimen: Urine, Other Updated: 03/12/21 1606     Color, UA Yellow     Clarity, UA Clear     Specific Gravity, UA 1 010     pH, UA 6 5 Leukocytes, UA Negative     Nitrite, UA Negative     Protein, UA Trace mg/dl      Glucose, UA Negative mg/dl      Ketones, UA Trace mg/dl      Urobilinogen, UA 0 2 E U /dl      Bilirubin, UA Negative     Blood, UA Trace    CBC and differential [[id number]]  (Abnormal) Collected: 03/12/21 1515    Lab Status: Final result Specimen: Blood from Arm, Right Updated: 03/12/21 1524     WBC 11 75 Thousand/uL      RBC 4 48 Million/uL      Hemoglobin 12 9 g/dL      Hematocrit 38 9 %      MCV 87 fL      MCH 28 8 pg      MCHC 33 2 g/dL      RDW 13 2 %      MPV 10 7 fL      Platelets 345 Thousands/uL      nRBC 0 /100 WBCs      Neutrophils Relative 72 %      Immat GRANS % 1 %      Lymphocytes Relative 17 %      Monocytes Relative 9 %      Eosinophils Relative 1 %      Basophils Relative 0 %      Neutrophils Absolute 8 51 Thousands/µL      Immature Grans Absolute 0 07 Thousand/uL      Lymphocytes Absolute 1 98 Thousands/µL      Monocytes Absolute 1 01 Thousand/µL      Eosinophils Absolute 0 15 Thousand/µL      Basophils Absolute 0 03 Thousands/µL                  CT abdomen pelvis with contrast   Final Result by Elkin Rosenberg MD (03/12 1750)   Atrophic right kidney  Colonic diverticulosis  Workstation performed: [id number]            Procedures  Procedures      ED Course  ED Course as of Mar 13 0010   Fri Mar 12, 2021   1525 Decreasing from 16k four days ago   WBC(!): 11 75   1632 Blood, UA(!): Trace   1632 Ketones, UA(!): Trace                             SBIRT 20yo+      Most Recent Value   SBIRT (25 yo +)   In order to provide better care to our patients, we are screening all of our patients for alcohol and drug use  Would it be okay to ask you these screening questions? Yes Filed at: 03/12/2021 1520   Initial Alcohol Screen: US AUDIT-C    1  How often do you have a drink containing alcohol?  0 Filed at: 03/12/2021 1520   2  How many drinks containing alcohol do you have on a typical day you are drinking?    0 Filed at: 03/12/2021 1520   3a  Male UNDER 65: How often do you have five or more drinks on one occasion? 0 Filed at: 03/12/2021 1520   3b  FEMALE Any Age, or MALE 65+: How often do you have 4 or more drinks on one occassion? 0 Filed at: 03/12/2021 1520   Audit-C Score  0 Filed at: 03/12/2021 1520   JENNIFER: How many times in the past year have you    Used an illegal drug or used a prescription medication for non-medical reasons? Never Filed at: 03/12/2021 1520                MDM  Number of Diagnoses or Management Options  Abdominal pain: new and requires workup  Hypokalemia: new and requires workup  Hyponatremia: new and requires workup  Nausea and vomiting: new and requires workup  Diagnosis management comments: Well appearing 57 y/o presents with abdominal pain in the left femoral/ pelvic region  No hernia palpable of visible  Multiple recent visits  Afebrile at all those visits  Ddx: hernia, UTI, acute intra-abdominal abnormality  Probable chronic pain exacerbated by recent episode of nausea and vomiting  Will give fluids and pain meds  Patient has mild hyponatremia and hypokalemia  Informed of these findings  Diverticulosis without diverticulitis  Informed of this finding  Will prescribe zofran for nausea  Follow up with PCP asap  Return precautions given          Amount and/or Complexity of Data Reviewed  Clinical lab tests: ordered and reviewed  Tests in the radiology section of CPT®: ordered and reviewed  Decide to obtain previous medical records or to obtain history from someone other than the patient: yes  Review and summarize past medical records: yes  Independent visualization of images, tracings, or specimens: yes    Risk of Complications, Morbidity, and/or Mortality  Presenting problems: moderate  Diagnostic procedures: moderate  Management options: moderate    Patient Progress  Patient progress: stable      Disposition  Final diagnoses:   Hyponatremia   Hypokalemia   Nausea and vomiting Abdominal pain     Time reflects when diagnosis was documented in both MDM as applicable and the Disposition within this note     Time User Action Codes Description Comment    3/12/2021  6:15 PM Karrie Sauk Add [E87 1] Hyponatremia     3/12/2021  6:15 PM Karrie Sauk Add [E87 6] Hypokalemia     3/12/2021  6:15 PM Karrie Sauk Add [R11 2] Nausea and vomiting     3/12/2021  6:15 PM Karrie Sauk Add [R10 9] Abdominal pain       ED Disposition     ED Disposition Condition Date/Time Comment    Discharge Stable Fri Mar 12, 2021  6:15 PM [address] discharge to home/self care              Follow-up Information     Follow up With Specialties Details Why Contact Info Additional [id number]  [facility], DO Family Medicine  For re-evaluation as soon as possible [address] [place] [phone]       [address] [place] [address] 34 [facility] Emergency Department Emergency Medicine  If symptoms worsen [address]  958 [facility] 64 [facility] Emergency Department, [address] 108          Discharge Medication List as of 3/12/2021  6:17 PM      START taking these medications    Details   ondansetron (ZOFRAN-ODT) 4 mg disintegrating tablet Take 1 tablet (4 mg total) by mouth every 6 (six) hours as needed for nausea or vomiting, Starting Fri 3/12/2021, Normal         CONTINUE these medications which have NOT CHANGED    Details   busPIRone (BUSPAR) 5 mg tablet Take 5 mg by mouth 2 (two) times a day, Starting Tue 6/30/2020, Historical Med      calcium-vitamin D 250-100 MG-UNIT per tablet Take 1 tablet by mouth 2 (two) times a day, Historical Med      COLLAGEN PO Take by mouth, Historical Med      cyanocobalamin (VITAMIN B-12) 100 mcg tablet Take by mouth daily, Historical Med      !! gabapentin (NEURONTIN) 300 mg capsule Take 1 capsule (300 mg total) by mouth 3 (three) times a day, Starting Fri 7/24/2020, Until Fri 3/12/2021, Normal      !! gabapentin (NEURONTIN) 300 mg capsule Take 2 tablets tid , Normal      hydrOXYzine HCL (ATARAX) 50 mg tablet Starting Thu 3/1/2018, Historical Med      meclizine (ANTIVERT) 12 5 MG tablet Take 1 tablet (12 5 mg total) by mouth every 8 (eight) hours as needed for dizziness or nausea, Starting Tue 1/28/2020, Normal      meloxicam (MOBIC) 7 5 mg tablet Take 1 tablet (7 5 mg total) by mouth 2 (two) times a day as needed for moderate pain, Starting Thu 1/7/2021, Normal      methenamine hippurate (HIPREX) 1 g tablet 3 g 2 (two) times a day with meals , Starting Tue 5/21/2019, Historical Med      Multiple Vitamins-Minerals (ZINC PO) Take by mouth, Historical Med      omeprazole (PriLOSEC) 40 MG capsule Take 40 mg by mouth daily , Starting Sat 12/1/2018, Historical Med      !! PARoxetine (PAXIL) 10 mg tablet Take by mouth, Historical Med      !! PARoxetine (PAXIL) 40 MG tablet Take by mouth, Historical Med      sulfamethoxazole-trimethoprim (BACTRIM DS) 800-160 mg per tablet Take 1 tablet by mouth 2 (two) times a day for 14 days smx-tmp DS (BACTRIM) 800-160 mg tabs (1tab q12 D10), Starting Mon 3/8/2021, Until Mon 3/22/2021, Print      SUMAtriptan (IMITREX) 50 mg tablet Take 1 tablet (50 mg total) by mouth once as needed for migraine for up to 1 dose May repeat in 2 hours  No more than 200 mg per day , Starting Fri 5/8/2020, Normal       !! - Potential duplicate medications found  Please discuss with provider  No discharge procedures on file  PDMP Review       Value Time User    PDMP Reviewed  Yes 12/15/2020 11:25 AM Merlyn Griffiths           ED Provider  Attending physically available and evaluated [address]  I managed the patient along with the ED Attending      Electronically Signed by         Marlon Goldberg, DO  03/13/21 0011

## 2021-03-12 NOTE — ED ATTENDING ATTESTATION
3/12/2021  IRo MD, saw and evaluated the patient  I have discussed the patient with the resident/non-physician practitioner and agree with the resident's/non-physician practitioner's findings, Plan of Care, and MDM as documented in the resident's/non-physician practitioner's note, except where noted  All available labs and Radiology studies were reviewed  I was present for key portions of any procedure(s) performed by the resident/non-physician practitioner and I was immediately available to provide assistance  At this point I agree with the current assessment done in the Emergency Department  I have conducted an independent evaluation of this patient a history and physical is as follows:    78-year-old woman with prior history of ovarian cancer status post hysterectomy and bilateral oophorectomy presenting with 4 days of abdominal pain which was fairly sudden in onset  Pain is primarily left lower quadrant with radiation to the rest of the abdomen  Patient has had nausea and multiple episodes of vomiting  No diarrhea  Patient recently put on Bactrim for urinary tract infection  She is awake alert, uncomfortable appearing  Head atraumatic normocephalic  Neck is supple  Heart regular rate rhythm, no murmurs rubs or gallops  Lungs clear to auscultation bilaterally  Abdomen is soft and nondistended  There is left lower quadrant tenderness and epigastric tenderness without rebound or guarding  Plan labs, imaging, treatment as indicated      ED Course         Critical Care Time  Procedures

## 2021-03-12 NOTE — DISCHARGE INSTRUCTIONS
Follow-up with primary care provider soon as possible  Use the Zofran as needed for nausea  Take Tylenol as needed for your pain  You had slightly decreased levels of potassium on your laboratory results today  Added potassium rich foods her diet such as bananas, orange juice, figs, potatoes  Return to emergency department if you have new or worsening symptoms, blood in your stool or emesis

## 2021-03-12 NOTE — PROGRESS NOTES
Chief Complaint   Patient presents with    Abdominal Pain    Back Pain    Hip Pain     left    Vomiting    Fever    Headache    Constipation    Sore Throat     Assessment/Plan:  ER evaluation  Diagnoses and all orders for this visit:    Left lower quadrant abdominal pain  -     Transfer to other facility    Acute left-sided low back pain without sciatica  -     Transfer to other facility    Leukocytosis, unspecified type  -     Transfer to other facility    Nausea and vomiting, intractability of vomiting not specified, unspecified vomiting type  -     Transfer to other facility    Tiredness    Other orders  -     Multiple Vitamins-Minerals (ZINC PO); Take by mouth  -     COLLAGEN PO; Take by mouth          Subjective:      Patient ID: Brenda Villa is a 56 y o  female  LLQ abdominal pain and LLB pain  Reviewed ER notes from past Monday, seen for left low back pain  Abdominal pain started past Wednesday - 2 days ago  Vomiting started past Tuesday and has been doing this every couple hours and today vomited twice, no appetite  Fevers subjective Tuesday, Wed and Thursday this week       The following portions of the patient's history were reviewed and updated as appropriate: allergies, current medications, past medical history, past social history, past surgical history and problem list   I have spent 25 minutes with Patient  today in which greater than 50% of this time was spent in counseling/coordination of care regarding Risks and benefits of tx options, Intructions for management, Patient and family education, Importance of tx compliance, Risk factor reductions and Impressions  Review of Systems   Constitutional: Positive for appetite change and fatigue  No appetite  HENT: Negative  Respiratory: Negative  Cardiovascular: Negative  Gastrointestinal: Positive for abdominal pain and vomiting  LLQ pain  Genitourinary: Negative      Musculoskeletal: Positive for back pain    Skin: Negative  Neurological: Negative  Psychiatric/Behavioral: Negative  Objective:      /84 (BP Location: Left arm, Patient Position: Sitting, Cuff Size: Standard)   Pulse 93   Temp (!) 97 2 °F (36 2 °C) (Tympanic)   Ht 5' 7" (1 702 m)   LMP  (LMP Unknown)   SpO2 98%   BMI 28 19 kg/m²       Current Outpatient Medications:     calcium-vitamin D 250-100 MG-UNIT per tablet, Take 1 tablet by mouth 2 (two) times a day, Disp: , Rfl:     COLLAGEN PO, Take by mouth, Disp: , Rfl:     cyanocobalamin (VITAMIN B-12) 100 mcg tablet, Take by mouth daily, Disp: , Rfl:     gabapentin (NEURONTIN) 300 mg capsule, Take 1 capsule (300 mg total) by mouth 3 (three) times a day, Disp: 90 capsule, Rfl: 0    gabapentin (NEURONTIN) 300 mg capsule, Take 2 tablets tid , Disp: 540 capsule, Rfl: 0    hydrOXYzine HCL (ATARAX) 50 mg tablet, , Disp: , Rfl:     meclizine (ANTIVERT) 12 5 MG tablet, Take 1 tablet (12 5 mg total) by mouth every 8 (eight) hours as needed for dizziness or nausea, Disp: 30 tablet, Rfl: 0    meloxicam (MOBIC) 7 5 mg tablet, Take 1 tablet (7 5 mg total) by mouth 2 (two) times a day as needed for moderate pain, Disp: 60 tablet, Rfl: 1    methenamine hippurate (HIPREX) 1 g tablet, 3 g 2 (two) times a day with meals , Disp: , Rfl:     Multiple Vitamins-Minerals (ZINC PO), Take by mouth, Disp: , Rfl:     omeprazole (PriLOSEC) 40 MG capsule, Take 40 mg by mouth daily , Disp: , Rfl:     PARoxetine (PAXIL) 10 mg tablet, Take by mouth, Disp: , Rfl:     PARoxetine (PAXIL) 40 MG tablet, Take by mouth, Disp: , Rfl:     sulfamethoxazole-trimethoprim (BACTRIM DS) 800-160 mg per tablet, Take 1 tablet by mouth 2 (two) times a day for 14 days smx-tmp DS (BACTRIM) 800-160 mg tabs (1tab q12 D10), Disp: 28 tablet, Rfl: 0    SUMAtriptan (IMITREX) 50 mg tablet, Take 1 tablet (50 mg total) by mouth once as needed for migraine for up to 1 dose May repeat in 2 hours    No more than 200 mg per day , Disp: 10 tablet, Rfl: 2    busPIRone (BUSPAR) 5 mg tablet, Take 5 mg by mouth 2 (two) times a day, Disp: , Rfl:     Allergies   Allergen Reactions    Morphine      Acts not like herself and feels agitated and restless    Penicillins Hives     Tongue swells       Past Surgical History:   Procedure Laterality Date    ABDOMINAL SURGERY  1986    several    BACK SURGERY  1990    CATARACT EXTRACTION, BILATERAL      COLONOSCOPY      FOOT FRACTURE SURGERY Bilateral 2004    x 5  surgeries    KIDNEY SURGERY  1974    at age 13 yo    KNEE SURGERY Right     [place] KNEE SCOPE,MED/LAT MENISECTOMY Right 8/3/2020    Procedure: KNEE ARTHROSCOPY,PARTIAL MEDIAL & LATERAL MENISECTOMIES;  Surgeon: Kayla Centeno MD;  [facility];  Service: Orthopedics    [place] LAP,CHOLECYSTECTOMY N/A 11/27/2020    Procedure: Tre Duran;  Surgeon: Rickey Love MD;  [facility];  Service: General    TOTAL ABDOMINAL HYSTERECTOMY  1987    age 27    TOTAL ABDOMINAL HYSTERECTOMY W/ BILATERAL SALPINGOOPHORECTOMY Bilateral 1987    age 27          Physical Exam  Constitutional:       Appearance: She is obese  Comments: Looks tired and in discomfort  HENT:      Head: Normocephalic and atraumatic  Right Ear: External ear normal       Left Ear: External ear normal       Nose: Nose normal    Eyes:      Conjunctiva/sclera: Conjunctivae normal       Pupils: Pupils are equal, round, and reactive to light  Neck:      Musculoskeletal: Normal range of motion and neck supple  Cardiovascular:      Rate and Rhythm: Normal rate and regular rhythm  Heart sounds: Normal heart sounds  Pulmonary:      Effort: Pulmonary effort is normal       Breath sounds: Normal breath sounds  Abdominal:      General: Bowel sounds are normal  There is no distension  Palpations: Abdomen is soft  Tenderness: There is abdominal tenderness in the left lower quadrant  There is no guarding or rebound        Hernia: No hernia is present  Skin:     General: Skin is warm and dry  Neurological:      General: No focal deficit present  Mental Status: She is alert and oriented to person, place, and time  Deep Tendon Reflexes: Reflexes are normal and symmetric  Psychiatric:         Mood and Affect: Mood normal          Behavior: Behavior normal          Thought Content:  Thought content normal          Judgment: Judgment normal

## 2021-03-15 ENCOUNTER — TELEPHONE (OUTPATIENT)
Dept: FAMILY MEDICINE CLINIC | Facility: CLINIC | Age: 61
End: 2021-03-15

## 2021-03-15 NOTE — TELEPHONE ENCOUNTER
Pt has not finished sulfamethoxazole-trimethoprim (BACTRIM DS) 800-160 mg per tablet  She is currently still taking medication

## 2021-03-15 NOTE — TELEPHONE ENCOUNTER
Called pt per Dr Paula pt was told to stop Bactrim at this time,pt can use tylenol PRN for abdominal pain and use Zofran as prescribed for nausea and vomiting, pt was told to have bland diet as well as staying hydrated  Pt verbalized understanding and will call back if symptoms worsen or do not improve

## 2021-03-17 ENCOUNTER — TELEPHONE (OUTPATIENT)
Dept: FAMILY MEDICINE CLINIC | Facility: CLINIC | Age: 61
End: 2021-03-17

## 2021-03-19 ENCOUNTER — TELEPHONE (OUTPATIENT)
Dept: FAMILY MEDICINE CLINIC | Facility: CLINIC | Age: 61
End: 2021-03-19

## 2021-03-19 NOTE — TELEPHONE ENCOUNTER
Patient called and asked for medication that will help diarrhea  Patient was instructed to take Imodium AD otc  She received instructions and verbalized understanding  She will back if diarrhea salcedo snot go away in a few days

## 2021-03-20 ENCOUNTER — NURSE TRIAGE (OUTPATIENT)
Dept: OTHER | Facility: OTHER | Age: 61
End: 2021-03-20

## 2021-03-20 NOTE — TELEPHONE ENCOUNTER
Regarding: Hand and Leg swelling   ----- Message from Raissa Farmer RN sent at 3/20/2021 10:21 AM EDT -----  " For the past two weeks I have been follow up with Dr Kaia Malhotra, and now I have developed bilateral swollen hands and legs"

## 2021-03-20 NOTE — TELEPHONE ENCOUNTER
Patient stated she may wait until Monday to see Dr Crispin Mathias  She will monitor symptoms and call back if worsening  Reason for Disposition   [1] MILD swelling (puffiness) of both hands AND [2] new onset or worsening  (Exception: caused by hot weather or normal pregnancy swelling)    Answer Assessment - Initial Assessment Questions  1  ONSET: "When did the swelling start?" (e g , minutes, hours, days)      Yesterday  2  LOCATION: "What part of the hand is swollen?"  "Are both hands swollen or just one hand?"      Both  3  SEVERITY: "How bad is the swelling?" (e g , localized; mild, moderate, severe)  Moderate  Denies pitting  Said wedding rings are digging in more than normal     4  REDNESS: "Does the swelling look red or infected?"      No     5  PAIN: "Is the swelling painful to touch?" If so, ask: "How painful is it?"   (Scale 1-10; mild, moderate or severe)      Moderate  6  FEVER: "Do you have a fever?" If so, ask: "What is it, how was it measured, and when did it start?"       No     7  CAUSE: "What do you think is causing the hand swelling?" (e g , heat, insect bite, pregnancy, recent injury)      No injury  Unknown cause  8  MEDICAL HISTORY: "Do you have a history of heart failure, kidney disease, liver failure, or cancer?"      No     9  RECURRENT SYMPTOM: "Have you had hand swelling before?" If so, ask: "When was the last time?" "What happened that time?"      No     10  OTHER SYMPTOMS: "Do you have any other symptoms?" (e g , blurred vision, difficulty breathing, headache)        No  Bilateral feet are also swollen  She states some numbness and tingling which is chronic from neuropathy  She takes gabapentin for this      Protocols used: HAND [facility]

## 2021-03-22 ENCOUNTER — OFFICE VISIT (OUTPATIENT)
Dept: FAMILY MEDICINE CLINIC | Facility: CLINIC | Age: 61
End: 2021-03-22
Payer: MEDICARE

## 2021-03-22 VITALS
HEIGHT: 67 IN | WEIGHT: 223 LBS | BODY MASS INDEX: 35 KG/M2 | TEMPERATURE: 97.9 F | DIASTOLIC BLOOD PRESSURE: 92 MMHG | HEART RATE: 115 BPM | SYSTOLIC BLOOD PRESSURE: 144 MMHG | OXYGEN SATURATION: 97 %

## 2021-03-22 DIAGNOSIS — M79.7 FIBROMYALGIA: ICD-10-CM

## 2021-03-22 DIAGNOSIS — R00.0 SINUS TACHYCARDIA: ICD-10-CM

## 2021-03-22 DIAGNOSIS — G62.9 NEUROPATHY: ICD-10-CM

## 2021-03-22 DIAGNOSIS — F33.1 MODERATE EPISODE OF RECURRENT MAJOR DEPRESSIVE DISORDER (HCC): ICD-10-CM

## 2021-03-22 DIAGNOSIS — R79.82 ELEVATED C-REACTIVE PROTEIN (CRP): ICD-10-CM

## 2021-03-22 DIAGNOSIS — E86.0 DEHYDRATION: ICD-10-CM

## 2021-03-22 DIAGNOSIS — M19.90 ARTHRITIS: Primary | ICD-10-CM

## 2021-03-22 PROCEDURE — 99215 OFFICE O/P EST HI 40 MIN: CPT | Performed by: FAMILY MEDICINE

## 2021-03-22 NOTE — PROGRESS NOTES
Chief Complaint   Patient presents with    Hand Pain     both     Foot Swelling     both     Foot Pain     both     Leg Pain     Assessment/Plan:  Discussed with patient and  that her tissues are not being properly hydrated  Start with #4 16 oz bottles a day  Discussed nutrition  Diagnoses and all orders for this visit:    Arthritis  -     YESICA Screen w/ Reflex to Titer/Pattern; Future  -     RF Screen w/ Reflex to Titer; Future  -     C-reactive protein; Future    Elevated C-reactive protein (CRP)    Moderate episode of recurrent major depressive disorder (HCC)    Dehydration    Neuropathy    Fibromyalgia    Sinus tachycardia          Subjective:      Patient ID: Fred Lindsey is a 56 y o  female  Foot neuropathy acting up  Has been taking Neurontin for  Also feels like hand swelling  Both hands are tender, feet feel same way  History of elevated CRP  S/P N/V/D, sulfa stopped and symptoms resolved  SH: Diet sodas #12, 1/2 liters a day and tea's: hot and cold  Hx of neuropathy and fibromyalgia  The following portions of the patient's history were reviewed and updated as appropriate: allergies, current medications, past medical history, past social history, past surgical history and problem list   I have spent 40 minutes with Patient  today in which greater than 50% of this time was spent in counseling/coordination of care regarding Risks and benefits of tx options, Intructions for management, Patient and family education, Importance of tx compliance, Risk factor reductions and Impressions  Review of Systems   Constitutional: Negative  HENT: Negative  Eyes: Negative  Respiratory: Negative  Cardiovascular: Negative  Gastrointestinal: Negative  Genitourinary: Negative  Musculoskeletal: Positive for arthralgias, gait problem and myalgias  Skin: Negative  Psychiatric/Behavioral: Negative            Objective:      /92 (BP Location: Left arm, Patient Position: Sitting, Cuff Size: Large)   Pulse (!) 115   Temp 97 9 °F (36 6 °C) (Tympanic)   Ht 5' 7" (1 702 m)   Wt 101 kg (223 lb)   LMP  (LMP Unknown)   SpO2 97%   BMI 34 93 kg/m²       Current Outpatient Medications:     calcium-vitamin D 250-100 MG-UNIT per tablet, Take 1 tablet by mouth 2 (two) times a day, Disp: , Rfl:     COLLAGEN PO, Take by mouth, Disp: , Rfl:     cyanocobalamin (VITAMIN B-12) 100 mcg tablet, Take by mouth daily, Disp: , Rfl:     gabapentin (NEURONTIN) 300 mg capsule, Take 1 capsule (300 mg total) by mouth 3 (three) times a day, Disp: 90 capsule, Rfl: 0    gabapentin (NEURONTIN) 300 mg capsule, Take 2 tablets tid , Disp: 540 capsule, Rfl: 0    meclizine (ANTIVERT) 12 5 MG tablet, Take 1 tablet (12 5 mg total) by mouth every 8 (eight) hours as needed for dizziness or nausea, Disp: 30 tablet, Rfl: 0    meloxicam (MOBIC) 7 5 mg tablet, Take 1 tablet (7 5 mg total) by mouth 2 (two) times a day as needed for moderate pain, Disp: 60 tablet, Rfl: 1    Multiple Vitamins-Minerals (ZINC PO), Take by mouth, Disp: , Rfl:     omeprazole (PriLOSEC) 40 MG capsule, Take 40 mg by mouth daily , Disp: , Rfl:     ondansetron (ZOFRAN-ODT) 4 mg disintegrating tablet, Take 1 tablet (4 mg total) by mouth every 6 (six) hours as needed for nausea or vomiting, Disp: 20 tablet, Rfl: 0    PARoxetine (PAXIL) 10 mg tablet, Take by mouth, Disp: , Rfl:     PARoxetine (PAXIL) 40 MG tablet, Take by mouth, Disp: , Rfl:     SUMAtriptan (IMITREX) 50 mg tablet, Take 1 tablet (50 mg total) by mouth once as needed for migraine for up to 1 dose May repeat in 2 hours    No more than 200 mg per day , Disp: 10 tablet, Rfl: 2    busPIRone (BUSPAR) 5 mg tablet, Take 5 mg by mouth 2 (two) times a day, Disp: , Rfl:     hydrOXYzine HCL (ATARAX) 50 mg tablet, , Disp: , Rfl:     methenamine hippurate (HIPREX) 1 g tablet, 3 g 2 (two) times a day with meals , Disp: , Rfl:     sulfamethoxazole-trimethoprim (BACTRIM DS) 800-160 mg per tablet, Take 1 tablet by mouth 2 (two) times a day for 14 days smx-tmp DS (BACTRIM) 800-160 mg tabs (1tab q12 D10) (Patient not taking: Reported on 3/22/2021), Disp: 28 tablet, Rfl: 0    Allergies   Allergen Reactions    Morphine      Acts not like herself and feels agitated and restless    Penicillins Hives     Tongue swells       Past Surgical History:   Procedure Laterality Date    ABDOMINAL SURGERY  1986    several    BACK SURGERY  1990    CATARACT EXTRACTION, BILATERAL      COLONOSCOPY      FOOT FRACTURE SURGERY Bilateral 2004    x 5  surgeries    KIDNEY SURGERY  1974    at age 13 yo    KNEE SURGERY Right     [place] KNEE SCOPE,MED/LAT MENISECTOMY Right 8/3/2020    Procedure: [address];  Surgeon: Winston Le MD;  [facility];  Service: Orthopedics    [place] LAP,CHOLECYSTECTOMY N/A 11/27/2020    Procedure: Luster Chuck;  Surgeon: Trisha Steele MD;  [facility];  Service: General    TOTAL ABDOMINAL HYSTERECTOMY  1987    age 27    TOTAL ABDOMINAL HYSTERECTOMY W/ BILATERAL SALPINGOOPHORECTOMY Bilateral 1987    age 27          Physical Exam  Constitutional:       Appearance: Normal appearance  She is well-developed  HENT:      Head: Normocephalic and atraumatic  Right Ear: Tympanic membrane, ear canal and external ear normal       Left Ear: Tympanic membrane, ear canal and external ear normal       Nose: Nose normal       Mouth/Throat:      Mouth: Mucous membranes are dry  Pharynx: Oropharynx is clear  Eyes:      Conjunctiva/sclera: Conjunctivae normal       Pupils: Pupils are equal, round, and reactive to light  Neck:      Musculoskeletal: Normal range of motion and neck supple  Cardiovascular:      Rate and Rhythm: Regular rhythm  Heart sounds: Normal heart sounds  Pulmonary:      Effort: Pulmonary effort is normal       Breath sounds: Normal breath sounds     Abdominal:      General: Abdomen is flat  Bowel sounds are normal       Palpations: Abdomen is soft  Musculoskeletal:      Comments: Hands tender to palpation, minimal swelling  No leg edema  Skin:     General: Skin is warm and dry  Neurological:      General: No focal deficit present  Mental Status: She is alert and oriented to person, place, and time  Deep Tendon Reflexes: Reflexes are normal and symmetric  Psychiatric:         Behavior: Behavior normal          Thought Content:  Thought content normal          Judgment: Judgment normal

## 2021-03-25 ENCOUNTER — APPOINTMENT (OUTPATIENT)
Dept: LAB | Facility: IMAGING CENTER | Age: 61
End: 2021-03-25
Payer: MEDICARE

## 2021-03-25 DIAGNOSIS — M19.90 ARTHRITIS: ICD-10-CM

## 2021-03-25 LAB — CRP SERPL QL: <3 MG/L

## 2021-03-25 PROCEDURE — 86038 ANTINUCLEAR ANTIBODIES: CPT

## 2021-03-25 PROCEDURE — 86430 RHEUMATOID FACTOR TEST QUAL: CPT

## 2021-03-25 PROCEDURE — 36415 COLL VENOUS BLD VENIPUNCTURE: CPT

## 2021-03-25 PROCEDURE — 86140 C-REACTIVE PROTEIN: CPT

## 2021-03-26 LAB
RHEUMATOID FACT SER QL LA: NEGATIVE
RYE IGE QN: NEGATIVE

## 2021-03-29 ENCOUNTER — TELEPHONE (OUTPATIENT)
Dept: FAMILY MEDICINE CLINIC | Facility: CLINIC | Age: 61
End: 2021-03-29

## 2021-03-29 DIAGNOSIS — M25.50 ARTHRALGIA, UNSPECIFIED JOINT: Primary | ICD-10-CM

## 2021-03-29 NOTE — TELEPHONE ENCOUNTER
Patient drinks about two 13 oz bottles of caffeine-free tea with a lot of water in it, drinking about 4 20 oz water bottles a day  She still has pain and aches   Patient states there is something not right even though labs are normal

## 2021-03-29 NOTE — TELEPHONE ENCOUNTER
Labs were normal   Is she drinking the water amount as requested ? How much is she drinking ? How many bottles of water per day?

## 2021-03-29 NOTE — TELEPHONE ENCOUNTER
Called pt gave information to schedule with rheumatology  Pt verbalized understanding and will call back if needed

## 2021-04-15 ENCOUNTER — LAB REQUISITION (OUTPATIENT)
Dept: LAB | Facility: HOSPITAL | Age: 61
End: 2021-04-15
Payer: MEDICARE

## 2021-04-15 DIAGNOSIS — N39.0 URINARY TRACT INFECTION, SITE NOT SPECIFIED: ICD-10-CM

## 2021-04-15 PROCEDURE — 87086 URINE CULTURE/COLONY COUNT: CPT | Performed by: UROLOGY

## 2021-04-15 PROCEDURE — 87186 SC STD MICRODIL/AGAR DIL: CPT | Performed by: UROLOGY

## 2021-04-15 PROCEDURE — 87077 CULTURE AEROBIC IDENTIFY: CPT | Performed by: UROLOGY

## 2021-04-19 LAB — BACTERIA UR CULT: ABNORMAL

## 2021-04-20 ENCOUNTER — OFFICE VISIT (OUTPATIENT)
Dept: PAIN MEDICINE | Facility: CLINIC | Age: 61
End: 2021-04-20
Payer: MEDICARE

## 2021-04-20 VITALS
HEIGHT: 67 IN | BODY MASS INDEX: 35 KG/M2 | WEIGHT: 223 LBS | SYSTOLIC BLOOD PRESSURE: 124 MMHG | HEART RATE: 85 BPM | TEMPERATURE: 98.2 F | DIASTOLIC BLOOD PRESSURE: 77 MMHG

## 2021-04-20 DIAGNOSIS — M51.36 DDD (DEGENERATIVE DISC DISEASE), LUMBAR: ICD-10-CM

## 2021-04-20 DIAGNOSIS — M54.16 LUMBAR RADICULOPATHY: ICD-10-CM

## 2021-04-20 DIAGNOSIS — M47.816 LUMBAR SPONDYLOSIS: ICD-10-CM

## 2021-04-20 DIAGNOSIS — M50.30 DDD (DEGENERATIVE DISC DISEASE), CERVICAL: ICD-10-CM

## 2021-04-20 DIAGNOSIS — G89.4 CHRONIC PAIN SYNDROME: Primary | ICD-10-CM

## 2021-04-20 DIAGNOSIS — M48.061 SPINAL STENOSIS OF LUMBAR REGION, UNSPECIFIED WHETHER NEUROGENIC CLAUDICATION PRESENT: ICD-10-CM

## 2021-04-20 DIAGNOSIS — M54.12 CERVICAL RADICULOPATHY: ICD-10-CM

## 2021-04-20 DIAGNOSIS — M54.2 NECK PAIN: ICD-10-CM

## 2021-04-20 DIAGNOSIS — G89.29 CHRONIC LEFT SHOULDER PAIN: ICD-10-CM

## 2021-04-20 DIAGNOSIS — M25.512 CHRONIC LEFT SHOULDER PAIN: ICD-10-CM

## 2021-04-20 PROCEDURE — 99214 OFFICE O/P EST MOD 30 MIN: CPT | Performed by: NURSE PRACTITIONER

## 2021-04-20 RX ORDER — CIPROFLOXACIN 500 MG/1
TABLET, FILM COATED ORAL
COMMUNITY
Start: 2021-04-17 | End: 2021-08-12

## 2021-04-20 NOTE — PROGRESS NOTES
Assessment:  1  Chronic pain syndrome    2  Lumbar radiculopathy    3  DDD (degenerative disc disease), lumbar    4  Lumbar spondylosis    5  DDD (degenerative disc disease), cervical    6  Spinal stenosis of lumbar region, unspecified whether neurogenic claudication present    7  Cervical radiculopathy    8  Neck pain        Plan:  1  I will schedule the patient for a left L3 and L4 TFESI address the inflammatory component the patient's pain  Complete risks and benefits including bleeding, infection, tissue reaction, nerve injury and allergic reaction were discussed  The patient was agreeable and verbalized an understanding  2  I will order an MRI of the cervical spine without contrast and an xray of the left shoulder  3  We can repeat right L4 and L5 TFESI p r n    4  Patient may continue gabapentin as prescribed   5  I offered to initiate the patient physical therapy, however she declines  6  Patient will follow-up 4 weeks after the procedure or sooner if needed     M*Modal software was used to dictate this note  It may contain errors with dictating incorrect words or incorrect spelling  Please contact the provider directly with any questions  History of Present Illness: The patient is a 56 y o  female with a history of fibromyalgia last seen on 1/7/21 who presents for a follow up office visit in regards to chronic low back pain with radiculopathy into bilateral lower extremities secondary to  Lumbar degenerative disc disease, lumbar spondylosis, lumbar stenosis, lumbar radiculopathy, and neck pain that radiates into the left upper extremity to the 3rd through 5th digits of the left hand with associated numbness and subjective weakness  The patient denies bowel or bladder incontinence, balance issues or saddle anesthesia  The patient has had a right L4 and L5 TFESI in December 2020 for right lower extremity symptoms which she states is not an issue at this time    She currently complains of low back pain that radiates into the left groin and anterior aspect of the left thigh to the knee  She is not interested in physical therapy at this time  She has tried NSAIDs in the past without relief  She is taking gabapentin 600 mg 3 times a day as prescribed by 2 by podiatry without much improvement of her symptoms  The patient rates her pain a 10/10 on the numeric pain rating scale  She states her pain is constant nature and bothersome throughout the entirety of the day  She characterizes the pain as burning, sharp and throbbing  I have personally reviewed and/or updated the patient's past medical history, past surgical history, family history, social history, current medications, allergies, and vital signs today  Review of Systems:    Review of Systems   Respiratory: Negative for shortness of breath  Cardiovascular: Negative for chest pain  Gastrointestinal: Negative for constipation, diarrhea, nausea and vomiting  Musculoskeletal: Negative for arthralgias, gait problem, joint swelling and myalgias  Skin: Negative for rash  Neurological: Negative for dizziness, seizures and weakness  All other systems reviewed and are negative          Past Medical History:   Diagnosis Date    Anxiety     Arthritis     Last assessed 5/3/2011    Ortega's esophagus     Cancer ([facility] )     ovarian cancer at age 26 yo- REMOVAL  B/L    Colon polyp     DDD (degenerative disc disease), lumbar     Depression     Fall     5/2020 right knee meniscus tear- OR repair today 8/3/2020    Fibromyalgia     Fibromyalgia, primary     Fracture of one or more phalanges of foot     GERD (gastroesophageal reflux disease)     H/O bladder infections     chronic    Hypertension     Kidney infection     occasional    Migraines     O[facility] 1987    age 27    Polyneuropathy     Last assessed 6/23/2016 knees to feet    PONV (postoperative nausea and vomiting)     Patient requests to be pre-medicated prior to surgery prior to surgery    PONV (postoperative nausea and vomiting) 8/3/2020    Renal disorder     Spinal stenosis     Vertigo     Wears glasses     reading       Past Surgical History:   Procedure Laterality Date    ABDOMINAL SURGERY  1986    several    BACK SURGERY  1990    CATARACT EXTRACTION, BILATERAL      COLONOSCOPY      FOOT FRACTURE SURGERY Bilateral 2004    x 5  surgeries    KIDNEY SURGERY  1974    at age 13 yo    KNEE SURGERY Right     [place] KNEE SCOPE,MED/LAT MENISECTOMY Right 8/3/2020    Procedure: KNEE ARTHROSCOPY,PARTIAL MEDIAL & LATERAL MENISECTOMIES;  Surgeon: Satnam Banegas MD;  [facility];  Service: Orthopedics    [place] LAP,CHOLECYSTECTOMY N/A 11/27/2020    Procedure: LAPAROSCOPIC CHOLECYSTECTOMY;  Surgeon: Torres Pablo MD;  [facility];  Service: General    TOTAL ABDOMINAL HYSTERECTOMY  1987    age 27   Feliz Major TOTAL ABDOMINAL HYSTERECTOMY W/ BILATERAL SALPINGOOPHORECTOMY Bilateral 1987    age 27       Family History   Problem Relation Age of Onset    Heart disease Mother     Cancer Mother     Diabetes Mother     Arthritis Mother     Anxiety disorder Mother     Bleeding Disorder Mother     Clotting disorder Mother     Depression Mother     Hyperlipidemia Mother     Irritable bowel syndrome Mother     Hypertension Mother     Obesity Mother     Osteoporosis Mother     Vaginal cancer Mother 27    Skin cancer Mother     Thyroid disease Mother     Diabetes Father     Arthritis Father     Hyperlipidemia Father     Vesicoureteral reflux Father     Heart disease Father     Hypertension Father     Migraines Father     Obesity Father     Hypertension Family     Arthritis Family     Arthritis Brother     Anxiety disorder Brother     Diabetes Brother     Hyperlipidemia Brother     Vesicoureteral reflux Brother     Heart disease Brother     Irritable bowel syndrome Brother     Hypertension Brother     Migraines Brother     Thyroid disease Brother  Pancreatic cancer Brother 54    Migraines Daughter     Asthma Son     Migraines Son     Diabetes Maternal Grandmother     Vaginal cancer Maternal Grandmother 48    Infertility Paternal Grandmother     Arthritis Maternal Aunt     Anxiety disorder Maternal Aunt     Depression Maternal Aunt     Diabetes Maternal Aunt     Vaginal cancer Maternal Aunt 48    Fibroids Paternal Aunt     No Known Problems Maternal Grandfather     No Known Problems Paternal Grandfather     No Known Problems Maternal Aunt     No Known Problems Maternal Aunt     No Known Problems Maternal Aunt        Social History     Occupational History    Occupation: CNA   Tobacco Use    Smoking status: Never Smoker    Smokeless tobacco: Never Used   Substance and Sexual Activity    Alcohol use: Yes     Drinks per session: 1 or 2     Comment: rare    Drug use: No    Sexual activity: Yes         Current Outpatient Medications:     busPIRone (BUSPAR) 5 mg tablet, Take 5 mg by mouth 2 (two) times a day, Disp: , Rfl:     calcium-vitamin D 250-100 MG-UNIT per tablet, Take 1 tablet by mouth 2 (two) times a day, Disp: , Rfl:     ciprofloxacin (CIPRO) 500 mg tablet, , Disp: , Rfl:     COLLAGEN PO, Take by mouth, Disp: , Rfl:     cyanocobalamin (VITAMIN B-12) 100 mcg tablet, Take by mouth daily, Disp: , Rfl:     gabapentin (NEURONTIN) 300 mg capsule, Take 1 capsule (300 mg total) by mouth 3 (three) times a day, Disp: 90 capsule, Rfl: 0    gabapentin (NEURONTIN) 300 mg capsule, Take 2 tablets tid , Disp: 540 capsule, Rfl: 0    hydrOXYzine HCL (ATARAX) 50 mg tablet, , Disp: , Rfl:     meclizine (ANTIVERT) 12 5 MG tablet, Take 1 tablet (12 5 mg total) by mouth every 8 (eight) hours as needed for dizziness or nausea, Disp: 30 tablet, Rfl: 0    meloxicam (MOBIC) 7 5 mg tablet, Take 1 tablet (7 5 mg total) by mouth 2 (two) times a day as needed for moderate pain (Patient not taking: Reported on 4/20/2021), Disp: 60 tablet, Rfl: 1   methenamine hippurate (HIPREX) 1 g tablet, 3 g 2 (two) times a day with meals , Disp: , Rfl:     Multiple Vitamins-Minerals (ZINC PO), Take by mouth, Disp: , Rfl:     omeprazole (PriLOSEC) 40 MG capsule, Take 40 mg by mouth daily , Disp: , Rfl:     ondansetron (ZOFRAN-ODT) 4 mg disintegrating tablet, Take 1 tablet (4 mg total) by mouth every 6 (six) hours as needed for nausea or vomiting, Disp: 20 tablet, Rfl: 0    PARoxetine (PAXIL) 10 mg tablet, Take by mouth, Disp: , Rfl:     PARoxetine (PAXIL) 40 MG tablet, Take by mouth, Disp: , Rfl:     SUMAtriptan (IMITREX) 50 mg tablet, Take 1 tablet (50 mg total) by mouth once as needed for migraine for up to 1 dose May repeat in 2 hours  No more than 200 mg per day , Disp: 10 tablet, Rfl: 2    Allergies   Allergen Reactions    Morphine      Acts not like herself and feels agitated and restless    Penicillins Hives     Tongue swells       Physical Exam:    /77   Pulse 85   Temp 98 2 °F (36 8 °C)   Ht 5' 7" (1 702 m)   Wt 101 kg (223 lb)   LMP  (LMP Unknown)   BMI 34 93 kg/m²     Constitutional:normal, well developed, well nourished, alert, in no distress and non-toxic and no overt pain behavior  Eyes:anicteric  HEENT:grossly intact  Neck:supple, symmetric, trachea midline and no masses   Pulmonary:even and unlabored  Cardiovascular:No edema or pitting edema present  Skin:Normal without rashes or lesions and well hydrated  Psychiatric:Mood and affect appropriate  Neurologic:Cranial Nerves II-XII grossly intact  Musculoskeletal:antalgic  Gait  Bilateral lumbar paraspinal musculature mildly tender to palpation  Bilateral SI joints nontender to palpation  Bilateral lower extremity strength 5/5 in all muscle groups  Sensation intact to light touch in L3 through S1 dermatomes bilaterally  Negative Theo's test bilaterally  Negative straight leg raise bilaterally    Internal and external rotation of the left hip does not reproduce groin pain   Bilateral cervical paraspinal and trapezii musculature mildly tender to palpation, left greater than right  Full range of motion in all planes of the cervical spine  Bilateral upper extremity strength 5/5 in all muscle groups  Sensation intact to light touch in C5 through T1 dermatomes bilaterally  Negative Spurling's bilaterally  Negative Decker's reflex bilaterally  Positive Tinel's over the left cubital tunnel but negative over the left carpal tunnel  Positive empty can test, Neer test, and Zhang test on the left      Imaging  FL spine and pain procedure    (Results Pending)   MRI cervical spine wo contrast    (Results Pending)     CERVICAL SPINE   INDICATION: M54 2: Cervicalgia  COMPARISON: Cervical spine CT 12/27/2016   VIEWS: XR SPINE CERVICAL COMPLETE 4 OR 5 VW NON INJURY   FINDINGS:   No fracture  Normal alignment without subluxation  Multilevel degenerative disc disease from C5-C6 through C6-C7, most severe at C6-C7, appears stable  The neuroforamina are patent  The prevertebral soft tissues are within normal limits  The lung apices are clear  IMPRESSION:   Stable degenerative disc disease from C5-C6 through C6-C7, most severe at C6-C7  CT LUMBAR SPINE   INDICATION: Severe low back pain  COMPARISON: CT lumbar spine study from January 31, 2017  TECHNIQUE: Axial CT examination of the lumbar spine was obtained utilizing reconstructed images from CT of the chest, abdomen and pelvis performed the same day  Images were reformatted in the sagittal and coronal planes  This examination, like all CT scans performed in the [facility], was performed utilizing techniques to minimize radiation dose exposure, including the use of iterative reconstruction and automated exposure control  FINDINGS:   ALIGNMENT: Mild dextroscoliosis of the lumbar spine without lateral subluxation, spondylolisthesis or spondylolysis  VERTEBRAL BODIES: No fracture   No acute osseous abnormality  DEGENERATIVE CHANGES: Disc space narrowing and endplate sclerosis at T5-S6  Previously noted degenerative intervertebral discal gas at the L4-5 level has resolved  There is mild L4-5 disc space narrowing  Mild disc bulging at L1-L2 and L2-L3 with mild spinal stenosis  L3-L4: Left foraminal protrusion at L3-L4 with moderate left foraminal stenosis, correlate for left L3 radiculopathy  There is bilateral facet arthropathy, more pronounced on the right than the left which is not significantly changed when compared to   the prior study  L4-L5: Disc bulge, eccentric to the left with mild bilateral facet arthropathy  There is mild to moderate spinal stenosis and mild left foraminal encroachment  There is mild left greater than right subarticular recess stenosis, correlate for left L5   radiculopathy  L5-S1: Disc space narrowing, endplate osteophytosis and mild disc bulge without significant spinal canal stenosis  Mild bilateral inferior foraminal encroachment  PREVERTEBRAL AND PARASPINAL SOFT TISSUES: Atrophic appearing right kidney with cortical scarring  IMPRESSION:   No fracture or traumatic subluxation  Degenerative discogenic disease with disc space narrowing and endplate sclerosis with osteophytosis at L5-S1  Lower lumbar degenerative discogenic disease and facet arthropathy as described above  Left foraminal protrusion, correlate for left L3 radiculopathy  Additional discogenic disease resulting in left greater than right subarticular recess stenosis,   correlate for left L5 radiculopathy         Orders Placed This Encounter   Procedures    FL spine and pain procedure    MRI cervical spine wo contrast

## 2021-04-27 ENCOUNTER — TELEPHONE (OUTPATIENT)
Dept: PAIN MEDICINE | Facility: CLINIC | Age: 61
End: 2021-04-27

## 2021-04-27 ENCOUNTER — HOSPITAL ENCOUNTER (OUTPATIENT)
Dept: RADIOLOGY | Facility: CLINIC | Age: 61
Discharge: HOME | End: 2021-04-27
Attending: ANESTHESIOLOGY

## 2021-04-27 VITALS
HEART RATE: 68 BPM | RESPIRATION RATE: 18 BRPM | TEMPERATURE: 98.4 F | DIASTOLIC BLOOD PRESSURE: 85 MMHG | SYSTOLIC BLOOD PRESSURE: 118 MMHG | OXYGEN SATURATION: 98 %

## 2021-04-27 DIAGNOSIS — M54.16 LUMBAR RADICULOPATHY: ICD-10-CM

## 2021-04-27 RX ORDER — PAPAVERINE HCL 150 MG
20 CAPSULE, EXTENDED RELEASE ORAL ONCE
Status: DISCONTINUED | OUTPATIENT
Start: 2021-04-27 | End: 2021-04-27

## 2021-04-27 NOTE — H&P
History of Present Illness: The patient is a 56 y o  female who presents with complaints of   Low back and leg pain      Patient Active Problem List   Diagnosis    Anxiety    Ortega esophagus    Depression    Lumbar radiculopathy    DDD (degenerative disc disease), lumbar    Lumbar spondylosis    Spinal stenosis of lumbar region    Other tear of medial meniscus, current injury, right knee, initial encounter    PONV (postoperative nausea and vomiting)    Calculus of gallbladder without cholecystitis without obstruction    Chronic pain syndrome    DDD (degenerative disc disease), cervical    Moderate episode of recurrent major depressive disorder ([facility] )       Past Medical History:   Diagnosis Date    Anxiety     Arthritis     Last assessed 5/3/2011    Ortega's esophagus     Cancer ([facility] )     ovarian cancer at age 28 yo- REMOVAL  B/L    Colon polyp     DDD (degenerative disc disease), lumbar     Depression     Fall     5/2020 right knee meniscus tear- OR repair today 8/3/2020    Fibromyalgia     Fibromyalgia, primary     Fracture of one or more phalanges of foot     GERD (gastroesophageal reflux disease)     H/O bladder infections     chronic    Hypertension     Kidney infection     occasional    Migraines     [facility]    age 27    Polyneuropathy     Last assessed 6/23/2016 knees to feet    PONV (postoperative nausea and vomiting)     Patient requests to be pre-medicated prior to surgery prior to surgery    PONV (postoperative nausea and vomiting) 8/3/2020    Renal disorder     Spinal stenosis     Vertigo     Wears glasses     reading       Past Surgical History:   Procedure Laterality Date    ABDOMINAL SURGERY  1986    several    BACK SURGERY  1990    CATARACT EXTRACTION, BILATERAL      COLONOSCOPY      FOOT FRACTURE SURGERY Bilateral 2004    x 5  surgeries    KIDNEY SURGERY  1974    at age 13 yo    KNEE SURGERY Right     [place] KNEE SCOPE,MED/LAT MENISECTOMY Right 8/3/2020    Procedure: [address];  Surgeon: Cally Marquez MD;  [facility];  Service: Orthopedics    [place] LAP,CHOLECYSTECTOMY N/A 11/27/2020    Procedure: Jay Jay Barrerar;  Surgeon: Pilar Kapoor MD;  [facility];  Service: General    TOTAL ABDOMINAL HYSTERECTOMY  1987    age 27    TOTAL ABDOMINAL HYSTERECTOMY W/ BILATERAL SALPINGOOPHORECTOMY Bilateral 1987    age 27         Current Outpatient Medications:     busPIRone (BUSPAR) 5 mg tablet, Take 5 mg by mouth 2 (two) times a day, Disp: , Rfl:     calcium-vitamin D 250-100 MG-UNIT per tablet, Take 1 tablet by mouth 2 (two) times a day, Disp: , Rfl:     ciprofloxacin (CIPRO) 500 mg tablet, , Disp: , Rfl:     COLLAGEN PO, Take by mouth, Disp: , Rfl:     cyanocobalamin (VITAMIN B-12) 100 mcg tablet, Take by mouth daily, Disp: , Rfl:     gabapentin (NEURONTIN) 300 mg capsule, Take 1 capsule (300 mg total) by mouth 3 (three) times a day, Disp: 90 capsule, Rfl: 0    gabapentin (NEURONTIN) 300 mg capsule, Take 2 tablets tid , Disp: 540 capsule, Rfl: 0    hydrOXYzine HCL (ATARAX) 50 mg tablet, , Disp: , Rfl:     meclizine (ANTIVERT) 12 5 MG tablet, Take 1 tablet (12 5 mg total) by mouth every 8 (eight) hours as needed for dizziness or nausea, Disp: 30 tablet, Rfl: 0    meloxicam (MOBIC) 7 5 mg tablet, Take 1 tablet (7 5 mg total) by mouth 2 (two) times a day as needed for moderate pain (Patient not taking: Reported on 4/20/2021), Disp: 60 tablet, Rfl: 1    methenamine hippurate (HIPREX) 1 g tablet, 3 g 2 (two) times a day with meals , Disp: , Rfl:     Multiple Vitamins-Minerals (ZINC PO), Take by mouth, Disp: , Rfl:     omeprazole (PriLOSEC) 40 MG capsule, Take 40 mg by mouth daily , Disp: , Rfl:     ondansetron (ZOFRAN-ODT) 4 mg disintegrating tablet, Take 1 tablet (4 mg total) by mouth every 6 (six) hours as needed for nausea or vomiting, Disp: 20 tablet, Rfl: 0   PARoxetine (PAXIL) 10 mg tablet, Take by mouth, Disp: , Rfl:     PARoxetine (PAXIL) 40 MG tablet, Take by mouth, Disp: , Rfl:     SUMAtriptan (IMITREX) 50 mg tablet, Take 1 tablet (50 mg total) by mouth once as needed for migraine for up to 1 dose May repeat in 2 hours  No more than 200 mg per day , Disp: 10 tablet, Rfl: 2    Current Facility-Administered Medications:     dexamethasone (PF) (DECADRON) injection 20 mg, 20 mg, Epidural, Once, Cristi Watson, DO    iohexol (OMNIPAQUE) 300 mg/mL injection 50 mL, 50 mL, Epidural, Once, Cristi Watson, DO    lidocaine (PF) (XYLOCAINE-MPF) 2 % injection 2 mL, 2 mL, Epidural, Once, Cristi Watson, DO    Allergies   Allergen Reactions    Morphine      Acts not like herself and feels agitated and restless    Penicillins Hives     Tongue swells       Physical Exam:   Vitals:    04/27/21 0912   BP: 118/85   Pulse: 68   Resp: 18   Temp: 98 4 °F (36 9 °C)   SpO2: 98%     General: Awake, Alert, Oriented x 3, Mood and affect appropriate  Respiratory: Respirations even and unlabored  Cardiovascular: Peripheral pulses intact; no edema  Musculoskeletal Exam:   Left lumbar paraspinals tender to palpation  ASA Score: 2    Patient/Chart Verification  ID Band Applied: No  Consents Confirmed: Procedural  H&P( within 30 days) Verified: To be obtained in the Pre-Procedure area  Interval H&P(within 24 hr) Complete (required for Outpatients and Surgery Admit only): To be obtained in the Pre-Procedure area  Allergies Reviewed: Yes  Anticoag/NSAID held?: NA  Currently on antibiotics?: Yes    Assessment:   1   Lumbar radiculopathy        Plan: Left L3 and L4 TFESI

## 2021-05-04 ENCOUNTER — TELEPHONE (OUTPATIENT)
Dept: FAMILY MEDICINE CLINIC | Facility: CLINIC | Age: 61
End: 2021-05-04

## 2021-05-04 ENCOUNTER — HOSPITAL ENCOUNTER (OUTPATIENT)
Dept: RADIOLOGY | Facility: HOSPITAL | Age: 61
Discharge: HOME | End: 2021-05-04
Payer: MEDICARE

## 2021-05-04 ENCOUNTER — TRANSCRIBE ORDERS (OUTPATIENT)
Dept: RADIOLOGY | Facility: HOSPITAL | Age: 61
End: 2021-05-04

## 2021-05-04 DIAGNOSIS — G89.29 CHRONIC LEFT SHOULDER PAIN: ICD-10-CM

## 2021-05-04 DIAGNOSIS — M79.7 SCAPULOHUMERAL FIBROSITIS: Primary | ICD-10-CM

## 2021-05-04 DIAGNOSIS — Z01.84 IMMUNITY STATUS TESTING: Primary | ICD-10-CM

## 2021-05-04 DIAGNOSIS — M25.512 CHRONIC LEFT SHOULDER PAIN: ICD-10-CM

## 2021-05-04 PROCEDURE — 73030 X-RAY EXAM OF SHOULDER: CPT

## 2021-05-04 NOTE — TELEPHONE ENCOUNTER
Patient states when she was feeling sick a few months ago she thinks she may have had covid   Patient would like antibody test,

## 2021-05-07 ENCOUNTER — APPOINTMENT (OUTPATIENT)
Dept: LAB | Age: 61
End: 2021-05-07
Payer: MEDICARE

## 2021-05-07 DIAGNOSIS — Z01.84 IMMUNITY STATUS TESTING: ICD-10-CM

## 2021-05-07 LAB — SARS-COV-2 IGG+IGM SERPL QL IA: NORMAL

## 2021-05-07 PROCEDURE — 36415 COLL VENOUS BLD VENIPUNCTURE: CPT

## 2021-05-07 PROCEDURE — 86769 SARS-COV-2 COVID-19 ANTIBODY: CPT

## 2021-05-10 ENCOUNTER — TELEPHONE (OUTPATIENT)
Dept: FAMILY MEDICINE CLINIC | Facility: CLINIC | Age: 61
End: 2021-05-10

## 2021-05-11 ENCOUNTER — HOSPITAL ENCOUNTER (OUTPATIENT)
Dept: RADIOLOGY | Facility: CLINIC | Age: 61
Discharge: HOME | End: 2021-05-11
Attending: ANESTHESIOLOGY | Admitting: ANESTHESIOLOGY
Payer: MEDICARE

## 2021-05-11 VITALS
SYSTOLIC BLOOD PRESSURE: 127 MMHG | OXYGEN SATURATION: 97 % | HEART RATE: 84 BPM | RESPIRATION RATE: 18 BRPM | TEMPERATURE: 98.5 F | DIASTOLIC BLOOD PRESSURE: 84 MMHG

## 2021-05-11 PROCEDURE — 64483 NJX AA&/STRD TFRM EPI L/S 1: CPT | Performed by: ANESTHESIOLOGY

## 2021-05-11 PROCEDURE — 64484 NJX AA&/STRD TFRM EPI L/S EA: CPT | Performed by: ANESTHESIOLOGY

## 2021-05-11 RX ORDER — PAPAVERINE HCL 150 MG
20 CAPSULE, EXTENDED RELEASE ORAL ONCE
Status: COMPLETED | OUTPATIENT
Start: 2021-05-11 | End: 2021-05-11

## 2021-05-11 RX ADMIN — DEXAMETHASONE SODIUM PHOSPHATE 15 MG: 10 INJECTION, SOLUTION INTRAMUSCULAR; INTRAVENOUS at 10:53

## 2021-05-11 RX ADMIN — LIDOCAINE HYDROCHLORIDE 2 ML: 20 INJECTION, SOLUTION EPIDURAL; INFILTRATION; INTRACAUDAL; PERINEURAL at 10:53

## 2021-05-11 RX ADMIN — IOHEXOL 2 ML: 300 INJECTION, SOLUTION INTRAVENOUS at 10:52

## 2021-05-11 NOTE — DISCHARGE INSTRUCTIONS
Epidural Steroid Injection   WHAT YOU NEED TO KNOW:   An epidural steroid injection (DANIELLA) is a procedure to inject steroid medicine into the epidural space  The epidural space is between your spinal cord and vertebrae  Steroids reduce inflammation and fluid buildup in your spine that may be causing pain  You may be given pain medicine along with the steroids  ACTIVITY  · Do not drive or operate machinery today  · No strenuous activity today - bending, lifting, etc   · You may resume normal activites starting tomorrow - start slowly and as tolerated  · You may shower today, but no tub baths or hot tubs  · You may have numbness for several hours from the local anesthetic  Please use caution and common sense, especially with weight-bearing activities  CARE OF THE INJECTION SITE  · If you have soreness or pain, apply ice to the area today (20 minutes on/20 minutes off)  · Starting tomorrow, you may use warm, moist heat or ice if needed  · You may have an increase or change in your discomfort for 36-48 hours after your treatment  · Apply ice and continue with any pain medication you have been prescribed  · Notify the [facility] if you have any of the following: redness, drainage, swelling, headache, stiff neck or fever above 100°F     SPECIAL INSTRUCTIONS  · Our office will contact you in approximately 7 days for a progress report  MEDICATIONS  · Continue to take all routine medications  · Our office may have instructed you to hold some medications  As no general anesthesia was used in today's procedure, you should not experience any side effects related to anesthesia  If you have a problem specifically related to your procedure, please call our office at [phone]  Problems not related to your procedure should be directed to your primary care physician

## 2021-05-11 NOTE — H&P
History of Present Illness: The patient is a 56 y o  female who presents with complaints of   Low back and leg pain      Patient Active Problem List   Diagnosis    Anxiety    Ortega esophagus    Depression    Lumbar radiculopathy    DDD (degenerative disc disease), lumbar    Lumbar spondylosis    Spinal stenosis of lumbar region    Other tear of medial meniscus, current injury, right knee, initial encounter    PONV (postoperative nausea and vomiting)    Calculus of gallbladder without cholecystitis without obstruction    Chronic pain syndrome    DDD (degenerative disc disease), cervical    Moderate episode of recurrent major depressive disorder ([facility] )       Past Medical History:   Diagnosis Date    Anxiety     Arthritis     Last assessed 5/3/2011    Ortega's esophagus     Cancer ([facility] )     ovarian cancer at age 26 yo- REMOVAL  B/L    Colon polyp     DDD (degenerative disc disease), lumbar     Depression     Fall     5/2020 right knee meniscus tear- OR repair today 8/3/2020    Fibromyalgia     Fibromyalgia, primary     Fracture of one or more phalanges of foot     GERD (gastroesophageal reflux disease)     H/O bladder infections     chronic    Hypertension     Kidney infection     occasional    Migraines     Ovarian cancer ([facility] ) 26    age 27    Polyneuropathy     Last assessed 6/23/2016 knees to feet    PONV (postoperative nausea and vomiting)     Patient requests to be pre-medicated prior to surgery prior to surgery    PONV (postoperative nausea and vomiting) 8/3/2020    Renal disorder     Spinal stenosis     Vertigo     Wears glasses     reading       Past Surgical History:   Procedure Laterality Date    ABDOMINAL SURGERY  1986    several    BACK SURGERY  1990    CATARACT EXTRACTION, BILATERAL      COLONOSCOPY      FOOT FRACTURE SURGERY Bilateral 2004    x 5  surgeries    KIDNEY SURGERY  1974    at age 13 yo    KNEE SURGERY Right     MA KNEE SCOPE,MED/LAT MENISECTOMY Right 8/3/2020    Procedure: [address];  Surgeon: Shama Oshea MD;  [facility];  Service: Orthopedics    MT LAP,CHOLECYSTECTOMY N/A 11/27/2020    Procedure: Fercho Rivero;  Surgeon: Xi Malhotra MD;  [facility];  Service: General    TOTAL ABDOMINAL HYSTERECTOMY  1987    age 27    TOTAL ABDOMINAL HYSTERECTOMY W/ BILATERAL SALPINGOOPHORECTOMY Bilateral 1987    age 27         Current Outpatient Medications:     busPIRone (BUSPAR) 5 mg tablet, Take 5 mg by mouth 2 (two) times a day, Disp: , Rfl:     calcium-vitamin D 250-100 MG-UNIT per tablet, Take 1 tablet by mouth 2 (two) times a day, Disp: , Rfl:     ciprofloxacin (CIPRO) 500 mg tablet, , Disp: , Rfl:     COLLAGEN PO, Take by mouth, Disp: , Rfl:     cyanocobalamin (VITAMIN B-12) 100 mcg tablet, Take by mouth daily, Disp: , Rfl:     gabapentin (NEURONTIN) 300 mg capsule, Take 1 capsule (300 mg total) by mouth 3 (three) times a day, Disp: 90 capsule, Rfl: 0    gabapentin (NEURONTIN) 300 mg capsule, Take 2 tablets tid , Disp: 540 capsule, Rfl: 0    hydrOXYzine HCL (ATARAX) 50 mg tablet, , Disp: , Rfl:     meclizine (ANTIVERT) 12 5 MG tablet, Take 1 tablet (12 5 mg total) by mouth every 8 (eight) hours as needed for dizziness or nausea, Disp: 30 tablet, Rfl: 0    meloxicam (MOBIC) 7 5 mg tablet, Take 1 tablet (7 5 mg total) by mouth 2 (two) times a day as needed for moderate pain (Patient not taking: Reported on 4/20/2021), Disp: 60 tablet, Rfl: 1    methenamine hippurate (HIPREX) 1 g tablet, 3 g 2 (two) times a day with meals , Disp: , Rfl:     Multiple Vitamins-Minerals (ZINC PO), Take by mouth, Disp: , Rfl:     omeprazole (PriLOSEC) 40 MG capsule, Take 40 mg by mouth daily , Disp: , Rfl:     ondansetron (ZOFRAN-ODT) 4 mg disintegrating tablet, Take 1 tablet (4 mg total) by mouth every 6 (six) hours as needed for nausea or vomiting, Disp: 20 tablet, Rfl: 0   PARoxetine (PAXIL) 10 mg tablet, Take by mouth, Disp: , Rfl:     PARoxetine (PAXIL) 40 MG tablet, Take by mouth, Disp: , Rfl:     SUMAtriptan (IMITREX) 50 mg tablet, Take 1 tablet (50 mg total) by mouth once as needed for migraine for up to 1 dose May repeat in 2 hours  No more than 200 mg per day , Disp: 10 tablet, Rfl: 2    Current Facility-Administered Medications:     dexamethasone (PF) (DECADRON) injection 20 mg, 20 mg, Epidural, Once, Cristi Watson, DO    iohexol (OMNIPAQUE) 300 mg/mL injection 50 mL, 50 mL, Epidural, Once, Cristi Watson, DO    lidocaine (PF) (XYLOCAINE-MPF) 2 % injection 2 mL, 2 mL, Epidural, Once, Cristi Watson, DO    Allergies   Allergen Reactions    Morphine      Acts not like herself and feels agitated and restless    Penicillins Hives     Tongue swells       Physical Exam:   Vitals:    05/11/21 1022   BP: 129/74   Pulse: 79   Resp: 18   Temp: 98 5 °F (36 9 °C)   SpO2: 96%     General: Awake, Alert, Oriented x 3, Mood and affect appropriate  Respiratory: Respirations even and unlabored  Cardiovascular: Peripheral pulses intact; no edema  Musculoskeletal Exam:   Left lumbar paraspinals tender to palpation  ASA Score: 2    Patient/Chart Verification  Patient ID Verified: Verbal  ID Band Applied: No  Consents Confirmed: Procedural, To be obtained in the Pre-Procedure area  H&P( within 30 days) Verified: To be obtained in the Pre-Procedure area  Allergies Reviewed:  Yes  Anticoag/NSAID held?: NA  Currently on antibiotics?: No    Assessment:   Lumbar radiculopathy    Plan: Left L3 and L4 TFESI

## 2021-05-14 ENCOUNTER — HOSPITAL ENCOUNTER (OUTPATIENT)
Dept: RADIOLOGY | Facility: IMAGING CENTER | Age: 61
Discharge: HOME | End: 2021-05-14
Payer: MEDICARE

## 2021-05-14 DIAGNOSIS — M54.12 CERVICAL RADICULOPATHY: ICD-10-CM

## 2021-05-14 PROCEDURE — G1004 CDSM NDSC: HCPCS

## 2021-05-14 PROCEDURE — 72141 MRI NECK SPINE W/O DYE: CPT

## 2021-05-18 ENCOUNTER — TELEPHONE (OUTPATIENT)
Dept: PAIN MEDICINE | Facility: CLINIC | Age: 61
End: 2021-05-18

## 2021-05-24 ENCOUNTER — TELEPHONE (OUTPATIENT)
Dept: FAMILY MEDICINE CLINIC | Facility: CLINIC | Age: 61
End: 2021-05-24

## 2021-05-24 NOTE — TELEPHONE ENCOUNTER
Patient called c/o hand and leg numbness and pain to the point she can not walk  Patient wanted to know if there was a specialist she can see for these concerns  Patient was instructed to schedule with rheumatologist, Dr Tameka Pleitez, that she was referred to on 3/29/21  She received instructions and verbalized understanding

## 2021-05-25 NOTE — TELEPHONE ENCOUNTER
Patient not walking well- not sure if the pain is due to her back but her legs are painful and bruised    left side is better than right    pain level 10/10    back pain improvement is 5%          [phone]

## 2021-06-01 ENCOUNTER — APPOINTMENT (OUTPATIENT)
Dept: LAB | Facility: HOSPITAL | Age: 61
End: 2021-06-01
Attending: INTERNAL MEDICINE
Payer: MEDICARE

## 2021-06-01 DIAGNOSIS — M79.7 SCAPULOHUMERAL FIBROSITIS: ICD-10-CM

## 2021-06-01 LAB
BASOPHILS # BLD AUTO: 0.08 THOUSANDS/ΜL (ref 0–0.1)
BASOPHILS NFR BLD AUTO: 1 % (ref 0–1)
C3 SERPL-MCNC: 133 MG/DL (ref 90–180)
C4 SERPL-MCNC: 26 MG/DL (ref 10–40)
CRP SERPL QL: <3 MG/L
EOSINOPHIL # BLD AUTO: 0.35 THOUSAND/ΜL (ref 0–0.61)
EOSINOPHIL NFR BLD AUTO: 5 % (ref 0–6)
ERYTHROCYTE [DISTWIDTH] IN BLOOD BY AUTOMATED COUNT: 15.5 % (ref 11.6–15.1)
ERYTHROCYTE [SEDIMENTATION RATE] IN BLOOD: 24 MM/HOUR (ref 0–29)
HCT VFR BLD AUTO: 39.2 % (ref 34.8–46.1)
HGB BLD-MCNC: 12.8 G/DL (ref 11.5–15.4)
IMM GRANULOCYTES # BLD AUTO: 0.01 THOUSAND/UL (ref 0–0.2)
IMM GRANULOCYTES NFR BLD AUTO: 0 % (ref 0–2)
LYMPHOCYTES # BLD AUTO: 3.02 THOUSANDS/ΜL (ref 0.6–4.47)
LYMPHOCYTES NFR BLD AUTO: 47 % (ref 14–44)
MCH RBC QN AUTO: 29.6 PG (ref 26.8–34.3)
MCHC RBC AUTO-ENTMCNC: 32.7 G/DL (ref 31.4–37.4)
MCV RBC AUTO: 91 FL (ref 82–98)
MONOCYTES # BLD AUTO: 0.61 THOUSAND/ΜL (ref 0.17–1.22)
MONOCYTES NFR BLD AUTO: 9 % (ref 4–12)
NEUTROPHILS # BLD AUTO: 2.45 THOUSANDS/ΜL (ref 1.85–7.62)
NEUTS SEG NFR BLD AUTO: 38 % (ref 43–75)
NRBC BLD AUTO-RTO: 0 /100 WBCS
PLATELET # BLD AUTO: 250 THOUSANDS/UL (ref 149–390)
PMV BLD AUTO: 9.5 FL (ref 8.9–12.7)
RBC # BLD AUTO: 4.33 MILLION/UL (ref 3.81–5.12)
RHEUMATOID FACT SER QL LA: NEGATIVE
WBC # BLD AUTO: 6.52 THOUSAND/UL (ref 4.31–10.16)

## 2021-06-01 PROCEDURE — 86235 NUCLEAR ANTIGEN ANTIBODY: CPT

## 2021-06-01 PROCEDURE — 86038 ANTINUCLEAR ANTIBODIES: CPT

## 2021-06-01 PROCEDURE — 86430 RHEUMATOID FACTOR TEST QUAL: CPT

## 2021-06-01 PROCEDURE — 85025 COMPLETE CBC W/AUTO DIFF WBC: CPT

## 2021-06-01 PROCEDURE — 86225 DNA ANTIBODY NATIVE: CPT

## 2021-06-01 PROCEDURE — 86160 COMPLEMENT ANTIGEN: CPT

## 2021-06-01 PROCEDURE — 84165 PROTEIN E-PHORESIS SERUM: CPT

## 2021-06-01 PROCEDURE — 86200 CCP ANTIBODY: CPT

## 2021-06-01 PROCEDURE — 86140 C-REACTIVE PROTEIN: CPT

## 2021-06-01 PROCEDURE — 84165 PROTEIN E-PHORESIS SERUM: CPT | Performed by: PATHOLOGY

## 2021-06-01 PROCEDURE — 36415 COLL VENOUS BLD VENIPUNCTURE: CPT

## 2021-06-01 PROCEDURE — 85652 RBC SED RATE AUTOMATED: CPT

## 2021-06-02 ENCOUNTER — TELEPHONE (OUTPATIENT)
Dept: PAIN MEDICINE | Facility: MEDICAL CENTER | Age: 61
End: 2021-06-02

## 2021-06-02 LAB
ALBUMIN SERPL ELPH-MCNC: 4.48 G/DL (ref 3.5–5)
ALBUMIN SERPL ELPH-MCNC: 61.4 % (ref 52–65)
ALPHA1 GLOB SERPL ELPH-MCNC: 0.27 G/DL (ref 0.1–0.4)
ALPHA1 GLOB SERPL ELPH-MCNC: 3.7 % (ref 2.5–5)
ALPHA2 GLOB SERPL ELPH-MCNC: 0.76 G/DL (ref 0.4–1.2)
ALPHA2 GLOB SERPL ELPH-MCNC: 10.4 % (ref 7–13)
BETA GLOB ABNORMAL SERPL ELPH-MCNC: 0.5 G/DL (ref 0.4–0.8)
BETA1 GLOB SERPL ELPH-MCNC: 6.8 % (ref 5–13)
BETA2 GLOB SERPL ELPH-MCNC: 5.1 % (ref 2–8)
BETA2+GAMMA GLOB SERPL ELPH-MCNC: 0.37 G/DL (ref 0.2–0.5)
DSDNA AB SER-ACNC: <1 IU/ML (ref 0–9)
ENA SS-A AB SER-ACNC: <0.2 AI (ref 0–0.9)
ENA SS-B AB SER-ACNC: <0.2 AI (ref 0–0.9)
GAMMA GLOB ABNORMAL SERPL ELPH-MCNC: 0.92 G/DL (ref 0.5–1.6)
GAMMA GLOB SERPL ELPH-MCNC: 12.6 % (ref 12–22)
IGG/ALB SER: 1.59 {RATIO} (ref 1.1–1.8)
PROT PATTERN SERPL ELPH-IMP: NORMAL
PROT SERPL-MCNC: 7.3 G/DL (ref 6.4–8.2)
RYE IGE QN: NEGATIVE

## 2021-06-02 NOTE — TELEPHONE ENCOUNTER
Patient no showed to her appointment today where I was going to go over these results  MRI looks good, no significant narrowing other than C6-7 where she has mild to moderate bilateral foraminal stenosis  She is welcome to reschedule another OV to discuss treatment

## 2021-06-02 NOTE — TELEPHONE ENCOUNTER
S/w the patient and reviewed the previous task  She profusely apologized stating she at the hospital with her  for a test and totally forgot to call to cancel  When she has a moment she will call and schedule the next ovs with you

## 2021-06-03 LAB — CCP AB SER IA-ACNC: <0.4

## 2021-06-15 ENCOUNTER — TELEPHONE (OUTPATIENT)
Dept: PAIN MEDICINE | Facility: MEDICAL CENTER | Age: 61
End: 2021-06-15

## 2021-06-15 NOTE — TELEPHONE ENCOUNTER
Pt contacted Call Center requested refill of their medication          Medication Name:  Gabapentin     Dosage of Med:    300 mg   Frequency of Med:    1 tab TID  Remaining Medication:  1 week left

## 2021-06-16 DIAGNOSIS — G62.9 PERIPHERAL NERVE DISORDER: Primary | ICD-10-CM

## 2021-06-16 RX ORDER — GABAPENTIN 300 MG/1
300 CAPSULE ORAL 3 TIMES DAILY
Qty: 270 CAPSULE | Refills: 1 | Status: SHIPPED | OUTPATIENT
Start: 2021-06-16 | End: 2021-08-12

## 2021-06-16 NOTE — TELEPHONE ENCOUNTER
Lisa Rosa DPM  You 3 minutes ago (10:24 AM)     Rx sent in for 90 days with 1 refill    Message text      LMOM to notify patient this was completed

## 2021-08-04 ENCOUNTER — APPOINTMENT (OUTPATIENT)
Dept: RADIOLOGY | Facility: OTHER | Age: 61
End: 2021-08-04
Payer: MEDICARE

## 2021-08-04 ENCOUNTER — OFFICE VISIT (OUTPATIENT)
Dept: OBGYN CLINIC | Facility: OTHER | Age: 61
End: 2021-08-04
Payer: MEDICARE

## 2021-08-04 VITALS — SYSTOLIC BLOOD PRESSURE: 110 MMHG | HEART RATE: 76 BPM | DIASTOLIC BLOOD PRESSURE: 75 MMHG

## 2021-08-04 DIAGNOSIS — Z01.89 ENCOUNTER FOR LOWER EXTREMITY COMPARISON IMAGING STUDY: ICD-10-CM

## 2021-08-04 DIAGNOSIS — G89.29 CHRONIC BILATERAL LOW BACK PAIN, UNSPECIFIED WHETHER SCIATICA PRESENT: ICD-10-CM

## 2021-08-04 DIAGNOSIS — M54.50 CHRONIC BILATERAL LOW BACK PAIN, UNSPECIFIED WHETHER SCIATICA PRESENT: ICD-10-CM

## 2021-08-04 DIAGNOSIS — M25.562 LEFT KNEE PAIN: ICD-10-CM

## 2021-08-04 DIAGNOSIS — M17.11 PRIMARY OSTEOARTHRITIS OF RIGHT KNEE: ICD-10-CM

## 2021-08-04 DIAGNOSIS — M17.11 PRIMARY OSTEOARTHRITIS OF RIGHT KNEE: Primary | ICD-10-CM

## 2021-08-04 PROCEDURE — 72100 X-RAY EXAM L-S SPINE 2/3 VWS: CPT

## 2021-08-04 PROCEDURE — 99214 OFFICE O/P EST MOD 30 MIN: CPT | Performed by: ORTHOPAEDIC SURGERY

## 2021-08-04 PROCEDURE — 73564 X-RAY EXAM KNEE 4 OR MORE: CPT

## 2021-08-04 PROCEDURE — 20610 DRAIN/INJ JOINT/BURSA W/O US: CPT | Performed by: ORTHOPAEDIC SURGERY

## 2021-08-04 RX ORDER — PAROXETINE HYDROCHLORIDE 20 MG/1
20 TABLET, FILM COATED ORAL EVERY MORNING
COMMUNITY
Start: 2021-08-02

## 2021-08-04 RX ORDER — GABAPENTIN 600 MG/1
600 TABLET ORAL 3 TIMES DAILY
COMMUNITY
End: 2021-08-12

## 2021-08-04 RX ADMIN — METHYLPREDNISOLONE ACETATE 1 ML: 40 INJECTION, SUSPENSION INTRA-ARTICULAR; INTRALESIONAL; INTRAMUSCULAR; SOFT TISSUE at 12:22

## 2021-08-04 RX ADMIN — BUPIVACAINE HYDROCHLORIDE 4 ML: 2.5 INJECTION, SOLUTION INFILTRATION; PERINEURAL at 12:22

## 2021-08-04 NOTE — PROGRESS NOTES
Orthopaedic Surgery - Office Note  Key Cox (62 y o  female)   : 1960   MRN: [id number]  Encounter Date: 2021    Chief Complaint   Patient presents with    Right Knee - Pain       Assessment / Plan  right knee lateral compartment osteoarthritis  Lumbar DDD    · CSI of right knee joint was performed  · Activity as tolerated  · WBAT  · Neoprene knee sleeve  · Home exercise program reviewed  · Anti-inflammatories or Tylenol prn pain  · compression and ice for swelling and pain control  · She should follow up with pain management for her lumbar spine   Return if symptoms worsen or fail to improve  History of Present Illness  Karmen Dean is a 56 y o  female who presents for follow-up evaluation of bilateral leg pain  She states approximately 3 months ago she began to experience bilateral knee pain with insidious onset  She indicates her pain is proximal lateral aspect of her lower leg  She states her legs feel weak and heavy  She is also experience numbness and tingling down her legs  She does have back pain and states she will be going for injections in the near future  At her last appointment in 2020 she did receive viscosupplementation injections which provided her with minimal relief of her knee pain  She denies any further injury or trauma to her bilateral legs  Review of Systems  Pertinent items are noted in HPI  All other systems were reviewed and are negative  Physical Exam  /75 (BP Location: Left arm, Patient Position: Sitting, Cuff Size: Adult)   Pulse 76   LMP  (LMP Unknown)   Cons: Appears well  No apparent distress  Psych: Alert  Oriented x3  Mood and affect normal   Eyes: PERRLA, EOMI  Resp: Normal effort  No audible wheezing or stridor  CV: Palpable pulse  No discernable arrhythmia  No LE edema  Lymph:  No palpable cervical, axillary, or inguinal lymphadenopathy  Skin: Warm  No palpable masses  No visible lesions  Neuro: Normal muscle tone  Normal and symmetric DTR's  Bilateral Knee Exam  Alignment:  moderate genu valgus  Inspection:  mild swelling  No edema  No erythema  No ecchymosis  Palpation:  lateral joint line tenderness  No effusion  No warmth  ROM:  Knee Extension 10  Knee Flexion 110  Strength:  Able to actively extend knee against gravity  Stability:  No objective knee instability  Stable Varus / Valgus stress, Lachman, and Posterior drawer  Tests:  No pertinent positive or negative tests  Patella:  Normal patellar mobility  Neurovascular:  Sensation intact in DP/SP/Saucedo/Sa/T nerve distributions  2+ DP & PT pulses  Gait:  Normal     Studies Reviewed  XR of bilateral knee - I personally reviewed x-rays obtained on August 4, 2021 which demonstrates osteoarthritis in the lateral compartment of the right knee  XR of lumbar spine-  Personally reviewed x-rays obtained on August 4, 2021 which demonstrates degenerative disc disease, specifically L5-S1    Large joint arthrocentesis: R knee  Universal Protocol:  Consent: Verbal consent obtained  Written consent not obtained  Risks and benefits: risks, benefits and alternatives were discussed  Consent given by: patient  Time out: Immediately prior to procedure a "time out" was called to verify the correct patient, procedure, equipment, support staff and site/side marked as required    Site marked: the operative site was marked  Patient identity confirmed: verbally with patient    Supporting Documentation  Indications: pain and diagnostic evaluation   Procedure Details  Location: knee - R knee  Preparation: Patient was prepped and draped in the usual sterile fashion  Needle size: 22 G  Ultrasound guidance: no  Approach: lateral  Medications administered: 4 mL bupivacaine 0 25 %; 1 mL methylPREDNISolone acetate 40 mg/mL    Patient tolerance: patient tolerated the procedure well with no immediate complications  Dressing:  Sterile dressing applied             Medical, Surgical, Family, and Social History  The patient's medical history, family history, and social history, were reviewed and updated as appropriate      Past Medical History:   Diagnosis Date    Anxiety     Arthritis     Last assessed 5/3/2011    Ortega's esophagus     Cancer ([facility])     ovarian cancer at age 26 yo- REMOVAL  B/L    Colon polyp     DDD (degenerative disc disease), lumbar     Depression     Fall     5/2020 right knee meniscus tear- OR repair today 8/3/2020    Fibromyalgia     Fibromyalgia, primary     Fracture of one or more phalanges of foot     GERD (gastroesophageal reflux disease)     H/O bladder infections     chronic    Hypertension     Kidney infection     occasional    Migraines     Ovarian cancer ([facility]) 1987    age 27    Polyneuropathy     Last assessed 6/23/2016 knees to feet    PONV (postoperative nausea and vomiting)     Patient requests to be pre-medicated prior to surgery prior to surgery    PONV (postoperative nausea and vomiting) 8/3/2020    Renal disorder     Spinal stenosis     Vertigo     Wears glasses     reading       Past Surgical History:   Procedure Laterality Date   [address] [facility]    several    BACK SURGERY  1990    CATARACT EXTRACTION, BILATERAL      COLONOSCOPY      FOOT FRACTURE SURGERY Bilateral 2004    x 5  surgeries    KIDNEY SURGERY  1974    at age 13 yo    KNEE SURGERY Right     OK KNEE SCOPE,MED/LAT MENISECTOMY Right 8/3/2020    Procedure: [address];  Surgeon: mEelina Del Rosario MD;  [facility];  Service: Orthopedics    OK LAP,CHOLECYSTECTOMY N/A 11/27/2020    Procedure: LAPAROSCOPIC CHOLECYSTECTOMY;  Surgeon: Drake Mcpherson MD;  [facility];  Service: General    TOTAL ABDOMINAL HYSTERECTOMY  1987    age 27    TOTAL ABDOMINAL HYSTERECTOMY W/ BILATERAL SALPINGOOPHORECTOMY Bilateral 1987    age 27       Family History   Problem Relation Age of Onset    Heart disease Mother    Mark De La Fuente Mother  Diabetes Mother     Arthritis Mother     Anxiety disorder Mother     Bleeding Disorder Mother     Clotting disorder Mother     Depression Mother     Hyperlipidemia Mother     Irritable bowel syndrome Mother     Hypertension Mother     Obesity Mother     Osteoporosis Mother     Vaginal cancer Mother 27    Skin cancer Mother     Thyroid disease Mother     Diabetes Father     Arthritis Father     Hyperlipidemia Father     Vesicoureteral reflux Father     Heart disease Father     Hypertension Father     Migraines Father     Obesity Father     Hypertension Family     Arthritis Family     Arthritis Brother     Anxiety disorder Brother     Diabetes Brother     Hyperlipidemia Brother     Vesicoureteral reflux Brother     Heart disease Brother     Irritable bowel syndrome Brother     Hypertension Brother     Migraines Brother     Thyroid disease Brother     Pancreatic cancer Brother 54    Migraines Daughter     Asthma Son     Migraines Son     Diabetes Maternal Grandmother     Vaginal cancer Maternal Grandmother 48    Infertility Paternal Grandmother     Arthritis Maternal Aunt     Anxiety disorder Maternal Aunt     Depression Maternal Aunt     Diabetes Maternal Aunt     Vaginal cancer Maternal Aunt 48    Fibroids Paternal Aunt     No Known Problems Maternal Grandfather     No Known Problems Paternal Grandfather     No Known Problems Maternal Aunt     No Known Problems Maternal Aunt     No Known Problems Maternal Aunt        Social History     Occupational History    Occupation: CNA   Tobacco Use    Smoking status: Never Smoker    Smokeless tobacco: Never Used   Vaping Use    Vaping Use: Never used   Substance and Sexual Activity    Alcohol use: Yes     Comment: rare    Drug use: No    Sexual activity: Yes       Allergies   Allergen Reactions    Morphine      Acts not like herself and feels agitated and restless    Penicillins Hives     Tongue swells Current Outpatient Medications:     busPIRone (BUSPAR) 5 mg tablet, Take 5 mg by mouth 2 (two) times a day, Disp: , Rfl:     calcium-vitamin D 250-100 MG-UNIT per tablet, Take 1 tablet by mouth 2 (two) times a day, Disp: , Rfl:     ciprofloxacin (CIPRO) 500 mg tablet, , Disp: , Rfl:     COLLAGEN PO, Take by mouth, Disp: , Rfl:     cyanocobalamin (VITAMIN B-12) 100 mcg tablet, Take by mouth daily, Disp: , Rfl:     gabapentin (NEURONTIN) 300 mg capsule, Take 1 capsule (300 mg total) by mouth 3 (three) times a day, Disp: 270 capsule, Rfl: 1    gabapentin (NEURONTIN) 600 MG tablet, Take 600 mg by mouth Three times a day, Disp: , Rfl:     hydrOXYzine HCL (ATARAX) 50 mg tablet, , Disp: , Rfl:     meclizine (ANTIVERT) 12 5 MG tablet, Take 1 tablet (12 5 mg total) by mouth every 8 (eight) hours as needed for dizziness or nausea, Disp: 30 tablet, Rfl: 0    meloxicam (MOBIC) 7 5 mg tablet, Take 1 tablet (7 5 mg total) by mouth 2 (two) times a day as needed for moderate pain, Disp: 60 tablet, Rfl: 1    methenamine hippurate (HIPREX) 1 g tablet, 3 g 2 (two) times a day with meals , Disp: , Rfl:     Multiple Vitamins-Minerals (ZINC PO), Take by mouth, Disp: , Rfl:     omeprazole (PriLOSEC) 40 MG capsule, Take 40 mg by mouth daily , Disp: , Rfl:     ondansetron (ZOFRAN-ODT) 4 mg disintegrating tablet, Take 1 tablet (4 mg total) by mouth every 6 (six) hours as needed for nausea or vomiting, Disp: 20 tablet, Rfl: 0    PARoxetine (PAXIL) 10 mg tablet, Take by mouth, Disp: , Rfl:     PARoxetine (PAXIL) 20 mg tablet, Take 20 mg by mouth every morning, Disp: , Rfl:     PARoxetine (PAXIL) 40 MG tablet, Take by mouth, Disp: , Rfl:     SUMAtriptan (IMITREX) 50 mg tablet, Take 1 tablet (50 mg total) by mouth once as needed for migraine for up to 1 dose May repeat in 2 hours    No more than 200 mg per day , Disp: 10 tablet, Rfl: 2    gabapentin (NEURONTIN) 300 mg capsule, Take 2 tablets tid , Disp: 540 capsule, Rfl: 0      Jasmin Alfaro    Scribe Attestation    I,:  Jakob Shirley am acting as a scribe while in the presence of the attending physician :       I,:  Aparna Arthur MD personally performed the services described in this documentation    as scribed in my presence :

## 2021-08-05 RX ORDER — BUPIVACAINE HYDROCHLORIDE 2.5 MG/ML
4 INJECTION, SOLUTION INFILTRATION; PERINEURAL
Status: COMPLETED | OUTPATIENT
Start: 2021-08-04 | End: 2021-08-04

## 2021-08-05 RX ORDER — METHYLPREDNISOLONE ACETATE 40 MG/ML
1 INJECTION, SUSPENSION INTRA-ARTICULAR; INTRALESIONAL; INTRAMUSCULAR; SOFT TISSUE
Status: COMPLETED | OUTPATIENT
Start: 2021-08-04 | End: 2021-08-04

## 2021-08-06 ENCOUNTER — TELEPHONE (OUTPATIENT)
Dept: OBGYN CLINIC | Facility: HOSPITAL | Age: 61
End: 2021-08-06

## 2021-08-06 DIAGNOSIS — G62.9 PERIPHERAL NERVE DISORDER: Primary | ICD-10-CM

## 2021-08-06 RX ORDER — GABAPENTIN 300 MG/1
300 CAPSULE ORAL 3 TIMES DAILY
Qty: 270 CAPSULE | Refills: 0 | Status: SHIPPED | OUTPATIENT
Start: 2021-08-06 | End: 2021-08-12

## 2021-08-06 NOTE — TELEPHONE ENCOUNTER
Dr Smith Russell    [phone]    Patient is requesting a refill of Gabapentin 300 mg  She has 5 days remaninig  Please call into [facility] on file

## 2021-08-12 ENCOUNTER — OFFICE VISIT (OUTPATIENT)
Dept: OBGYN CLINIC | Facility: CLINIC | Age: 61
End: 2021-08-12
Payer: MEDICARE

## 2021-08-12 VITALS — SYSTOLIC BLOOD PRESSURE: 132 MMHG | DIASTOLIC BLOOD PRESSURE: 98 MMHG

## 2021-08-12 DIAGNOSIS — L90.0 LICHEN SCLEROSUS: Primary | ICD-10-CM

## 2021-08-12 DIAGNOSIS — L90.0 LICHEN SCLEROSUS: ICD-10-CM

## 2021-08-12 PROCEDURE — 87255 GENET VIRUS ISOLATE HSV: CPT | Performed by: NURSE PRACTITIONER

## 2021-08-12 PROCEDURE — 99213 OFFICE O/P EST LOW 20 MIN: CPT | Performed by: NURSE PRACTITIONER

## 2021-08-12 RX ORDER — BETAMETHASONE DIPROPIONATE 0.05 %
GEL (GRAM) TOPICAL 2 TIMES DAILY
Qty: 30 G | Refills: 0 | Status: SHIPPED | OUTPATIENT
Start: 2021-08-12

## 2021-08-12 RX ORDER — CLOBETASOL PROPIONATE 0.5 MG/G
CREAM TOPICAL
Qty: 30 G | Refills: 0 | Status: SHIPPED | OUTPATIENT
Start: 2021-08-12 | End: 2021-08-12

## 2021-08-12 NOTE — ASSESSMENT & PLAN NOTE
Exam c/w lichens sclerosus (confirmed with Bx 2019)  HSV culture sent  Clobetasol ordered  Discussed comfort measures such as lidocaine gel, open to air and cool water  To call if no relief by Monday

## 2021-08-12 NOTE — PROGRESS NOTES
Lichen sclerosus  Exam c/w lichens sclerosus (confirmed with Bx 2019)  HSV culture sent  Clobetasol ordered  Discussed comfort measures such as lidocaine gel, open to air and cool water  To call if no relief by Monday  Diagnoses and all orders for this visit:    Lichen sclerosus  -     clobetasol (TEMOVATE) 0 05 % cream; Apply two times a day for 2 weeks then twice weekly  -     Herpes Simplex Virus Culture with Typing         Call if no symptom improvement, all questions answered, return for annual       Karmen 56 y o  presents with vaginal complaints of open area on vulva  She used hydrogen peroxide and baby powder without relief    She had a bx in 2019  that came back lichens sclerosis  She was not on any medication for same    She is not sexually active  She has family hx of vulvar cancer       Past Medical History:   Diagnosis Date    Anxiety     Arthritis     Last assessed 5/3/2011    Ortega's esophagus     [facility]     ovarian cancer at age 28 yo- REMOVAL  B/L    Colon polyp     DDD (degenerative disc disease), lumbar     Depression     Fall     5/2020 right knee meniscus tear- OR repair today 8/3/2020    Fibromyalgia     Fibromyalgia, primary     Fracture of one or more phalanges of foot     GERD (gastroesophageal reflux disease)     H/O bladder infections     chronic    Hypertension     Kidney infection     occasional    Migraines     Ovarian cancer ([facility] ) 1987    age 27    Polyneuropathy     Last assessed 6/23/2016 knees to feet    PONV (postoperative nausea and vomiting)     Patient requests to be pre-medicated prior to surgery prior to surgery    PONV (postoperative nausea and vomiting) 8/3/2020    Renal disorder     Spinal stenosis     Vertigo     Wears glasses     reading     Past Surgical History:   Procedure Laterality Date    ABDOMINAL SURGERY  1986    several    BACK SURGERY  1990    CATARACT EXTRACTION, BILATERAL      COLONOSCOPY      FOOT FRACTURE SURGERY Bilateral 2004    x 5  surgeries    KIDNEY SURGERY  1974    at age 13 yo    KNEE SURGERY Right     LA KNEE SCOPE,MED/LAT MENISECTOMY Right 8/3/2020    Procedure: KNEE ARTHROSCOPY,PARTIAL MEDIAL & LATERAL MENISECTOMIES;  Surgeon: Alina Salmeron MD;  [facility];  Service: Orthopedics    LA LAP,CHOLECYSTECTOMY N/A 11/27/2020    Procedure: LAPAROSCOPIC CHOLECYSTECTOMY;  Surgeon: Goldie Garcia MD;  [facility];  Service: General    TOTAL ABDOMINAL HYSTERECTOMY  1987    age 27    TOTAL ABDOMINAL HYSTERECTOMY W/ BILATERAL SALPINGOOPHORECTOMY Bilateral 1987    age 27     Social History     Tobacco Use    Smoking status: Never Smoker    Smokeless tobacco: Never Used   Vaping Use    Vaping Use: Never used   Substance Use Topics    Alcohol use: Not Currently    Drug use: No     Family History   Problem Relation Age of Onset    Heart disease Mother     Cancer Mother     Diabetes Mother     Arthritis Mother     Anxiety disorder Mother     Bleeding Disorder Mother     Clotting disorder Mother     Depression Mother     Hyperlipidemia Mother     Irritable bowel syndrome Mother     Hypertension Mother     Obesity Mother     Osteoporosis Mother     Vaginal cancer Mother 27    Skin cancer Mother     Thyroid disease Mother     Diabetes Father     Arthritis Father     Hyperlipidemia Father     Vesicoureteral reflux Father     Heart disease Father     Hypertension Father     Migraines Father     Obesity Father     Hypertension Family     Arthritis Family     Arthritis Brother     Anxiety disorder Brother     Diabetes Brother     Hyperlipidemia Brother     Vesicoureteral reflux Brother     Heart disease Brother     Irritable bowel syndrome Brother     Hypertension Brother     Migraines Brother     Thyroid disease Brother     Pancreatic cancer Brother 54    Migraines Daughter     Asthma Son     Migraines Son     Diabetes Maternal Grandmother     Vaginal cancer Maternal Grandmother 48    Infertility Paternal Grandmother     Arthritis Maternal Aunt     Anxiety disorder Maternal Aunt     Depression Maternal Aunt     Diabetes Maternal Aunt     Vaginal cancer Maternal Aunt 48    Fibroids Paternal Aunt     No Known Problems Maternal Grandfather     No Known Problems Paternal Grandfather     No Known Problems Maternal Aunt     No Known Problems Maternal Aunt     No Known Problems Maternal Aunt        Current Outpatient Medications:     gabapentin (NEURONTIN) 300 mg capsule, Take 2 tablets tid , Disp: 540 capsule, Rfl: 0    hydrOXYzine HCL (ATARAX) 50 mg tablet, , Disp: , Rfl:     meclizine (ANTIVERT) 12 5 MG tablet, Take 1 tablet (12 5 mg total) by mouth every 8 (eight) hours as needed for dizziness or nausea, Disp: 30 tablet, Rfl: 0    omeprazole (PriLOSEC) 40 MG capsule, Take 40 mg by mouth daily , Disp: , Rfl:     ondansetron (ZOFRAN-ODT) 4 mg disintegrating tablet, Take 1 tablet (4 mg total) by mouth every 6 (six) hours as needed for nausea or vomiting, Disp: 20 tablet, Rfl: 0    PARoxetine (PAXIL) 20 mg tablet, Take 20 mg by mouth every morning, Disp: , Rfl:     PARoxetine (PAXIL) 40 MG tablet, Take by mouth, Disp: , Rfl:     SUMAtriptan (IMITREX) 50 mg tablet, Take 1 tablet (50 mg total) by mouth once as needed for migraine for up to 1 dose May repeat in 2 hours    No more than 200 mg per day , Disp: 10 tablet, Rfl: 2    calcium-vitamin D 250-100 MG-UNIT per tablet, Take 1 tablet by mouth 2 (two) times a day (Patient not taking: Reported on 8/12/2021), Disp: , Rfl:     clobetasol (TEMOVATE) 0 05 % cream, Apply two times a day for 2 weeks then twice weekly, Disp: 30 g, Rfl: 0    COLLAGEN PO, Take by mouth (Patient not taking: Reported on 8/12/2021), Disp: , Rfl:     cyanocobalamin (VITAMIN B-12) 100 mcg tablet, Take by mouth daily (Patient not taking: Reported on 8/12/2021), Disp: , Rfl:     meloxicam (MOBIC) 7 5 mg tablet, Take 1 tablet (7 5 mg total) by mouth 2 (two) times a day as needed for moderate pain (Patient not taking: Reported on 2021), Disp: 60 tablet, Rfl: 1    methenamine hippurate (HIPREX) 1 g tablet, 3 g 2 (two) times a day with meals  (Patient not taking: Reported on 2021), Disp: , Rfl:     Multiple Vitamins-Minerals (ZINC PO), Take by mouth (Patient not taking: Reported on 2021), Disp: , Rfl:     PARoxetine (PAXIL) 10 mg tablet, Take by mouth (Patient not taking: Reported on 2021), Disp: , Rfl:     Allergies   Allergen Reactions    Morphine      Acts not like herself and feels agitated and restless    Penicillins Hives     Tongue swells     OB History    Para Term  AB Living   9 8 4   1     SAB TAB Ectopic Multiple Live Births   1              # Outcome Date GA Lbr Mohit/2nd Weight Sex Delivery Anes PTL Lv   9 Para      Vag-Spont      8 Para      Vag-Spont      7 Para      Vag-Spont      6 Para      Vag-Spont      5 SAB            4 Term            3 Term            2 Term            1 Term                Vitals:    21 0905   BP: 132/98   BP Location: Right arm   Patient Position: Sitting   Cuff Size: Standard     There is no height or weight on file to calculate BMI  Review of Systems   Constitutional: Negative for fatigue, fever and unexpected weight change  + for chills   Respiratory: Negative for shortness of breath  Gastrointestinal: Negative for abdominal pain    Genitourinary: Negative for difficulty urinating, frequency, dysuria, hematuria and pelvic pain  + for open area on vulva and burning when urine touches the skin        Physical Exam   /98 (BP Location: Right arm, Patient Position: Sitting, Cuff Size: Standard)   LMP  (LMP Unknown)   She appears well and is in no distress  Abdomen is soft and nontender without masses  External genitals -Open area with scant amount of serous drainage noted left labia minora -mid area  Red and flat       Skin warm and dry  Capillary refill < 2 seconds  Alert and oriented x 3 with normal affect

## 2021-08-17 ENCOUNTER — OFFICE VISIT (OUTPATIENT)
Dept: PODIATRY | Facility: CLINIC | Age: 61
End: 2021-08-17
Payer: MEDICARE

## 2021-08-17 ENCOUNTER — TELEPHONE (OUTPATIENT)
Dept: OBGYN CLINIC | Facility: MEDICAL CENTER | Age: 61
End: 2021-08-17

## 2021-08-17 VITALS — DIASTOLIC BLOOD PRESSURE: 86 MMHG | SYSTOLIC BLOOD PRESSURE: 128 MMHG | HEART RATE: 90 BPM

## 2021-08-17 DIAGNOSIS — G62.9 PERIPHERAL NERVE DISORDER: ICD-10-CM

## 2021-08-17 DIAGNOSIS — R22.41 MASS OF RIGHT LOWER EXTREMITY: Primary | ICD-10-CM

## 2021-08-17 LAB — HSV SPEC CULT: NORMAL

## 2021-08-17 PROCEDURE — 99212 OFFICE O/P EST SF 10 MIN: CPT | Performed by: PODIATRIST

## 2021-08-17 RX ORDER — GABAPENTIN 600 MG/1
600 TABLET ORAL 3 TIMES DAILY
Qty: 270 TABLET | Refills: 1 | Status: SHIPPED | OUTPATIENT
Start: 2021-08-17 | End: 2022-04-05

## 2021-08-17 NOTE — TELEPHONE ENCOUNTER
Pt advised to get the betamethasone cream, pt said she wasn't told it was ever sent in, she will call pharmacy

## 2021-08-17 NOTE — PROGRESS NOTES
Patient presents for renewal of gabapentin  Patient has burning and tingling in both feet due to herniated disc in her lower back  She also notes the lower leg mass at the anterior aspect of the right leg  It is mildly uncomfortable with direct palpation  On exam, pedal pulses are within normal limits  Sensorium is markedly diminished per [place]uel[place] monofilament  There is a soft tissue mass anterior aspect right lower leg  Unable to rule out DVT  Treatment:  Prescribed gabapentin 600 mg t i d  For 90 days with 1 refill  Patient referred for venous Doppler right lower extremity

## 2021-08-17 NOTE — TELEPHONE ENCOUNTER
Please let her know her HSV culture was negative (as we suspected) and see how she is feeling since using the cream

## 2021-08-17 NOTE — TELEPHONE ENCOUNTER
Pt called in to request a new rx be ordered, the one that was sent is too expensive  Pt has not taken any medication and is not feeling well  Please advise

## 2021-08-18 ENCOUNTER — HOSPITAL ENCOUNTER (OUTPATIENT)
Dept: NON INVASIVE DIAGNOSTICS | Facility: HOSPITAL | Age: 61
Discharge: HOME | End: 2021-08-18
Attending: PODIATRIST
Payer: MEDICARE

## 2021-08-18 DIAGNOSIS — R22.41 MASS OF RIGHT LOWER EXTREMITY: ICD-10-CM

## 2021-08-18 PROCEDURE — 93971 EXTREMITY STUDY: CPT | Performed by: SURGERY

## 2021-08-18 PROCEDURE — 93971 EXTREMITY STUDY: CPT

## 2021-08-19 ENCOUNTER — TELEPHONE (OUTPATIENT)
Dept: PODIATRY | Facility: CLINIC | Age: 61
End: 2021-08-19

## 2021-08-19 NOTE — TELEPHONE ENCOUNTER
Call placed to patient as per Dr Daniela Cano  Informed patient that her recent venus study was negative  Patient understands instructions and has no further questions at this time

## 2021-10-07 ENCOUNTER — OFFICE VISIT (OUTPATIENT)
Dept: PAIN MEDICINE | Facility: CLINIC | Age: 61
End: 2021-10-07
Payer: MEDICARE

## 2021-10-07 VITALS
BODY MASS INDEX: 34.93 KG/M2 | SYSTOLIC BLOOD PRESSURE: 128 MMHG | DIASTOLIC BLOOD PRESSURE: 83 MMHG | HEART RATE: 92 BPM | HEIGHT: 67 IN

## 2021-10-07 DIAGNOSIS — M48.061 SPINAL STENOSIS OF LUMBAR REGION, UNSPECIFIED WHETHER NEUROGENIC CLAUDICATION PRESENT: ICD-10-CM

## 2021-10-07 DIAGNOSIS — M47.816 LUMBAR SPONDYLOSIS: ICD-10-CM

## 2021-10-07 DIAGNOSIS — M50.30 DDD (DEGENERATIVE DISC DISEASE), CERVICAL: ICD-10-CM

## 2021-10-07 DIAGNOSIS — M47.812 CERVICAL SPONDYLOSIS: ICD-10-CM

## 2021-10-07 DIAGNOSIS — M54.16 LUMBAR RADICULOPATHY: ICD-10-CM

## 2021-10-07 DIAGNOSIS — M51.36 DDD (DEGENERATIVE DISC DISEASE), LUMBAR: ICD-10-CM

## 2021-10-07 DIAGNOSIS — M48.02 CERVICAL SPINAL STENOSIS: ICD-10-CM

## 2021-10-07 DIAGNOSIS — M54.12 CERVICAL RADICULOPATHY: ICD-10-CM

## 2021-10-07 DIAGNOSIS — G89.4 CHRONIC PAIN SYNDROME: Primary | ICD-10-CM

## 2021-10-07 PROCEDURE — 99214 OFFICE O/P EST MOD 30 MIN: CPT | Performed by: NURSE PRACTITIONER

## 2021-10-08 ENCOUNTER — HOSPITAL ENCOUNTER (EMERGENCY)
Facility: HOSPITAL | Age: 61
Discharge: HOME | End: 2021-10-08
Attending: EMERGENCY MEDICINE
Payer: MEDICARE

## 2021-10-08 ENCOUNTER — APPOINTMENT (EMERGENCY)
Dept: RADIOLOGY | Facility: HOSPITAL | Age: 61
End: 2021-10-08
Payer: MEDICARE

## 2021-10-08 VITALS
HEART RATE: 64 BPM | RESPIRATION RATE: 16 BRPM | SYSTOLIC BLOOD PRESSURE: 118 MMHG | OXYGEN SATURATION: 95 % | TEMPERATURE: 97.8 F | DIASTOLIC BLOOD PRESSURE: 69 MMHG

## 2021-10-08 DIAGNOSIS — R07.9 CHEST PAIN, UNSPECIFIED: Primary | ICD-10-CM

## 2021-10-08 LAB
ANION GAP SERPL CALCULATED.3IONS-SCNC: 4 MMOL/L (ref 4–13)
ATRIAL RATE: 75 BPM
BASOPHILS # BLD AUTO: 0.08 THOUSANDS/ΜL (ref 0–0.1)
BASOPHILS NFR BLD AUTO: 1 % (ref 0–1)
BUN SERPL-MCNC: 11 MG/DL (ref 5–25)
CALCIUM SERPL-MCNC: 9.8 MG/DL (ref 8.3–10.1)
CHLORIDE SERPL-SCNC: 108 MMOL/L (ref 100–108)
CO2 SERPL-SCNC: 24 MMOL/L (ref 21–32)
CREAT SERPL-MCNC: 1.06 MG/DL (ref 0.6–1.3)
EOSINOPHIL # BLD AUTO: 0.38 THOUSAND/ΜL (ref 0–0.61)
EOSINOPHIL NFR BLD AUTO: 5 % (ref 0–6)
ERYTHROCYTE [DISTWIDTH] IN BLOOD BY AUTOMATED COUNT: 14.6 % (ref 11.6–15.1)
GFR SERPL CREATININE-BSD FRML MDRD: 56 ML/MIN/1.73SQ M
GLUCOSE SERPL-MCNC: 102 MG/DL (ref 65–140)
HCT VFR BLD AUTO: 40.6 % (ref 34.8–46.1)
HGB BLD-MCNC: 13.5 G/DL (ref 11.5–15.4)
IMM GRANULOCYTES # BLD AUTO: 0.01 THOUSAND/UL (ref 0–0.2)
IMM GRANULOCYTES NFR BLD AUTO: 0 % (ref 0–2)
LYMPHOCYTES # BLD AUTO: 3.53 THOUSANDS/ΜL (ref 0.6–4.47)
LYMPHOCYTES NFR BLD AUTO: 46 % (ref 14–44)
MCH RBC QN AUTO: 29.9 PG (ref 26.8–34.3)
MCHC RBC AUTO-ENTMCNC: 33.3 G/DL (ref 31.4–37.4)
MCV RBC AUTO: 90 FL (ref 82–98)
MONOCYTES # BLD AUTO: 0.68 THOUSAND/ΜL (ref 0.17–1.22)
MONOCYTES NFR BLD AUTO: 9 % (ref 4–12)
NEUTROPHILS # BLD AUTO: 2.97 THOUSANDS/ΜL (ref 1.85–7.62)
NEUTS SEG NFR BLD AUTO: 39 % (ref 43–75)
NRBC BLD AUTO-RTO: 0 /100 WBCS
P AXIS: 62 DEGREES
PLATELET # BLD AUTO: 267 THOUSANDS/UL (ref 149–390)
PMV BLD AUTO: 9.8 FL (ref 8.9–12.7)
POTASSIUM SERPL-SCNC: 5 MMOL/L (ref 3.5–5.3)
PR INTERVAL: 180 MS
QRS AXIS: 34 DEGREES
QRSD INTERVAL: 86 MS
QT INTERVAL: 380 MS
QTC INTERVAL: 424 MS
RBC # BLD AUTO: 4.52 MILLION/UL (ref 3.81–5.12)
SODIUM SERPL-SCNC: 136 MMOL/L (ref 136–145)
T WAVE AXIS: 30 DEGREES
TROPONIN I SERPL-MCNC: <0.02 NG/ML
VENTRICULAR RATE: 75 BPM
WBC # BLD AUTO: 7.65 THOUSAND/UL (ref 4.31–10.16)

## 2021-10-08 PROCEDURE — 93005 ELECTROCARDIOGRAM TRACING: CPT

## 2021-10-08 PROCEDURE — 71045 X-RAY EXAM CHEST 1 VIEW: CPT

## 2021-10-08 PROCEDURE — 84484 ASSAY OF TROPONIN QUANT: CPT | Performed by: EMERGENCY MEDICINE

## 2021-10-08 PROCEDURE — 93010 ELECTROCARDIOGRAM REPORT: CPT | Performed by: INTERNAL MEDICINE

## 2021-10-08 PROCEDURE — 85025 COMPLETE CBC W/AUTO DIFF WBC: CPT | Performed by: EMERGENCY MEDICINE

## 2021-10-08 PROCEDURE — 99285 EMERGENCY DEPT VISIT HI MDM: CPT

## 2021-10-08 PROCEDURE — 96374 THER/PROPH/DIAG INJ IV PUSH: CPT

## 2021-10-08 PROCEDURE — 36415 COLL VENOUS BLD VENIPUNCTURE: CPT | Performed by: EMERGENCY MEDICINE

## 2021-10-08 PROCEDURE — 80048 BASIC METABOLIC PNL TOTAL CA: CPT | Performed by: EMERGENCY MEDICINE

## 2021-10-08 PROCEDURE — 99285 EMERGENCY DEPT VISIT HI MDM: CPT | Performed by: EMERGENCY MEDICINE

## 2021-10-08 RX ORDER — KETOROLAC TROMETHAMINE 30 MG/ML
10 INJECTION, SOLUTION INTRAMUSCULAR; INTRAVENOUS ONCE
Status: COMPLETED | OUTPATIENT
Start: 2021-10-08 | End: 2021-10-08

## 2021-10-08 RX ORDER — METHOCARBAMOL 500 MG/1
500 TABLET, FILM COATED ORAL 2 TIMES DAILY
Qty: 20 TABLET | Refills: 0 | Status: SHIPPED | OUTPATIENT
Start: 2021-10-08

## 2021-10-08 RX ORDER — SODIUM CHLORIDE 9 MG/ML
3 INJECTION INTRAVENOUS
Status: DISCONTINUED | OUTPATIENT
Start: 2021-10-08 | End: 2021-10-08 | Stop reason: HOSPADM

## 2021-10-08 RX ORDER — ACETAMINOPHEN 325 MG/1
975 TABLET ORAL ONCE
Status: COMPLETED | OUTPATIENT
Start: 2021-10-08 | End: 2021-10-08

## 2021-10-08 RX ORDER — LIDOCAINE 50 MG/G
1 PATCH TOPICAL ONCE
Status: DISCONTINUED | OUTPATIENT
Start: 2021-10-08 | End: 2021-10-08 | Stop reason: HOSPADM

## 2021-10-08 RX ADMIN — ACETAMINOPHEN 975 MG: 325 TABLET ORAL at 12:41

## 2021-10-08 RX ADMIN — LIDOCAINE 1 PATCH: 50 PATCH TOPICAL at 12:41

## 2021-10-08 RX ADMIN — KETOROLAC TROMETHAMINE 9.9 MG: 30 INJECTION, SOLUTION INTRAMUSCULAR at 13:18

## 2021-11-01 ENCOUNTER — HOSPITAL ENCOUNTER (OUTPATIENT)
Dept: RADIOLOGY | Facility: CLINIC | Age: 61
Discharge: HOME | End: 2021-11-01
Attending: ANESTHESIOLOGY | Admitting: ANESTHESIOLOGY
Payer: MEDICARE

## 2021-11-01 VITALS
SYSTOLIC BLOOD PRESSURE: 147 MMHG | TEMPERATURE: 96.5 F | OXYGEN SATURATION: 94 % | HEART RATE: 88 BPM | DIASTOLIC BLOOD PRESSURE: 82 MMHG | RESPIRATION RATE: 20 BRPM

## 2021-11-01 DIAGNOSIS — M54.16 LUMBAR RADICULOPATHY: ICD-10-CM

## 2021-11-01 PROCEDURE — 64483 NJX AA&/STRD TFRM EPI L/S 1: CPT | Performed by: ANESTHESIOLOGY

## 2021-11-01 RX ORDER — PAPAVERINE HCL 150 MG
20 CAPSULE, EXTENDED RELEASE ORAL ONCE
Status: COMPLETED | OUTPATIENT
Start: 2021-11-01 | End: 2021-11-01

## 2021-11-01 RX ADMIN — LIDOCAINE HYDROCHLORIDE 2 ML: 20 INJECTION, SOLUTION EPIDURAL; INFILTRATION; INTRACAUDAL; PERINEURAL at 15:10

## 2021-11-01 RX ADMIN — IOHEXOL 2 ML: 300 INJECTION, SOLUTION INTRAVENOUS at 15:10

## 2021-11-01 RX ADMIN — DEXAMETHASONE SODIUM PHOSPHATE 15 MG: 10 INJECTION, SOLUTION INTRAMUSCULAR; INTRAVENOUS at 15:10

## 2021-11-08 ENCOUNTER — TELEPHONE (OUTPATIENT)
Dept: PAIN MEDICINE | Facility: CLINIC | Age: 61
End: 2021-11-08

## 2021-11-15 ENCOUNTER — HOSPITAL ENCOUNTER (OUTPATIENT)
Dept: RADIOLOGY | Facility: CLINIC | Age: 61
Discharge: HOME | End: 2021-11-15
Attending: ANESTHESIOLOGY
Payer: MEDICARE

## 2021-11-15 VITALS
HEART RATE: 79 BPM | RESPIRATION RATE: 20 BRPM | DIASTOLIC BLOOD PRESSURE: 89 MMHG | TEMPERATURE: 99.2 F | SYSTOLIC BLOOD PRESSURE: 147 MMHG | OXYGEN SATURATION: 96 %

## 2021-11-15 DIAGNOSIS — M54.12 CERVICAL RADICULOPATHY: ICD-10-CM

## 2021-11-15 PROCEDURE — 62321 NJX INTERLAMINAR CRV/THRC: CPT | Performed by: ANESTHESIOLOGY

## 2021-11-15 RX ORDER — LIDOCAINE HYDROCHLORIDE 10 MG/ML
5 INJECTION, SOLUTION EPIDURAL; INFILTRATION; INTRACAUDAL; PERINEURAL ONCE
Status: COMPLETED | OUTPATIENT
Start: 2021-11-15 | End: 2021-11-15

## 2021-11-15 RX ORDER — PAPAVERINE HCL 150 MG
10 CAPSULE, EXTENDED RELEASE ORAL ONCE
Status: COMPLETED | OUTPATIENT
Start: 2021-11-15 | End: 2021-11-15

## 2021-11-15 RX ADMIN — DEXAMETHASONE SODIUM PHOSPHATE 10 MG: 10 INJECTION, SOLUTION INTRAMUSCULAR; INTRAVENOUS at 14:54

## 2021-11-15 RX ADMIN — LIDOCAINE HYDROCHLORIDE 2 ML: 10 INJECTION, SOLUTION EPIDURAL; INFILTRATION; INTRACAUDAL; PERINEURAL at 14:54

## 2021-11-16 ENCOUNTER — TELEPHONE (OUTPATIENT)
Dept: PAIN MEDICINE | Facility: CLINIC | Age: 61
End: 2021-11-16

## 2021-11-22 ENCOUNTER — TELEPHONE (OUTPATIENT)
Dept: PAIN MEDICINE | Facility: CLINIC | Age: 61
End: 2021-11-22

## 2021-11-22 NOTE — TELEPHONE ENCOUNTER
Pt says she has a sore neck and she cannot feel her fingers. Pt says she had a procedure done on 11/15.  Ph# [phone]

## 2021-11-22 NOTE — TELEPHONE ENCOUNTER
RN s/w pt. Pt states that she has had no improvement in her finger symptoms since her procedure and is still having pain in her neck. Pt advised that she may not feel the full effect of the procedure for 2 weeks and to give the steroid a little more time to work. Pt is taking tylenol with some relief and advised that she can take up to 3000mg/24hs. Pt also advised to try ice and or heat 20 mins on and off. Pt cannot take NSAIDS. Pt advised that this office will reach out at the 1 and 2 week marks after her procedure but pt states that she will CB with update next week.

## 2022-01-16 ENCOUNTER — OFFICE VISIT (OUTPATIENT)
Dept: URGENT CARE | Age: 62
End: 2022-01-16
Payer: MEDICARE

## 2022-01-16 VITALS — HEIGHT: 67 IN | BODY MASS INDEX: 34.93 KG/M2

## 2022-01-16 DIAGNOSIS — H92.01 ACUTE OTALGIA, RIGHT: ICD-10-CM

## 2022-01-16 DIAGNOSIS — J02.9 SORE THROAT: Primary | ICD-10-CM

## 2022-01-16 DIAGNOSIS — Z03.818 ENCOUNTER FOR OBSERVATION FOR SUSPECTED EXPOSURE TO OTHER BIOLOGICAL AGENTS RULED OUT: ICD-10-CM

## 2022-01-16 LAB — S PYO AG THROAT QL: NEGATIVE

## 2022-01-16 PROCEDURE — 87880 STREP A ASSAY W/OPTIC: CPT | Performed by: NURSE PRACTITIONER

## 2022-01-16 PROCEDURE — 99213 OFFICE O/P EST LOW 20 MIN: CPT | Performed by: NURSE PRACTITIONER

## 2022-01-16 PROCEDURE — G0463 HOSPITAL OUTPT CLINIC VISIT: HCPCS | Performed by: NURSE PRACTITIONER

## 2022-01-16 PROCEDURE — 87636 SARSCOV2 & INF A&B AMP PRB: CPT | Performed by: NURSE PRACTITIONER

## 2022-01-16 NOTE — LETTER
[facility] [place] [address]  [address] [address]  Dept: [phone]    January 16, 2022    Patient: Jj Wade  YOB: 1960    Karmen Perdomo was seen and evaluated at our [facility]  Please note if Covid and Flu tests are negative, they may return to work when fever free for 24 hours without the use of a fever reducing agent  If Covid or Flu test is positive, they may return to work on 1/19/2022, as this is 5 days from the onset of symptoms  Upon return, they must then adhere to strict masking for an additional 5 days      Sincerely,    SONIA Blackwell

## 2022-01-16 NOTE — PATIENT INSTRUCTIONS
Rapid strep in office negative today  CoVid/influenza swab collected today for safe return to work  Suggested taking daily loratadine or Allegra for relief of symptoms  Hydrate adequately  Please follow up results on MyChart and follow up with PCP for any positive results  Maintain quarantine as recommended by current CDC guidelines for return to work  Follow up as needed for any persistent or worsening symptoms

## 2022-01-16 NOTE — PROGRESS NOTES
[facility]        NAME: Pranay Barth is a 59 y o  female  : 1960    MRN: [id number]  DATE: 2022  TIME: 2:03 PM    Assessment and Plan   Sore throat [J02 9]  1  Sore throat  POCT rapid strepA   2  Acute otalgia, right     3  Encounter for observation for suspected exposure to other biological agents ruled out  Covid/Flu-Office Collect         Patient Instructions       Follow up with PCP in 3-5 days  Proceed to  ER if symptoms worsen  Patient Instructions   Rapid strep in office negative today  CoVid/influenza swab collected today for safe return to work  Suggested taking daily loratadine or Allegra for relief of symptoms  Hydrate adequately  Please follow up results on MyChart and follow up with PCP for any positive results  Maintain quarantine as recommended by current CDC guidelines for return to work  Follow up as needed for any persistent or worsening symptoms  Chief Complaint     Chief Complaint   Patient presents with    Sore Throat     Sore throat, right ear pain x 3 days         History of Present Illness       Patient here today with c/o right ear pain with sore throat  History more frequent strep infections  Afebrile  Denies cough, nasal congestion  No known sick contacts  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  Review of Systems   Review of Systems   Constitutional: Negative for fever  HENT: Positive for ear pain and sore throat  Negative for congestion and sneezing  Eyes: Negative for discharge and redness  Respiratory: Negative for cough, chest tightness and shortness of breath  Cardiovascular: Negative for chest pain  Gastrointestinal: Negative for abdominal pain, diarrhea, nausea and vomiting  Genitourinary: Negative for decreased urine volume  Musculoskeletal: Negative for myalgias  Allergic/Immunologic: Negative for environmental allergies     Neurological: Negative for dizziness, syncope and headaches  Hematological: Negative for adenopathy  Psychiatric/Behavioral: Negative for sleep disturbance  Current Medications       Current Outpatient Medications:     gabapentin (NEURONTIN) 600 MG tablet, Take 1 tablet (600 mg total) by mouth 3 (three) times a day, Disp: 270 tablet, Rfl: 1    hydrOXYzine HCL (ATARAX) 50 mg tablet, , Disp: , Rfl:     meclizine (ANTIVERT) 12 5 MG tablet, Take 1 tablet (12 5 mg total) by mouth every 8 (eight) hours as needed for dizziness or nausea, Disp: 30 tablet, Rfl: 0    omeprazole (PriLOSEC) 40 MG capsule, Take 40 mg by mouth daily , Disp: , Rfl:     ondansetron (ZOFRAN-ODT) 4 mg disintegrating tablet, Take 1 tablet (4 mg total) by mouth every 6 (six) hours as needed for nausea or vomiting, Disp: 20 tablet, Rfl: 0    PARoxetine (PAXIL) 20 mg tablet, Take 20 mg by mouth every morning, Disp: , Rfl:     PARoxetine (PAXIL) 40 MG tablet, Take by mouth, Disp: , Rfl:     SUMAtriptan (IMITREX) 50 mg tablet, Take 1 tablet (50 mg total) by mouth once as needed for migraine for up to 1 dose May repeat in 2 hours  No more than 200 mg per day , Disp: 10 tablet, Rfl: 2    betamethasone, augmented, (DIPROLENE) 0 05 % gel, Apply topically 2 (two) times a day, Disp: 30 g, Rfl: 0    calcium-vitamin D 250-100 MG-UNIT per tablet, Take 1 tablet by mouth 2 (two) times a day (Patient not taking: Reported on 8/12/2021), Disp: , Rfl:     COLLAGEN PO, Take by mouth (Patient not taking: Reported on 8/12/2021), Disp: , Rfl:     cyanocobalamin (VITAMIN B-12) 100 mcg tablet, Take by mouth daily (Patient not taking: Reported on 8/12/2021), Disp: , Rfl:     gabapentin (NEURONTIN) 300 mg capsule, Take 2 tablets tid   (Patient not taking: Reported on 1/16/2022 ), Disp: 540 capsule, Rfl: 0    meloxicam (MOBIC) 7 5 mg tablet, Take 1 tablet (7 5 mg total) by mouth 2 (two) times a day as needed for moderate pain (Patient not taking: Reported on 8/12/2021), Disp: 60 tablet, Rfl: 1   methenamine hippurate (HIPREX) 1 g tablet, 3 g 2 (two) times a day with meals  (Patient not taking: Reported on 1/16/2022 ), Disp: , Rfl:     methocarbamol (ROBAXIN) 500 mg tablet, Take 1 tablet (500 mg total) by mouth 2 (two) times a day, Disp: 20 tablet, Rfl: 0    Multiple Vitamins-Minerals (ZINC PO), Take by mouth (Patient not taking: Reported on 8/12/2021), Disp: , Rfl:     PARoxetine (PAXIL) 10 mg tablet, Take by mouth (Patient not taking: Reported on 8/12/2021), Disp: , Rfl:     Current Allergies     Allergies as of 01/16/2022 - Reviewed 01/16/2022   Allergen Reaction Noted    Morphine  12/27/2016    Penicillins Hives 12/27/2016            The following portions of the patient's history were reviewed and updated as appropriate: allergies, current medications, past family history, past medical history, past social history, past surgical history and problem list      Past Medical History:   Diagnosis Date    Anxiety     Arthritis     Last assessed 5/3/2011    Ortega's esophagus     [facility]     ovarian cancer at age 28 yo- REMOVAL  B/L    Colon polyp     DDD (degenerative disc disease), lumbar     Depression     Fall     5/2020 right knee meniscus tear- OR repair today 8/3/2020    Fibromyalgia     Fibromyalgia, primary     Fracture of one or more phalanges of foot     GERD (gastroesophageal reflux disease)     H/O bladder infections     chronic    Hypertension     Kidney infection     occasional    Migraines     Ovarian cancer ([facility]    age 27    Polyneuropathy     Last assessed 6/23/2016 knees to feet    PONV (postoperative nausea and vomiting)     Patient requests to be pre-medicated prior to surgery prior to surgery    PONV (postoperative nausea and vomiting) 8/3/2020    Renal disorder     Spinal stenosis     Vertigo     Wears glasses     reading       Past Surgical History:   Procedure Laterality Date    ABDOMINAL SURGERY  1986    several   [id number]  CATARACT EXTRACTION, BILATERAL      COLONOSCOPY      FOOT FRACTURE SURGERY Bilateral 2004    x 5  surgeries    KIDNEY SURGERY  1974    at age 13 yo    KNEE SURGERY Right     OR KNEE SCOPE,MED/LAT MENISECTOMY Right 8/3/2020    Procedure: KNEE ARTHROSCOPY,PARTIAL MEDIAL & LATERAL MENISECTOMIES;  Surgeon: Gabriela Disla MD;  [facility];  Service: Orthopedics    OR LAP,CHOLECYSTECTOMY N/A 11/27/2020    Procedure: LAPAROSCOPIC CHOLECYSTECTOMY;  Surgeon: Speedy Haynes MD;  [facility];  Service: General    TOTAL ABDOMINAL HYSTERECTOMY  1987    age 27    TOTAL ABDOMINAL HYSTERECTOMY W/ BILATERAL SALPINGOOPHORECTOMY Bilateral 1987    age 27       Family History   Problem Relation Age of Onset    Heart disease Mother     Cancer Mother     Diabetes Mother     Arthritis Mother     Anxiety disorder Mother     Bleeding Disorder Mother     Clotting disorder Mother     Depression Mother     Hyperlipidemia Mother     Irritable bowel syndrome Mother    Kathy Phana Hypertension Mother     Obesity Mother     Osteoporosis Mother     Vaginal cancer Mother 27    Skin cancer Mother     Thyroid disease Mother     Diabetes Father     Arthritis Father     Hyperlipidemia Father     Vesicoureteral reflux Father     Heart disease Father     Hypertension Father     Migraines Father     Obesity Father     Hypertension Family     Arthritis Family     Arthritis Brother     Anxiety disorder Brother     Diabetes Brother     Hyperlipidemia Brother     Vesicoureteral reflux Brother     Heart disease Brother     Irritable bowel syndrome Brother     Hypertension Brother     Migraines Brother     Thyroid disease Brother     Pancreatic cancer Brother 54    Migraines Daughter     Asthma Son     Migraines Son     Diabetes Maternal Grandmother     Vaginal cancer Maternal Grandmother 48    Infertility Paternal Grandmother     Arthritis Maternal Aunt     Anxiety disorder Maternal Aunt     Depression Maternal Aunt     Diabetes Maternal Aunt     Vaginal cancer Maternal Aunt 48    Fibroids Paternal Aunt     No Known Problems Maternal Grandfather     No Known Problems Paternal Grandfather     No Known Problems Maternal Aunt     No Known Problems Maternal Aunt     No Known Problems Maternal Aunt          Medications have been verified  Objective   Ht 5' 7" (1 702 m)   LMP  (LMP Unknown)   BMI 34 93 kg/m²   No LMP recorded (lmp unknown)  Patient has had a hysterectomy  Physical Exam     Physical Exam  Vitals reviewed  Constitutional:       General: She is not in acute distress  Appearance: She is well-developed  She is not ill-appearing  HENT:      Head: Normocephalic  Right Ear: Ear canal normal  Tympanic membrane is retracted  Left Ear: Tympanic membrane and ear canal normal       Nose: Nose normal       Mouth/Throat:      Lips: Pink  Mouth: Mucous membranes are moist       Pharynx: Posterior oropharyngeal erythema (mild cobblestoning) present  Eyes:      General: Lids are normal          Right eye: No discharge  Left eye: No discharge  Cardiovascular:      Rate and Rhythm: Regular rhythm  Heart sounds: Normal heart sounds, S1 normal and S2 normal    Pulmonary:      Effort: Pulmonary effort is normal  No respiratory distress  Breath sounds: Normal breath sounds  No decreased breath sounds, wheezing or rhonchi  Musculoskeletal:      Cervical back: Normal range of motion  Lymphadenopathy:      Cervical: No cervical adenopathy  Skin:     General: Skin is warm and dry  Neurological:      Mental Status: She is alert  Psychiatric:         Behavior: Behavior normal  Behavior is cooperative

## 2022-01-17 LAB
FLUAV RNA RESP QL NAA+PROBE: NEGATIVE
FLUBV RNA RESP QL NAA+PROBE: NEGATIVE
SARS-COV-2 RNA RESP QL NAA+PROBE: NEGATIVE

## 2022-02-08 ENCOUNTER — TELEMEDICINE (OUTPATIENT)
Dept: PAIN MEDICINE | Facility: CLINIC | Age: 62
End: 2022-02-08
Payer: MEDICARE

## 2022-02-08 DIAGNOSIS — G89.4 CHRONIC PAIN SYNDROME: Primary | ICD-10-CM

## 2022-02-08 DIAGNOSIS — M51.36 DDD (DEGENERATIVE DISC DISEASE), LUMBAR: ICD-10-CM

## 2022-02-08 DIAGNOSIS — M54.12 CERVICAL RADICULOPATHY: ICD-10-CM

## 2022-02-08 DIAGNOSIS — M54.16 LUMBAR RADICULOPATHY: ICD-10-CM

## 2022-02-08 DIAGNOSIS — M48.061 SPINAL STENOSIS OF LUMBAR REGION, UNSPECIFIED WHETHER NEUROGENIC CLAUDICATION PRESENT: ICD-10-CM

## 2022-02-08 DIAGNOSIS — M47.816 LUMBAR SPONDYLOSIS: ICD-10-CM

## 2022-02-08 DIAGNOSIS — M50.30 DDD (DEGENERATIVE DISC DISEASE), CERVICAL: ICD-10-CM

## 2022-02-08 PROCEDURE — 99214 OFFICE O/P EST MOD 30 MIN: CPT | Performed by: NURSE PRACTITIONER

## 2022-02-08 NOTE — PROGRESS NOTES
Virtual Regular Visit    Verification of patient location:    Patient is located in the following state in which I hold an active license [place]      Assessment/Plan:    Problem List Items Addressed This Visit        Nervous and Auditory    Lumbar radiculopathy    Relevant Orders    FL spine and pain procedure    Cervical radiculopathy    Relevant Orders    FL spine and pain procedure       Musculoskeletal and Integument    DDD (degenerative disc disease), lumbar    Lumbar spondylosis    DDD (degenerative disc disease), cervical       Other    Spinal stenosis of lumbar region    Chronic pain syndrome - Primary               Reason for visit is neck pain and low back pain  Chief Complaint   Patient presents with    Virtual Regular Visit        Encounter provider SONIA Malave    Provider located at [address] [place] [id number]      Recent Visits  No visits were found meeting these conditions  Showing recent visits within past 7 days and meeting all other requirements  Future Appointments  No visits were found meeting these conditions  Showing future appointments within next 150 days and meeting all other requirements       The patient was identified by name and date of birth  Jaiden Mojica was informed that this is a telemedicine visit and that the visit is being conducted through [address] [place] [address] Now and patient was informed that this is a secure, HIPAA-compliant platform  She agrees to proceed     My office door was closed  No one else was in the room  She acknowledged consent and understanding of privacy and security of the video platform  The patient has agreed to participate and understands they can discontinue the visit at any time  Patient is aware this is a billable service       Subjective  Jaiden Mojica is a 59 y o  female with a history of fibromyalgia last seen in the office on October 7, 2021 returns for follow-up visit in regards to chronic low back pain that radiates into the bilateral lower extremities and neck pain that radiates toward the left upper extremity secondary to lumbar degenerative disc disease, lumbar spondylosis, lumbar stenosis, lumbar radiculopathy, cervical degenerative disc disease, spondylosis and stenosis and chronic pain syndrome  The patient denies bowel or bladder incontinence, balance issues or saddle anesthesia  Patient is status post bilateral L4 TFESI on November 1, 2021 and reported a 70% improvement of her pain for approximately 3 months  She does feel the pain has started to return and would like to repeat injection at this time  She is also status post cervical epidural steroid injection on November 15, 2021 without any significant relief  She has not found relief in the past with NSAIDs  She does remain on gabapentin 600 mg 3 times a day as prescribed by podiatry without much improvement of her pain  Patient rates her pain a 6 to 7/10 on the numeric pain rating scale    She states her pain is constant nature      HPI     Past Medical History:   Diagnosis Date    Anxiety     Arthritis     Last assessed 5/3/2011    Ortega's esophagus     Cancer ([facility] [address] )     ovarian cancer at age 26 yo- REMOVAL  B/L    Colon polyp     DDD (degenerative disc disease), lumbar     Depression     Fall     5/2020 right knee meniscus tear- OR repair today 8/3/2020    Fibromyalgia     Fibromyalgia, primary     Fracture of one or more phalanges of foot     GERD (gastroesophageal reflux disease)     H/O bladder infections     chronic    Hypertension     Kidney infection     occasional    Migraines     Ovarian cancer ([facility] [address] ) 1987    age 27    Polyneuropathy     Last assessed 6/23/2016 knees to feet    PONV (postoperative nausea and vomiting)     Patient requests to be pre-medicated prior to surgery prior to surgery    PONV (postoperative nausea and vomiting) 8/3/2020    Renal disorder     Spinal stenosis     Vertigo     Wears glasses     reading       Past Surgical History:   Procedure Laterality Date   [address]    several    BACK SURGERY  1990    CATARACT EXTRACTION, BILATERAL      COLONOSCOPY      FOOT FRACTURE SURGERY Bilateral 2004    x 5  surgeries    KIDNEY SURGERY  1974    at age 13 yo    KNEE SURGERY Right     [place] KNEE SCOPE,MED/LAT MENISECTOMY Right 8/3/2020    Procedure: [address];  Surgeon: Abram Andersen MD;  [facility];  Service: Orthopedics    ND LAP,CHOLECYSTECTOMY N/A 11/27/2020    Procedure: Enrique Setter;  Surgeon: Yadira Manzo MD;  [facility];  Service: General    TOTAL ABDOMINAL HYSTERECTOMY  1987    age 27    TOTAL ABDOMINAL HYSTERECTOMY W/ BILATERAL SALPINGOOPHORECTOMY Bilateral 1987    age 27       Current Outpatient Medications   Medication Sig Dispense Refill    betamethasone, augmented, (DIPROLENE) 0 05 % gel Apply topically 2 (two) times a day 30 g 0    calcium-vitamin D 250-100 MG-UNIT per tablet Take 1 tablet by mouth 2 (two) times a day (Patient not taking: Reported on 8/12/2021)      COLLAGEN PO Take by mouth (Patient not taking: Reported on 8/12/2021)      cyanocobalamin (VITAMIN B-12) 100 mcg tablet Take by mouth daily (Patient not taking: Reported on 8/12/2021)      gabapentin (NEURONTIN) 300 mg capsule Take 2 tablets tid   (Patient not taking: Reported on 1/16/2022 ) 540 capsule 0    gabapentin (NEURONTIN) 600 MG tablet Take 1 tablet (600 mg total) by mouth 3 (three) times a day 270 tablet 1    hydrOXYzine HCL (ATARAX) 50 mg tablet       meclizine (ANTIVERT) 12 5 MG tablet Take 1 tablet (12 5 mg total) by mouth every 8 (eight) hours as needed for dizziness or nausea 30 tablet 0    meloxicam (MOBIC) 7 5 mg tablet Take 1 tablet (7 5 mg total) by mouth 2 (two) times a day as needed for moderate pain (Patient not taking: Reported on 8/12/2021) 60 tablet 1    methenamine hippurate (HIPREX) 1 g tablet 3 g 2 (two) times a day with meals  (Patient not taking: Reported on 1/16/2022 )      methocarbamol (ROBAXIN) 500 mg tablet Take 1 tablet (500 mg total) by mouth 2 (two) times a day 20 tablet 0    Multiple Vitamins-Minerals (ZINC PO) Take by mouth (Patient not taking: Reported on 8/12/2021)      omeprazole (PriLOSEC) 40 MG capsule Take 40 mg by mouth daily       ondansetron (ZOFRAN-ODT) 4 mg disintegrating tablet Take 1 tablet (4 mg total) by mouth every 6 (six) hours as needed for nausea or vomiting 20 tablet 0    PARoxetine (PAXIL) 10 mg tablet Take by mouth (Patient not taking: Reported on 8/12/2021)      PARoxetine (PAXIL) 20 mg tablet Take 20 mg by mouth every morning      PARoxetine (PAXIL) 40 MG tablet Take by mouth      SUMAtriptan (IMITREX) 50 mg tablet Take 1 tablet (50 mg total) by mouth once as needed for migraine for up to 1 dose May repeat in 2 hours  No more than 200 mg per day  10 tablet 2     No current facility-administered medications for this visit  Allergies   Allergen Reactions    Morphine      Acts not like herself and feels agitated and restless    Penicillins Hives     Tongue swells       Review of Systems    Video Exam    There were no vitals filed for this visit  Physical Exam     General:  Alert and oriented x3  In no acute distress  Well-developed and well-nourished  HEENT:  Grossly intact  Eyes:  Anicteric  Respiratory:  Even and unlabored respirations  Psychiatric:  Normal affect  Musculoskeletal:  Normal gait  Full range of motion all planes of the cervical and lumbar spine    Plan:    1  I will schedule the patient for both repeat bilateral L4 TFESI and cervical epidural steroid injection to address the inflammatory component the patient's pain  Complete risks and benefits including bleeding, infection, tissue reaction, nerve injury and allergic reaction were discussed  The patient was agreeable and verbalized an understanding  2   I offered to discontinue gabapentin and trial pregabalin, however she declines   3  Patient may continue Tylenol p r n  and should not exceed more than 3000 mg in 24 hours   4  Patient will continue with her home exercise program  5  Patient will follow-up after procedures or sooner  I spent 15 minutes directly with the patient during this visit    [address] verbally agrees to participate in [employer]  Pt is aware that [employer] could be limited without vital signs or the ability to perform a full hands-on physical exam  Karmen Ferguson understands she or the provider may request at any time to terminate the video visit and request the patient to seek care or treatment in person

## 2022-02-08 NOTE — PATIENT INSTRUCTIONS
Epidural Steroid Injection, Ambulatory Care   GENERAL INFORMATION:   What do I need to know about an epidural steroid injection? An epidural steroid injection (DANIELLA) is a procedure to inject steroid medicine into the epidural space  The epidural space is between your spinal cord and vertebrae  Steroids reduce inflammation and fluid buildup in your spine that may be causing pain  You may be given pain medicine along with the steroids  How do I prepare for an DANIELLA? Your healthcare provider will talk to you about how to prepare for your procedure  He will tell you what medicines to take or not take on the day of your procedure  You may need to stop taking blood thinners or other medicines several days before your procedure  You may need to adjust any diabetes medicine you take on the day of your procedure  Steroid medicine can increase your blood sugar level  What will happen during an DANIELLA? · You will be given medicine to numb the procedure area  You will be awake for the procedure, but you will not feel pain  You may also be given medicine to help you relax during the procedure  Contrast liquid will be used to help your healthcare provider see the area better  Tell the healthcare provider if you have ever had an allergic reaction to contrast liquid  · Your healthcare provider may place the needle into your neck area, middle of your back, or tailbone area  He may inject the medicine next to the nerves that are causing your pain  He may instead inject the medicine into a larger area of the epidural space  This helps the medicine spread to more nerves  Your healthcare provider will use a fluoroscope to help guide the needle to the right place  A fluoroscope is a type of x-ray  After the procedure, a bandage will be placed over the injection site to prevent infection  What are the risks of an DANIELLA? You may have temporary or permanent nerve damage or paralysis   You may have bleeding or develop a serious infection, such as meningitis (swelling of the brain coverings)  An abscess may also develop  You may need surgery to fix the abscess  You may have a seizure, anxiety, or trouble sleeping  If you are a man, you may have temporary erectile dysfunction (not able to have an erection)  CARE AGREEMENT:   You have the right to help plan your care  Learn about your health condition and how it may be treated  Discuss treatment options with your caregivers to decide what care you want to receive  You always have the right to refuse treatment  The above information is an  only  It is not intended as medical advice for individual conditions or treatments  Talk to your doctor, nurse or pharmacist before following any medical regimen to see if it is safe and effective for you  © 2014 [address] is for End User's use only and may not be sold, redistributed or otherwise used for commercial purposes  All illustrations and images included in CareNotes® are the copyrighted property of A D A M [employer] Inc  or Uday Avila

## 2022-02-17 ENCOUNTER — TELEPHONE (OUTPATIENT)
Dept: PODIATRY | Facility: CLINIC | Age: 62
End: 2022-02-17

## 2022-02-18 DIAGNOSIS — G62.9 PERIPHERAL NERVE DISORDER: Primary | ICD-10-CM

## 2022-02-18 RX ORDER — GABAPENTIN 600 MG/1
600 TABLET ORAL 3 TIMES DAILY
Qty: 270 TABLET | Refills: 1 | Status: SHIPPED | OUTPATIENT
Start: 2022-02-18 | End: 2022-04-05

## 2022-02-19 NOTE — TELEPHONE ENCOUNTER
Betamethasone ordered as clobetasol not covered
20y F pmh BO presents for eval of vomiting. Pt states she was drinking last night and woke this morning feeling nauseas followed by multiple episodes of nbnb vomiting, no aggravating or relieving factors.

## 2022-02-23 ENCOUNTER — HOSPITAL ENCOUNTER (OUTPATIENT)
Dept: RADIOLOGY | Facility: CLINIC | Age: 62
Discharge: HOME | End: 2022-02-23
Attending: ANESTHESIOLOGY | Admitting: ANESTHESIOLOGY
Payer: MEDICARE

## 2022-02-23 VITALS
OXYGEN SATURATION: 92 % | TEMPERATURE: 97.1 F | SYSTOLIC BLOOD PRESSURE: 165 MMHG | RESPIRATION RATE: 20 BRPM | DIASTOLIC BLOOD PRESSURE: 70 MMHG | HEART RATE: 76 BPM

## 2022-02-23 DIAGNOSIS — M54.16 LUMBAR RADICULOPATHY: ICD-10-CM

## 2022-02-23 PROCEDURE — 64483 NJX AA&/STRD TFRM EPI L/S 1: CPT | Performed by: ANESTHESIOLOGY

## 2022-02-23 RX ORDER — PAPAVERINE HCL 150 MG
20 CAPSULE, EXTENDED RELEASE ORAL ONCE
Status: COMPLETED | OUTPATIENT
Start: 2022-02-23 | End: 2022-02-23

## 2022-02-23 RX ADMIN — IOHEXOL 2 ML: 300 INJECTION, SOLUTION INTRAVENOUS at 14:13

## 2022-02-23 RX ADMIN — LIDOCAINE HYDROCHLORIDE 2 ML: 20 INJECTION, SOLUTION EPIDURAL; INFILTRATION; INTRACAUDAL; PERINEURAL at 14:14

## 2022-02-23 RX ADMIN — DEXAMETHASONE SODIUM PHOSPHATE 15 MG: 10 INJECTION, SOLUTION INTRAMUSCULAR; INTRAVENOUS at 14:14

## 2022-02-23 NOTE — DISCHARGE INSTRUCTIONS
Epidural Steroid Injection   WHAT YOU NEED TO KNOW:   An epidural steroid injection (DANIELLA) is a procedure to inject steroid medicine into the epidural space  The epidural space is between your spinal cord and vertebrae  Steroids reduce inflammation and fluid buildup in your spine that may be causing pain  You may be given pain medicine along with the steroids  ACTIVITY  · Do not drive or operate machinery today  · No strenuous activity today - bending, lifting, etc   · You may resume normal activites starting tomorrow - start slowly and as tolerated  · You may shower today, but no tub baths or hot tubs  · You may have numbness for several hours from the local anesthetic  Please use caution and common sense, especially with weight-bearing activities  CARE OF THE INJECTION SITE  · If you have soreness or pain, apply ice to the area today (20 minutes on/20 minutes off)  · Starting tomorrow, you may use warm, moist heat or ice if needed  · You may have an increase or change in your discomfort for 36-48 hours after your treatment  · Apply ice and continue with any pain medication you have been prescribed  · Notify [facility] if you have any of the following: redness, drainage, swelling, headache, stiff neck or fever above 100°F     SPECIAL INSTRUCTIONS  · Our office will contact you in approximately 7 days for a progress report  MEDICATIONS  · Continue to take all routine medications  · Our office may have instructed you to hold some medications  As no general anesthesia was used in today's procedure, you should not experience any side effects related to anesthesia  If you have a problem specifically related to your procedure, please call our office at [phone]  Problems not related to your procedure should be directed to your primary care physician

## 2022-02-23 NOTE — H&P
History of Present Illness:  The patient is a 59 y o  female who presents with complaints of low back and leg    Patient Active Problem List   Diagnosis    Anxiety    Ortega esophagus    Depression    Lumbar radiculopathy    DDD (degenerative disc disease), lumbar    Lumbar spondylosis    Spinal stenosis of lumbar region    Other tear of medial meniscus, current injury, right knee, initial encounter    PONV (postoperative nausea and vomiting)    Calculus of gallbladder without cholecystitis without obstruction    Chronic pain syndrome    DDD (degenerative disc disease), cervical    Moderate episode of recurrent major depressive disorder (HCC)    Lichen sclerosus    Cervical radiculopathy       Past Medical History:   Diagnosis Date    Anxiety     Arthritis     Last assessed 5/3/2011    Ortega's esophagus     Cancer ([facility] [address] )     ovarian cancer at age 28 yo- REMOVAL  B/L    Colon polyp     DDD (degenerative disc disease), lumbar     Depression     Fall     5/2020 right knee meniscus tear- OR repair today 8/3/2020    Fibromyalgia     Fibromyalgia, primary     Fracture of one or more phalanges of foot     GERD (gastroesophageal reflux disease)     H/O bladder infections     chronic    Hypertension     Kidney infection     occasional    Migraines     Ovarian cancer ([facility] [address] ) 26    age 27    Polyneuropathy     Last assessed 6/23/2016 knees to feet    PONV (postoperative nausea and vomiting)     Patient requests to be pre-medicated prior to surgery prior to surgery    PONV (postoperative nausea and vomiting) 8/3/2020    Renal disorder     Spinal stenosis     Vertigo     Wears glasses     reading       Past Surgical History:   Procedure Laterality Date    ABDOMINAL SURGERY  1986    several    BACK SURGERY  1990    CATARACT EXTRACTION, BILATERAL      COLONOSCOPY      FOOT FRACTURE SURGERY Bilateral 2004    x 5  surgeries    KIDNEY SURGERY  1974    at age 13 yo    KNEE SURGERY Right  [place] KNEE SCOPE,MED/LAT MENISECTOMY Right 8/3/2020    Procedure: Albino Bruno MEDIAL & LATERAL MENISECTOMIES;  Surgeon: Devin Felder MD;  [facility];  Service: Orthopedics    [place] LAP,CHOLECYSTECTOMY N/A 11/27/2020    Procedure: Yari Vern;  Surgeon: Kelvin Tracy MD;  [facility];  Service: General    TOTAL ABDOMINAL HYSTERECTOMY  1987    age 27    TOTAL ABDOMINAL HYSTERECTOMY W/ BILATERAL SALPINGOOPHORECTOMY Bilateral 1987    age 27         Current Outpatient Medications:     betamethasone, augmented, (DIPROLENE) 0 05 % gel, Apply topically 2 (two) times a day, Disp: 30 g, Rfl: 0    calcium-vitamin D 250-100 MG-UNIT per tablet, Take 1 tablet by mouth 2 (two) times a day (Patient not taking: Reported on 8/12/2021), Disp: , Rfl:     COLLAGEN PO, Take by mouth (Patient not taking: Reported on 8/12/2021), Disp: , Rfl:     cyanocobalamin (VITAMIN B-12) 100 mcg tablet, Take by mouth daily (Patient not taking: Reported on 8/12/2021), Disp: , Rfl:     gabapentin (NEURONTIN) 300 mg capsule, Take 2 tablets tid   (Patient not taking: Reported on 1/16/2022 ), Disp: 540 capsule, Rfl: 0    gabapentin (NEURONTIN) 600 MG tablet, Take 1 tablet (600 mg total) by mouth 3 (three) times a day, Disp: 270 tablet, Rfl: 1    gabapentin (Neurontin) 600 MG tablet, Take 1 tablet (600 mg total) by mouth 3 (three) times a day, Disp: 270 tablet, Rfl: 1    hydrOXYzine HCL (ATARAX) 50 mg tablet, , Disp: , Rfl:     meclizine (ANTIVERT) 12 5 MG tablet, Take 1 tablet (12 5 mg total) by mouth every 8 (eight) hours as needed for dizziness or nausea, Disp: 30 tablet, Rfl: 0    meloxicam (MOBIC) 7 5 mg tablet, Take 1 tablet (7 5 mg total) by mouth 2 (two) times a day as needed for moderate pain (Patient not taking: Reported on 8/12/2021), Disp: 60 tablet, Rfl: 1    methenamine hippurate (HIPREX) 1 g tablet, 3 g 2 (two) times a day with meals  (Patient not taking: Reported on 1/16/2022 ), Disp: , Rfl:     methocarbamol (ROBAXIN) 500 mg tablet, Take 1 tablet (500 mg total) by mouth 2 (two) times a day, Disp: 20 tablet, Rfl: 0    Multiple Vitamins-Minerals (ZINC PO), Take by mouth (Patient not taking: Reported on 8/12/2021), Disp: , Rfl:     omeprazole (PriLOSEC) 40 MG capsule, Take 40 mg by mouth daily , Disp: , Rfl:     ondansetron (ZOFRAN-ODT) 4 mg disintegrating tablet, Take 1 tablet (4 mg total) by mouth every 6 (six) hours as needed for nausea or vomiting, Disp: 20 tablet, Rfl: 0    PARoxetine (PAXIL) 10 mg tablet, Take by mouth (Patient not taking: Reported on 8/12/2021), Disp: , Rfl:     PARoxetine (PAXIL) 20 mg tablet, Take 20 mg by mouth every morning, Disp: , Rfl:     PARoxetine (PAXIL) 40 MG tablet, Take by mouth, Disp: , Rfl:     SUMAtriptan (IMITREX) 50 mg tablet, Take 1 tablet (50 mg total) by mouth once as needed for migraine for up to 1 dose May repeat in 2 hours  No more than 200 mg per day , Disp: 10 tablet, Rfl: 2    Current Facility-Administered Medications:     dexamethasone (PF) (DECADRON) injection 20 mg, 20 mg, Epidural, Once, Cristi Watson, DO    iohexol (OMNIPAQUE) 300 mg/mL injection 50 mL, 50 mL, Epidural, Once, Cristi Watson, DO    lidocaine (PF) (XYLOCAINE-MPF) 2 % injection 2 mL, 2 mL, Epidural, Once, Cristi Watson, DO    Allergies   Allergen Reactions    Morphine      Acts not like herself and feels agitated and restless    Penicillins Hives     Tongue swells       Physical Exam:   Vitals:    02/23/22 1351   BP: 148/85   Pulse: 83   Resp: 20   Temp: (!) 97 1 °F (36 2 °C)   SpO2: 96%     General: Awake, Alert, Oriented x 3, Mood and affect appropriate  Respiratory: Respirations even and unlabored  Cardiovascular: Peripheral pulses intact; no edema  Musculoskeletal Exam:  Bilateral lumbar paraspinals tender to palpation      ASA Score: 2    Patient/Chart Verification  Patient ID Verified: Verbal  ID Band Applied: No  Consents Confirmed: Procedural  H&P( within 30 days) Verified: To be obtained in the Pre-Procedure area  Interval H&P(within 24 hr) Complete (required for Outpatients and Surgery Admit only): To be obtained in the Pre-Procedure area  Allergies Reviewed: Yes  Anticoag/NSAID held?: No  Currently on antibiotics?: No  Pre-op Lab/Test Results Available: N/A  Pregnancy Lab Collected: N/A comment    Assessment:   1   Lumbar radiculopathy        Plan: B/L L4 TFESI

## 2022-03-02 ENCOUNTER — TELEPHONE (OUTPATIENT)
Dept: PAIN MEDICINE | Facility: CLINIC | Age: 62
End: 2022-03-02

## 2022-03-09 ENCOUNTER — HOSPITAL ENCOUNTER (OUTPATIENT)
Dept: RADIOLOGY | Facility: CLINIC | Age: 62
Discharge: HOME | End: 2022-03-09
Attending: ANESTHESIOLOGY | Admitting: ANESTHESIOLOGY
Payer: MEDICARE

## 2022-03-09 VITALS
DIASTOLIC BLOOD PRESSURE: 99 MMHG | SYSTOLIC BLOOD PRESSURE: 156 MMHG | RESPIRATION RATE: 18 BRPM | TEMPERATURE: 98.2 F | HEART RATE: 94 BPM | OXYGEN SATURATION: 96 %

## 2022-03-09 DIAGNOSIS — M54.12 CERVICAL RADICULOPATHY: ICD-10-CM

## 2022-03-09 PROCEDURE — 62321 NJX INTERLAMINAR CRV/THRC: CPT | Performed by: ANESTHESIOLOGY

## 2022-03-09 RX ORDER — PAPAVERINE HCL 150 MG
10 CAPSULE, EXTENDED RELEASE ORAL ONCE
Status: COMPLETED | OUTPATIENT
Start: 2022-03-09 | End: 2022-03-09

## 2022-03-09 RX ADMIN — IOHEXOL 1 ML: 300 INJECTION, SOLUTION INTRAVENOUS at 11:47

## 2022-03-09 RX ADMIN — DEXAMETHASONE SODIUM PHOSPHATE 10 MG: 10 INJECTION, SOLUTION INTRAMUSCULAR; INTRAVENOUS at 11:47

## 2022-03-09 NOTE — H&P
History of Present Illness: The patient is a 59 y o  female who presents with complaints of neck and arm pain      Patient Active Problem List   Diagnosis    Anxiety    Ortega esophagus    Depression    Lumbar radiculopathy    DDD (degenerative disc disease), lumbar    Lumbar spondylosis    Spinal stenosis of lumbar region    Other tear of medial meniscus, current injury, right knee, initial encounter    PONV (postoperative nausea and vomiting)    Calculus of gallbladder without cholecystitis without obstruction    Chronic pain syndrome    DDD (degenerative disc disease), cervical    Moderate episode of recurrent major depressive disorder (HCC)    Lichen sclerosus    Cervical radiculopathy       Past Medical History:   Diagnosis Date    Anxiety     Arthritis     Last assessed 5/3/2011    Ortega's esophagus     Cancer ([facility] [address] )     ovarian cancer at age 28 yo- REMOVAL  B/L    Colon polyp     DDD (degenerative disc disease), lumbar     Depression     Fall     5/2020 right knee meniscus tear- OR repair today 8/3/2020    Fibromyalgia     Fibromyalgia, primary     Fracture of one or more phalanges of foot     GERD (gastroesophageal reflux disease)     H/O bladder infections     chronic    Hypertension     Kidney infection     occasional    Migraines     Ovarian cancer ([facility] [address] ) 26    age 27    Polyneuropathy     Last assessed 6/23/2016 knees to feet    PONV (postoperative nausea and vomiting)     Patient requests to be pre-medicated prior to surgery prior to surgery    PONV (postoperative nausea and vomiting) 8/3/2020    Renal disorder     Spinal stenosis     Vertigo     Wears glasses     reading       Past Surgical History:   Procedure Laterality Date    ABDOMINAL SURGERY  1986    several    BACK SURGERY  1990    CATARACT EXTRACTION, BILATERAL      COLONOSCOPY      FOOT FRACTURE SURGERY Bilateral 2004    x 5  surgeries    KIDNEY SURGERY  1974    at age 13 yo    KNEE SURGERY Right  DE KNEE SCOPE,MED/LAT MENISECTOMY Right 8/3/2020    Procedure: Garry Martinez MEDIAL & LATERAL MENISECTOMIES;  Surgeon: Josh Willams MD;  [facility];  Service: Orthopedics    DE LAP,CHOLECYSTECTOMY N/A 11/27/2020    Procedure: Carter Begun;  Surgeon: William London MD;  [facility];  Service: General    TOTAL ABDOMINAL HYSTERECTOMY  1987    age 27    TOTAL ABDOMINAL HYSTERECTOMY W/ BILATERAL SALPINGOOPHORECTOMY Bilateral 1987    age 27         Current Outpatient Medications:     betamethasone, augmented, (DIPROLENE) 0 05 % gel, Apply topically 2 (two) times a day, Disp: 30 g, Rfl: 0    calcium-vitamin D 250-100 MG-UNIT per tablet, Take 1 tablet by mouth 2 (two) times a day (Patient not taking: Reported on 8/12/2021), Disp: , Rfl:     COLLAGEN PO, Take by mouth (Patient not taking: Reported on 8/12/2021), Disp: , Rfl:     cyanocobalamin (VITAMIN B-12) 100 mcg tablet, Take by mouth daily (Patient not taking: Reported on 8/12/2021), Disp: , Rfl:     gabapentin (NEURONTIN) 300 mg capsule, Take 2 tablets tid   (Patient not taking: Reported on 1/16/2022 ), Disp: 540 capsule, Rfl: 0    gabapentin (NEURONTIN) 600 MG tablet, Take 1 tablet (600 mg total) by mouth 3 (three) times a day, Disp: 270 tablet, Rfl: 1    gabapentin (Neurontin) 600 MG tablet, Take 1 tablet (600 mg total) by mouth 3 (three) times a day, Disp: 270 tablet, Rfl: 1    hydrOXYzine HCL (ATARAX) 50 mg tablet, , Disp: , Rfl:     meclizine (ANTIVERT) 12 5 MG tablet, Take 1 tablet (12 5 mg total) by mouth every 8 (eight) hours as needed for dizziness or nausea, Disp: 30 tablet, Rfl: 0    meloxicam (MOBIC) 7 5 mg tablet, Take 1 tablet (7 5 mg total) by mouth 2 (two) times a day as needed for moderate pain (Patient not taking: Reported on 8/12/2021), Disp: 60 tablet, Rfl: 1    methenamine hippurate (HIPREX) 1 g tablet, 3 g 2 (two) times a day with meals  (Patient not taking: Reported on 1/16/2022 ), Disp: , Rfl:     methocarbamol (ROBAXIN) 500 mg tablet, Take 1 tablet (500 mg total) by mouth 2 (two) times a day, Disp: 20 tablet, Rfl: 0    Multiple Vitamins-Minerals (ZINC PO), Take by mouth (Patient not taking: Reported on 8/12/2021), Disp: , Rfl:     omeprazole (PriLOSEC) 40 MG capsule, Take 40 mg by mouth daily , Disp: , Rfl:     ondansetron (ZOFRAN-ODT) 4 mg disintegrating tablet, Take 1 tablet (4 mg total) by mouth every 6 (six) hours as needed for nausea or vomiting, Disp: 20 tablet, Rfl: 0    PARoxetine (PAXIL) 10 mg tablet, Take by mouth (Patient not taking: Reported on 8/12/2021), Disp: , Rfl:     PARoxetine (PAXIL) 20 mg tablet, Take 20 mg by mouth every morning, Disp: , Rfl:     PARoxetine (PAXIL) 40 MG tablet, Take by mouth, Disp: , Rfl:     SUMAtriptan (IMITREX) 50 mg tablet, Take 1 tablet (50 mg total) by mouth once as needed for migraine for up to 1 dose May repeat in 2 hours  No more than 200 mg per day , Disp: 10 tablet, Rfl: 2    Current Facility-Administered Medications:     dexamethasone (PF) (DECADRON) injection 10 mg, 10 mg, Epidural, Once, Cristi Watson, DO    iohexol (OMNIPAQUE) 300 mg/mL injection 50 mL, 50 mL, Epidural, Once, Cristi Watson, DO    Allergies   Allergen Reactions    Morphine      Acts not like herself and feels agitated and restless    Penicillins Hives     Tongue swells       Physical Exam:   Vitals:    03/09/22 1129   BP: 153/90   Pulse: 97   Resp: 18   Temp: 98 2 °F (36 8 °C)   SpO2: 95%     General: Awake, Alert, Oriented x 3, Mood and affect appropriate  Respiratory: Respirations even and unlabored  Cardiovascular: Peripheral pulses intact; no edema  Musculoskeletal Exam:  Bilateral cervical paraspinals tender to palpation      ASA Score: 2         Assessment:  Cervical radiculopathy    Plan: C6-7 MUSA

## 2022-03-09 NOTE — DISCHARGE INSTR - LAB
Epidural Steroid Injection   WHAT YOU NEED TO KNOW:   An epidural steroid injection (DANIELLA) is a procedure to inject steroid medicine into the epidural space  The epidural space is between your spinal cord and vertebrae  Steroids reduce inflammation and fluid buildup in your spine that may be causing pain  You may be given pain medicine along with the steroids  ACTIVITY  Do not drive or operate machinery today  No strenuous activity today - bending, lifting, etc   You may resume normal activites starting tomorrow - start slowly and as tolerated  You may shower today, but no tub baths or hot tubs  You may have numbness for several hours from the local anesthetic  Please use caution and common sense, especially with weight-bearing activities  CARE OF THE INJECTION SITE  If you have soreness or pain, apply ice to the area today (20 minutes on/20 minutes off)  Starting tomorrow, you may use warm, moist heat or ice if needed  You may have an increase or change in your discomfort for 36-48 hours after your treatment  Apply ice and continue with any pain medication you have been prescribed  Notify the [facility] if you have any of the following: redness, drainage, swelling, headache, stiff neck or fever above 100°F     SPECIAL INSTRUCTIONS  Our office will contact you in approximately 7 days for a progress report  MEDICATIONS  Continue to take all routine medications  Our office may have instructed you to hold some medications  As no general anesthesia was used in today's procedure, you should not experience any side effects related to anesthesia  If you have a problem specifically related to your procedure, please call our office at [phone]  Problems not related to your procedure should be directed to your primary care physician

## 2022-03-16 ENCOUNTER — TELEPHONE (OUTPATIENT)
Dept: PAIN MEDICINE | Facility: CLINIC | Age: 62
End: 2022-03-16

## 2022-03-22 ENCOUNTER — OFFICE VISIT (OUTPATIENT)
Dept: FAMILY MEDICINE CLINIC | Facility: CLINIC | Age: 62
End: 2022-03-22
Payer: MEDICARE

## 2022-03-22 VITALS
OXYGEN SATURATION: 98 % | SYSTOLIC BLOOD PRESSURE: 150 MMHG | HEART RATE: 80 BPM | DIASTOLIC BLOOD PRESSURE: 90 MMHG | TEMPERATURE: 96.9 F

## 2022-03-22 DIAGNOSIS — R53.83 TIREDNESS: ICD-10-CM

## 2022-03-22 DIAGNOSIS — E55.9 VITAMIN D DEFICIENCY: ICD-10-CM

## 2022-03-22 DIAGNOSIS — G89.4 CHRONIC PAIN SYNDROME: ICD-10-CM

## 2022-03-22 DIAGNOSIS — F41.9 ANXIETY: ICD-10-CM

## 2022-03-22 DIAGNOSIS — F33.1 MODERATE EPISODE OF RECURRENT MAJOR DEPRESSIVE DISORDER (HCC): ICD-10-CM

## 2022-03-22 DIAGNOSIS — I10 HYPERTENSION, UNSPECIFIED TYPE: Primary | ICD-10-CM

## 2022-03-22 PROCEDURE — 93000 ELECTROCARDIOGRAM COMPLETE: CPT | Performed by: FAMILY MEDICINE

## 2022-03-22 PROCEDURE — 99215 OFFICE O/P EST HI 40 MIN: CPT | Performed by: FAMILY MEDICINE

## 2022-03-22 NOTE — PROGRESS NOTES
Chief Complaint   Patient presents with    Hypertension     patient c/o elevated bp with chest pain   Assessment/Plan:  Fasting labs prior to starting treatment, follow up 2 weeks  Diet changes  EKG is OK  BMI Counseling: There is no height or weight on file to calculate BMI  The BMI is above normal  Nutrition recommendations include consuming healthier snacks, decreasing soda and/or juice intake, moderation in carbohydrate intake and increasing intake of lean protein  Diagnoses and all orders for this visit:    Hypertension, unspecified type  -     POCT ECG  -     Basic metabolic panel; Future  -     CBC and Platelet; Future  -     Hepatic function panel; Future  -     Lipid panel; Future    Tiredness  -     TSH, 3rd generation; Future  -     T4, free; Future  -     T3; Future    Vitamin D deficiency  -     Vitamin D 25 hydroxy; Future    Moderate episode of recurrent major depressive disorder (HCC)    Anxiety    Chronic pain syndrome          Subjective:      Patient ID: Jessica Nava is a 59 y o  female  Visit started at 2:30 pm   Home BP's running high at home, 130-160/      The following portions of the patient's history were reviewed and updated as appropriate: allergies, current medications, past family history, past medical history, past social history, past surgical history and problem list   I have spent 40 minutes with Patient  today in which greater than 50% of this time was spent in counseling/coordination of care regarding Diagnostic results, Risks and benefits of tx options, Intructions for management, Patient and family education, Importance of tx compliance, Risk factor reductions and Impressions  Review of Systems   Constitutional: Negative  HENT: Negative  Eyes: Negative  Respiratory: Negative  Cardiovascular: Negative  Gastrointestinal: Negative  Genitourinary: Negative  Musculoskeletal: Positive for arthralgias  Skin: Negative      Neurological: Negative  Psychiatric/Behavioral: Negative  Objective:      /90   Pulse 80   Temp (!) 96 9 °F (36 1 °C) (Tympanic)   LMP  (LMP Unknown)   SpO2 98%          Physical Exam  Constitutional:       Appearance: She is well-developed  HENT:      Head: Normocephalic and atraumatic  Right Ear: External ear normal       Left Ear: External ear normal       Nose: Nose normal    Eyes:      Conjunctiva/sclera: Conjunctivae normal       Pupils: Pupils are equal, round, and reactive to light  Cardiovascular:      Rate and Rhythm: Normal rate and regular rhythm  Pulses: Normal pulses  Heart sounds: Normal heart sounds  Pulmonary:      Effort: Pulmonary effort is normal       Breath sounds: Normal breath sounds  Abdominal:      General: Abdomen is flat  Bowel sounds are normal       Palpations: Abdomen is soft  Musculoskeletal:      Cervical back: Normal range of motion and neck supple  Skin:     General: Skin is warm and dry  Neurological:      Mental Status: She is alert and oriented to person, place, and time  Deep Tendon Reflexes: Reflexes are normal and symmetric  Psychiatric:         Behavior: Behavior normal          Thought Content:  Thought content normal          Judgment: Judgment normal            Current Outpatient Medications:     gabapentin (Neurontin) 600 MG tablet, Take 1 tablet (600 mg total) by mouth 3 (three) times a day, Disp: 270 tablet, Rfl: 1    hydrOXYzine HCL (ATARAX) 50 mg tablet, , Disp: , Rfl:     meclizine (ANTIVERT) 12 5 MG tablet, Take 1 tablet (12 5 mg total) by mouth every 8 (eight) hours as needed for dizziness or nausea, Disp: 30 tablet, Rfl: 0    methocarbamol (ROBAXIN) 500 mg tablet, Take 1 tablet (500 mg total) by mouth 2 (two) times a day, Disp: 20 tablet, Rfl: 0    Multiple Vitamins-Minerals (ZINC PO), Take by mouth  , Disp: , Rfl:     omeprazole (PriLOSEC) 40 MG capsule, Take 40 mg by mouth daily , Disp: , Rfl:     ondansetron (ZOFRAN-ODT) 4 mg disintegrating tablet, Take 1 tablet (4 mg total) by mouth every 6 (six) hours as needed for nausea or vomiting, Disp: 20 tablet, Rfl: 0    PARoxetine (PAXIL) 10 mg tablet, Take by mouth  , Disp: , Rfl:     PARoxetine (PAXIL) 20 mg tablet, Take 20 mg by mouth every morning, Disp: , Rfl:     PARoxetine (PAXIL) 40 MG tablet, Take by mouth, Disp: , Rfl:     SUMAtriptan (IMITREX) 50 mg tablet, Take 1 tablet (50 mg total) by mouth once as needed for migraine for up to 1 dose May repeat in 2 hours  No more than 200 mg per day , Disp: 10 tablet, Rfl: 2    betamethasone, augmented, (DIPROLENE) 0 05 % gel, Apply topically 2 (two) times a day (Patient not taking: Reported on 3/22/2022 ), Disp: 30 g, Rfl: 0    calcium-vitamin D 250-100 MG-UNIT per tablet, Take 1 tablet by mouth 2 (two) times a day (Patient not taking: Reported on 3/22/2022 ), Disp: , Rfl:     COLLAGEN PO, Take by mouth (Patient not taking: Reported on 3/22/2022 ), Disp: , Rfl:     cyanocobalamin (VITAMIN B-12) 100 mcg tablet, Take by mouth daily (Patient not taking: Reported on 3/22/2022 ), Disp: , Rfl:     gabapentin (NEURONTIN) 300 mg capsule, Take 2 tablets tid   (Patient not taking: Reported on 1/16/2022 ), Disp: 540 capsule, Rfl: 0    gabapentin (NEURONTIN) 600 MG tablet, Take 1 tablet (600 mg total) by mouth 3 (three) times a day, Disp: 270 tablet, Rfl: 1    meloxicam (MOBIC) 7 5 mg tablet, Take 1 tablet (7 5 mg total) by mouth 2 (two) times a day as needed for moderate pain (Patient not taking: Reported on 8/12/2021), Disp: 60 tablet, Rfl: 1    methenamine hippurate (HIPREX) 1 g tablet, 3 g 2 (two) times a day with meals  (Patient not taking: Reported on 1/16/2022 ), Disp: , Rfl:    Allergies   Allergen Reactions    Morphine      Acts not like herself and feels agitated and restless    Penicillins Hives     Tongue swells     Past Surgical History:   Procedure Laterality Date   [address]    several    BACK SURGERY  1990    CATARACT EXTRACTION, BILATERAL      COLONOSCOPY      FOOT FRACTURE SURGERY Bilateral 2004    x 5  surgeries    KIDNEY SURGERY  1974    at age 13 yo    KNEE SURGERY Right     [place] KNEE SCOPE,MED/LAT MENISECTOMY Right 8/3/2020    Procedure: [address];  Surgeon: Devin Felder MD;  [facility];  Service: Orthopedics    [place] LAP,CHOLECYSTECTOMY N/A 11/27/2020    Procedure: Yari Porras;  Surgeon: Kelvin Tracy MD;  [facility];  Service: General    TOTAL ABDOMINAL HYSTERECTOMY  1987    age 27    TOTAL ABDOMINAL HYSTERECTOMY W/ BILATERAL SALPINGOOPHORECTOMY Bilateral 1987    age 27

## 2022-03-23 NOTE — TELEPHONE ENCOUNTER
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

## 2022-03-23 NOTE — TELEPHONE ENCOUNTER
Pt said that she is not doing good  The injection didn't take she said  She feels like it is worse  Pain level is a 10/10   Please have a nurse follow up with pt

## 2022-03-25 ENCOUNTER — APPOINTMENT (OUTPATIENT)
Dept: LAB | Facility: IMAGING CENTER | Age: 62
End: 2022-03-25
Payer: MEDICARE

## 2022-03-25 DIAGNOSIS — E55.9 VITAMIN D DEFICIENCY: ICD-10-CM

## 2022-03-25 DIAGNOSIS — I10 HYPERTENSION, UNSPECIFIED TYPE: ICD-10-CM

## 2022-03-25 DIAGNOSIS — R53.83 TIREDNESS: ICD-10-CM

## 2022-03-25 LAB
25(OH)D3 SERPL-MCNC: 28.8 NG/ML (ref 30–100)
ALBUMIN SERPL BCP-MCNC: 4 G/DL (ref 3.5–5)
ALP SERPL-CCNC: 95 U/L (ref 46–116)
ALT SERPL W P-5'-P-CCNC: 22 U/L (ref 12–78)
ANION GAP SERPL CALCULATED.3IONS-SCNC: 6 MMOL/L (ref 4–13)
AST SERPL W P-5'-P-CCNC: 15 U/L (ref 5–45)
BILIRUB DIRECT SERPL-MCNC: 0.12 MG/DL (ref 0–0.2)
BILIRUB SERPL-MCNC: 0.91 MG/DL (ref 0.2–1)
BUN SERPL-MCNC: 13 MG/DL (ref 5–25)
CALCIUM SERPL-MCNC: 9.6 MG/DL (ref 8.3–10.1)
CHLORIDE SERPL-SCNC: 106 MMOL/L (ref 100–108)
CHOLEST SERPL-MCNC: 337 MG/DL
CO2 SERPL-SCNC: 26 MMOL/L (ref 21–32)
CREAT SERPL-MCNC: 1.12 MG/DL (ref 0.6–1.3)
GFR SERPL CREATININE-BSD FRML MDRD: 52 ML/MIN/1.73SQ M
GLUCOSE P FAST SERPL-MCNC: 108 MG/DL (ref 65–99)
HDLC SERPL-MCNC: 72 MG/DL
LDLC SERPL CALC-MCNC: 229 MG/DL (ref 0–100)
NONHDLC SERPL-MCNC: 265 MG/DL
POTASSIUM SERPL-SCNC: 4 MMOL/L (ref 3.5–5.3)
PROT SERPL-MCNC: 7.8 G/DL (ref 6.4–8.2)
SODIUM SERPL-SCNC: 138 MMOL/L (ref 136–145)
T3 SERPL-MCNC: 1 NG/ML (ref 0.6–1.8)
T4 FREE SERPL-MCNC: 0.84 NG/DL (ref 0.76–1.46)
TRIGL SERPL-MCNC: 180 MG/DL
TSH SERPL DL<=0.05 MIU/L-ACNC: 3.91 UIU/ML (ref 0.36–3.74)

## 2022-03-25 PROCEDURE — 80076 HEPATIC FUNCTION PANEL: CPT

## 2022-03-25 PROCEDURE — 85027 COMPLETE CBC AUTOMATED: CPT

## 2022-03-25 PROCEDURE — 80048 BASIC METABOLIC PNL TOTAL CA: CPT

## 2022-03-25 PROCEDURE — 82306 VITAMIN D 25 HYDROXY: CPT

## 2022-03-25 PROCEDURE — 84480 ASSAY TRIIODOTHYRONINE (T3): CPT

## 2022-03-25 PROCEDURE — 84443 ASSAY THYROID STIM HORMONE: CPT

## 2022-03-25 PROCEDURE — 84439 ASSAY OF FREE THYROXINE: CPT

## 2022-03-25 PROCEDURE — 80061 LIPID PANEL: CPT

## 2022-03-25 PROCEDURE — 36415 COLL VENOUS BLD VENIPUNCTURE: CPT

## 2022-03-26 ENCOUNTER — APPOINTMENT (OUTPATIENT)
Dept: LAB | Facility: IMAGING CENTER | Age: 62
End: 2022-03-26
Payer: MEDICARE

## 2022-03-26 LAB
ERYTHROCYTE [DISTWIDTH] IN BLOOD BY AUTOMATED COUNT: 13.8 % (ref 11.6–15.1)
HCT VFR BLD AUTO: 43.9 % (ref 34.8–46.1)
HGB BLD-MCNC: 13.8 G/DL (ref 11.5–15.4)
MCH RBC QN AUTO: 29.1 PG (ref 26.8–34.3)
MCHC RBC AUTO-ENTMCNC: 31.4 G/DL (ref 31.4–37.4)
MCV RBC AUTO: 93 FL (ref 82–98)
PLATELET # BLD AUTO: 267 THOUSANDS/UL (ref 149–390)
PMV BLD AUTO: 10.4 FL (ref 8.9–12.7)
RBC # BLD AUTO: 4.74 MILLION/UL (ref 3.81–5.12)
WBC # BLD AUTO: 6.83 THOUSAND/UL (ref 4.31–10.16)

## 2022-03-26 PROCEDURE — 36415 COLL VENOUS BLD VENIPUNCTURE: CPT

## 2022-04-05 ENCOUNTER — OFFICE VISIT (OUTPATIENT)
Dept: PAIN MEDICINE | Facility: CLINIC | Age: 62
End: 2022-04-05
Payer: MEDICARE

## 2022-04-05 VITALS
BODY MASS INDEX: 34.93 KG/M2 | HEIGHT: 67 IN | SYSTOLIC BLOOD PRESSURE: 138 MMHG | DIASTOLIC BLOOD PRESSURE: 83 MMHG | HEART RATE: 80 BPM

## 2022-04-05 DIAGNOSIS — M48.061 SPINAL STENOSIS OF LUMBAR REGION, UNSPECIFIED WHETHER NEUROGENIC CLAUDICATION PRESENT: ICD-10-CM

## 2022-04-05 DIAGNOSIS — M51.36 DDD (DEGENERATIVE DISC DISEASE), LUMBAR: ICD-10-CM

## 2022-04-05 DIAGNOSIS — G89.4 CHRONIC PAIN SYNDROME: ICD-10-CM

## 2022-04-05 DIAGNOSIS — M54.16 LUMBAR RADICULOPATHY: Primary | ICD-10-CM

## 2022-04-05 DIAGNOSIS — M79.602 LEFT ARM PAIN: ICD-10-CM

## 2022-04-05 PROCEDURE — 99214 OFFICE O/P EST MOD 30 MIN: CPT | Performed by: NURSE PRACTITIONER

## 2022-04-05 RX ORDER — PREGABALIN 50 MG/1
CAPSULE ORAL
Qty: 90 CAPSULE | Refills: 1 | Status: SHIPPED | OUTPATIENT
Start: 2022-04-05 | End: 2022-05-17

## 2022-04-05 NOTE — PATIENT INSTRUCTIONS
Gabapentin 600mg tablet:  Take 1 tablet in the morning and 1 5 tablets at bedtime x 3 days  Then decrease to 1 tablet twice a day x 3 days   Then decrease to 0 5 tablet in the morning and 1 tablet at bedtime x 3 days  At this time, stop gabapentin and start pregabalin 50mg in the morning and 100mg at bedtime and stay on this dose until next office visit

## 2022-04-05 NOTE — PROGRESS NOTES
Assessment:  1  Lumbar radiculopathy    2  Left arm pain    3  DDD (degenerative disc disease), lumbar    4  Spinal stenosis of lumbar region, unspecified whether neurogenic claudication present    5  Chronic pain syndrome        Plan:  1  I will discontinue gabapentin and trial pregabalin 50 mg in the morning and 100 mg at bedtime for neuropathic complaints  Patient was given instructions on how to wean off of gabapentin appropriately during today's office visit  I discussed with the patient the type of medication it is, how it works, and that it requires a titration process that is specific to each individual  I reviewed with the patient that it may take 3-4 weeks for the medication's effects to be noticed and that it should never be abruptly stopped  Possible side effects include but are not limited to; vertigo, lethargy, nausea, and edema of the extremities  Advised the patient to call our office if they experience any side effects  The patient verbalized an understanding    2  I will order an updated MRI of the lumbar spine  3  Will order an EMG of the left upper extremities the patient continues with pain that has not been responding to MUSA x2   4  Patient may continue Tylenol p r n  should not exceed more than 3000 mg in 24 hours   5  Patient will follow-up in 4-6 weeks or sooner if needed    M*Modal software was used to dictate this note  It may contain errors with dictating incorrect words or incorrect spelling  Please contact the provider directly with any questions  History of Present Illness: The patient is a 59 y o  female with a history of fibromyalgia last seen on 2/8/22 who presents for a follow up office visit in regards to chronic low back pain that radiates into the lower extremities and neck pain that radiates into the left upper extremity secondary to chronic pain syndrome  The patient denies bowel or bladder incontinence or saddle anesthesia    The patient has now had cervical epidural steroid injection x2 without relief  She is also status post bilateral L4 TFESI on February 23, 2022 which provided relief for about a week or 2  She is taking gabapentin 600 mg 3 times a day as prescribed by podiatry without relief of her symptoms  She has not found relief with NSAIDs in the past    Patient rates her pain as 7/10 on the numeric pain rating scale  She consult as pain throughout the day which is described as burning, dull aching, throbbing, shooting, numbness and pins and needles  I have personally reviewed and/or updated the patient's past medical history, past surgical history, family history, social history, current medications, allergies, and vital signs today  Review of Systems:    Review of Systems   Respiratory: Negative for shortness of breath  Cardiovascular: Negative for chest pain  Gastrointestinal: Negative for constipation, diarrhea, nausea and vomiting  Musculoskeletal: Negative for arthralgias, gait problem, joint swelling and myalgias  Skin: Negative for rash  Neurological: Negative for dizziness, seizures and weakness  All other systems reviewed and are negative          Past Medical History:   Diagnosis Date    Anxiety     Arthritis     Last assessed 5/3/2011    Ortega's esophagus     [facility]     ovarian cancer at age 28 yo- REMOVAL  B/L    Colon polyp     DDD (degenerative disc disease), lumbar     Depression     Fall     5/2020 right knee meniscus tear- OR repair today 8/3/2020    Fibromyalgia     Fibromyalgia, primary     Fracture of one or more phalanges of foot     GERD (gastroesophageal reflux disease)     H/O bladder infections     chronic    Hypertension     Kidney infection     occasional    Migraines     Ovarian cancer ([facility] ) 1987    age 27    Polyneuropathy     Last assessed 6/23/2016 knees to feet    PONV (postoperative nausea and vomiting)     Patient requests to be pre-medicated prior to surgery prior to surgery  PONV (postoperative nausea and vomiting) 8/3/2020    Renal disorder     Spinal stenosis     Vertigo     Wears glasses     reading       Past Surgical History:   Procedure Laterality Date    ABDOMINAL SURGERY  1986    several    BACK SURGERY  1990    CATARACT EXTRACTION, BILATERAL      COLONOSCOPY      FOOT FRACTURE SURGERY Bilateral 2004    x 5  surgeries    KIDNEY SURGERY  1974    at age 13 yo    KNEE SURGERY Right     UT KNEE SCOPE,MED/LAT MENISECTOMY Right 8/3/2020    Procedure: KNEE ARTHROSCOPY,PARTIAL MEDIAL & LATERAL MENISECTOMIES;  Surgeon: Tavon Johnson MD;  Location: [facility];  Service: Orthopedics    UT LAP,CHOLECYSTECTOMY N/A 11/27/2020    Procedure: LAPAROSCOPIC CHOLECYSTECTOMY;  Surgeon: Oscar Hi MD;  [facility];  Service: General    TOTAL ABDOMINAL HYSTERECTOMY  1987    age 27   Julieann Frankel TOTAL ABDOMINAL HYSTERECTOMY W/ BILATERAL SALPINGOOPHORECTOMY Bilateral 1987    age 27       Family History   Problem Relation Age of Onset    Heart disease Mother     Cancer Mother     Diabetes Mother     Arthritis Mother     Anxiety disorder Mother     Bleeding Disorder Mother     Clotting disorder Mother     Depression Mother     Hyperlipidemia Mother     Irritable bowel syndrome Mother     Hypertension Mother     Obesity Mother     Osteoporosis Mother     Vaginal cancer Mother 27    Skin cancer Mother     Thyroid disease Mother     Diabetes Father     Arthritis Father     Hyperlipidemia Father     Vesicoureteral reflux Father     Heart disease Father     Hypertension Father     Migraines Father     Obesity Father     Hypertension Family     Arthritis Family     Arthritis Brother     Anxiety disorder Brother     Diabetes Brother     Hyperlipidemia Brother     Vesicoureteral reflux Brother     Heart disease Brother     Irritable bowel syndrome Brother     Hypertension Brother     Migraines Brother     Thyroid disease Brother     Pancreatic cancer Brother 54    Migraines Daughter     Asthma Son     Migraines Son     Diabetes Maternal Grandmother     Vaginal cancer Maternal Grandmother 48    Infertility Paternal Grandmother     Arthritis Maternal Aunt     Anxiety disorder Maternal Aunt     Depression Maternal Aunt     Diabetes Maternal Aunt     Vaginal cancer Maternal Aunt 48    Fibroids Paternal Aunt     No Known Problems Maternal Grandfather     No Known Problems Paternal Grandfather     No Known Problems Maternal Aunt     No Known Problems Maternal Aunt     No Known Problems Maternal Aunt        Social History     Occupational History    Occupation: CNA   Tobacco Use    Smoking status: Never Smoker    Smokeless tobacco: Never Used   Vaping Use    Vaping Use: Never used   Substance and Sexual Activity    Alcohol use: Not Currently    Drug use: No    Sexual activity: Not Currently         Current Outpatient Medications:     betamethasone, augmented, (DIPROLENE) 0 05 % gel, Apply topically 2 (two) times a day (Patient not taking: Reported on 3/22/2022 ), Disp: 30 g, Rfl: 0    calcium-vitamin D 250-100 MG-UNIT per tablet, Take 1 tablet by mouth 2 (two) times a day (Patient not taking: Reported on 3/22/2022 ), Disp: , Rfl:     COLLAGEN PO, Take by mouth (Patient not taking: Reported on 3/22/2022 ), Disp: , Rfl:     cyanocobalamin (VITAMIN B-12) 100 mcg tablet, Take by mouth daily (Patient not taking: Reported on 3/22/2022 ), Disp: , Rfl:     hydrOXYzine HCL (ATARAX) 50 mg tablet, , Disp: , Rfl:     meclizine (ANTIVERT) 12 5 MG tablet, Take 1 tablet (12 5 mg total) by mouth every 8 (eight) hours as needed for dizziness or nausea, Disp: 30 tablet, Rfl: 0    meloxicam (MOBIC) 7 5 mg tablet, Take 1 tablet (7 5 mg total) by mouth 2 (two) times a day as needed for moderate pain (Patient not taking: Reported on 8/12/2021), Disp: 60 tablet, Rfl: 1    methenamine hippurate (HIPREX) 1 g tablet, 3 g 2 (two) times a day with meals  (Patient not taking: Reported on 1/16/2022 ), Disp: , Rfl:     methocarbamol (ROBAXIN) 500 mg tablet, Take 1 tablet (500 mg total) by mouth 2 (two) times a day, Disp: 20 tablet, Rfl: 0    Multiple Vitamins-Minerals (ZINC PO), Take by mouth  , Disp: , Rfl:     omeprazole (PriLOSEC) 40 MG capsule, Take 40 mg by mouth daily , Disp: , Rfl:     ondansetron (ZOFRAN-ODT) 4 mg disintegrating tablet, Take 1 tablet (4 mg total) by mouth every 6 (six) hours as needed for nausea or vomiting, Disp: 20 tablet, Rfl: 0    PARoxetine (PAXIL) 10 mg tablet, Take by mouth  , Disp: , Rfl:     PARoxetine (PAXIL) 20 mg tablet, Take 20 mg by mouth every morning, Disp: , Rfl:     PARoxetine (PAXIL) 40 MG tablet, Take by mouth, Disp: , Rfl:     pregabalin (LYRICA) 50 mg capsule, Take 1 PO AM and 2 PO HS, Disp: 90 capsule, Rfl: 1    SUMAtriptan (IMITREX) 50 mg tablet, Take 1 tablet (50 mg total) by mouth once as needed for migraine for up to 1 dose May repeat in 2 hours  No more than 200 mg per day , Disp: 10 tablet, Rfl: 2    Allergies   Allergen Reactions    Morphine      Acts not like herself and feels agitated and restless    Penicillins Hives     Tongue swells       Physical Exam:    /83   Pulse 80   Ht 5' 7" (1 702 m)   LMP  (LMP Unknown)   BMI 34 93 kg/m²     Constitutional:normal, well developed, well nourished, alert, in no distress and non-toxic and no overt pain behavior  Eyes:anicteric  HEENT:grossly intact  Neck:supple, symmetric, trachea midline and no masses   Pulmonary:even and unlabored  Cardiovascular:No edema or pitting edema present  Skin:Normal without rashes or lesions and well hydrated  Psychiatric:Mood and affect appropriate  Neurologic:Cranial Nerves II-XII grossly intact  Musculoskeletal:normal gait      Imaging  MRI lumbar spine without contrast    (Results Pending)   MRI CERVICAL SPINE WITHOUT CONTRAST   INDICATION: Neck and arm pain with numbness in her left hand     COMPARISON: MRI of the cervical spine from July 3, 2009  TECHNIQUE: Sagittal T1, sagittal T2, sagittal inversion recovery, axial T2, axial 2D merge   Imaging performed on 1 5T MRI   IMAGE QUALITY: Mild motion degradation  FINDINGS:   ALIGNMENT: Straightening of the cervical spine  No subluxation  MARROW SIGNAL: Normal marrow signal is identified within the visualized bony structures  No discrete marrow lesion  CERVICAL AND VISUALIZED THORACIC CORD: Normal signal within the visualized cord  PREVERTEBRAL AND PARASPINAL SOFT TISSUES: Retropharyngeal course of the cervical carotids, more so on the right than the left  No prevertebral or paravertebral soft tissue edema  VISUALIZED POSTERIOR FOSSA: The visualized posterior fossa demonstrates no abnormal signal  Incidentally noted prominent arachnoid granulation in the region of the inion  CERVICAL DISC SPACES: Posterior disc space narrowing at C5-C6 and C6-C7  C2-C3: Unremarkable  C3-C4: Unremarkable  C4-C5: Normal    C5-C6: Posterior disc space narrowing, bilateral uncovertebral hypertrophy and posterior disc osteophyte complex  No spinal canal or foraminal stenosis  C6-C7: Posterior disc osteophyte complex with mild spinal stenosis  Bilateral uncovertebral hypertrophy  Moderate right and mild left foraminal stenosis  C7-T1: Normal    UPPER THORACIC DISC SPACES: Normal    IMPRESSION:   Motion degraded examination  No gross cord signal abnormality  Redemonstrated degenerative discogenic disease, uncovertebral hypertrophy at C5-C6 and C6-C7 with mild C6-7 spinal canal stenosis  CT LUMBAR SPINE   INDICATION: Severe low back pain  COMPARISON: CT lumbar spine study from January 31, 2017  TECHNIQUE: Axial CT examination of the lumbar spine was obtained utilizing reconstructed images from CT of the chest, abdomen and pelvis performed the same day  Images were reformatted in the sagittal and coronal planes     This examination, like all CT scans performed in the [facility], was performed utilizing techniques to minimize radiation dose exposure, including the use of iterative reconstruction and automated exposure control  FINDINGS:   ALIGNMENT: Mild dextroscoliosis of the lumbar spine without lateral subluxation, spondylolisthesis or spondylolysis  VERTEBRAL BODIES: No fracture  No acute osseous abnormality  DEGENERATIVE CHANGES: Disc space narrowing and endplate sclerosis at V9-N9  Previously noted degenerative intervertebral discal gas at the L4-5 level has resolved  There is mild L4-5 disc space narrowing  Mild disc bulging at L1-L2 and L2-L3 with mild spinal stenosis  L3-L4: Left foraminal protrusion at L3-L4 with moderate left foraminal stenosis, correlate for left L3 radiculopathy  There is bilateral facet arthropathy, more pronounced on the right than the left which is not significantly changed when compared to   the prior study  L4-L5: Disc bulge, eccentric to the left with mild bilateral facet arthropathy  There is mild to moderate spinal stenosis and mild left foraminal encroachment  There is mild left greater than right subarticular recess stenosis, correlate for left L5   radiculopathy  L5-S1: Disc space narrowing, endplate osteophytosis and mild disc bulge without significant spinal canal stenosis  Mild bilateral inferior foraminal encroachment  PREVERTEBRAL AND PARASPINAL SOFT TISSUES: Atrophic appearing right kidney with cortical scarring  IMPRESSION:   No fracture or traumatic subluxation  Degenerative discogenic disease with disc space narrowing and endplate sclerosis with osteophytosis at L5-S1  Lower lumbar degenerative discogenic disease and facet arthropathy as described above  Left foraminal protrusion, correlate for left L3 radiculopathy  Additional discogenic disease resulting in left greater than right subarticular recess stenosis,   correlate for left L5 radiculopathy      Orders Placed This Encounter   Procedures    MRI lumbar spine without contrast    EMG 1 Limb

## 2022-04-06 ENCOUNTER — TELEPHONE (OUTPATIENT)
Dept: PAIN MEDICINE | Facility: CLINIC | Age: 62
End: 2022-04-06

## 2022-04-12 ENCOUNTER — OFFICE VISIT (OUTPATIENT)
Dept: FAMILY MEDICINE CLINIC | Facility: CLINIC | Age: 62
End: 2022-04-12
Payer: MEDICARE

## 2022-04-12 VITALS
HEART RATE: 83 BPM | DIASTOLIC BLOOD PRESSURE: 82 MMHG | OXYGEN SATURATION: 95 % | SYSTOLIC BLOOD PRESSURE: 122 MMHG | TEMPERATURE: 97.6 F

## 2022-04-12 DIAGNOSIS — Z12.31 SCREENING MAMMOGRAM FOR BREAST CANCER: Primary | ICD-10-CM

## 2022-04-12 DIAGNOSIS — E66.3 OVER WEIGHT: ICD-10-CM

## 2022-04-12 DIAGNOSIS — F41.9 ANXIETY: ICD-10-CM

## 2022-04-12 DIAGNOSIS — G89.29 CHRONIC BILATERAL LOW BACK PAIN, UNSPECIFIED WHETHER SCIATICA PRESENT: ICD-10-CM

## 2022-04-12 DIAGNOSIS — M54.50 CHRONIC BILATERAL LOW BACK PAIN, UNSPECIFIED WHETHER SCIATICA PRESENT: ICD-10-CM

## 2022-04-12 DIAGNOSIS — Z12.4 SCREENING FOR CERVICAL CANCER: ICD-10-CM

## 2022-04-12 PROCEDURE — 99214 OFFICE O/P EST MOD 30 MIN: CPT | Performed by: FAMILY MEDICINE

## 2022-04-12 RX ORDER — GABAPENTIN 800 MG/1
800 TABLET ORAL 3 TIMES DAILY
COMMUNITY
End: 2022-04-27 | Stop reason: SDUPTHER

## 2022-04-12 NOTE — PROGRESS NOTES
Chief Complaint   Patient presents with    Follow-up     labs done 3/25/22    Hypertension       Assessment/Plan:  Discussed diet habits and activity level  Weight loss management referral given  No problem-specific Assessment & Plan notes found for this encounter  Diagnoses and all orders for this visit:    Screening mammogram for breast cancer  -     Mammo screening bilateral w 3d & cad; Future    Screening for cervical cancer  -     Ambulatory Referral to Gynecology; Future    Anxiety    Over weight  -     Ambulatory Referral to Weight Management; Future    Chronic bilateral low back pain, unspecified whether sciatica present    Other orders  -     gabapentin (NEURONTIN) 800 mg tablet; Take 800 mg by mouth 3 (three) times a day TAKEN 1600 MG DAILY          Subjective:      Patient ID: Amairani Liz is a 59 y o  female  Review labs  BP by me: Right arm - 128/80, Left: 122/82  Request weight loss surgery  Not feeling good about self w/r to weight  The following portions of the patient's history were reviewed and updated as appropriate: allergies, current medications, past medical history, past social history, past surgical history and problem list   I have spent 25 minutes with Patient  today in which greater than 50% of this time was spent in counseling/coordination of care regarding Risks and benefits of tx options, Intructions for management, Patient and family education, Importance of tx compliance, Risk factor reductions and Impressions  Review of Systems   Constitutional: Negative  HENT: Negative  Eyes: Negative  Respiratory: Negative  Cardiovascular: Negative  Gastrointestinal: Negative  Genitourinary: Negative  Musculoskeletal: Positive for arthralgias and back pain  Skin: Negative  Neurological: Negative  Psychiatric/Behavioral: Negative            Objective:      /84 (BP Location: Left arm, Patient Position: Sitting, Cuff Size: Large)   Pulse 83   Temp 97 6 °F (36 4 °C) (Tympanic)   LMP  (LMP Unknown)   SpO2 95%     Current Outpatient Medications:     cyanocobalamin (VITAMIN B-12) 100 mcg tablet, Take by mouth daily  , Disp: , Rfl:     gabapentin (NEURONTIN) 800 mg tablet, Take 800 mg by mouth 3 (three) times a day TAKEN 1600 MG DAILY, Disp: , Rfl:     hydrOXYzine HCL (ATARAX) 50 mg tablet, , Disp: , Rfl:     meclizine (ANTIVERT) 12 5 MG tablet, Take 1 tablet (12 5 mg total) by mouth every 8 (eight) hours as needed for dizziness or nausea, Disp: 30 tablet, Rfl: 0    methocarbamol (ROBAXIN) 500 mg tablet, Take 1 tablet (500 mg total) by mouth 2 (two) times a day, Disp: 20 tablet, Rfl: 0    omeprazole (PriLOSEC) 40 MG capsule, Take 40 mg by mouth daily , Disp: , Rfl:     PARoxetine (PAXIL) 20 mg tablet, Take 20 mg by mouth every morning, Disp: , Rfl:     PARoxetine (PAXIL) 40 MG tablet, Take by mouth, Disp: , Rfl:     pregabalin (LYRICA) 50 mg capsule, Take 1 PO AM and 2 PO HS, Disp: 90 capsule, Rfl: 1    SUMAtriptan (IMITREX) 50 mg tablet, Take 1 tablet (50 mg total) by mouth once as needed for migraine for up to 1 dose May repeat in 2 hours    No more than 200 mg per day , Disp: 10 tablet, Rfl: 2    betamethasone, augmented, (DIPROLENE) 0 05 % gel, Apply topically 2 (two) times a day (Patient not taking: Reported on 3/22/2022 ), Disp: 30 g, Rfl: 0    calcium-vitamin D 250-100 MG-UNIT per tablet, Take 1 tablet by mouth 2 (two) times a day (Patient not taking: Reported on 4/12/2022 ), Disp: , Rfl:     COLLAGEN PO, Take by mouth (Patient not taking: Reported on 3/22/2022 ), Disp: , Rfl:     meloxicam (MOBIC) 7 5 mg tablet, Take 1 tablet (7 5 mg total) by mouth 2 (two) times a day as needed for moderate pain (Patient not taking: Reported on 8/12/2021), Disp: 60 tablet, Rfl: 1    methenamine hippurate (HIPREX) 1 g tablet, 3 g 2 (two) times a day with meals  (Patient not taking: Reported on 1/16/2022 ), Disp: , Rfl:     Multiple Vitamins-Minerals (ZINC PO), Take by mouth   (Patient not taking: Reported on 4/12/2022 ), Disp: , Rfl:     ondansetron (ZOFRAN-ODT) 4 mg disintegrating tablet, Take 1 tablet (4 mg total) by mouth every 6 (six) hours as needed for nausea or vomiting (Patient not taking: Reported on 4/12/2022 ), Disp: 20 tablet, Rfl: 0    PARoxetine (PAXIL) 10 mg tablet, Take by mouth   (Patient not taking: Reported on 4/12/2022 ), Disp: , Rfl:    Allergies   Allergen Reactions    Morphine      Acts not like herself and feels agitated and restless    Penicillins Hives     Tongue swells          Physical Exam  Constitutional:       Appearance: She is well-developed  HENT:      Head: Normocephalic and atraumatic  Right Ear: External ear normal       Left Ear: External ear normal       Nose: Nose normal    Eyes:      Conjunctiva/sclera: Conjunctivae normal       Pupils: Pupils are equal, round, and reactive to light  Cardiovascular:      Rate and Rhythm: Normal rate and regular rhythm  Heart sounds: Normal heart sounds  Pulmonary:      Effort: Pulmonary effort is normal       Breath sounds: Normal breath sounds  Abdominal:      General: Bowel sounds are normal       Palpations: Abdomen is soft  Musculoskeletal:      Cervical back: Normal range of motion and neck supple  Skin:     General: Skin is warm and dry  Neurological:      Mental Status: She is alert and oriented to person, place, and time  Deep Tendon Reflexes: Reflexes are normal and symmetric  Psychiatric:         Behavior: Behavior normal          Thought Content:  Thought content normal          Judgment: Judgment normal

## 2022-04-19 ENCOUNTER — HOSPITAL ENCOUNTER (OUTPATIENT)
Dept: RADIOLOGY | Facility: IMAGING CENTER | Age: 62
Discharge: HOME | End: 2022-04-19
Payer: MEDICARE

## 2022-04-19 DIAGNOSIS — M54.16 LUMBAR RADICULOPATHY: ICD-10-CM

## 2022-04-19 PROCEDURE — 72148 MRI LUMBAR SPINE W/O DYE: CPT

## 2022-04-26 ENCOUNTER — TELEPHONE (OUTPATIENT)
Dept: PAIN MEDICINE | Facility: MEDICAL CENTER | Age: 62
End: 2022-04-26

## 2022-04-26 DIAGNOSIS — M54.16 LUMBAR RADICULOPATHY: Primary | ICD-10-CM

## 2022-04-26 NOTE — TELEPHONE ENCOUNTER
Pt called stating that she is getting a lot more panic attacks being on lyrica     Pt can be reached at [phone]

## 2022-04-27 RX ORDER — GABAPENTIN 600 MG/1
600 TABLET ORAL 3 TIMES DAILY
Qty: 90 TABLET | Refills: 1 | Status: SHIPPED | OUTPATIENT
Start: 2022-04-27 | End: 2022-05-17

## 2022-04-27 NOTE — TELEPHONE ENCOUNTER
Pt returned the nurse call  Please be advised thank you    Pt can be reached @ [phone] [facility] if no answer

## 2022-04-27 NOTE — TELEPHONE ENCOUNTER
S/w pt  Pt was switched to Lyrica from gabapentin at [facility] 4/5 d/t neuropathic complaints  Pt has been taking Lyrica 50 mg am and  100 mg at hs x 2 weeks and has noticed that her anxiety attacks have increased to 10x per day and night each day  Pt has not had any Lyrica since 4/26 and would like to resume Gabapentin  Pt was taking Gabapentin 600 mg tid when she was switched  Pt advised will notify María Elena Arce and CB

## 2022-04-27 NOTE — TELEPHONE ENCOUNTER
Pt returning call to office- pt hasnt had any more Lyrica since 4/26 and wants to know should she go back to taking gabapentin-pt is still having some feelings of nervousness- thank you        cb [phone]

## 2022-04-27 NOTE — TELEPHONE ENCOUNTER
Prescription for gabapentin 600 mg t i d  sent to pharmacy on file  Please have the patient start taking gabapentin 600 mg t i d  starting today  She cannot stop gabapentin or pregabalin abruptly as aching cause withdrawal symptoms, including withdrawal seizures    If she starts taking gabapentin today she will not need to be weaned off of pregabalin as they are in the same class of medications

## 2022-05-10 ENCOUNTER — CONSULT (OUTPATIENT)
Dept: BARIATRICS | Facility: CLINIC | Age: 62
End: 2022-05-10
Payer: MEDICARE

## 2022-05-10 VITALS
WEIGHT: 221.5 LBS | BODY MASS INDEX: 34.76 KG/M2 | DIASTOLIC BLOOD PRESSURE: 80 MMHG | SYSTOLIC BLOOD PRESSURE: 124 MMHG | HEART RATE: 82 BPM | HEIGHT: 67 IN | TEMPERATURE: 99.1 F | RESPIRATION RATE: 16 BRPM | OXYGEN SATURATION: 96 %

## 2022-05-10 DIAGNOSIS — E66.3 OVER WEIGHT: ICD-10-CM

## 2022-05-10 DIAGNOSIS — Z12.39 ENCOUNTER FOR SCREENING FOR MALIGNANT NEOPLASM OF BREAST, UNSPECIFIED SCREENING MODALITY: ICD-10-CM

## 2022-05-10 DIAGNOSIS — F33.1 MODERATE EPISODE OF RECURRENT MAJOR DEPRESSIVE DISORDER (HCC): ICD-10-CM

## 2022-05-10 DIAGNOSIS — F41.9 ANXIETY: ICD-10-CM

## 2022-05-10 DIAGNOSIS — E66.9 OBESITY, CLASS I, BMI 30-34.9: Primary | ICD-10-CM

## 2022-05-10 DIAGNOSIS — R19.7 DIARRHEA, UNSPECIFIED TYPE: ICD-10-CM

## 2022-05-10 DIAGNOSIS — Z12.11 SCREENING FOR COLON CANCER: ICD-10-CM

## 2022-05-10 DIAGNOSIS — K21.9 GERD (GASTROESOPHAGEAL REFLUX DISEASE): ICD-10-CM

## 2022-05-10 PROCEDURE — 99204 OFFICE O/P NEW MOD 45 MIN: CPT | Performed by: NURSE PRACTITIONER

## 2022-05-10 NOTE — PROGRESS NOTES
Assessment/Plan:  Kylee was seen today for consult  Diagnoses and all orders for this visit:    Obesity, Class I, BMI 30-34 9  - Discussed options of HealthyCORE-Intensive Lifestyle Intervention Program, Very Low Calorie Diet-VLCD and Conservative Program and the role of weight loss medications  - Explained the importance of making lifestyle changes first before starting any anti-obesity medications  Patient should demonstrate lifestyle changes first before anti-obesity medication can be initiated  - Patient is interested in pursuing Conservative Program  - Initial weight loss goal of 5-10% weight loss for improved health  - Discussed that Neurontin and Paxil can contribute to weight gain  She was advised not to discontinue those medications, but review with prescribing providers  - Weight loss can improve patient's co-morbid conditions and/or prevent weight-related complications  - Avoid sympathomimetics, as this could worsen anxiety and palpitations  - Labs from 3/25/2022 reviewed BMP, hepatic function panel, lipid panel, and TSH  Fasting glucose elevated, chol, trig, and LDL all elevated, which should improve with weight loss  TSH was elevated - patient reports she discussed this with her primary care provider  Otherwise labs within acceptable range  - Check fasting insulin  If that is elevated, will check A1C, as that would not currently be covered by insurance  Goals:  Do not skip meals  Food log (ie ) www myfitnesspal com,sparkpeople  com,loseit com,calorieking  com,etc  baritastic (use skinnytaste  com, dietdoctor  com or smartphone alma Ticketbis for recipes)  No sugary beverages  At least 64oz of water daily  Increase physical activity by 10 minutes daily   Gradually increase physical activity to a goal of 5 days per week for 30 minutes of MODERATE intensity PLUS 2 days per week of FULL BODY resistance training (use smartphone apps Photos I Like, Home Workout, etc )  - Start food logging, weighing and measuring    - Reduce diet soda and gradually increase water intake to goal of a least 64 oz daily  - 1589-5115 calories per day  Sample menu given  - Try to start walking 5-10 minutes most days of the week and increase as tolerated  Anxiety  Moderate episode of recurrent major depressive disorder (HCC)  - Taking Paxil  Continue management with prescribing provider  Screening for colon cancer  Diarrhea, unspecified type  - Referral to GI recommended, but patient declined  Encounter for screening for malignant neoplasm of breast, unspecified screening modality  - Mammogram recommended, patient declined  GERD  - Taking omeprazole  Continue management with prescribing provider  Follow up in approximately 2 months with Non-Surgical Physician/Advanced Practitioner  Subjective:   Chief Complaint   Patient presents with    Consult     MWM goal wt 130 stop bang 2-8       Patient ID: Mary Anne Ruiz  is a 59 y o  female with excess weight/obesity here to pursue weight management  Previous notes and records have been reviewed      Past Medical History:   Diagnosis Date    Anxiety     Arthritis     Last assessed 5/3/2011    Ortega's esophagus     Cancer ([facility] [address] )     ovarian cancer at age 28 yo- REMOVAL  B/L    Colon polyp     DDD (degenerative disc disease), lumbar     Depression     Fall     5/2020 right knee meniscus tear- OR repair today 8/3/2020    Fibromyalgia     Fibromyalgia, primary     Fracture of one or more phalanges of foot     GERD (gastroesophageal reflux disease)     H/O bladder infections     chronic    Hypertension     Kidney infection     occasional    Migraines     Obesity     Ovarian cancer ([facility] [address] ) 1987    age 27    Polyneuropathy     Last assessed 6/23/2016 knees to feet    PONV (postoperative nausea and vomiting)     Patient requests to be pre-medicated prior to surgery prior to surgery    PONV (postoperative nausea and vomiting) 8/3/2020    Renal disorder     Spinal stenosis     Vertigo     Wears glasses     reading     Past Surgical History:   Procedure Laterality Date    ABDOMINAL SURGERY  1986    several    BACK SURGERY  1990    CATARACT EXTRACTION, BILATERAL      CHOLECYSTECTOMY      COLONOSCOPY      FOOT FRACTURE SURGERY Bilateral 2004    x 5  surgeries    KIDNEY SURGERY  1974    at age 13 yo    KNEE SURGERY Right     AR KNEE SCOPE,MED/LAT MENISECTOMY Right 8/3/2020    Procedure: KNEE ARTHROSCOPY,PARTIAL MEDIAL & LATERAL MENISECTOMIES;  Surgeon: Anabelle Hurst MD;  [facility];  Service: Orthopedics    AR LAP,CHOLECYSTECTOMY N/A 11/27/2020    Procedure: Meghann Grapes;  Surgeon: Margarito Abrams MD;  [facility];  Service: General    TOTAL ABDOMINAL HYSTERECTOMY  1987    age 27    TOTAL ABDOMINAL HYSTERECTOMY W/ BILATERAL SALPINGOOPHORECTOMY Bilateral 1987    age 27       HPI:  Wt Readings from Last 21 Encounters:   05/10/22 100 kg (221 lb 8 oz)   04/20/21 101 kg (223 lb)   03/22/21 101 kg (223 lb)   03/12/21 81 6 kg (180 lb)   03/08/21 81 6 kg (180 lb)   02/05/21 81 6 kg (180 lb)   12/23/20 81 6 kg (180 lb)   12/09/20 81 6 kg (180 lb)   11/27/20 81 6 kg (180 lb)   11/10/20 81 6 kg (180 lb)   10/21/20 81 6 kg (180 lb)   09/23/20 81 6 kg (180 lb)   09/14/20 81 6 kg (180 lb)   09/09/20 81 6 kg (180 lb)   08/11/20 81 6 kg (180 lb)   07/28/20 81 6 kg (180 lb)   07/15/20 95 7 kg (211 lb)   02/01/20 96 1 kg (211 lb 13 8 oz)   02/01/20 97 5 kg (215 lb)   11/27/19 97 5 kg (215 lb)     Obesity/Excess Weight:  Severity: Mild  Onset:  After hysterectomy at age 27    Modifiers: Diet and Exercise  Contributing factors: Stress/Emotional Eating  Associated symptoms: decreased self esteem and clothes do not fit    Hydration: 20 oz water, 12 12 oz diet Pepsi,  1 5 12 oz sparkling water 0 calorie   Alcohol: none  Smoking: denies  Exercise: none  Occupation: takes care of the elderly  Sleep: Takes melatonin 7-8 hours nightly  STOP ban/8    Highest weight: current weight  Current weight: 221 5 lbs  Goal weight: 130 lbs, but would be happy with 150 lbs    Colonoscopy: 2 years ago, declined referral  Mammogram: due, but declined    The following portions of the patient's history were reviewed and updated as appropriate: allergies, current medications, past family history, past medical history, past social history, past surgical history, and problem list     Review of Systems   HENT: Positive for sore throat (chronic)  Respiratory: Negative for cough and shortness of breath  Cardiovascular: Positive for chest pain (due to anxiety, previously evaluated) and palpitations (due to anxiety, previously evaluated)  Gastrointestinal: Positive for diarrhea (saw GI in the past ) and nausea (in am)  Negative for constipation and vomiting         + GERD - controlled with medication  Endocrine: Negative for cold intolerance and heat intolerance  Genitourinary: Negative for dysuria  Musculoskeletal: Positive for arthralgias (chronic) and back pain (chronic)  Skin: Negative for rash  Neurological: Positive for headaches (PCP aware)  Psychiatric/Behavioral: Negative for suicidal ideas (or HI)  + depression and anxiety - not controlled - sees psychiatry       Objective:  /80   Pulse 82   Temp 99 1 °F (37 3 °C) (Tympanic)   Resp 16   Ht 5' 7" (1 702 m)   Wt 100 kg (221 lb 8 oz)   LMP  (LMP Unknown)   SpO2 96%   Breastfeeding No   BMI 34 69 kg/m²     Physical Exam  Vitals and nursing note reviewed  Constitutional   General appearance: Abnormal   well developed and obese  Eyes No conjunctival injection  Ears, Nose, Mouth, and Throat Oral mucosa moist    Pulmonary   Respiratory effort: No increased work of breathing or signs of respiratory distress  Cardiovascular     Examination of extremities for edema and/or varicosities: Normal   no edema     Abdomen   Abdomen: Abnormal   The abdomen was obese     Musculoskeletal   Gait and station: Normal     Psychiatric   Orientation to person, place and time: Normal     Affect: appropriate      Labs  Recent labs have been personally reviewed      Lab Results   Component Value Date    SODIUM 138 03/25/2022    K 4 0 03/25/2022     03/25/2022    CO2 26 03/25/2022    AGAP 6 03/25/2022    BUN 13 03/25/2022    CREATININE 1 12 03/25/2022    GLUC 102 10/08/2021    GLUF 108 (H) 03/25/2022    CALCIUM 9 6 03/25/2022    AST 15 03/25/2022    ALT 22 03/25/2022    ALKPHOS 95 03/25/2022    TP 7 8 03/25/2022    TBILI 0 91 03/25/2022    EGFR 52 03/25/2022     Lab Results   Component Value Date    YHD2ZCUAGWHW 3 910 (H) 03/25/2022     Lab Results   Component Value Date    CHOLESTEROL 337 (H) 03/25/2022     Lab Results   Component Value Date    HDL 72 03/25/2022     Lab Results   Component Value Date    TRIG 180 (H) 03/25/2022     Lab Results   Component Value Date    LDLCALC 229 (H) 03/25/2022

## 2022-05-16 DIAGNOSIS — R51.9 NONINTRACTABLE EPISODIC HEADACHE, UNSPECIFIED HEADACHE TYPE: ICD-10-CM

## 2022-05-16 RX ORDER — SUMATRIPTAN 50 MG/1
50 TABLET, FILM COATED ORAL ONCE AS NEEDED
Qty: 10 TABLET | Refills: 2 | Status: SHIPPED | OUTPATIENT
Start: 2022-05-16

## 2022-05-17 ENCOUNTER — OFFICE VISIT (OUTPATIENT)
Dept: PAIN MEDICINE | Facility: CLINIC | Age: 62
End: 2022-05-17
Payer: MEDICARE

## 2022-05-17 VITALS
DIASTOLIC BLOOD PRESSURE: 84 MMHG | SYSTOLIC BLOOD PRESSURE: 121 MMHG | HEART RATE: 88 BPM | BODY MASS INDEX: 34.69 KG/M2 | HEIGHT: 67 IN

## 2022-05-17 DIAGNOSIS — M47.816 LUMBAR SPONDYLOSIS: ICD-10-CM

## 2022-05-17 DIAGNOSIS — M50.30 DDD (DEGENERATIVE DISC DISEASE), CERVICAL: ICD-10-CM

## 2022-05-17 DIAGNOSIS — M48.061 SPINAL STENOSIS OF LUMBAR REGION, UNSPECIFIED WHETHER NEUROGENIC CLAUDICATION PRESENT: ICD-10-CM

## 2022-05-17 DIAGNOSIS — M54.16 LUMBAR RADICULOPATHY: ICD-10-CM

## 2022-05-17 DIAGNOSIS — M54.81 BILATERAL OCCIPITAL NEURALGIA: ICD-10-CM

## 2022-05-17 DIAGNOSIS — M54.12 CERVICAL RADICULOPATHY: ICD-10-CM

## 2022-05-17 DIAGNOSIS — M51.36 DDD (DEGENERATIVE DISC DISEASE), LUMBAR: ICD-10-CM

## 2022-05-17 DIAGNOSIS — G89.4 CHRONIC PAIN SYNDROME: Primary | ICD-10-CM

## 2022-05-17 PROCEDURE — 99214 OFFICE O/P EST MOD 30 MIN: CPT | Performed by: NURSE PRACTITIONER

## 2022-05-17 RX ORDER — GABAPENTIN 800 MG/1
800 TABLET ORAL 3 TIMES DAILY
Qty: 90 TABLET | Refills: 1 | Status: SHIPPED | OUTPATIENT
Start: 2022-05-17

## 2022-05-17 NOTE — PROGRESS NOTES
Assessment:  1  Chronic pain syndrome    2  Lumbar radiculopathy    3  Cervical radiculopathy    4  DDD (degenerative disc disease), lumbar    5  Lumbar spondylosis    6  DDD (degenerative disc disease), cervical    7  Spinal stenosis of lumbar region, unspecified whether neurogenic claudication present    8  Bilateral occipital neuralgia        Plan:  1  I will schedule the patient for bilateral occipital nerve blocks under ultrasound guidance to address the inflammatory component the patient's pain  Complete risks and benefits including bleeding, infection, tissue reaction, nerve injury and allergic reaction were discussed  The patient was agreeable and verbalized an understanding  2  I will increase gabapentin 800 mg 3 times a day for neuropathic complaints  I advised the patient that if they experience any side effects or issues with the changes in their medication regiment, they should give our office a call to discuss  I also advised the patient not to drive or operate machinery until they see how the changes in the medication regimen affects them  The patient was agreeable and verbalized an understanding  3  Patient will move forward with EMG as scheduled  4  Patient may continue Tylenol p r n  should not exceed more than 3000 mg in 24 hours   5  Patient will follow-up after her procedure or sooner if needed    M*[employer] software was used to dictate this note  It may contain errors with dictating incorrect words or incorrect spelling  Please contact the provider directly with any questions  History of Present Illness: The patient is a 59 y o  female with a history of fibromyalgia last seen on 4/5/22 who presents for a follow up office visit in regards to chronic neck pain that radiates into the back of the head and upper extremities, and low back pain that radiates into the bilateral lower extremities  Patient denies bowel or bladder incontinence, balance issues or saddle anesthesia    Patient's main complaint today is pain that starts at the base of her skull and shoots up the back of her head and around her ears  She has not found any relief with cervical epidural steroid injection x2  Patient is also status post bilateral L4 TFESI x2 without any significant improvement of her pain  Updated MRI of the lumbar spine reveals degenerative disc disease from L3-L5 with some mild central stenosis at L4-5 and mild foraminal narrowing at L3-4  Since last office visit, the patient transitioned off of pregabalin and back on the gabapentin stating she had side effects of increased anxiety with pregabalin  She has not found relief with NSAIDs or muscle relaxants in the past     The patient rates her pain an 8/10 on the numeric pain rating scale  Constant throughout the day which is described as burning, dull aching, sharp, throbbing, pressure-like, shooting, numbness and pins and needles    I have personally reviewed and/or updated the patient's past medical history, past surgical history, family history, social history, current medications, allergies, and vital signs today  Review of Systems:    Review of Systems   Respiratory: Negative for shortness of breath  Cardiovascular: Negative for chest pain  Gastrointestinal: Negative for constipation, diarrhea, nausea and vomiting  Musculoskeletal: Negative for arthralgias, gait problem, joint swelling and myalgias  Skin: Negative for rash  Neurological: Negative for dizziness, seizures and weakness  All other systems reviewed and are negative          Past Medical History:   Diagnosis Date    Anxiety     Arthritis     Last assessed 5/3/2011    Ortega's esophagus     Cancer ([facility] [address] )     ovarian cancer at age 26 yo- REMOVAL  B/L    Colon polyp     DDD (degenerative disc disease), lumbar     Depression     Fall     5/2020 right knee meniscus tear- OR repair today 8/3/2020    Fibromyalgia     Fibromyalgia, primary     Fracture of one or more phalanges of foot     GERD (gastroesophageal reflux disease)     H/O bladder infections     chronic    Hypertension     Kidney infection     occasional    Migraines     Obesity     Ovarian cancer [facility]) 1987    age 27    Polyneuropathy     Last assessed 6/23/2016 knees to feet    PONV (postoperative nausea and vomiting)     Patient requests to be pre-medicated prior to surgery prior to surgery    PONV (postoperative nausea and vomiting) 8/3/2020    Renal disorder     Spinal stenosis     Vertigo     Wears glasses     reading       Past Surgical History:   Procedure Laterality Date    ABDOMINAL SURGERY  1986    several    BACK SURGERY  1990    CATARACT EXTRACTION, BILATERAL      CHOLECYSTECTOMY      COLONOSCOPY      FOOT FRACTURE SURGERY Bilateral 2004    x 5  surgeries    KIDNEY SURGERY  1974    at age 13 yo    KNEE SURGERY Right     NH KNEE SCOPE,MED/LAT MENISECTOMY Right 8/3/2020    Procedure: [address];  Surgeon: Bonny Powell MD;  [facility];  Service: Orthopedics    NH LAP,CHOLECYSTECTOMY N/A 11/27/2020    Procedure: LAPAROSCOPIC CHOLECYSTECTOMY;  Surgeon: Ashley Magaña MD;  [facility];  Service: General    TOTAL ABDOMINAL HYSTERECTOMY  1987    age 27    TOTAL ABDOMINAL HYSTERECTOMY W/ BILATERAL SALPINGOOPHORECTOMY Bilateral 1987    age 27       Family History   Problem Relation Age of Onset    Heart disease Mother     Cancer Mother     Diabetes Mother     Arthritis Mother     Anxiety disorder Mother     Bleeding Disorder Mother     Clotting disorder Mother     Depression Mother     Hyperlipidemia Mother     Irritable bowel syndrome Mother     Hypertension Mother     Obesity Mother     Osteoporosis Mother     Vaginal cancer Mother 27    Thyroid disease Mother     Stroke Mother     Diabetes Father     Arthritis Father     Hyperlipidemia Father     Vesicoureteral reflux Father     Heart disease Father  Hypertension Father     Migraines Father     Obesity Father     Hypertension Family     Arthritis Family     Arthritis Brother     Anxiety disorder Brother     Diabetes Brother     Hyperlipidemia Brother     Vesicoureteral reflux Brother     Heart disease Brother     Irritable bowel syndrome Brother     Hypertension Brother     Migraines Brother     Thyroid disease Brother     Pancreatic cancer Brother 54    Migraines Daughter     Asthma Son     Migraines Son     Diabetes Maternal Grandmother     Vaginal cancer Maternal Grandmother 48    Infertility Paternal Grandmother     Arthritis Maternal Aunt     Anxiety disorder Maternal Aunt     Depression Maternal Aunt     Diabetes Maternal Aunt     Vaginal cancer Maternal Aunt 48    Fibroids Paternal Aunt     No Known Problems Maternal Grandfather     No Known Problems Paternal Grandfather     No Known Problems Maternal Aunt     No Known Problems Maternal Aunt     No Known Problems Maternal Aunt        Social History     Occupational History    Occupation: CNA   Tobacco Use    Smoking status: Never Smoker    Smokeless tobacco: Never Used   Vaping Use    Vaping Use: Never used   Substance and Sexual Activity    Alcohol use: Not Currently    Drug use: No    Sexual activity: Not Currently         Current Outpatient Medications:     gabapentin (NEURONTIN) 800 mg tablet, Take 1 tablet (800 mg total) by mouth in the morning and 1 tablet (800 mg total) in the evening and 1 tablet (800 mg total) before bedtime  , Disp: 90 tablet, Rfl: 1    betamethasone, augmented, (DIPROLENE) 0 05 % gel, Apply topically 2 (two) times a day, Disp: 30 g, Rfl: 0    calcium-vitamin D 250-100 MG-UNIT per tablet, Take 1 tablet by mouth 2 (two) times a day  , Disp: , Rfl:     COLLAGEN PO, Take by mouth (Patient not taking: No sig reported), Disp: , Rfl:     cyanocobalamin (VITAMIN B-12) 100 mcg tablet, Take by mouth daily  , Disp: , Rfl:     hydrOXYzine HCL (ATARAX) 50 mg tablet, , Disp: , Rfl:     meclizine (ANTIVERT) 12 5 MG tablet, Take 1 tablet (12 5 mg total) by mouth every 8 (eight) hours as needed for dizziness or nausea (Patient not taking: No sig reported), Disp: 30 tablet, Rfl: 0    meloxicam (MOBIC) 7 5 mg tablet, Take 1 tablet (7 5 mg total) by mouth 2 (two) times a day as needed for moderate pain (Patient not taking: No sig reported), Disp: 60 tablet, Rfl: 1    methenamine hippurate (HIPREX) 1 g tablet, 3 g 2 (two) times a day with meals  (Patient not taking: No sig reported), Disp: , Rfl:     methocarbamol (ROBAXIN) 500 mg tablet, Take 1 tablet (500 mg total) by mouth 2 (two) times a day, Disp: 20 tablet, Rfl: 0    Multiple Vitamins-Minerals (ZINC PO), Take by mouth  , Disp: , Rfl:     omeprazole (PriLOSEC) 40 MG capsule, Take 40 mg by mouth daily , Disp: , Rfl:     ondansetron (ZOFRAN-ODT) 4 mg disintegrating tablet, Take 1 tablet (4 mg total) by mouth every 6 (six) hours as needed for nausea or vomiting, Disp: 20 tablet, Rfl: 0    PARoxetine (PAXIL) 10 mg tablet, Take by mouth  , Disp: , Rfl:     PARoxetine (PAXIL) 20 mg tablet, Take 20 mg by mouth every morning, Disp: , Rfl:     PARoxetine (PAXIL) 40 MG tablet, Take by mouth, Disp: , Rfl:     SUMAtriptan (IMITREX) 50 mg tablet, Take 1 tablet (50 mg total) by mouth once as needed for migraine for up to 1 dose May repeat in 2 hours  No more than 200 mg per day , Disp: 10 tablet, Rfl: 2    Allergies   Allergen Reactions    Morphine      Acts not like herself and feels agitated and restless    Penicillins Hives     Tongue swells       Physical Exam:    /84   Pulse 88   Ht 5' 7" (1 702 m)   LMP  (LMP Unknown)   BMI 34 69 kg/m²     Constitutional:normal, well developed, well nourished, alert, in no distress and non-toxic and no overt pain behavior    Eyes:anicteric  HEENT:grossly intact  Neck:supple, symmetric, trachea midline and no masses   Pulmonary:even and unlabored  Cardiovascular:No edema or pitting edema present  Skin:Normal without rashes or lesions and well hydrated  Psychiatric:Mood and affect appropriate  Neurologic:Cranial Nerves II-XII grossly intact  Musculoskeletal:normal gait  Bilateral cervical paraspinal trapezii musculature tender palpation  Tenderness to palpation over the bilateral occipital grooves  Imaging  No orders to display   MRI LUMBAR SPINE WITHOUT CONTRAST   INDICATION: [id number]: Radiculopathy, lumbar region  COMPARISON: 7/24/2010   TECHNIQUE: Sagittal T1, sagittal T2, sagittal inversion recovery, axial T1 and axial T2, coronal T2    IMAGE QUALITY: Coronal T2, Sagittal T1 and T2-weighted imaging is limited by artifact in the region of the lower thoracic spine  FINDINGS:   VERTEBRAL BODIES: There are 5 lumbar type vertebral bodies  Normal alignment of the lumbar spine  No spondylolysis or spondylolisthesis  No scoliosis  No compression fracture  Type II fatty endplate marrow degenerative change noted at the L5-S1 endplates  SACRUM: Normal signal within the sacrum  No evidence of insufficiency or stress fracture  DISTAL CORD AND CONUS: Normal size and signal within the distal cord and conus  PARASPINAL SOFT TISSUES: The right kidney is atrophic  LOWER THORACIC DISC SPACES: No lower thoracic disc herniation, canal stenosis or foraminal narrowing  LUMBAR DISC SPACES:   L1-L2: Normal    L2-L3: Normal    L3-L4: Minor annular bulging with no focal disc herniation  There is mild facet hypertrophic degenerative change bilaterally encroaching upon the posterior aspect of the neural foramen with mild foraminal narrowing  L4-L5: Loss of disc height  Mild annular bulging  No disc herniation  There is mild canal stenosis  No foraminal nerve impingement  L5-S1: Disc desiccation and loss of disc height  Mild annular bulging with minor endplate hypertrophic change  No disc herniation or canal stenosis  No foraminal nerve impingement  IMPRESSION:   Mild noncompressive lumbar degenerative disc disease and facet degenerative change  No orders of the defined types were placed in this encounter  MRI CERVICAL SPINE WITHOUT CONTRAST   INDICATION: Neck and arm pain with numbness in her left hand  COMPARISON: MRI of the cervical spine from July 3, 2009  TECHNIQUE: Sagittal T1, sagittal T2, sagittal inversion recovery, axial T2, axial 2D merge   Imaging performed on 1 5T MRI   IMAGE QUALITY: Mild motion degradation  FINDINGS:   ALIGNMENT: Straightening of the cervical spine  No subluxation  MARROW SIGNAL: Normal marrow signal is identified within the visualized bony structures  No discrete marrow lesion  CERVICAL AND VISUALIZED THORACIC CORD: Normal signal within the visualized cord  PREVERTEBRAL AND PARASPINAL SOFT TISSUES: Retropharyngeal course of the cervical carotids, more so on the right than the left  No prevertebral or paravertebral soft tissue edema  VISUALIZED POSTERIOR FOSSA: The visualized posterior fossa demonstrates no abnormal signal  Incidentally noted prominent arachnoid granulation in the region of the inion  CERVICAL DISC SPACES: Posterior disc space narrowing at C5-C6 and C6-C7  C2-C3: Unremarkable  C3-C4: Unremarkable  C4-C5: Normal    C5-C6: Posterior disc space narrowing, bilateral uncovertebral hypertrophy and posterior disc osteophyte complex  No spinal canal or foraminal stenosis  C6-C7: Posterior disc osteophyte complex with mild spinal stenosis  Bilateral uncovertebral hypertrophy  Moderate right and mild left foraminal stenosis  C7-T1: Normal    UPPER THORACIC DISC SPACES: Normal    IMPRESSION:   Motion degraded examination  No gross cord signal abnormality  Redemonstrated degenerative discogenic disease, uncovertebral hypertrophy at C5-C6 and C6-C7 with mild C6-7 spinal canal stenosis

## 2022-06-05 ENCOUNTER — OFFICE VISIT (OUTPATIENT)
Dept: URGENT CARE | Age: 62
End: 2022-06-05
Payer: MEDICARE

## 2022-06-05 VITALS
HEART RATE: 80 BPM | WEIGHT: 200 LBS | HEIGHT: 67 IN | OXYGEN SATURATION: 95 % | BODY MASS INDEX: 31.39 KG/M2 | TEMPERATURE: 98.7 F | RESPIRATION RATE: 20 BRPM

## 2022-06-05 DIAGNOSIS — J02.9 SORE THROAT: ICD-10-CM

## 2022-06-05 DIAGNOSIS — R11.2 NAUSEA AND VOMITING, UNSPECIFIED VOMITING TYPE: ICD-10-CM

## 2022-06-05 DIAGNOSIS — R50.9 FEVER, UNSPECIFIED FEVER CAUSE: Primary | ICD-10-CM

## 2022-06-05 LAB — S PYO AG THROAT QL: NEGATIVE

## 2022-06-05 PROCEDURE — 87880 STREP A ASSAY W/OPTIC: CPT

## 2022-06-05 PROCEDURE — G0463 HOSPITAL OUTPT CLINIC VISIT: HCPCS

## 2022-06-05 PROCEDURE — 99213 OFFICE O/P EST LOW 20 MIN: CPT

## 2022-06-05 PROCEDURE — 87070 CULTURE OTHR SPECIMN AEROBIC: CPT

## 2022-06-05 RX ORDER — ONDANSETRON 4 MG/1
4 TABLET, ORALLY DISINTEGRATING ORAL EVERY 6 HOURS PRN
Qty: 20 TABLET | Refills: 0 | Status: SHIPPED | OUTPATIENT
Start: 2022-06-05

## 2022-06-05 NOTE — PROGRESS NOTES
[facility]        NAME: Wilfredo Freeman is a 59 y o  female  : 1960    MRN: [id number]  DATE: 2022  TIME: 1:06 PM    Assessment and Plan   Fever, unspecified fever cause [R50 9]  1  Fever, unspecified fever cause  POCT rapid strepA    Throat culture   2  Sore throat  Throat culture   3  Nausea and vomiting, unspecified vomiting type  ondansetron (ZOFRAN-ODT) 4 mg disintegrating tablet     Patient states a week ago she started with a sore throat, cough  and fever ( tmax 100 7)  Over the past 2 days she has started having nausea and an episode of vomiting  Denies ear pain   Assessment notes clear breath sounds, GI WNL  No enlarged tonsils/exudate, no adenopathy  Patient states she gets sore throats and no one can see  Rapid strep negative- will send culture  Will also order zofran   Continue taking tylenol/motrin and f/u with PCP as needed  Patient Instructions       Follow up with PCP as needed    Chief Complaint     Chief Complaint   Patient presents with    Fever     Fever, chills x 1 week, cough x 2 days, vomiting x 2 days         History of Present Illness       Patient states a week ago she started with a sore throat, cough  and fever ( tmax 100 7)  Over the past 2 days she has started having nausea and an episode of vomiting  Denies ear pain   Review of Systems   Review of Systems   Constitutional: Positive for chills and fever  HENT: Positive for sore throat  Negative for congestion, ear pain, postnasal drip and sinus pain  Eyes: Negative for pain and itching  Respiratory: Positive for cough  Negative for shortness of breath and wheezing  Cardiovascular: Negative for chest pain and palpitations  Gastrointestinal: Positive for nausea and vomiting  Negative for abdominal pain, constipation and diarrhea  Genitourinary: Negative for difficulty urinating and hematuria  Musculoskeletal: Negative for arthralgias and myalgias  Skin: Negative for rash  Neurological: Negative for dizziness, light-headedness and headaches  Psychiatric/Behavioral: Negative for agitation and sleep disturbance  The patient is not nervous/anxious  Current Medications       Current Outpatient Medications:     betamethasone, augmented, (DIPROLENE) 0 05 % gel, Apply topically 2 (two) times a day, Disp: 30 g, Rfl: 0    cyanocobalamin (VITAMIN B-12) 100 mcg tablet, Take by mouth daily  , Disp: , Rfl:     gabapentin (NEURONTIN) 800 mg tablet, Take 1 tablet (800 mg total) by mouth in the morning and 1 tablet (800 mg total) in the evening and 1 tablet (800 mg total) before bedtime  , Disp: 90 tablet, Rfl: 1    hydrOXYzine HCL (ATARAX) 50 mg tablet, , Disp: , Rfl:     Multiple Vitamins-Minerals (ZINC PO), Take by mouth  , Disp: , Rfl:     ondansetron (ZOFRAN-ODT) 4 mg disintegrating tablet, Take 1 tablet (4 mg total) by mouth every 6 (six) hours as needed for nausea or vomiting, Disp: 20 tablet, Rfl: 0    PARoxetine (PAXIL) 20 mg tablet, Take 20 mg by mouth every morning, Disp: , Rfl:     PARoxetine (PAXIL) 40 MG tablet, Take by mouth, Disp: , Rfl:     SUMAtriptan (IMITREX) 50 mg tablet, Take 1 tablet (50 mg total) by mouth once as needed for migraine for up to 1 dose May repeat in 2 hours    No more than 200 mg per day , Disp: 10 tablet, Rfl: 2    calcium-vitamin D 250-100 MG-UNIT per tablet, Take 1 tablet by mouth 2 (two) times a day   (Patient not taking: Reported on 6/5/2022), Disp: , Rfl:     COLLAGEN PO, Take by mouth (Patient not taking: No sig reported), Disp: , Rfl:     meclizine (ANTIVERT) 12 5 MG tablet, Take 1 tablet (12 5 mg total) by mouth every 8 (eight) hours as needed for dizziness or nausea (Patient not taking: No sig reported), Disp: 30 tablet, Rfl: 0    meloxicam (MOBIC) 7 5 mg tablet, Take 1 tablet (7 5 mg total) by mouth 2 (two) times a day as needed for moderate pain (Patient not taking: No sig reported), Disp: 60 tablet, Rfl: 1    methenamine hippurate (HIPREX) 1 g tablet, 3 g 2 (two) times a day with meals  (Patient not taking: No sig reported), Disp: , Rfl:     methocarbamol (ROBAXIN) 500 mg tablet, Take 1 tablet (500 mg total) by mouth 2 (two) times a day (Patient not taking: Reported on 6/5/2022), Disp: 20 tablet, Rfl: 0    omeprazole (PriLOSEC) 40 MG capsule, Take 40 mg by mouth daily  (Patient not taking: Reported on 6/5/2022), Disp: , Rfl:     PARoxetine (PAXIL) 10 mg tablet, Take by mouth   (Patient not taking: Reported on 6/5/2022), Disp: , Rfl:     Current Allergies     Allergies as of 06/05/2022 - Reviewed 06/05/2022   Allergen Reaction Noted    Morphine  12/27/2016    Penicillins Hives 12/27/2016            The following portions of the patient's history were reviewed and updated as appropriate: allergies, current medications, past family history, past medical history, past social history, past surgical history and problem list      Past Medical History:   Diagnosis Date    Anxiety     Arthritis     Last assessed 5/3/2011    Ortega's esophagus     Cancer ([facility] [address] )     ovarian cancer at age 28 yo- REMOVAL  B/L    Colon polyp     DDD (degenerative disc disease), lumbar     Depression     Fall     5/2020 right knee meniscus tear- OR repair today 8/3/2020    Fibromyalgia     Fibromyalgia, primary     Fracture of one or more phalanges of foot     GERD (gastroesophageal reflux disease)     H/O bladder infections     chronic    Hypertension     Kidney infection     occasional    Migraines     Obesity     Ovarian cancer ([facility] [address] ) 26    age 27    Polyneuropathy     Last assessed 6/23/2016 knees to feet    PONV (postoperative nausea and vomiting)     Patient requests to be pre-medicated prior to surgery prior to surgery    PONV (postoperative nausea and vomiting) 8/3/2020    Renal disorder     Spinal stenosis     Vertigo     Wears glasses     reading       Past Surgical History:   Procedure Laterality Date    ABDOMINAL SURGERY 1986    several   [address]  CATARACT EXTRACTION, BILATERAL      CHOLECYSTECTOMY      COLONOSCOPY      FOOT FRACTURE SURGERY Bilateral 2004    x 5  surgeries    KIDNEY SURGERY  1974    at age 13 yo    KNEE SURGERY Right     GA KNEE SCOPE,MED/LAT MENISECTOMY Right 8/3/2020    Procedure: KNEE ARTHROSCOPY,PARTIAL MEDIAL & LATERAL MENISECTOMIES;  Surgeon: Kendall Fletcher MD;  [facility];  Service: Orthopedics    GA LAP,CHOLECYSTECTOMY N/A 11/27/2020    Procedure: LAPAROSCOPIC CHOLECYSTECTOMY;  Surgeon: Maria Teresa Cox MD;  [facility];  Service: General    TOTAL ABDOMINAL HYSTERECTOMY  1987    age 27    TOTAL ABDOMINAL HYSTERECTOMY W/ BILATERAL SALPINGOOPHORECTOMY Bilateral 1987    age 27       Family History   Problem Relation Age of Onset    Heart disease Mother     Cancer Mother     Diabetes Mother     Arthritis Mother     Anxiety disorder Mother     Bleeding Disorder Mother     Clotting disorder Mother     Depression Mother     Hyperlipidemia Mother     Irritable bowel syndrome Mother     Hypertension Mother     Obesity Mother     Osteoporosis Mother     Vaginal cancer Mother 27    Thyroid disease Mother     Stroke Mother     Diabetes Father     Arthritis Father     Hyperlipidemia Father     Vesicoureteral reflux Father     Heart disease Father     Hypertension Father     Migraines Father     Obesity Father     Hypertension Family     Arthritis Family     Arthritis Brother     Anxiety disorder Brother     Diabetes Brother     Hyperlipidemia Brother     Vesicoureteral reflux Brother     Heart disease Brother     Irritable bowel syndrome Brother     Hypertension Brother     Migraines Brother     Thyroid disease Brother     Pancreatic cancer Brother 54    Migraines Daughter     Asthma Son     Migraines Son     Diabetes Maternal Grandmother     Vaginal cancer Maternal Grandmother 48    Infertility Paternal Grandmother     Arthritis Maternal Aunt     Anxiety disorder Maternal Aunt     Depression Maternal Aunt     Diabetes Maternal Aunt     Vaginal cancer Maternal Aunt 48    Fibroids Paternal Aunt     No Known Problems Maternal Grandfather     No Known Problems Paternal Grandfather     No Known Problems Maternal Aunt     No Known Problems Maternal Aunt     No Known Problems Maternal Aunt          Medications have been verified  Objective   Pulse 80   Temp 98 7 °F (37 1 °C)   Resp 20   Ht 5' 7" (1 702 m)   Wt 90 7 kg (200 lb) Comment: stated/refused to be weighed  LMP  (LMP Unknown)   SpO2 95%   BMI 31 32 kg/m²   No LMP recorded (lmp unknown)  Patient has had a hysterectomy  Physical Exam     Physical Exam  Vitals reviewed  Constitutional:       General: She is not in acute distress  Appearance: Normal appearance  HENT:      Head: Normocephalic and atraumatic  Right Ear: Tympanic membrane and ear canal normal       Left Ear: Tympanic membrane and ear canal normal       Nose: No congestion or rhinorrhea  Mouth/Throat:      Mouth: Mucous membranes are moist       Pharynx: No oropharyngeal exudate or posterior oropharyngeal erythema  Eyes:      Extraocular Movements: Extraocular movements intact  Conjunctiva/sclera: Conjunctivae normal       Pupils: Pupils are equal, round, and reactive to light  Cardiovascular:      Rate and Rhythm: Normal rate and regular rhythm  Pulses: Normal pulses  Heart sounds: Normal heart sounds  No murmur heard  Pulmonary:      Effort: Pulmonary effort is normal  No respiratory distress  Breath sounds: Normal breath sounds  Abdominal:      General: Abdomen is flat  Bowel sounds are normal  There is no distension  Palpations: Abdomen is soft  Tenderness: There is no abdominal tenderness  There is no guarding or rebound  Musculoskeletal:         General: No swelling or tenderness  Normal range of motion        Cervical back: Normal range of motion and neck supple  No tenderness  Lymphadenopathy:      Cervical: No cervical adenopathy  Skin:     General: Skin is warm  Neurological:      General: No focal deficit present  Mental Status: She is alert     Psychiatric:         Mood and Affect: Mood normal          Behavior: Behavior normal          Judgment: Judgment normal

## 2022-06-08 LAB — BACTERIA THROAT CULT: NORMAL

## 2022-06-16 ENCOUNTER — PROCEDURE VISIT (OUTPATIENT)
Dept: PAIN MEDICINE | Facility: CLINIC | Age: 62
End: 2022-06-16
Payer: MEDICARE

## 2022-06-16 VITALS
DIASTOLIC BLOOD PRESSURE: 79 MMHG | OXYGEN SATURATION: 95 % | HEART RATE: 85 BPM | HEIGHT: 67 IN | SYSTOLIC BLOOD PRESSURE: 120 MMHG | TEMPERATURE: 98.4 F | BODY MASS INDEX: 31.32 KG/M2

## 2022-06-16 DIAGNOSIS — M54.81 BILATERAL OCCIPITAL NEURALGIA: Primary | ICD-10-CM

## 2022-06-16 PROCEDURE — 64405 NJX AA&/STRD GR OCPL NRV: CPT | Performed by: ANESTHESIOLOGY

## 2022-06-16 RX ORDER — BUPIVACAINE HYDROCHLORIDE 2.5 MG/ML
10 INJECTION, SOLUTION EPIDURAL; INFILTRATION; INTRACAUDAL ONCE
Status: COMPLETED | OUTPATIENT
Start: 2022-06-16 | End: 2022-06-16

## 2022-06-16 RX ORDER — TRIAMCINOLONE ACETONIDE 40 MG/ML
40 INJECTION, SUSPENSION INTRA-ARTICULAR; INTRAMUSCULAR ONCE
Status: COMPLETED | OUTPATIENT
Start: 2022-06-16 | End: 2022-06-16

## 2022-06-16 RX ADMIN — BUPIVACAINE HYDROCHLORIDE 10 ML: 2.5 INJECTION, SOLUTION EPIDURAL; INFILTRATION; INTRACAUDAL at 10:36

## 2022-06-16 RX ADMIN — BUPIVACAINE HYDROCHLORIDE 10 ML: 2.5 INJECTION, SOLUTION EPIDURAL; INFILTRATION; INTRACAUDAL at 10:35

## 2022-06-16 RX ADMIN — TRIAMCINOLONE ACETONIDE 40 MG: 40 INJECTION, SUSPENSION INTRA-ARTICULAR; INTRAMUSCULAR at 10:41

## 2022-06-16 NOTE — PROGRESS NOTES
Nerve block    Date/Time: 6/16/2022 10:31 AM  Performed by: Matthew Burr DO  Authorized by: Matthew Burr DO     Patient location:  [facility] Protocol:  Consent: Verbal consent obtained  Written consent obtained  Risks and benefits: risks, benefits and alternatives were discussed  Consent given by: patient  Time out: Immediately prior to procedure a "time out" was called to verify the correct patient, procedure, equipment, support staff and site/side marked as required  Timeout called at: 6/16/2022 10:31 AM   Patient understanding: patient states understanding of the procedure being performed  Patient consent: the patient's understanding of the procedure matches consent given  Procedure consent: procedure consent matches procedure scheduled  Site marked: the operative site was marked  Required items: required blood products, implants, devices, and special equipment available  Patient identity confirmed: verbally with patient      Indications:     Indications:  Pain relief  Location:     Body area:  Head    Head nerve:  Greater occipital    Nerve type:  Peripheral    Laterality:  Bilateral  Pre-procedure details:     Skin preparation:  2% chlorhexidine    Preparation: Patient was prepped and draped in usual sterile fashion    Procedure details (see MAR for exact dosages): Block needle gauge:  25 G    Indication: Occipital headaches  Preoperative diagnosis: Occipital neuralgia  Postoperative diagnosis: Occipital neuralgia  Procedure:  Bilateral greater occipital nerve block     After discussing the risks, benefits, and alternatives to the procedure, the patient expressed understanding and wished to proceed  A procedural pause was conducted to verify: Correct patient identity, procedure to be performed and as applicable, correct side and site, correct patient position, and availability of implants, special equipment or special requirements  The occiput was sterilely prepped using ChloraPrep   The right occipital artery was palpated, then a geoff was placed 2 cm lateral to the greater occipital protuberance  A 1 5 inch 25 gauge needle was used to inject a total of 2 mL of 0 25% bupivacaine and 20 mg of triamcinolone after negative aspiration of heme, CSF, or other bodily fluids  The needle was then fanned in the direction of the greater occipital nerve  The patient was discharged with no complications      The patient received a total steroid dose of 40 mg of triamcinolone

## 2022-06-23 ENCOUNTER — TELEPHONE (OUTPATIENT)
Dept: PAIN MEDICINE | Facility: CLINIC | Age: 62
End: 2022-06-23

## 2022-07-14 ENCOUNTER — TELEPHONE (OUTPATIENT)
Dept: OTHER | Facility: OTHER | Age: 62
End: 2022-07-14

## 2022-07-15 ENCOUNTER — APPOINTMENT (OUTPATIENT)
Dept: LAB | Facility: IMAGING CENTER | Age: 62
End: 2022-07-15
Payer: MEDICARE

## 2022-07-15 DIAGNOSIS — Z79.899 ENCOUNTER FOR LONG-TERM (CURRENT) USE OF OTHER MEDICATIONS: ICD-10-CM

## 2022-07-15 DIAGNOSIS — F41.1 GENERALIZED ANXIETY DISORDER: ICD-10-CM

## 2022-07-15 LAB
ALBUMIN SERPL BCP-MCNC: 3.8 G/DL (ref 3.5–5)
ALP SERPL-CCNC: 87 U/L (ref 46–116)
ALT SERPL W P-5'-P-CCNC: 22 U/L (ref 12–78)
ANION GAP SERPL CALCULATED.3IONS-SCNC: 7 MMOL/L (ref 4–13)
AST SERPL W P-5'-P-CCNC: 16 U/L (ref 5–45)
BASOPHILS # BLD AUTO: 0.08 THOUSANDS/ΜL (ref 0–0.1)
BASOPHILS NFR BLD AUTO: 1 % (ref 0–1)
BILIRUB SERPL-MCNC: 0.41 MG/DL (ref 0.2–1)
BUN SERPL-MCNC: 26 MG/DL (ref 5–25)
CALCIUM SERPL-MCNC: 9.3 MG/DL (ref 8.3–10.1)
CHLORIDE SERPL-SCNC: 105 MMOL/L (ref 100–108)
CO2 SERPL-SCNC: 27 MMOL/L (ref 21–32)
CREAT SERPL-MCNC: 1.13 MG/DL (ref 0.6–1.3)
EOSINOPHIL # BLD AUTO: 0.32 THOUSAND/ΜL (ref 0–0.61)
EOSINOPHIL NFR BLD AUTO: 4 % (ref 0–6)
ERYTHROCYTE [DISTWIDTH] IN BLOOD BY AUTOMATED COUNT: 14 % (ref 11.6–15.1)
GFR SERPL CREATININE-BSD FRML MDRD: 51 ML/MIN/1.73SQ M
GLUCOSE P FAST SERPL-MCNC: 112 MG/DL (ref 65–99)
HCT VFR BLD AUTO: 44.5 % (ref 34.8–46.1)
HGB BLD-MCNC: 14.3 G/DL (ref 11.5–15.4)
IMM GRANULOCYTES # BLD AUTO: 0.02 THOUSAND/UL (ref 0–0.2)
IMM GRANULOCYTES NFR BLD AUTO: 0 % (ref 0–2)
LYMPHOCYTES # BLD AUTO: 4.31 THOUSANDS/ΜL (ref 0.6–4.47)
LYMPHOCYTES NFR BLD AUTO: 48 % (ref 14–44)
MCH RBC QN AUTO: 29.7 PG (ref 26.8–34.3)
MCHC RBC AUTO-ENTMCNC: 32.1 G/DL (ref 31.4–37.4)
MCV RBC AUTO: 92 FL (ref 82–98)
MONOCYTES # BLD AUTO: 0.69 THOUSAND/ΜL (ref 0.17–1.22)
MONOCYTES NFR BLD AUTO: 8 % (ref 4–12)
NEUTROPHILS # BLD AUTO: 3.52 THOUSANDS/ΜL (ref 1.85–7.62)
NEUTS SEG NFR BLD AUTO: 39 % (ref 43–75)
NRBC BLD AUTO-RTO: 0 /100 WBCS
PLATELET # BLD AUTO: 284 THOUSANDS/UL (ref 149–390)
PMV BLD AUTO: 9.9 FL (ref 8.9–12.7)
POTASSIUM SERPL-SCNC: 4.6 MMOL/L (ref 3.5–5.3)
PROT SERPL-MCNC: 7.7 G/DL (ref 6.4–8.2)
RBC # BLD AUTO: 4.82 MILLION/UL (ref 3.81–5.12)
SODIUM SERPL-SCNC: 139 MMOL/L (ref 136–145)
TSH SERPL DL<=0.05 MIU/L-ACNC: 8.95 UIU/ML (ref 0.45–4.5)
WBC # BLD AUTO: 8.94 THOUSAND/UL (ref 4.31–10.16)

## 2022-07-15 PROCEDURE — 36415 COLL VENOUS BLD VENIPUNCTURE: CPT

## 2022-07-15 PROCEDURE — 80053 COMPREHEN METABOLIC PANEL: CPT

## 2022-07-15 PROCEDURE — 84443 ASSAY THYROID STIM HORMONE: CPT

## 2022-07-15 PROCEDURE — 85025 COMPLETE CBC W/AUTO DIFF WBC: CPT

## 2022-08-18 ENCOUNTER — TELEPHONE (OUTPATIENT)
Dept: FAMILY MEDICINE CLINIC | Facility: CLINIC | Age: 62
End: 2022-08-18

## 2022-08-18 NOTE — TELEPHONE ENCOUNTER
Pt called asking for a referral to a new urologist with in [facility]

## 2022-08-23 ENCOUNTER — HOSPITAL ENCOUNTER (OUTPATIENT)
Dept: ULTRASOUND IMAGING | Facility: CLINIC | Age: 62
Discharge: HOME | End: 2022-08-23
Payer: MEDICARE

## 2022-08-23 ENCOUNTER — HOSPITAL ENCOUNTER (OUTPATIENT)
Dept: MAMMOGRAPHY | Facility: CLINIC | Age: 62
Discharge: HOME | End: 2022-08-23
Payer: MEDICARE

## 2022-08-23 VITALS — WEIGHT: 198.41 LBS | HEIGHT: 67 IN | BODY MASS INDEX: 31.14 KG/M2

## 2022-08-23 DIAGNOSIS — N39.0 FREQUENT UTI: Primary | ICD-10-CM

## 2022-08-23 DIAGNOSIS — Z12.31 ENCOUNTER FOR SCREENING MAMMOGRAM FOR MALIGNANT NEOPLASM OF BREAST: ICD-10-CM

## 2022-08-23 DIAGNOSIS — N64.4 BREAST PAIN: ICD-10-CM

## 2022-08-23 PROCEDURE — G0279 TOMOSYNTHESIS, MAMMO: HCPCS

## 2022-08-23 PROCEDURE — 77066 DX MAMMO INCL CAD BI: CPT

## 2022-08-23 PROCEDURE — 76642 ULTRASOUND BREAST LIMITED: CPT

## 2022-08-24 ENCOUNTER — TELEPHONE (OUTPATIENT)
Dept: FAMILY MEDICINE CLINIC | Facility: CLINIC | Age: 62
End: 2022-08-24

## 2022-08-24 DIAGNOSIS — E03.9 HYPOTHYROIDISM, UNSPECIFIED TYPE: Primary | ICD-10-CM

## 2022-08-24 RX ORDER — LEVOTHYROXINE SODIUM 0.05 MG/1
50 TABLET ORAL
Qty: 90 TABLET | Refills: 1 | Status: SHIPPED | OUTPATIENT
Start: 2022-08-24 | End: 2023-03-02 | Stop reason: SDUPTHER

## 2022-08-24 NOTE — TELEPHONE ENCOUNTER
Called pt gave BW results and medication instructions pt verbalized understanding and will call back if needed

## 2022-08-24 NOTE — TELEPHONE ENCOUNTER
Thyroid little low or TSH is a little high  Start Levothyroxine 50 mcg, one po am on an empty stomach for at least 1/2 hour  Repeat the TSH ion 4 months

## 2022-08-26 ENCOUNTER — TELEPHONE (OUTPATIENT)
Dept: PODIATRY | Facility: CLINIC | Age: 62
End: 2022-08-26

## 2022-08-26 DIAGNOSIS — G62.9 PERIPHERAL NERVE DISORDER: Primary | ICD-10-CM

## 2022-08-26 RX ORDER — GABAPENTIN 800 MG/1
800 TABLET ORAL 3 TIMES DAILY
Qty: 90 TABLET | Refills: 0 | Status: SHIPPED | OUTPATIENT
Start: 2022-08-26 | End: 2022-10-17 | Stop reason: ALTCHOICE

## 2022-08-26 NOTE — TELEPHONE ENCOUNTER
gabapentin (NEURONTIN) 800 mg tablet  Can  We please phone in a refill the above prescription for this patient? Thank you

## 2022-08-29 ENCOUNTER — TELEPHONE (OUTPATIENT)
Dept: PODIATRY | Facility: CLINIC | Age: 62
End: 2022-08-29

## 2022-09-21 NOTE — TELEPHONE ENCOUNTER
Pt called stated that she is still having worsening abdominal pain, she has stopped vomiting since yesterday and stopped having diarrhea, she is starting to eat and is able to hold food down  Pt is still taking tylenol but has no relief at this time  Pt stated that she is having left leg pain and back pain, pt denies injury, redness,swelling, tenderness or heat to the touch 
No

## 2022-10-04 ENCOUNTER — OFFICE VISIT (OUTPATIENT)
Dept: FAMILY MEDICINE CLINIC | Facility: CLINIC | Age: 62
End: 2022-10-04
Payer: MEDICARE

## 2022-10-04 ENCOUNTER — OFFICE VISIT (OUTPATIENT)
Dept: PODIATRY | Facility: CLINIC | Age: 62
End: 2022-10-04
Payer: MEDICARE

## 2022-10-04 VITALS
DIASTOLIC BLOOD PRESSURE: 87 MMHG | SYSTOLIC BLOOD PRESSURE: 142 MMHG | BODY MASS INDEX: 31.08 KG/M2 | HEIGHT: 67 IN | HEART RATE: 96 BPM

## 2022-10-04 VITALS
HEIGHT: 67 IN | SYSTOLIC BLOOD PRESSURE: 118 MMHG | OXYGEN SATURATION: 98 % | BODY MASS INDEX: 31.08 KG/M2 | HEART RATE: 96 BPM | DIASTOLIC BLOOD PRESSURE: 78 MMHG | TEMPERATURE: 97.8 F

## 2022-10-04 DIAGNOSIS — R51.9 NONINTRACTABLE EPISODIC HEADACHE, UNSPECIFIED HEADACHE TYPE: ICD-10-CM

## 2022-10-04 DIAGNOSIS — G62.9 PERIPHERAL NERVE DISORDER: Primary | ICD-10-CM

## 2022-10-04 DIAGNOSIS — R51.9 SCALP PAIN: Primary | ICD-10-CM

## 2022-10-04 PROCEDURE — 99212 OFFICE O/P EST SF 10 MIN: CPT | Performed by: PODIATRIST

## 2022-10-04 PROCEDURE — 99213 OFFICE O/P EST LOW 20 MIN: CPT | Performed by: FAMILY MEDICINE

## 2022-10-04 RX ORDER — GABAPENTIN 600 MG/1
600 TABLET ORAL 3 TIMES DAILY
Qty: 270 TABLET | Refills: 3 | Status: SHIPPED | OUTPATIENT
Start: 2022-10-04 | End: 2023-10-04

## 2022-10-04 RX ORDER — SUMATRIPTAN 50 MG/1
50 TABLET, FILM COATED ORAL ONCE AS NEEDED
Qty: 10 TABLET | Refills: 2 | Status: SHIPPED | OUTPATIENT
Start: 2022-10-04

## 2022-10-04 RX ORDER — DULOXETIN HYDROCHLORIDE 30 MG/1
CAPSULE, DELAYED RELEASE ORAL
COMMUNITY
Start: 2022-09-13

## 2022-10-04 RX ORDER — METHYLPREDNISOLONE 4 MG/1
TABLET ORAL
Qty: 21 EACH | Refills: 1 | Status: SHIPPED | OUTPATIENT
Start: 2022-10-04

## 2022-10-04 NOTE — PROGRESS NOTES
Patient presents for assessment  Patient taking gabapentin 600 mg t i d  For neuropathy  This paresthesia in her feet is secondary to a back disorder  Patient is taking the medication without adverse effect  Ninety day supply with 3 refills was provided  On exam, pedal pulses are within normal limits  Patient unable to discern [place] monofilament on the soles of her feet  Patient will be reassessed in 1 year  Patient knows to refrain from walking barefoot

## 2022-10-04 NOTE — PROGRESS NOTES
Diabetic Foot Exam    Diabetic Foot ExamNameBjohnson Cedric      : 1960      MRN: [id number]  Encounter Provider: Britton Crouch DO  Encounter Date: 10/4/2022   Encounter department: [address]     Chief Complaint   Patient presents with    Hip Pain    Headache     X 3 weeks     BMI Counseling: Body mass index is 31 08 kg/m²  The BMI is above normal  Nutrition recommendations include reducing portion sizes  PHQ-2/9 Depression Screening             1  Scalp pain  -     methylPREDNISolone 4 MG tablet therapy pack; Use as directed on package    2  Nonintractable episodic headache, unspecified headache type  -     SUMAtriptan (IMITREX) 50 mg tablet; Take 1 tablet (50 mg total) by mouth once as needed for migraine for up to 1 dose May repeat in 2 hours  No more than 200 mg per day  Subjective     Banged head on a cupboard corner 2-3 weeks ago  Area still hurts  Review of Systems   Constitutional: Negative  Respiratory: Negative  Musculoskeletal: Negative  Skin: Negative  Scalp pain  Scalp tender in area of hitting head  Neurological: Positive for headaches         Past Medical History:   Diagnosis Date    Anxiety     Arthritis     Last assessed 5/3/2011    Ortega's esophagus     [facility]     ovarian cancer at age 26 yo- REMOVAL  B/L    Colon polyp     DDD (degenerative disc disease), lumbar     Depression     Fall     2020 right knee meniscus tear- OR repair today 8/3/2020    Fibromyalgia     Fibromyalgia, primary     Fracture of one or more phalanges of foot     GERD (gastroesophageal reflux disease)     H/O bladder infections     chronic    Hypertension     Kidney infection     occasional    Migraines     Obesity     [facility]    age 27    Polyneuropathy     Last assessed 2016 knees to feet    PONV (postoperative nausea and vomiting)     Patient requests to be pre-medicated prior to surgery prior to surgery    PONV (postoperative nausea and vomiting) 8/3/2020    Renal disorder     Spinal stenosis     Vertigo     Wears glasses     reading     Past Surgical History:   Procedure Laterality Date    ABDOMINAL SURGERY  1986    several    BACK SURGERY  1990    CATARACT EXTRACTION, BILATERAL      CHOLECYSTECTOMY      COLONOSCOPY      FOOT FRACTURE SURGERY Bilateral 2004    x 5  surgeries    KIDNEY SURGERY  1974    at age 13 yo    KNEE SURGERY Right     DC KNEE SCOPE,MED/LAT MENISECTOMY Right 8/3/2020    Procedure: KNEE ARTHROSCOPY,PARTIAL MEDIAL & LATERAL MENISECTOMIES;  Surgeon: Shama Oshea MD;  [facility];  Service: Orthopedics    DC LAP,CHOLECYSTECTOMY N/A 11/27/2020    Procedure: LAPAROSCOPIC CHOLECYSTECTOMY;  Surgeon: Xi Malhotra MD;  [facility];  Service: General    TOTAL ABDOMINAL HYSTERECTOMY  1987    age 27   Vanessa Nicasio TOTAL ABDOMINAL HYSTERECTOMY W/ BILATERAL SALPINGOOPHORECTOMY Bilateral 1987    age 27     Family History   Problem Relation Age of Onset    Heart disease Mother     Cancer Mother     Diabetes Mother     Arthritis Mother     Anxiety disorder Mother     Bleeding Disorder Mother     Clotting disorder Mother     Depression Mother     Hyperlipidemia Mother     Irritable bowel syndrome Mother     Hypertension Mother     Obesity Mother     Osteoporosis Mother     Vaginal cancer Mother 27    Thyroid disease Mother     Stroke Mother     Diabetes Father     Arthritis Father     Hyperlipidemia Father     Vesicoureteral reflux Father     Heart disease Father     Hypertension Father     Migraines Father     Obesity Father     Hypertension Family     Arthritis Family     Arthritis Brother     Anxiety disorder Brother     Diabetes Brother     Hyperlipidemia Brother     Vesicoureteral reflux Brother     Heart disease Brother     Irritable bowel syndrome Brother     Hypertension Brother     Migraines Brother     Thyroid disease Brother     Pancreatic cancer Brother 54    Migraines Daughter     Asthma Son     Migraines Son     Diabetes Maternal Grandmother     Vaginal cancer Maternal Grandmother 48    Infertility Paternal Grandmother     Arthritis Maternal Aunt     Anxiety disorder Maternal Aunt     Depression Maternal Aunt     Diabetes Maternal Aunt     Vaginal cancer Maternal Aunt 48    Fibroids Paternal Aunt     No Known Problems Maternal Grandfather     No Known Problems Paternal Grandfather     No Known Problems Maternal Aunt     No Known Problems Maternal Aunt     No Known Problems Maternal Aunt      Social History     Socioeconomic History    Marital status: /Civil Union     Spouse name: None    Number of children: None    Years of education: None    Highest education level: None   Occupational History    Occupation: CNA   Tobacco Use    Smoking status: Never Smoker    Smokeless tobacco: Never Used   Vaping Use    Vaping Use: Never used   Substance and Sexual Activity    Alcohol use: Not Currently    Drug use: No    Sexual activity: Not Currently   Other Topics Concern    None   Social History Narrative    None     Social Determinants of Health     Financial Resource Strain: Not on file   Food Insecurity: Not on file   Transportation Needs: Not on file   Physical Activity: Not on file   Stress: Not on file   Social Connections: Not on file   Intimate Partner Violence: Not on file   Housing Stability: Not on file     Current Outpatient Medications on File Prior to Visit   Medication Sig    calcium-vitamin D 250-100 MG-UNIT per tablet Take 1 tablet by mouth 2 (two) times a day    gabapentin (NEURONTIN) 800 mg tablet Take 1 tablet (800 mg total) by mouth in the morning and 1 tablet (800 mg total) in the evening and 1 tablet (800 mg total) before bedtime      gabapentin (NEURONTIN) 800 mg tablet Take 1 tablet (800 mg total) by mouth 3 (three) times a day    hydrOXYzine HCL (ATARAX) 50 mg tablet  levothyroxine (Euthyrox) 50 mcg tablet Take 1 tablet (50 mcg total) by mouth daily in the early morning    methenamine hippurate (HIPREX) 1 g tablet 3 g 2 (two) times a day with meals    Multiple Vitamins-Minerals (ZINC PO) Take by mouth      PARoxetine (PAXIL) 40 MG tablet Take by mouth    betamethasone, augmented, (DIPROLENE) 0 05 % gel Apply topically 2 (two) times a day (Patient not taking: No sig reported)    COLLAGEN PO Take by mouth (Patient not taking: No sig reported)    cyanocobalamin (VITAMIN B-12) 100 mcg tablet Take by mouth daily      DULoxetine (CYMBALTA) 30 mg delayed release capsule     gabapentin (Neurontin) 600 MG tablet Take 1 tablet (600 mg total) by mouth 3 (three) times a day    meclizine (ANTIVERT) 12 5 MG tablet Take 1 tablet (12 5 mg total) by mouth every 8 (eight) hours as needed for dizziness or nausea (Patient not taking: No sig reported)    meloxicam (MOBIC) 7 5 mg tablet Take 1 tablet (7 5 mg total) by mouth 2 (two) times a day as needed for moderate pain (Patient not taking: No sig reported)    methocarbamol (ROBAXIN) 500 mg tablet Take 1 tablet (500 mg total) by mouth 2 (two) times a day    omeprazole (PriLOSEC) 40 MG capsule Take 40 mg by mouth daily    ondansetron (ZOFRAN-ODT) 4 mg disintegrating tablet Take 1 tablet (4 mg total) by mouth every 6 (six) hours as needed for nausea or vomiting (Patient not taking: No sig reported)    PARoxetine (PAXIL) 10 mg tablet Take by mouth   (Patient not taking: No sig reported)    [DISCONTINUED] clobetasol (TEMOVATE) 0 05 % cream Apply two times a day for 2 weeks then twice weekly     Allergies   Allergen Reactions    Morphine      Acts not like herself and feels agitated and restless    Penicillins Hives     Tongue swells     Immunization History   Administered Date(s) Administered    COVID-19 MODERNA VACC 0 5 ML IM 08/05/2021, 09/02/2021, 02/05/2022    Tuberculin Skin Test-PPD Intradermal 12/12/2012, 12/26/2012    Zoster 10/12/2016       Objective     /78 (BP Location: Left arm, Patient Position: Sitting, Cuff Size: Large)   Pulse 96   Temp 97 8 °F (36 6 °C) (Temporal)   Ht 5' 7" (1 702 m)   LMP  (LMP Unknown)   SpO2 98%   BMI 31 08 kg/m²     Physical Exam  Constitutional:       Appearance: Normal appearance  Skin:     Comments: Healing lesion top of head just off to the left  Scalp tender in this area- reproduces pain  Neurological:      General: No focal deficit present  Mental Status: She is alert and oriented to person, place, and time  Psychiatric:         Mood and Affect: Mood normal          Behavior: Behavior normal          Thought Content:  Thought content normal          Judgment: Judgment normal        Giovanny Rashid,

## 2022-10-10 ENCOUNTER — OFFICE VISIT (OUTPATIENT)
Dept: URGENT CARE | Age: 62
End: 2022-10-10
Payer: MEDICARE

## 2022-10-10 ENCOUNTER — TELEPHONE (OUTPATIENT)
Dept: PAIN MEDICINE | Facility: CLINIC | Age: 62
End: 2022-10-10

## 2022-10-10 VITALS
BODY MASS INDEX: 31.39 KG/M2 | HEART RATE: 57 BPM | HEIGHT: 67 IN | OXYGEN SATURATION: 96 % | WEIGHT: 200 LBS | RESPIRATION RATE: 18 BRPM | TEMPERATURE: 97.8 F

## 2022-10-10 DIAGNOSIS — R07.89 FEELING OF CHEST TIGHTNESS: ICD-10-CM

## 2022-10-10 DIAGNOSIS — J02.9 SORE THROAT: Primary | ICD-10-CM

## 2022-10-10 LAB
ATRIAL RATE: 57 BPM
P AXIS: 8 DEGREES
PR INTERVAL: 174 MS
QRS AXIS: 0 DEGREES
QRSD INTERVAL: 88 MS
QT INTERVAL: 446 MS
QTC INTERVAL: 434 MS
SARS-COV-2 AG UPPER RESP QL IA: NEGATIVE
T WAVE AXIS: -6 DEGREES
VALID CONTROL: NORMAL
VENTRICULAR RATE: 57 BPM

## 2022-10-10 PROCEDURE — G0463 HOSPITAL OUTPT CLINIC VISIT: HCPCS | Performed by: PHYSICIAN ASSISTANT

## 2022-10-10 PROCEDURE — 99213 OFFICE O/P EST LOW 20 MIN: CPT | Performed by: PHYSICIAN ASSISTANT

## 2022-10-10 PROCEDURE — 93010 ELECTROCARDIOGRAM REPORT: CPT | Performed by: INTERNAL MEDICINE

## 2022-10-10 PROCEDURE — 93005 ELECTROCARDIOGRAM TRACING: CPT | Performed by: PHYSICIAN ASSISTANT

## 2022-10-10 PROCEDURE — 87811 SARS-COV-2 COVID19 W/OPTIC: CPT | Performed by: PHYSICIAN ASSISTANT

## 2022-10-10 PROCEDURE — 87636 SARSCOV2 & INF A&B AMP PRB: CPT | Performed by: PHYSICIAN ASSISTANT

## 2022-10-10 NOTE — TELEPHONE ENCOUNTER
Caller: patient    Doctor: Aman Reed    Reason for call: requesting injection    Call back#: [phone]

## 2022-10-10 NOTE — LETTER
[facility] [address]  [address] [address]  Dept: [phone]    October 10, 2022    Patient: Shahram Augustin  YOB: 1960    Karmen Nelson was seen and evaluated at our [facility]  Please note if Covid and Flu tests are negative, they may return to work when fever free for 24 hours without the use of a fever reducing agent  If Covid or Flu test is positive, they may return to work on 10/12/2022, as this is 5 days from the onset of symptoms  Upon return, they must then adhere to strict masking for an additional 5 days      Sincerely,    Jose Woodward PA-C

## 2022-10-10 NOTE — PATIENT INSTRUCTIONS
Nearly all of upper respiratory infections in adults are the result of viruses  Antibiotics do not treat viruses and are not recommended or indicated at this time  Take over the counter medications as needed  Recommend Flonase and Sudafed PE for nasal congestion  Neti Pot rinses and saline nasal sprays may also help clear nasal and/or sinus congestion  Recommend Mucinex to assist in the break-up of chest congestion  Also may consider over the counter allergy medications such as Allegra-D and Zyrtec  If symptoms persist for 7+ days, follow-up with primary care provider or return to clinic to discuss appropriateness of antibiotics    If symptoms worsen, shortness of breath develops, you experience severe sinus pain, difficulty swallowing or changes in voice, report to the emergency department

## 2022-10-10 NOTE — PROGRESS NOTES
[facility]        NAME: Steffen Caldera is a 59 y o  female  : 1960    MRN: [id number]  DATE: October 10, 2022  TIME: 6:15 PM    Assessment and Plan   Sore throat [J02 9]  1  Sore throat  Poct Covid 19 Rapid Antigen Test    Covid/Flu-Office Collect   2  Feeling of chest tightness  ECG with rhythm strip   Patient presents with symptoms concerning for possible COVID along with a known exposure  Rapid COVID in clinic is negative  Patient has requested follow-up PCR testing which is performed  Discussed results take 24-48 hours to come back  We discussed symptomatic treatments and recommended quarantine in the interim along with masking  Patient is instructed to follow-up with her primary care doctor in 3-5 days if COVID testing is negative and symptoms persist   Patient presents with symptoms concerning for cardiac disease as well  EKG performed in the clinic demonstrates normal sinus rhythm with a rate of 57 beats per minute  Patient is asymptomatic from this at this time  There are no ST changes and no interval abnormalities  Discussed with patient that due to her family history and current symptoms and female usually have atypical cardiac symptoms I would recommend she be evaluated in the emergency department  We discussed the risk of heart attack and stroke and death if she does not go the patient has declined to go at this time  Patient Instructions   Patient Instructions   Nearly all of upper respiratory infections in adults are the result of viruses  Antibiotics do not treat viruses and are not recommended or indicated at this time  Take over the counter medications as needed  Recommend Flonase and Sudafed PE for nasal congestion  Neti Pot rinses and saline nasal sprays may also help clear nasal and/or sinus congestion  Recommend Mucinex to assist in the break-up of chest congestion  Also may consider over the counter allergy medications such as Allegra-D and Zyrtec     If symptoms persist for 7+ days, follow-up with primary care provider or return to clinic to discuss appropriateness of antibiotics    If symptoms worsen, shortness of breath develops, you experience severe sinus pain, difficulty swallowing or changes in voice, report to the emergency department  Follow up with PCP in 3-5 days  Proceed to  ER if symptoms worsen  Chief Complaint     Chief Complaint   Patient presents with   • Cold Like Symptoms     Fatigue , sore throat, headache started 2 days ago  Pts daughter in-law tested positive          History of Present Illness       59year old female presents with complaints of headache, sore throat, and fatigue for 2 days duration  Patient has chronic fatigue and myalgias but states that she feels worse than she normally does  She notes some tightness in her chest   She also notes intermittent episodes of diaphoresis over the last week  Patient does have a strong family history of cardiac disease with her father having a brain aneurysm and  in his 46s  She states that her mother  the age of 59 of heart attack and her brother  in his 46s of heart attack  Pt denies fever, chills, runny nose,  cough,  shortness of breath, chest pain, nausea, vomiting, diarrhea,  myalgias, and loss of taste and smell  Patient's daughter lot recently tested positive for COVID  Patient reports she has vaccinated for COVID  No other concerns or complaints today  Review of Systems   Review of Systems   Constitutional: Positive for fatigue  Negative for chills and fever  HENT: Positive for congestion, postnasal drip, rhinorrhea and sore throat  Negative for trouble swallowing and voice change  Respiratory: Positive for chest tightness  Negative for cough and shortness of breath  Cardiovascular: Negative for chest pain  Gastrointestinal: Negative for diarrhea, nausea and vomiting  Musculoskeletal: Negative for myalgias     Neurological: Positive for headaches  Current Medications       Current Outpatient Medications:   •  betamethasone, augmented, (DIPROLENE) 0 05 % gel, Apply topically 2 (two) times a day (Patient not taking: No sig reported), Disp: 30 g, Rfl: 0  •  calcium-vitamin D 250-100 MG-UNIT per tablet, Take 1 tablet by mouth 2 (two) times a day, Disp: , Rfl:   •  COLLAGEN PO, Take by mouth (Patient not taking: No sig reported), Disp: , Rfl:   •  cyanocobalamin (VITAMIN B-12) 100 mcg tablet, Take by mouth daily  , Disp: , Rfl:   •  DULoxetine (CYMBALTA) 30 mg delayed release capsule, , Disp: , Rfl:   •  gabapentin (Neurontin) 600 MG tablet, Take 1 tablet (600 mg total) by mouth 3 (three) times a day, Disp: 270 tablet, Rfl: 3  •  gabapentin (NEURONTIN) 800 mg tablet, Take 1 tablet (800 mg total) by mouth in the morning and 1 tablet (800 mg total) in the evening and 1 tablet (800 mg total) before bedtime  , Disp: 90 tablet, Rfl: 1  •  gabapentin (NEURONTIN) 800 mg tablet, Take 1 tablet (800 mg total) by mouth 3 (three) times a day, Disp: 90 tablet, Rfl: 0  •  hydrOXYzine HCL (ATARAX) 50 mg tablet, , Disp: , Rfl:   •  levothyroxine (Euthyrox) 50 mcg tablet, Take 1 tablet (50 mcg total) by mouth daily in the early morning, Disp: 90 tablet, Rfl: 1  •  meclizine (ANTIVERT) 12 5 MG tablet, Take 1 tablet (12 5 mg total) by mouth every 8 (eight) hours as needed for dizziness or nausea (Patient not taking: No sig reported), Disp: 30 tablet, Rfl: 0  •  meloxicam (MOBIC) 7 5 mg tablet, Take 1 tablet (7 5 mg total) by mouth 2 (two) times a day as needed for moderate pain (Patient not taking: No sig reported), Disp: 60 tablet, Rfl: 1  •  methenamine hippurate (HIPREX) 1 g tablet, 3 g 2 (two) times a day with meals, Disp: , Rfl:   •  methocarbamol (ROBAXIN) 500 mg tablet, Take 1 tablet (500 mg total) by mouth 2 (two) times a day, Disp: 20 tablet, Rfl: 0  •  methylPREDNISolone 4 MG tablet therapy pack, Use as directed on package, Disp: 21 each, Rfl: 1  • Multiple Vitamins-Minerals (ZINC PO), Take by mouth  , Disp: , Rfl:   •  omeprazole (PriLOSEC) 40 MG capsule, Take 40 mg by mouth daily, Disp: , Rfl:   •  ondansetron (ZOFRAN-ODT) 4 mg disintegrating tablet, Take 1 tablet (4 mg total) by mouth every 6 (six) hours as needed for nausea or vomiting (Patient not taking: No sig reported), Disp: 20 tablet, Rfl: 0  •  PARoxetine (PAXIL) 10 mg tablet, Take by mouth   (Patient not taking: No sig reported), Disp: , Rfl:   •  PARoxetine (PAXIL) 40 MG tablet, Take by mouth, Disp: , Rfl:   •  SUMAtriptan (IMITREX) 50 mg tablet, Take 1 tablet (50 mg total) by mouth once as needed for migraine for up to 1 dose May repeat in 2 hours    No more than 200 mg per day , Disp: 10 tablet, Rfl: 2    Current Allergies     Allergies as of 10/10/2022 - Reviewed 10/10/2022   Allergen Reaction Noted   • Morphine  12/27/2016   • Penicillins Hives 12/27/2016            The following portions of the patient's history were reviewed and updated as appropriate: allergies, current medications, past family history, past medical history, past social history, past surgical history and problem list      Past Medical History:   Diagnosis Date   • Anxiety    • Arthritis     Last assessed 5/3/2011   • Ortega's esophagus    • Cancer ([facility] [place] [address] )     ovarian cancer at age 26 yo- REMOVAL  B/L   • Colon polyp    • DDD (degenerative disc disease), lumbar    • Depression    • Fall     5/2020 right knee meniscus tear- OR repair today 8/3/2020   • Fibromyalgia    • Fibromyalgia, primary    • Fracture of one or more phalanges of foot    • GERD (gastroesophageal reflux disease)    • H/O bladder infections     chronic   • Hypertension    • Kidney infection     occasional   • Migraines    • Obesity    • Ovarian cancer [facility]) 1987    age 27   • Polyneuropathy     Last assessed 6/23/2016 knees to feet   • PONV (postoperative nausea and vomiting)     Patient requests to be pre-medicated prior to surgery prior to surgery   • PONV (postoperative nausea and vomiting) 8/3/2020   • Renal disorder    • Spinal stenosis    • Vertigo    • Wears glasses     reading       Past Surgical History:   Procedure Laterality Date   • ABDOMINAL SURGERY  1986    several   • BACK SURGERY  1990   • CATARACT EXTRACTION, BILATERAL     • CHOLECYSTECTOMY     • COLONOSCOPY     • FOOT FRACTURE SURGERY Bilateral 2004    x 5  surgeries   • KIDNEY SURGERY  1974    at age 13 yo   • KNEE SURGERY Right    • CA KNEE SCOPE,MED/LAT MENISECTOMY Right 8/3/2020    Procedure: [address];  Surgeon: Leah Rey MD;  Location: [facility];  Service: Orthopedics   • CA LAP,CHOLECYSTECTOMY N/A 11/27/2020    Procedure: [address];  Surgeon: Morgan Kaminski MD;  Location: [facility];  Service: General   • TOTAL ABDOMINAL HYSTERECTOMY  1987    age 27   • TOTAL ABDOMINAL HYSTERECTOMY W/ BILATERAL SALPINGOOPHORECTOMY Bilateral 1987    age 27       Family History   Problem Relation Age of Onset   • Heart disease Mother    • Cancer Mother    • Diabetes Mother    • Arthritis Mother    • Anxiety disorder Mother    • Bleeding Disorder Mother    • Clotting disorder Mother    • Depression Mother    • Hyperlipidemia Mother    • Irritable bowel syndrome Mother    • Hypertension Mother    • Obesity Mother    • Osteoporosis Mother    • Vaginal cancer Mother 27   • Thyroid disease Mother    • Stroke Mother    • Diabetes Father    • Arthritis Father    • Hyperlipidemia Father    • Vesicoureteral reflux Father    • Heart disease Father    • Hypertension Father    • Migraines Father    • Obesity Father    • Hypertension Family    • Arthritis Family    • Arthritis Brother    • Anxiety disorder Brother    • Diabetes Brother    • Hyperlipidemia Brother    • Vesicoureteral reflux Brother    • Heart disease Brother    • Irritable bowel syndrome Brother    • Hypertension Brother    • Migraines Brother    • Thyroid disease Brother    • Pancreatic cancer Brother 54   • Migraines Daughter    • Asthma Son    • Migraines Son    • Diabetes Maternal Grandmother    • Vaginal cancer Maternal Grandmother 48   • Infertility Paternal Grandmother    • Arthritis Maternal Aunt    • Anxiety disorder Maternal Aunt    • Depression Maternal Aunt    • Diabetes Maternal Aunt    • Vaginal cancer Maternal Aunt 50   • Fibroids Paternal Aunt    • No Known Problems Maternal Grandfather    • No Known Problems Paternal Grandfather    • No Known Problems Maternal Aunt    • No Known Problems Maternal Aunt    • No Known Problems Maternal Aunt          Medications have been verified  Objective   Pulse 57   Temp 97 8 °F (36 6 °C)   Resp 18   Ht 5' 7" (1 702 m)   Wt 90 7 kg (200 lb)   LMP  (LMP Unknown)   SpO2 96%   BMI 31 32 kg/m²   No LMP recorded (lmp unknown)  Patient has had a hysterectomy  Physical Exam     Physical Exam  Vitals and nursing note reviewed  Constitutional:       General: She is not in acute distress  Appearance: Normal appearance  She is not ill-appearing or toxic-appearing  HENT:      Head: Normocephalic and atraumatic  Jaw: No trismus  Right Ear: Hearing, tympanic membrane, ear canal and external ear normal  There is no impacted cerumen  No foreign body  Left Ear: Hearing, tympanic membrane, ear canal and external ear normal  There is no impacted cerumen  No foreign body  Nose: No nasal deformity, mucosal edema, congestion or rhinorrhea  Right Nostril: No foreign body, epistaxis or occlusion  Left Nostril: No foreign body, epistaxis or occlusion  Right Turbinates: Not enlarged, swollen or pale  Left Turbinates: Not enlarged, swollen or pale  Mouth/Throat:      Lips: Pink  No lesions  Mouth: Mucous membranes are moist  No injury, oral lesions or angioedema  Dentition: Normal dentition  Tongue: No lesions  Tongue does not deviate from midline  Palate: No mass and lesions  Pharynx: Uvula midline  No pharyngeal swelling, oropharyngeal exudate, posterior oropharyngeal erythema or uvula swelling  Tonsils: No tonsillar exudate or tonsillar abscesses  Eyes:      General: Lids are normal  Gaze aligned appropriately  No allergic shiner  Extraocular Movements: Extraocular movements intact  Cardiovascular:      Rate and Rhythm: Normal rate and regular rhythm  Heart sounds: Normal heart sounds, S1 normal and S2 normal  Heart sounds not distant  No murmur heard  No friction rub  No gallop  Pulmonary:      Effort: Pulmonary effort is normal       Breath sounds: Normal breath sounds  No decreased breath sounds, wheezing, rhonchi or rales  Comments: Patient speaking in full sentences with no increased respiratory effort  No audible wheezing or stridor  Lymphadenopathy:      Cervical: No cervical adenopathy  Skin:     General: Skin is warm and dry  Neurological:      Mental Status: She is alert and oriented to person, place, and time  Coordination: Coordination is intact  Gait: Gait is intact  Psychiatric:         Attention and Perception: Attention and perception normal          Mood and Affect: Mood and affect normal          Speech: Speech normal          Behavior: Behavior is cooperative  Note: Portions of this record may have been created with voice recognition software  Occasional wrong word or "sound a like" substitutions may have occurred due to the inherent limitations of voice recognition software  Please read the chart carefully and recognize, using context, where substitutions have occurred  *

## 2022-10-11 NOTE — TELEPHONE ENCOUNTER
Please schedule the patient for follow-up office visit for re-evaluation  Last bilateral L4 TFESI, the patient initially reported 100% relief, however at follow-up office visit May 17th the patient states that she did not have any significant relief

## 2022-10-11 NOTE — TELEPHONE ENCOUNTER
S/w the patient and she stated the pain has returned in her LB  It starts in the middle of her back and radiates down into her LE's  The left side feels worse than the right side  She is requesting a LB injection  She denies taking anticoagulants but is currently being tested for COVID  She is around people that are positive and she denies s/s  Please advise   thanks

## 2022-10-11 NOTE — TELEPHONE ENCOUNTER
Reviewed chart and she was previously scheduled for Occipital NB in June, [facility]  to CB to review S/S

## 2022-10-11 NOTE — TELEPHONE ENCOUNTER
Caller: Patient     Doctor: Ya You    Reason for call: returning nurses call    Call back#: [phone]

## 2022-10-17 ENCOUNTER — TELEMEDICINE (OUTPATIENT)
Dept: UROLOGY | Facility: AMBULATORY SURGERY CENTER | Age: 62
End: 2022-10-17
Payer: MEDICARE

## 2022-10-17 DIAGNOSIS — N39.0 FREQUENT UTI: ICD-10-CM

## 2022-10-17 PROCEDURE — 99442 PR PHYS/QHP TELEPHONE EVALUATION 11-20 MIN: CPT | Performed by: NURSE PRACTITIONER

## 2022-10-17 RX ORDER — PHENAZOPYRIDINE HYDROCHLORIDE 100 MG/1
100 TABLET, FILM COATED ORAL 3 TIMES DAILY PRN
Qty: 20 TABLET | Refills: 0 | Status: SHIPPED | OUTPATIENT
Start: 2022-10-17

## 2022-10-17 RX ORDER — OXYBUTYNIN CHLORIDE 5 MG/1
5 TABLET ORAL 3 TIMES DAILY PRN
Qty: 30 TABLET | Refills: 0 | Status: SHIPPED | OUTPATIENT
Start: 2022-10-17

## 2022-10-17 NOTE — PROGRESS NOTES
10/17/22    Karmen LANGE [facility]   1960   [id number]     Assessment  1 Frequent UTI  2 Interstitial cystitis     Discussion/Plan  1 Frequent UTI    Urine culture   2 Interstitial cystitis    5 mg oxybutynin PRN   100 mg pyridium PRN    Eliminate diet pepsi and bladder irritants, hydration with water only     Request records  Subjective  HPI   Behzad Hawk is a 59year old female patient who wishes to establish care with our office  She is a former patient of [facility]  She reports a history of interstitial cystitis, atrophic right kidney, frequent UTI  She states she is currently having an acute flare of symptoms  She does not consume water  She only consumes diet pepsi  She does not consume ETOH or smoke  Per history, she has E  Coli UTI     Component      Latest Ref Rng & Units 10/8/2021 3/25/2022 7/15/2022          12:43 PM  9:47 AM  8:26 AM   BUN      5 - 25 mg/dL 11 13 26 (H)   Creatinine      0 60 - 1 30 mg/dL 1 06 1 12 1 13     Review of Systems - History obtained from chart review and the patient  General ROS: negative  Psychological ROS: negative  Respiratory ROS: no cough, shortness of breath, or wheezing  Cardiovascular ROS: no chest pain or dyspnea on exertion  Gastrointestinal ROS: no abdominal pain, change in bowel habits, or black or bloody stools  Genito-Urinary ROS: positive for - dysuria  Musculoskeletal ROS: positive for - pain in back - bilateral  Neurological ROS: no TIA or stroke symptoms  Dermatological ROS: negative     CT ABDOMEN AND PELVIS WITHOUT IV CONTRAST - LOW DOSE RENAL STONE      INDICATION:   Flank pain      COMPARISON:  2/1/2020     TECHNIQUE:  Low dose thin section CT examination of the abdomen and pelvis was performed without intravenous or oral contrast according to a protocol specifically designed to evaluate for urinary tract calculus  Axial, sagittal, and coronal 2D   reformatted images were created from the source data and submitted for interpretation     Evaluation for pathology in the abdomen and pelvis that is unrelated to urinary tract calculi is limited       Radiation dose length product (DLP) for this visit:  1122 25 mGy-cm   This examination, like all CT scans performed in the [facility], was performed utilizing techniques to minimize radiation dose exposure, including the use of   iterative reconstruction and automated exposure control       FINDINGS:     ABDOMEN     LOWER CHEST:  No significant abnormalities identified in the lower chest      LIVER/BILIARY TREE: Visualized portions are unremarkable      GALLBLADDER:  Gallbladder is surgically absent      SPLEEN: Visualized portions are unremarkable      PANCREAS:  Unremarkable      ADRENAL GLANDS:  Unremarkable      KIDNEYS/URETERS: Markedly atrophic right kidney with areas of multifocal parenchymal scarring  No suspicious contour distorting masses on either side  No perinephric collections  No nephrolithiasis or collecting system stones demonstrated  No   hydronephrosis      STOMACH AND BOWEL:  Stomach is unremarkable  No dilated or inflamed loops of small bowel  Few, scattered colonic diverticula  Moderate stool burden  No colonic wall thickening or pericolonic inflammation     APPENDIX:  A normal appendix was visualized      ABDOMINOPELVIC CAVITY:  No ascites or free intraperitoneal air  No lymphadenopathy      VESSELS:  Unremarkable for patient's age      PELVIS     REPRODUCTIVE ORGANS:  Patient is status post hysterectomy  No suspicious adnexal masses      URINARY BLADDER:  Unremarkable  No bladder stones      ABDOMINAL WALL/INGUINAL REGIONS:  Unremarkable      OSSEOUS STRUCTURES:  Sclerosis around the pubic symphysis appears degenerative in nature and unchanged from prior without acute erosions  No acute fracture or destructive osseous lesion      IMPRESSION:     1  No urolithiasis, hydronephrosis, or other acute abdominopelvic findings       2    Severe asymmetric atrophy of the right kidney with multifocal scarring suggesting chronic recurrent vascular or infectious insults  Appearance is unchanged from prior  Madie Santiago     I have spent 15 minutes with Patient  today in which greater than 50% of this time was spent in counseling/coordination of care regarding Intructions for management

## 2022-10-18 ENCOUNTER — APPOINTMENT (OUTPATIENT)
Dept: LAB | Age: 62
End: 2022-10-18
Payer: MEDICARE

## 2022-10-18 ENCOUNTER — TELEPHONE (OUTPATIENT)
Dept: UROLOGY | Facility: HOSPITAL | Age: 62
End: 2022-10-18

## 2022-10-18 DIAGNOSIS — N39.0 FREQUENT UTI: ICD-10-CM

## 2022-10-18 PROCEDURE — 87086 URINE CULTURE/COLONY COUNT: CPT

## 2022-10-18 NOTE — TELEPHONE ENCOUNTER
----- Message from [id number] sent at 10/18/2022  8:00 AM EDT -----  Regarding: records  Please request patient records from Dr Thayer's office  She states he was her previous urologist within network  Patient is a vague historian   Thank you

## 2022-10-19 LAB — BACTERIA UR CULT: NORMAL

## 2022-11-01 ENCOUNTER — OFFICE VISIT (OUTPATIENT)
Dept: PAIN MEDICINE | Facility: CLINIC | Age: 62
End: 2022-11-01

## 2022-11-01 VITALS
SYSTOLIC BLOOD PRESSURE: 134 MMHG | DIASTOLIC BLOOD PRESSURE: 76 MMHG | HEART RATE: 74 BPM | HEIGHT: 67 IN | BODY MASS INDEX: 31.32 KG/M2

## 2022-11-01 DIAGNOSIS — M51.36 DDD (DEGENERATIVE DISC DISEASE), LUMBAR: ICD-10-CM

## 2022-11-01 DIAGNOSIS — M46.1 SACROILIITIS (HCC): ICD-10-CM

## 2022-11-01 DIAGNOSIS — M47.816 LUMBAR SPONDYLOSIS: ICD-10-CM

## 2022-11-01 DIAGNOSIS — M48.061 SPINAL STENOSIS OF LUMBAR REGION, UNSPECIFIED WHETHER NEUROGENIC CLAUDICATION PRESENT: ICD-10-CM

## 2022-11-01 DIAGNOSIS — M50.30 DDD (DEGENERATIVE DISC DISEASE), CERVICAL: ICD-10-CM

## 2022-11-01 DIAGNOSIS — R07.9 CHEST PAIN, UNSPECIFIED: ICD-10-CM

## 2022-11-01 DIAGNOSIS — M54.12 CERVICAL RADICULOPATHY: ICD-10-CM

## 2022-11-01 DIAGNOSIS — G89.4 CHRONIC PAIN SYNDROME: Primary | ICD-10-CM

## 2022-11-01 DIAGNOSIS — M54.16 LUMBAR RADICULOPATHY: ICD-10-CM

## 2022-11-01 RX ORDER — METHOCARBAMOL 500 MG/1
500 TABLET, FILM COATED ORAL EVERY 8 HOURS PRN
Qty: 90 TABLET | Refills: 0 | Status: SHIPPED | OUTPATIENT
Start: 2022-11-01

## 2022-11-01 NOTE — PROGRESS NOTES
Assessment:  1  Chronic pain syndrome    2  Lumbar radiculopathy    3  Cervical radiculopathy    4  DDD (degenerative disc disease), lumbar    5  Lumbar spondylosis    6  DDD (degenerative disc disease), cervical    7  Spinal stenosis of lumbar region, unspecified whether neurogenic claudication present    8  Chest pain, unspecified    9  Sacroiliitis ([facility] [address] )        Plan:  1  Based on patient report and physical exam, the patient's symptomatology does seem to be consistent with sacroiliac mediated pain from sacroiliitis  We will schedule the patient for a bilateral SIJ injection to decrease any inflammatory component of the patient's pain symptoms  Complete risks and benefits including bleeding, infection, tissue reaction, nerve injury and allergic reaction were discussed  The patient was agreeable and verbalized an understanding  2  Patient may continue methocarbamol 500 mg q 8 hours p r n  myofascial pain  This medication was refilled today   3  Patient may continue gabapentin and duloxetine as prescribed  4  Will avoid NSAIDs secondary to history of GI ulcer and CKD  5  Patient was encouraged to complete EMG of the lower extremities  6  Continue with home exercise program  7  Follow up after procedure or sooner if needed    History of Present Illness: The patient is a 59 y o  female with a history of fibromyalgia last seen on 5/17/22 who presents for a follow up office visit in regards to chronic low back pain that radiates into the buttocks and occasionally to the knee  Patient denies bowel or bladder incontinence, or saddle anesthesia  Patient has not found relief in the past with cervical epidural steroid injection x2 and most recently failed bilateral L4 TFESI  Updated MRI of the lumbar spine reveals some mild central stenosis at L3-4 and L4-5  An EMG of the lower extremities has been ordered however not yet scheduled    She has had side effects to pregabalin and is currently taking gabapentin 800 mg 3 times a day, and duloxetine 30 mg daily without much relief of her low back complaints  She has found relief with methocarbamol 500 mg p r n  in the past   She is unable to take NSAIDs secondary to history of GI ulcer and CKD  She rates her pain a 6/10 on the numeric pain rating scale  She constantly has pain throughout the day which is described as burning, dull aching, sharp, shooting, numbness and pins and needles    I have personally reviewed and/or updated the patient's past medical history, past surgical history, family history, social history, current medications, allergies, and vital signs today  Review of Systems:    Review of Systems   Respiratory: Negative for shortness of breath  Cardiovascular: Negative for chest pain  Gastrointestinal: Negative for constipation, diarrhea, nausea and vomiting  Musculoskeletal: Positive for gait problem  Negative for arthralgias, joint swelling and myalgias  Skin: Negative for rash  Neurological: Negative for dizziness, seizures and weakness  All other systems reviewed and are negative          Past Medical History:   Diagnosis Date   • Anxiety    • Arthritis     Last assessed 5/3/2011   • Ortega's esophagus    • Cancer ([facility] [address] )     ovarian cancer at age 28 yo- REMOVAL  B/L   • Colon polyp    • DDD (degenerative disc disease), lumbar    • Depression    • Fall     5/2020 right knee meniscus tear- OR repair today 8/3/2020   • Fibromyalgia    • Fibromyalgia, primary    • Fracture of one or more phalanges of foot    • GERD (gastroesophageal reflux disease)    • H/O bladder infections     chronic   • Hypertension    • Kidney infection     occasional   • Migraines    • Obesity    • Ovarian cancer [facility]) 1987    age 27   • Polyneuropathy     Last assessed 6/23/2016 knees to feet   • PONV (postoperative nausea and vomiting)     Patient requests to be pre-medicated prior to surgery prior to surgery   • PONV (postoperative nausea and vomiting) 8/3/2020   • Renal disorder    • Spinal stenosis    • Vertigo    • Wears glasses     reading       Past Surgical History:   Procedure Laterality Date   • ABDOMINAL SURGERY  1986    several   • BACK SURGERY  1990   • CATARACT EXTRACTION, BILATERAL     • CHOLECYSTECTOMY     • COLONOSCOPY     • FOOT FRACTURE SURGERY Bilateral 2004    x 5  surgeries   • KIDNEY SURGERY  1974    at age 13 yo   • KNEE SURGERY Right    • ND KNEE SCOPE,MED/LAT MENISECTOMY Right 8/3/2020    Procedure: [address];  Surgeon: Lynette Hooper MD;  [facility];  Service: Orthopedics   • ND LAP,CHOLECYSTECTOMY N/A 11/27/2020    Procedure: María Phan;  Surgeon: Marty Love MD;  [facility];  Service: General   • TOTAL ABDOMINAL HYSTERECTOMY  1987    age 27   • TOTAL ABDOMINAL HYSTERECTOMY W/ BILATERAL SALPINGOOPHORECTOMY Bilateral 1987    age 27       Family History   Problem Relation Age of Onset   • Heart disease Mother    • Cancer Mother    • Diabetes Mother    • Arthritis Mother    • Anxiety disorder Mother    • Bleeding Disorder Mother    • Clotting disorder Mother    • Depression Mother    • Hyperlipidemia Mother    • Irritable bowel syndrome Mother    • Hypertension Mother    • Obesity Mother    • Osteoporosis Mother    • Vaginal cancer Mother 27   • Thyroid disease Mother    • Stroke Mother    • Diabetes Father    • Arthritis Father    • Hyperlipidemia Father    • Vesicoureteral reflux Father    • Heart disease Father    • Hypertension Father    • Migraines Father    • Obesity Father    • Hypertension Family    • Arthritis Family    • Arthritis Brother    • Anxiety disorder Brother    • Diabetes Brother    • Hyperlipidemia Brother    • Vesicoureteral reflux Brother    • Heart disease Brother    • Irritable bowel syndrome Brother    • Hypertension Brother    • Migraines Brother    • Thyroid disease Brother    • Pancreatic cancer Brother 54   • Migraines Daughter    • Asthma Son • Migraines Son    • Diabetes Maternal Grandmother    • Vaginal cancer Maternal Grandmother 48   • Infertility Paternal Grandmother    • Arthritis Maternal Aunt    • Anxiety disorder Maternal Aunt    • Depression Maternal Aunt    • Diabetes Maternal Aunt    • Vaginal cancer Maternal Aunt 50   • Fibroids Paternal Aunt    • No Known Problems Maternal Grandfather    • No Known Problems Paternal Grandfather    • No Known Problems Maternal Aunt    • No Known Problems Maternal Aunt    • No Known Problems Maternal Aunt        Social History     Occupational History   • Occupation: CNA   Tobacco Use   • Smoking status: Never Smoker   • Smokeless tobacco: Never Used   Vaping Use   • Vaping Use: Never used   Substance and Sexual Activity   • Alcohol use: Not Currently   • Drug use: No   • Sexual activity: Not Currently         Current Outpatient Medications:   •  DULoxetine (CYMBALTA) 30 mg delayed release capsule, , Disp: , Rfl:   •  gabapentin (Neurontin) 600 MG tablet, Take 1 tablet (600 mg total) by mouth 3 (three) times a day, Disp: 270 tablet, Rfl: 3  •  levothyroxine (Euthyrox) 50 mcg tablet, Take 1 tablet (50 mcg total) by mouth daily in the early morning, Disp: 90 tablet, Rfl: 1  •  methocarbamol (ROBAXIN) 500 mg tablet, Take 1 tablet (500 mg total) by mouth every 8 (eight) hours as needed for muscle spasms, Disp: 90 tablet, Rfl: 0  •  Multiple Vitamins-Minerals (ZINC PO), Take by mouth  , Disp: , Rfl:   •  omeprazole (PriLOSEC) 40 MG capsule, Take 40 mg by mouth daily, Disp: , Rfl:   •  oxybutynin (DITROPAN) 5 mg tablet, Take 1 tablet (5 mg total) by mouth 3 (three) times a day as needed (bladder spasm/pain), Disp: 30 tablet, Rfl: 0  •  phenazopyridine (PYRIDIUM) 100 mg tablet, Take 1 tablet (100 mg total) by mouth 3 (three) times a day as needed for bladder spasms (painful urination), Disp: 20 tablet, Rfl: 0  •  SUMAtriptan (IMITREX) 50 mg tablet, Take 1 tablet (50 mg total) by mouth once as needed for migraine for up to 1 dose May repeat in 2 hours  No more than 200 mg per day , Disp: 10 tablet, Rfl: 2  •  calcium-vitamin D 250-100 MG-UNIT per tablet, Take 1 tablet by mouth 2 (two) times a day, Disp: , Rfl:   •  cyanocobalamin (VITAMIN B-12) 100 mcg tablet, Take by mouth daily  , Disp: , Rfl:   •  hydrOXYzine HCL (ATARAX) 50 mg tablet, , Disp: , Rfl:   •  methenamine hippurate (HIPREX) 1 g tablet, 3 g 2 (two) times a day with meals, Disp: , Rfl:   •  PARoxetine (PAXIL) 40 MG tablet, Take by mouth (Patient not taking: Reported on 11/1/2022), Disp: , Rfl:     Allergies   Allergen Reactions   • Morphine      Acts not like herself and feels agitated and restless   • Penicillins Hives     Tongue swells       Physical Exam:    /76   Pulse 74   Ht 5' 7" (1 702 m)   LMP  (LMP Unknown)   BMI 31 32 kg/m²     Constitutional:normal, well developed, well nourished, alert, in no distress and non-toxic and no overt pain behavior  Eyes:anicteric  HEENT:grossly intact  Neck:supple, symmetric, trachea midline and no masses   Pulmonary:even and unlabored  Cardiovascular:No edema or pitting edema present  Skin:Normal without rashes or lesions and well hydrated  Psychiatric:Mood and affect appropriate  Neurologic:Cranial Nerves II-XII grossly intact  Musculoskeletal:antalgic gait  Bilateral SI joints tender to palpation  Bilateral lower extremity strength 5/5 in all muscle groups  Positive Theo's, Gaenslen's and AP compression test bilaterally  Negative straight leg raise bilaterally      Imaging  FL spine and pain procedure    (Results Pending)       MRI LUMBAR SPINE WITHOUT CONTRAST   INDICATION: M54 16: Radiculopathy, lumbar region  COMPARISON: 7/24/2010   TECHNIQUE: Sagittal T1, sagittal T2, sagittal inversion recovery, axial T1 and axial T2, coronal T2    IMAGE QUALITY: Coronal T2, Sagittal T1 and T2-weighted imaging is limited by artifact in the region of the lower thoracic spine     FINDINGS:   VERTEBRAL BODIES: There are 5 lumbar type vertebral bodies  Normal alignment of the lumbar spine  No spondylolysis or spondylolisthesis  No scoliosis  No compression fracture  Type II fatty endplate marrow degenerative change noted at the L5-S1 endplates  SACRUM: Normal signal within the sacrum  No evidence of insufficiency or stress fracture  DISTAL CORD AND CONUS: Normal size and signal within the distal cord and conus  PARASPINAL SOFT TISSUES: The right kidney is atrophic  LOWER THORACIC DISC SPACES: No lower thoracic disc herniation, canal stenosis or foraminal narrowing  LUMBAR DISC SPACES:   L1-L2: Normal    L2-L3: Normal    L3-L4: Minor annular bulging with no focal disc herniation  There is mild facet hypertrophic degenerative change bilaterally encroaching upon the posterior aspect of the neural foramen with mild foraminal narrowing  L4-L5: Loss of disc height  Mild annular bulging  No disc herniation  There is mild canal stenosis  No foraminal nerve impingement  L5-S1: Disc desiccation and loss of disc height  Mild annular bulging with minor endplate hypertrophic change  No disc herniation or canal stenosis  No foraminal nerve impingement  IMPRESSION:   Mild noncompressive lumbar degenerative disc disease and facet degenerative change      Orders Placed This Encounter   Procedures   • FL spine and pain procedure

## 2022-11-11 ENCOUNTER — OFFICE VISIT (OUTPATIENT)
Dept: FAMILY MEDICINE CLINIC | Facility: CLINIC | Age: 62
End: 2022-11-11

## 2022-11-11 VITALS
TEMPERATURE: 97.9 F | HEART RATE: 126 BPM | DIASTOLIC BLOOD PRESSURE: 77 MMHG | SYSTOLIC BLOOD PRESSURE: 112 MMHG | OXYGEN SATURATION: 97 % | BODY MASS INDEX: 31.32 KG/M2 | HEIGHT: 67 IN

## 2022-11-11 DIAGNOSIS — R00.0 TACHYCARDIA: ICD-10-CM

## 2022-11-11 DIAGNOSIS — D58.8: ICD-10-CM

## 2022-11-11 DIAGNOSIS — E86.0 DEHYDRATION: ICD-10-CM

## 2022-11-11 DIAGNOSIS — R11.2 NAUSEA AND VOMITING, UNSPECIFIED VOMITING TYPE: Primary | ICD-10-CM

## 2022-11-11 DIAGNOSIS — R53.1 WEAKNESS: ICD-10-CM

## 2022-11-11 RX ORDER — ONDANSETRON 4 MG/1
4 TABLET, FILM COATED ORAL EVERY 8 HOURS PRN
Qty: 12 TABLET | Refills: 0 | Status: SHIPPED | OUTPATIENT
Start: 2022-11-11

## 2022-11-11 NOTE — PROGRESS NOTES
Name: Sheryl Fraire      : 1960      MRN: [id number]  Encounter Provider: Jillian Gray DO  Encounter Date: 2022   Encounter department: [address]   Explained to Dev Hooks and  that she is dehydrated or the need of fluids and electrolytes  Falling will be easy gunjan supine or sit to stand resulting in injuries  Can try as an outpatient with hydration  If not able to hydrate by this afternoon need to go to ER  Chief Complaint   Patient presents with   • Nausea   • Cough     3 days   • Nasal Congestion   • Headache     X 3 days    BMI Counseling: Body mass index is 31 32 kg/m²  The BMI is above normal  Nutrition recommendations include reducing portion sizes, consuming healthier snacks, decreasing soda and/or juice intake, moderation in carbohydrate intake and increasing intake of lean protein  1  Nausea and vomiting, unspecified vomiting type  -     ondansetron (ZOFRAN) 4 mg tablet; Take 1 tablet (4 mg total) by mouth every 8 (eight) hours as needed for nausea or vomiting    2  Weakness    3  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[address] )    4  Dehydration    5  Tachycardia          PHQ-2/9 Depression Screening             Subjective     Vomiting started Wednesday morning, denies diarrhea  Vomits 3 - 4 times a day  Not hungary, little PO hydration  Feels lightheaded/dizzy with nausea with sit to standing  I have spent 30 minutes with Patient and family today in which greater than 50% of this time was spent in counseling/coordination of care regarding Diagnostic results, Risks and benefits of tx options, Intructions for management, Patient and family education, Importance of tx compliance, Risk factor reductions and Impressions  Review of Systems   Constitutional: Positive for activity change, appetite change and fatigue  HENT: Negative  Eyes: Negative  Respiratory: Negative  Cardiovascular: Negative      Gastrointestinal: Positive for nausea and vomiting  Negative for abdominal pain, constipation and diarrhea  Genitourinary: Negative  Musculoskeletal: Negative  Skin: Negative  Psychiatric/Behavioral: Negative          Past Medical History:   Diagnosis Date   • Anxiety    • Arthritis     Last assessed 5/3/2011   • Ortega's esophagus    • Cancer ([address] )     ovarian cancer at age 28 yo- REMOVAL  B/L   • Colon polyp    • DDD (degenerative disc disease), lumbar    • Depression    • Fall     5/2020 right knee meniscus tear- OR repair today 8/3/2020   • Fibromyalgia    • Fibromyalgia, primary    • Fracture of one or more phalanges of foot    • GERD (gastroesophageal reflux disease)    • H/O bladder infections     chronic   • Hypertension    • Kidney infection     occasional   • Migraines    • Obesity    • Ovarian cancer [facility]) 1987    age 27   • Polyneuropathy     Last assessed 6/23/2016 knees to feet   • PONV (postoperative nausea and vomiting)     Patient requests to be pre-medicated prior to surgery prior to surgery   • PONV (postoperative nausea and vomiting) 8/3/2020   • Renal disorder    • Spinal stenosis    • Vertigo    • Wears glasses     reading     Past Surgical History:   Procedure Laterality Date   • ABDOMINAL SURGERY  1986    several   • BACK SURGERY  1990   • CATARACT EXTRACTION, BILATERAL     • CHOLECYSTECTOMY     • COLONOSCOPY     • FOOT FRACTURE SURGERY Bilateral 2004    x 5  surgeries   • KIDNEY SURGERY  1974    at age 13 yo   • KNEE SURGERY Right    • WI KNEE SCOPE,MED/LAT MENISECTOMY Right 8/3/2020    Procedure: KNEE ARTHROSCOPY,PARTIAL MEDIAL & LATERAL MENISECTOMIES;  Surgeon: Antonina Wilson MD;  [facility];  Service: Orthopedics   • WI LAP,CHOLECYSTECTOMY N/A 11/27/2020    Procedure: LAPAROSCOPIC CHOLECYSTECTOMY;  Surgeon: Eric Koyanagi, MD;  [facility];  Service: General   • TOTAL ABDOMINAL HYSTERECTOMY  1987    age 27   • TOTAL ABDOMINAL HYSTERECTOMY W/ BILATERAL SALPINGOOPHORECTOMY Bilateral 1987 age 27     Family History   Problem Relation Age of Onset   • Heart disease Mother    • Cancer Mother    • Diabetes Mother    • Arthritis Mother    • Anxiety disorder Mother    • Bleeding Disorder Mother    • Clotting disorder Mother    • Depression Mother    • Hyperlipidemia Mother    • Irritable bowel syndrome Mother    • Hypertension Mother    • Obesity Mother    • Osteoporosis Mother    • Vaginal cancer Mother 27   • Thyroid disease Mother    • Stroke Mother    • Diabetes Father    • Arthritis Father    • Hyperlipidemia Father    • Vesicoureteral reflux Father    • Heart disease Father    • Hypertension Father    • Migraines Father    • Obesity Father    • Hypertension Family    • Arthritis Family    • Arthritis Brother    • Anxiety disorder Brother    • Diabetes Brother    • Hyperlipidemia Brother    • Vesicoureteral reflux Brother    • Heart disease Brother    • Irritable bowel syndrome Brother    • Hypertension Brother    • Migraines Brother    • Thyroid disease Brother    • Pancreatic cancer Brother 54   • Migraines Daughter    • Asthma Son    • Migraines Son    • Diabetes Maternal Grandmother    • Vaginal cancer Maternal Grandmother 48   • Infertility Paternal Grandmother    • Arthritis Maternal Aunt    • Anxiety disorder Maternal Aunt    • Depression Maternal Aunt    • Diabetes Maternal Aunt    • Vaginal cancer Maternal Aunt 50   • Fibroids Paternal Aunt    • No Known Problems Maternal Grandfather    • No Known Problems Paternal Grandfather    • No Known Problems Maternal Aunt    • No Known Problems Maternal Aunt    • No Known Problems Maternal Aunt      Social History     Socioeconomic History   • Marital status: /Civil Union     Spouse name: None   • Number of children: None   • Years of education: None   • Highest education level: None   Occupational History   • Occupation: CNA   Tobacco Use   • Smoking status: Never Smoker   • Smokeless tobacco: Never Used   Vaping Use   • Vaping Use: Never used Substance and Sexual Activity   • Alcohol use: Not Currently   • Drug use: No   • Sexual activity: Not Currently   Other Topics Concern   • None   Social History Narrative   • None     Social Determinants of Health     Financial Resource Strain: Not on file   Food Insecurity: Not on file   Transportation Needs: Not on file   Physical Activity: Not on file   Stress: Not on file   Social Connections: Not on file   Intimate Partner Violence: Not on file   Housing Stability: Not on file     Current Outpatient Medications on File Prior to Visit   Medication Sig   • calcium-vitamin D 250-100 MG-UNIT per tablet Take 1 tablet by mouth 2 (two) times a day   • cyanocobalamin (VITAMIN B-12) 100 mcg tablet Take by mouth daily     • DULoxetine (CYMBALTA) 30 mg delayed release capsule    • gabapentin (Neurontin) 600 MG tablet Take 1 tablet (600 mg total) by mouth 3 (three) times a day   • hydrOXYzine HCL (ATARAX) 50 mg tablet    • levothyroxine (Euthyrox) 50 mcg tablet Take 1 tablet (50 mcg total) by mouth daily in the early morning   • methenamine hippurate (HIPREX) 1 g tablet 3 g 2 (two) times a day with meals   • methocarbamol (ROBAXIN) 500 mg tablet Take 1 tablet (500 mg total) by mouth every 8 (eight) hours as needed for muscle spasms   • Multiple Vitamins-Minerals (ZINC PO) Take by mouth     • omeprazole (PriLOSEC) 40 MG capsule Take 40 mg by mouth daily   • oxybutynin (DITROPAN) 5 mg tablet Take 1 tablet (5 mg total) by mouth 3 (three) times a day as needed (bladder spasm/pain)   • PARoxetine (PAXIL) 40 MG tablet Take by mouth   • phenazopyridine (PYRIDIUM) 100 mg tablet Take 1 tablet (100 mg total) by mouth 3 (three) times a day as needed for bladder spasms (painful urination)   • SUMAtriptan (IMITREX) 50 mg tablet Take 1 tablet (50 mg total) by mouth once as needed for migraine for up to 1 dose May repeat in 2 hours  No more than 200 mg per day     • [DISCONTINUED] clobetasol (TEMOVATE) 0 05 % cream Apply two times a day for 2 weeks then twice weekly     Allergies   Allergen Reactions   • Morphine      Acts not like herself and feels agitated and restless   • Penicillins Hives     Tongue swells     Immunization History   Administered Date(s) Administered   • COVID-19 MODERNA VACC 0 5 ML IM 08/05/2021, 09/02/2021, 02/05/2022   • Tuberculin Skin Test-PPD Intradermal 12/12/2012, 12/26/2012   • Zoster 10/12/2016       Objective     /77 (BP Location: Left arm, Patient Position: Sitting, Cuff Size: Large)   Pulse (!) 126   Temp 97 9 °F (36 6 °C) (Temporal)   Ht 5' 7" (1 702 m)   LMP  (LMP Unknown)   SpO2 97%   BMI 31 32 kg/m²     Physical Exam  Constitutional:       Appearance: She is well-developed  Comments: Looks tired  HENT:      Head: Normocephalic and atraumatic  Right Ear: Tympanic membrane, ear canal and external ear normal       Left Ear: Tympanic membrane, ear canal and external ear normal       Nose: Nose normal       Mouth/Throat:      Pharynx: Oropharynx is clear  Eyes:      Conjunctiva/sclera: Conjunctivae normal       Pupils: Pupils are equal, round, and reactive to light  Cardiovascular:      Rate and Rhythm: Normal rate and regular rhythm  Heart sounds: Normal heart sounds  Pulmonary:      Effort: Pulmonary effort is normal       Breath sounds: Normal breath sounds  Abdominal:      General: Bowel sounds are normal       Palpations: Abdomen is soft  Tenderness: There is no abdominal tenderness  There is no guarding  Musculoskeletal:      Cervical back: Normal range of motion and neck supple  Skin:     General: Skin is warm and dry  Neurological:      Mental Status: She is alert and oriented to person, place, and time  Deep Tendon Reflexes: Reflexes are normal and symmetric  Psychiatric:         Mood and Affect: Mood normal          Behavior: Behavior normal          Thought Content:  Thought content normal          Judgment: Judgment normal        Rufina Blanca DO

## 2022-11-21 ENCOUNTER — OFFICE VISIT (OUTPATIENT)
Dept: OBGYN CLINIC | Facility: CLINIC | Age: 62
End: 2022-11-21

## 2022-11-21 VITALS — SYSTOLIC BLOOD PRESSURE: 120 MMHG | DIASTOLIC BLOOD PRESSURE: 86 MMHG

## 2022-11-21 DIAGNOSIS — B35.9 TINEA: ICD-10-CM

## 2022-11-21 DIAGNOSIS — L29.2 VULVOVAGINAL ITCHING: ICD-10-CM

## 2022-11-21 DIAGNOSIS — L90.0 LICHEN SCLEROSUS: Primary | ICD-10-CM

## 2022-11-21 LAB
BV WHIFF TEST VAG QL: NEGATIVE
CLUE CELLS SPEC QL WET PREP: NEGATIVE
PH SMN: 5 [PH]
YEAST VAG QL WET PREP: NEGATIVE

## 2022-11-21 RX ORDER — NYSTATIN AND TRIAMCINOLONE ACETONIDE 100000; 1 [USP'U]/G; MG/G
OINTMENT TOPICAL
Qty: 30 G | Refills: 3 | Status: SHIPPED | OUTPATIENT
Start: 2022-11-21

## 2022-11-21 RX ORDER — CLOBETASOL PROPIONATE 0.5 MG/G
CREAM TOPICAL
Qty: 30 G | Refills: 0 | Status: SHIPPED | OUTPATIENT
Start: 2022-11-21 | End: 2022-11-23

## 2022-11-21 NOTE — PROGRESS NOTES
Karmen LANGE [facility]  1960    S:  59 y o  female with c/o vulvovaginal itching x 2 months  Sx are intermittent, lasting a few days to a week, then resolved  During flares can sometimes see some bright red blood with wiping, but this is only a small spot on toilet tissue, and not persistent or noted in her underwear  Today feels sx are slightly relieved from a few days ago when she called for appt  She denies discharge, pelvic pain, or burning  She is unsure of odor, noting a rash of panus that is always red, wet, and smells a little musty  No tx tried  Hx of lichen sclerosus with rx for clobetasol but admits she does not use this  She is not sexually active  Review of Systems   Respiratory: Negative  Cardiovascular: Negative  Gastrointestinal: Negative for constipation and diarrhea  Genitourinary: Negative for difficulty urinating, pelvic pain, vaginal bleeding, vaginal discharge, itching or odor  O:  /86 (BP Location: Left arm, Patient Position: Sitting, Cuff Size: Large)   LMP  (LMP Unknown)      No results found for this or any previous visit (from the past 1 hour(s))  She appears well and is in no distress  Normocephalic, atraumatic  Normal respiratory effort  Abdomen is soft and nontender  External genitals erythema of inferior labia minora and just inferior the vaginal introitus  Small white plaque inferior the introitus with central fissure and small amount of dried blood of this area  Vaginal cuff is intact without discharge, bleeding, or lesion  Cervix and uterus surgically absent  No pelvic mass or tenderness appreciated  No focal neurological deficits  Normal mood, affect, and behavior  A/P:    1  Lichen sclerosus    - clobetasol (TEMOVATE) 0 05 % cream; Apply two times a day for 2 weeks then as needed  Dispense: 30 g; Refill: 0    Flares and appearance consistent with Lichen sclerosus  Refill of clobetasol sent to pharmacy   Pt called following visit to review clobetasol steroid ointment to vulva and mycolog ointment to panus  She should RTO for annual exam and recheck  2  Tinea    - nystatin-triamcinolone (MYCOLOG-II) ointment; Apply to vulva twice daily for two weeks and to skin rash as needed  Dispense: 30 g; Refill: 3    Advised application of cream to rash area twice daily for 1-2 weeks, then as needed  To call office if no improvement or worsening  Reviewed aeration, cleansing with mild cleanser, wearing of absorbent/moisture wicking clothing, and use of drying agents (powder/pads) which can be used daily for prevention  3  Vulvovaginal itching    - Molecular Vaginal Panel  - clobetasol (TEMOVATE) 0 05 % cream; Apply two times a day for 2 weeks then as needed    Dispense: 30 g; Refill: 0  - POCT wet mount

## 2022-11-22 ENCOUNTER — HOSPITAL ENCOUNTER (OUTPATIENT)
Dept: RADIOLOGY | Facility: CLINIC | Age: 62
Discharge: HOME | End: 2022-11-22

## 2022-11-22 VITALS
SYSTOLIC BLOOD PRESSURE: 150 MMHG | RESPIRATION RATE: 18 BRPM | HEART RATE: 97 BPM | DIASTOLIC BLOOD PRESSURE: 91 MMHG | TEMPERATURE: 97.1 F | OXYGEN SATURATION: 97 %

## 2022-11-22 DIAGNOSIS — M46.1 SACROILIITIS (HCC): ICD-10-CM

## 2022-11-22 LAB
C GLABRATA DNA VAG QL NAA+PROBE: NEGATIVE
C KRUSEI DNA VAG QL NAA+PROBE: NEGATIVE
CANDIDA SP 6 PNL VAG NAA+PROBE: NEGATIVE
T VAGINALIS DNA VAG QL NAA+PROBE: NEGATIVE
VAGINOSIS/ITIS DNA PNL VAG PROBE+SIG AMP: NEGATIVE

## 2022-11-22 RX ORDER — BUPIVACAINE HCL/PF 2.5 MG/ML
30 VIAL (ML) INJECTION ONCE
Status: COMPLETED | OUTPATIENT
Start: 2022-11-22 | End: 2022-11-22

## 2022-11-22 RX ORDER — METHYLPREDNISOLONE ACETATE 40 MG/ML
80 INJECTION, SUSPENSION INTRA-ARTICULAR; INTRALESIONAL; INTRAMUSCULAR; PARENTERAL; SOFT TISSUE ONCE
Status: COMPLETED | OUTPATIENT
Start: 2022-11-22 | End: 2022-11-22

## 2022-11-22 RX ORDER — LIDOCAINE HYDROCHLORIDE 10 MG/ML
5 INJECTION, SOLUTION EPIDURAL; INFILTRATION; INTRACAUDAL; PERINEURAL ONCE
Status: COMPLETED | OUTPATIENT
Start: 2022-11-22 | End: 2022-11-22

## 2022-11-22 RX ADMIN — METHYLPREDNISOLONE ACETATE 80 MG: 40 INJECTION, SUSPENSION INTRA-ARTICULAR; INTRALESIONAL; INTRAMUSCULAR; SOFT TISSUE at 08:38

## 2022-11-22 RX ADMIN — IOHEXOL 1 ML: 300 INJECTION, SOLUTION INTRAVENOUS at 08:38

## 2022-11-22 RX ADMIN — BUPIVACAINE HYDROCHLORIDE 3 ML: 2.5 INJECTION, SOLUTION EPIDURAL; INFILTRATION; INTRACAUDAL at 08:38

## 2022-11-22 RX ADMIN — LIDOCAINE HYDROCHLORIDE 3 ML: 10 INJECTION, SOLUTION EPIDURAL; INFILTRATION; INTRACAUDAL; PERINEURAL at 08:38

## 2022-11-22 NOTE — DISCHARGE INSTRUCTIONS
Steroid Joint Injection   WHAT YOU NEED TO KNOW:   A steroid joint injection is a procedure to inject steroid medicine into a joint  Steroid medicine decreases pain and inflammation  The injection may also contain an anesthetic (numbing medicine) to decrease pain  It may be done to treat conditions such as arthritis, gout, or carpal tunnel syndrome  The injections may be given in your knee, ankle, shoulder, elbow, wrist, ankle or sacroiliac joint  Do not apply heat to any area that is numb  If you have discomfort or soreness at the injection site, you may apply ice today, 20 minutes on and 20 minutes off  Tomorrow you may use ice or warm, moist heat  Do not apply ice or heat directly to the skin  You may have an increase or change in the discomfort for 36-48 hours after your treatment  Apply ice and continue with any pain medicine you have been prescribed  Do not do anything strenuous today  You may shower, but no tub baths or hot tubs today  You may resume your normal activities tomorrow, but do not “overdo it”  Resume normal activities slowly when you are feeling better  If you experience redness, drainage or swelling at the injection site, or if you develop a fever above 100 degrees, please call [facility] at [phone] or go to the Emergency Room  Continue to take all routine medicines prescribed by your primary care physician unless otherwise instructed by our staff  Most blood thinners should be started again according to your regularly scheduled dosing  If you have any questions, please give our office a call  As no general anesthesia was used in today's procedure, you should not experience any side effects related to anesthesia  If you are diabetic, the steroids used in today's injection may temporarily increase your blood sugar levels after the first few days after your injection   Please keep a close eye on your sugars and alert the doctor who manages your diabetes if your sugars are significantly high from your baseline or you are symptomatic  If you have a problem specifically related to your procedure, please call our office at [phone]  Problems not related to your procedure should be directed to your primary care physician

## 2022-11-22 NOTE — H&P
History of Present Illness: The patient is a 59 y o  female who presents with complaints of low back and buttock pain      Past Medical History:   Diagnosis Date   • Anxiety    • Arthritis     Last assessed 5/3/2011   • Ortega's esophagus    • Cancer ([address] )     ovarian cancer at age 28 yo- REMOVAL  B/L   • Colon polyp    • DDD (degenerative disc disease), lumbar    • Depression    • Fall     5/2020 right knee meniscus tear- OR repair today 8/3/2020   • Fibromyalgia    • Fibromyalgia, primary    • Fracture of one or more phalanges of foot    • GERD (gastroesophageal reflux disease)    • H/O bladder infections     chronic   • Hypertension    • Kidney infection     occasional   • Migraines    • Obesity    • Ovarian cancer [facility]) 1987    age 27   • Polyneuropathy     Last assessed 6/23/2016 knees to feet   • PONV (postoperative nausea and vomiting)     Patient requests to be pre-medicated prior to surgery prior to surgery   • PONV (postoperative nausea and vomiting) 8/3/2020   • Renal disorder    • Spinal stenosis    • Vertigo    • Wears glasses     reading       Past Surgical History:   Procedure Laterality Date   • ABDOMINAL SURGERY  1986    several   • BACK SURGERY  1990   • CATARACT EXTRACTION, BILATERAL     • CHOLECYSTECTOMY     • COLONOSCOPY     • FOOT FRACTURE SURGERY Bilateral 2004    x 5  surgeries   • KIDNEY SURGERY  1974    at age 13 yo   • KNEE SURGERY Right    • [place] KNEE SCOPE,MED/LAT MENISECTOMY Right 8/3/2020    Procedure: [address];  Surgeon: Beatrice Jenkins MD;  [facility];  Service: Orthopedics   • [place] LAP,CHOLECYSTECTOMY N/A 11/27/2020    Procedure: LAPAROSCOPIC CHOLECYSTECTOMY;  Surgeon: Lafe Gaucher, MD;  [facility];  Service: General   • TOTAL ABDOMINAL HYSTERECTOMY  1987    age 27   • TOTAL ABDOMINAL HYSTERECTOMY W/ BILATERAL SALPINGOOPHORECTOMY Bilateral 1987    age 27         Current Outpatient Medications:   •  calcium-vitamin D 250-100 MG-UNIT per tablet, Take 1 tablet by mouth 2 (two) times a day, Disp: , Rfl:   •  clobetasol (TEMOVATE) 0 05 % cream, Apply two times a day for 2 weeks then as needed  , Disp: 30 g, Rfl: 0  •  cyanocobalamin (VITAMIN B-12) 100 mcg tablet, Take by mouth daily  , Disp: , Rfl:   •  DULoxetine (CYMBALTA) 30 mg delayed release capsule, , Disp: , Rfl:   •  gabapentin (Neurontin) 600 MG tablet, Take 1 tablet (600 mg total) by mouth 3 (three) times a day, Disp: 270 tablet, Rfl: 3  •  hydrOXYzine HCL (ATARAX) 50 mg tablet, , Disp: , Rfl:   •  levothyroxine (Euthyrox) 50 mcg tablet, Take 1 tablet (50 mcg total) by mouth daily in the early morning, Disp: 90 tablet, Rfl: 1  •  methenamine hippurate (HIPREX) 1 g tablet, 3 g 2 (two) times a day with meals, Disp: , Rfl:   •  methocarbamol (ROBAXIN) 500 mg tablet, Take 1 tablet (500 mg total) by mouth every 8 (eight) hours as needed for muscle spasms (Patient not taking: Reported on 11/21/2022), Disp: 90 tablet, Rfl: 0  •  Multiple Vitamins-Minerals (ZINC PO), Take by mouth  , Disp: , Rfl:   •  nystatin-triamcinolone (MYCOLOG-II) ointment, Apply to vulva twice daily for two weeks and to skin rash as needed  , Disp: 30 g, Rfl: 3  •  omeprazole (PriLOSEC) 40 MG capsule, Take 40 mg by mouth daily, Disp: , Rfl:   •  ondansetron (ZOFRAN) 4 mg tablet, Take 1 tablet (4 mg total) by mouth every 8 (eight) hours as needed for nausea or vomiting (Patient not taking: Reported on 11/21/2022), Disp: 12 tablet, Rfl: 0  •  oxybutynin (DITROPAN) 5 mg tablet, Take 1 tablet (5 mg total) by mouth 3 (three) times a day as needed (bladder spasm/pain) (Patient not taking: Reported on 11/21/2022), Disp: 30 tablet, Rfl: 0  •  PARoxetine (PAXIL) 40 MG tablet, Take by mouth (Patient not taking: Reported on 11/21/2022), Disp: , Rfl:   •  phenazopyridine (PYRIDIUM) 100 mg tablet, Take 1 tablet (100 mg total) by mouth 3 (three) times a day as needed for bladder spasms (painful urination) (Patient not taking: Reported on 11/21/2022), Disp: 20 tablet, Rfl: 0  •  SUMAtriptan (IMITREX) 50 mg tablet, Take 1 tablet (50 mg total) by mouth once as needed for migraine for up to 1 dose May repeat in 2 hours  No more than 200 mg per day , Disp: 10 tablet, Rfl: 2    Allergies   Allergen Reactions   • Morphine      Acts not like herself and feels agitated and restless   • Penicillins Hives     Tongue swells       Physical Exam: There were no vitals filed for this visit  General: Awake, Alert, Oriented x 3, Mood and affect appropriate  Respiratory: Respirations even and unlabored  Cardiovascular: Peripheral pulses intact; no edema  Musculoskeletal Exam:  Tenderness to palpation over bilateral SI joints    ASA Score: 2    Patient/Chart Verification  Patient ID Verified: Verbal  ID Band Applied: No  Consents Confirmed: Procedural, To be obtained in the Pre-Procedure area  Interval H&P(within 24 hr) Complete (required for Outpatients and Surgery Admit only): To be obtained in the Pre-Procedure area  Allergies Reviewed: Yes  Anticoag/NSAID held?: NA  Currently on antibiotics?: No    Assessment:   1   Sacroiliitis (HCC)        Plan: b/L SIJ injections

## 2022-11-23 ENCOUNTER — TELEPHONE (OUTPATIENT)
Dept: OBGYN CLINIC | Facility: CLINIC | Age: 62
End: 2022-11-23

## 2022-11-23 DIAGNOSIS — L90.0 LICHEN SCLEROSUS: Primary | ICD-10-CM

## 2022-11-23 RX ORDER — HALOBETASOL PROPIONATE 0.05 %
OINTMENT (GRAM) TOPICAL 2 TIMES DAILY
Qty: 30 G | Refills: 2 | Status: SHIPPED | OUTPATIENT
Start: 2022-11-23

## 2022-11-23 NOTE — TELEPHONE ENCOUNTER
Clobetasol not covered  Spoke with Karmen today  Reviewed negative molecular panel  Itching is likely stemming from lichen flare  Advised application of halobetasol, which was sent to pharmacy on file  She will apply this twice daily with plan for f/u in 4-6 weeks  To call sooner if sx worsening or not improving  Agreeable to plan

## 2022-11-29 ENCOUNTER — TELEPHONE (OUTPATIENT)
Dept: PAIN MEDICINE | Facility: CLINIC | Age: 62
End: 2022-11-29

## 2022-12-05 ENCOUNTER — TELEPHONE (OUTPATIENT)
Dept: FAMILY MEDICINE CLINIC | Facility: CLINIC | Age: 62
End: 2022-12-05

## 2022-12-05 DIAGNOSIS — R51.9 NONINTRACTABLE EPISODIC HEADACHE, UNSPECIFIED HEADACHE TYPE: Primary | ICD-10-CM

## 2022-12-05 DIAGNOSIS — R11.2 NAUSEA AND VOMITING, UNSPECIFIED VOMITING TYPE: ICD-10-CM

## 2022-12-05 NOTE — TELEPHONE ENCOUNTER
Patient called to say she is still experiencing nausea and vomiting with her headaches, is using the Imitrex prescription as needed but would like to know if symptoms can be contributed to her thyroid? States feels like she is experiencing morning sickness but states impossible because of her age  Admits to increasing her fluids as advised at last office visit  Patient is due to repeat TSH this month

## 2022-12-05 NOTE — TELEPHONE ENCOUNTER
Called patient, gave information on typical symptoms for abnormal thyroid levels and stated Dr Hector Estrella states most likely nausea and vomiting due to headaches and should review with neurologist  She stated she used to see a neurologist, agreed to call to schedule an appointment with [facility]

## 2022-12-06 ENCOUNTER — TELEPHONE (OUTPATIENT)
Dept: FAMILY MEDICINE CLINIC | Facility: CLINIC | Age: 62
End: 2022-12-06

## 2022-12-06 DIAGNOSIS — E03.9 HYPOTHYROIDISM, UNSPECIFIED TYPE: Primary | ICD-10-CM

## 2022-12-06 NOTE — TELEPHONE ENCOUNTER
Pt called stated that her daughter was diagnosed with lupus last year and her three grand kids cam back positive for it recently, she will like to know if she can have an order for a test to check if she has it too

## 2022-12-07 DIAGNOSIS — M25.59 PAIN IN OTHER JOINT: Primary | ICD-10-CM

## 2022-12-12 DIAGNOSIS — R11.2 NAUSEA AND VOMITING, UNSPECIFIED VOMITING TYPE: ICD-10-CM

## 2022-12-12 RX ORDER — ONDANSETRON 4 MG/1
4 TABLET, FILM COATED ORAL EVERY 8 HOURS PRN
Qty: 12 TABLET | Refills: 0 | Status: SHIPPED | OUTPATIENT
Start: 2022-12-12

## 2022-12-16 ENCOUNTER — APPOINTMENT (OUTPATIENT)
Dept: LAB | Facility: IMAGING CENTER | Age: 62
End: 2022-12-16

## 2022-12-16 DIAGNOSIS — M25.59 PAIN IN OTHER JOINT: ICD-10-CM

## 2022-12-16 DIAGNOSIS — E03.9 HYPOTHYROIDISM, UNSPECIFIED TYPE: ICD-10-CM

## 2022-12-16 LAB — TSH SERPL DL<=0.05 MIU/L-ACNC: 2.34 UIU/ML (ref 0.45–4.5)

## 2022-12-17 LAB — ANA SER QL IA: NEGATIVE

## 2022-12-20 ENCOUNTER — TELEPHONE (OUTPATIENT)
Dept: UROLOGY | Facility: AMBULATORY SURGERY CENTER | Age: 62
End: 2022-12-20

## 2022-12-20 NOTE — TELEPHONE ENCOUNTER
What is the reason for the patient’s appointment? NP- Bladder infection    Patient can be reached at [phone]    What office location does the patient prefer? [place]     Do we accept the patient's insurance or is the patient Self-Pay? Medicare     Has the patient had any previous Urologist(s)? No    Have patient records been requested? No    Has the patient had any outside testing done? No    Does the patient have a personal history of cancer?  Ovarian cancer when she was 27

## 2022-12-21 ENCOUNTER — OFFICE VISIT (OUTPATIENT)
Dept: UROLOGY | Facility: AMBULATORY SURGERY CENTER | Age: 62
End: 2022-12-21

## 2022-12-21 VITALS — HEART RATE: 70 BPM | DIASTOLIC BLOOD PRESSURE: 88 MMHG | SYSTOLIC BLOOD PRESSURE: 138 MMHG | OXYGEN SATURATION: 98 %

## 2022-12-21 DIAGNOSIS — R10.9 FLANK PAIN: ICD-10-CM

## 2022-12-21 DIAGNOSIS — N39.0 FREQUENT UTI: Primary | ICD-10-CM

## 2022-12-21 DIAGNOSIS — M79.3 MONILIAL PANNICULITIS: ICD-10-CM

## 2022-12-21 DIAGNOSIS — B37.9 MONILIAL PANNICULITIS: ICD-10-CM

## 2022-12-21 LAB
SL AMB  POCT GLUCOSE, UA: NORMAL
SL AMB LEUKOCYTE ESTERASE,UA: NORMAL
SL AMB POCT BILIRUBIN,UA: NORMAL
SL AMB POCT BLOOD,UA: NORMAL
SL AMB POCT CLARITY,UA: CLEAR
SL AMB POCT COLOR,UA: NORMAL
SL AMB POCT KETONES,UA: NORMAL
SL AMB POCT NITRITE,UA: NORMAL
SL AMB POCT PH,UA: 5
SL AMB POCT SPECIFIC GRAVITY,UA: 1.02
SL AMB POCT URINE PROTEIN: NORMAL
SL AMB POCT UROBILINOGEN: 0.2

## 2022-12-21 NOTE — PROGRESS NOTES
12/21/2022    Karmen Luna  1960  [id number]        Assessment  Recurrent UTI  Fungal panniculitis  Persistent flank pain    Plan  We discussed her concerns  Fortunately urine is clear  Regarding flank pain, I will check a CT scan noncontrast in light of her history and symptoms  Regarding her abdominal panniculitis, recommend wound care consultation soon as possible to help manage her issues  All questions answered and she is comfortable with the plan  History of Present Illness  Herb Camejo is a 59 y o  female with history of recurrent UTI  She was previously seen by Dr Parmjit Baca in the past and tried numerous medications  Currently she is not having any urinary symptoms  Urine dip in the office today is negative  She complains primarily of back and flank pains  This is different than her chronic back pain for which she receives injections from pain management  This is more on the flank than in the mid back  She also complains of significant abdominal infection which is not getting better  She is been treated on her own  Her gynecologist reportedly told her to use her vaginal cream in the area but is not helping  She does give a history of neurologic issues in childhood  It sounds like she had reflux to the right kidney  She has atrophic right kidney and has had bladder surgery, possibly reimplantation  Review of Systems  Review of Systems   Constitutional: Negative  HENT: Negative  Respiratory: Negative  Cardiovascular: Negative  Gastrointestinal: Negative  Genitourinary:        As per HPI   Musculoskeletal: Negative  Skin: Negative  Neurological: Negative  Hematological: Negative            Past Medical History  Past Medical History:   Diagnosis Date   • Anxiety    • Arthritis     Last assessed 5/3/2011   • Ortega's esophagus    • Cancer ([facility] [address] )     ovarian cancer at age 26 yo- REMOVAL  B/L   • Colon polyp    • DDD (degenerative disc disease), lumbar    • Depression    • Fall     5/2020 right knee meniscus tear- OR repair today 8/3/2020   • Fibromyalgia    • Fibromyalgia, primary    • Fracture of one or more phalanges of foot    • GERD (gastroesophageal reflux disease)    • H/O bladder infections     chronic   • Hypertension    • Kidney infection     occasional   • Migraines    • Obesity    • Ovarian cancer [facility]) 1987    age 27   • Polyneuropathy     Last assessed 6/23/2016 knees to feet   • PONV (postoperative nausea and vomiting)     Patient requests to be pre-medicated prior to surgery prior to surgery   • PONV (postoperative nausea and vomiting) 8/3/2020   • Renal disorder    • Spinal stenosis    • Vertigo    • Wears glasses     reading       Past Social History  Past Surgical History:   Procedure Laterality Date   • ABDOMINAL SURGERY  1986    several   • BACK SURGERY  1990   • CATARACT EXTRACTION, BILATERAL     • CHOLECYSTECTOMY     • COLONOSCOPY     • FOOT FRACTURE SURGERY Bilateral 2004    x 5  surgeries   • KIDNEY SURGERY  1974    at age 13 yo   • KNEE SURGERY Right    • [place] KNEE SCOPE,MED/LAT MENISECTOMY Right 8/3/2020    Procedure: [address];  Surgeon: Valente Gerard MD;  [facility];  Service: Orthopedics   • [place] LAP,CHOLECYSTECTOMY N/A 11/27/2020    Procedure: LAPAROSCOPIC CHOLECYSTECTOMY;  Surgeon: Lincoln New MD;  [facility];  Service: General   • TOTAL ABDOMINAL HYSTERECTOMY  1987    age 27   • TOTAL ABDOMINAL HYSTERECTOMY W/ BILATERAL SALPINGOOPHORECTOMY Bilateral 1987    age 27       Past Family History  Family History   Problem Relation Age of Onset   • Heart disease Mother    • Cancer Mother    • Diabetes Mother    • Arthritis Mother    • Anxiety disorder Mother    • Bleeding Disorder Mother    • Clotting disorder Mother    • Depression Mother    • Hyperlipidemia Mother    • Irritable bowel syndrome Mother    • Hypertension Mother    • Obesity Mother    • Osteoporosis Mother    • Vaginal cancer Mother 27   • Thyroid disease Mother    • Stroke Mother    • Diabetes Father    • Arthritis Father    • Hyperlipidemia Father    • Vesicoureteral reflux Father    • Heart disease Father    • Hypertension Father    • Migraines Father    • Obesity Father    • Hypertension Family    • Arthritis Family    • Arthritis Brother    • Anxiety disorder Brother    • Diabetes Brother    • Hyperlipidemia Brother    • Vesicoureteral reflux Brother    • Heart disease Brother    • Irritable bowel syndrome Brother    • Hypertension Brother    • Migraines Brother    • Thyroid disease Brother    • Pancreatic cancer Brother 54   • Migraines Daughter    • Asthma Son    • Migraines Son    • Diabetes Maternal Grandmother    • Vaginal cancer Maternal Grandmother 48   • Infertility Paternal Grandmother    • Arthritis Maternal Aunt    • Anxiety disorder Maternal Aunt    • Depression Maternal Aunt    • Diabetes Maternal Aunt    • Vaginal cancer Maternal Aunt 50   • Fibroids Paternal Aunt    • No Known Problems Maternal Grandfather    • No Known Problems Paternal Grandfather    • No Known Problems Maternal Aunt    • No Known Problems Maternal Aunt    • No Known Problems Maternal Aunt        Past Social history  Social History     Socioeconomic History   • Marital status: /Civil Union     Spouse name: Not on file   • Number of children: Not on file   • Years of education: Not on file   • Highest education level: Not on file   Occupational History   • Occupation: CNA   Tobacco Use   • Smoking status: Never   • Smokeless tobacco: Never   Vaping Use   • Vaping Use: Never used   Substance and Sexual Activity   • Alcohol use: Not Currently   • Drug use: No   • Sexual activity: Not Currently     Birth control/protection: Surgical   Other Topics Concern   • Not on file   Social History Narrative   • Not on file     Social Determinants of Health     Financial Resource Strain: Not on file   Food Insecurity: Not on file Transportation Needs: Not on file   Physical Activity: Not on file   Stress: Not on file   Social Connections: Not on file   Intimate Partner Violence: Not on file   Housing Stability: Not on file     Social History     Tobacco Use   Smoking Status Never   Smokeless Tobacco Never       Current Medications  Current Outpatient Medications   Medication Sig Dispense Refill   • calcium-vitamin D 250-100 MG-UNIT per tablet Take 1 tablet by mouth 2 (two) times a day     • cyanocobalamin (VITAMIN B-12) 100 mcg tablet Take by mouth daily       • DULoxetine (CYMBALTA) 30 mg delayed release capsule      • gabapentin (Neurontin) 600 MG tablet Take 1 tablet (600 mg total) by mouth 3 (three) times a day 270 tablet 3   • halobetasol (ULTRAVATE) 0 05 % ointment Apply topically 2 (two) times a day For up to 2 weeks  30 g 2   • hydrOXYzine HCL (ATARAX) 50 mg tablet      • levothyroxine (Euthyrox) 50 mcg tablet Take 1 tablet (50 mcg total) by mouth daily in the early morning 90 tablet 1   • methenamine hippurate (HIPREX) 1 g tablet 3 g 2 (two) times a day with meals     • methocarbamol (ROBAXIN) 500 mg tablet Take 1 tablet (500 mg total) by mouth every 8 (eight) hours as needed for muscle spasms 90 tablet 0   • Multiple Vitamins-Minerals (ZINC PO) Take by mouth       • nystatin-triamcinolone (MYCOLOG-II) ointment Apply to vulva twice daily for two weeks and to skin rash as needed  30 g 3   • omeprazole (PriLOSEC) 40 MG capsule Take 40 mg by mouth daily     • ondansetron (ZOFRAN) 4 mg tablet Take 1 tablet (4 mg total) by mouth every 8 (eight) hours as needed for nausea or vomiting 12 tablet 0   • SUMAtriptan (IMITREX) 50 mg tablet Take 1 tablet (50 mg total) by mouth once as needed for migraine for up to 1 dose May repeat in 2 hours  No more than 200 mg per day   10 tablet 2   • oxybutynin (DITROPAN) 5 mg tablet Take 1 tablet (5 mg total) by mouth 3 (three) times a day as needed (bladder spasm/pain) (Patient not taking: Reported on 11/21/2022) 30 tablet 0   • PARoxetine (PAXIL) 40 MG tablet Take by mouth (Patient not taking: Reported on 11/21/2022)     • phenazopyridine (PYRIDIUM) 100 mg tablet Take 1 tablet (100 mg total) by mouth 3 (three) times a day as needed for bladder spasms (painful urination) (Patient not taking: Reported on 11/21/2022) 20 tablet 0     No current facility-administered medications for this visit  Allergies  Allergies   Allergen Reactions   • Morphine      Acts not like herself and feels agitated and restless   • Penicillins Hives     Tongue swells       Past Medical History, Social History, Family History, medications and allergies were reviewed  Vitals  Vitals:    12/21/22 1427   BP: 138/88   BP Location: Right arm   Patient Position: Sitting   Cuff Size: Adult   Pulse: 70   SpO2: 98%       Physical Exam  Physical Exam  Vitals reviewed  Constitutional:       Appearance: She is well-developed  HENT:      Head: Normocephalic and atraumatic  Eyes:      Conjunctiva/sclera: Conjunctivae normal    Cardiovascular:      Rate and Rhythm: Normal rate  Pulmonary:      Effort: Pulmonary effort is normal    Abdominal:      Palpations: Abdomen is soft  Comments: Extended panniculitis, with fungal odor, with antifungal cream    Genitourinary:     Comments: No CVA tenderness  Skin:     General: Skin is warm and dry  Neurological:      Mental Status: She is alert and oriented to person, place, and time     Psychiatric:         Mood and Affect: Mood normal            Results  No results found for: PSA  Lab Results   Component Value Date    CALCIUM 9 3 07/15/2022    K 4 6 07/15/2022    CO2 27 07/15/2022     07/15/2022    BUN 26 (H) 07/15/2022    CREATININE 1 13 07/15/2022     Lab Results   Component Value Date    WBC 8 94 07/15/2022    HGB 14 3 07/15/2022    HCT 44 5 07/15/2022    MCV 92 07/15/2022     07/15/2022

## 2022-12-28 ENCOUNTER — OFFICE VISIT (OUTPATIENT)
Dept: WOUND CARE | Facility: CLINIC | Age: 62
End: 2022-12-28

## 2022-12-28 VITALS
RESPIRATION RATE: 18 BRPM | HEART RATE: 72 BPM | SYSTOLIC BLOOD PRESSURE: 130 MMHG | TEMPERATURE: 98.1 F | DIASTOLIC BLOOD PRESSURE: 98 MMHG

## 2022-12-28 DIAGNOSIS — B37.2 CANDIDIASIS, INTERTRIGO: Primary | ICD-10-CM

## 2022-12-28 RX ORDER — NYSTATIN 100000 [USP'U]/G
POWDER TOPICAL 2 TIMES DAILY
Qty: 15 G | Refills: 1 | Status: SHIPPED | OUTPATIENT
Start: 2022-12-28

## 2022-12-28 NOTE — PATIENT INSTRUCTIONS
No open wounds today  Follow up with Wound Management as needed  Prescription sent to your pharmacy   as soon as possible  Cleanse abdominal fold with baby wipes    May purchase Interdry on [employer]

## 2022-12-28 NOTE — PROGRESS NOTES
Patient ID: Bonifacio Angel is a 59 y o  female Date of Birth 1960       Chief Complaint   Patient presents with   • No Wound Consult     Patient arrived with no open wounds       Allergies:  Morphine and Penicillins    Diagnosis:      Diagnosis ICD-10-CM Associated Orders   1  Candidiasis, intertrigo  B37 2 nystatin (MYCOSTATIN) powder              Assessment & Plan:  Intertrigo of the abdominal wall  Fungal   Recurrent  No open areas at this time  Nystatin powder twice daily when a problem  Discharge from wound center  Return to clinic as needed  Subjective:   12/28/2022: First visit for this 68-year-old female who has been referred to the wound center because of recurrent rash and open area of the abdominal fold  Patient states that since she was a teenager she has had recurrent lower abdominal surgeries  She has not had any recent surgeries  However, over the years she has developed recurrent itching, redness and sometimes breakdown of the skin in the lower abdominal fold  She has tried washing, scrubbing and occasional ointments from various providers  Last week it was severe and bleeding and she was referred to the wound center  Currently, she does not believe there is anything open        The following portions of the patient's history were reviewed and updated as appropriate:   Patient Active Problem List   Diagnosis   • Anxiety   • Ortega esophagus   • Depression   • Lumbar radiculopathy   • DDD (degenerative disc disease), lumbar   • Lumbar spondylosis   • Spinal stenosis of lumbar region   • Other tear of medial meniscus, current injury, right knee, initial encounter   • PONV (postoperative nausea and vomiting)   • Calculus of gallbladder without cholecystitis without obstruction   • Chronic pain syndrome   • DDD (degenerative disc disease), cervical   • Moderate episode of recurrent major depressive disorder (HCC)   • Lichen sclerosus   • Cervical radiculopathy   • GERD (gastroesophageal reflux disease)   • Bilateral occipital neuralgia   • Sacroiliitis [facility])     Past Medical History:   Diagnosis Date   • Anxiety    • Arthritis     Last assessed 5/3/2011   • Ortega's esophagus    • Cancer ([facility] [address] )     ovarian cancer at age 26 yo- REMOVAL  B/L   • Colon polyp    • DDD (degenerative disc disease), lumbar    • Depression    • Fall     5/2020 right knee meniscus tear- OR repair today 8/3/2020   • Fibromyalgia    • Fibromyalgia, primary    • Fracture of one or more phalanges of foot    • GERD (gastroesophageal reflux disease)    • H/O bladder infections     chronic   • Hypertension    • Kidney infection     occasional   • Migraines    • Obesity    • Ovarian cancer [facility]) 1987    age 27   • Polyneuropathy     Last assessed 6/23/2016 knees to feet   • PONV (postoperative nausea and vomiting)     Patient requests to be pre-medicated prior to surgery prior to surgery   • PONV (postoperative nausea and vomiting) 8/3/2020   • Renal disorder    • Spinal stenosis    • Vertigo    • Wears glasses     reading     Past Surgical History:   Procedure Laterality Date   • ABDOMINAL SURGERY  1986    several   • BACK SURGERY  1990   • CATARACT EXTRACTION, BILATERAL     • CHOLECYSTECTOMY     • COLONOSCOPY     • FOOT FRACTURE SURGERY Bilateral 2004    x 5  surgeries   • KIDNEY SURGERY  1974    at age 13 yo   • KNEE SURGERY Right    • NV ARTHRS KNE SURG W/MENISCECTOMY MED/LAT W/SHVG Right 8/3/2020    Procedure: [address];  Surgeon: Steph Perea MD;  Location: [facility];  Service: Orthopedics   • NV LAPAROSCOPY SURG CHOLECYSTECTOMY N/A 11/27/2020    Procedure: LAPAROSCOPIC CHOLECYSTECTOMY;  Surgeon: Kimani Calles MD;  Location: [facility];  Service: General   • TOTAL ABDOMINAL HYSTERECTOMY  1987    age 27   • TOTAL ABDOMINAL HYSTERECTOMY W/ BILATERAL SALPINGOOPHORECTOMY Bilateral 1987    age 27     Family History   Problem Relation Age of Onset   • Heart disease Mother    • Cancer Mother    • Diabetes Mother    • Arthritis Mother    • Anxiety disorder Mother    • Bleeding Disorder Mother    • Clotting disorder Mother    • Depression Mother    • Hyperlipidemia Mother    • Irritable bowel syndrome Mother    • Hypertension Mother    • Obesity Mother    • Osteoporosis Mother    • Vaginal cancer Mother 27   • Thyroid disease Mother    • Stroke Mother    • Diabetes Father    • Arthritis Father    • Hyperlipidemia Father    • Vesicoureteral reflux Father    • Heart disease Father    • Hypertension Father    • Migraines Father    • Obesity Father    • Hypertension Family    • Arthritis Family    • Arthritis Brother    • Anxiety disorder Brother    • Diabetes Brother    • Hyperlipidemia Brother    • Vesicoureteral reflux Brother    • Heart disease Brother    • Irritable bowel syndrome Brother    • Hypertension Brother    • Migraines Brother    • Thyroid disease Brother    • Pancreatic cancer Brother 54   • Migraines Daughter    • Asthma Son    • Migraines Son    • Diabetes Maternal Grandmother    • Vaginal cancer Maternal Grandmother 48   • Infertility Paternal Grandmother    • Arthritis Maternal Aunt    • Anxiety disorder Maternal Aunt    • Depression Maternal Aunt    • Diabetes Maternal Aunt    • Vaginal cancer Maternal Aunt 50   • Fibroids Paternal Aunt    • No Known Problems Maternal Grandfather    • No Known Problems Paternal Grandfather    • No Known Problems Maternal Aunt    • No Known Problems Maternal Aunt    • No Known Problems Maternal Aunt       Social History     Socioeconomic History   • Marital status: /Civil Union     Spouse name: Not on file   • Number of children: Not on file   • Years of education: Not on file   • Highest education level: Not on file   Occupational History   • Occupation: CNA   Tobacco Use   • Smoking status: Never   • Smokeless tobacco: Never   Vaping Use   • Vaping Use: Never used   Substance and Sexual Activity   • Alcohol use: Not Currently   • Drug use: No   • Sexual activity: Not Currently     Birth control/protection: Surgical   Other Topics Concern   • Not on file   Social History Narrative   • Not on file     Social Determinants of Health     Financial Resource Strain: Not on file   Food Insecurity: Not on file   Transportation Needs: Not on file   Physical Activity: Not on file   Stress: Not on file   Social Connections: Not on file   Intimate Partner Violence: Not on file   Housing Stability: Not on file        Current Outpatient Medications:   •  nystatin (MYCOSTATIN) powder, Apply topically 2 (two) times a day, Disp: 15 g, Rfl: 1  •  calcium-vitamin D 250-100 MG-UNIT per tablet, Take 1 tablet by mouth 2 (two) times a day, Disp: , Rfl:   •  cyanocobalamin (VITAMIN B-12) 100 mcg tablet, Take by mouth daily  , Disp: , Rfl:   •  DULoxetine (CYMBALTA) 30 mg delayed release capsule, , Disp: , Rfl:   •  gabapentin (Neurontin) 600 MG tablet, Take 1 tablet (600 mg total) by mouth 3 (three) times a day, Disp: 270 tablet, Rfl: 3  •  halobetasol (ULTRAVATE) 0 05 % ointment, Apply topically 2 (two) times a day For up to 2 weeks  , Disp: 30 g, Rfl: 2  •  hydrOXYzine HCL (ATARAX) 50 mg tablet, , Disp: , Rfl:   •  levothyroxine (Euthyrox) 50 mcg tablet, Take 1 tablet (50 mcg total) by mouth daily in the early morning, Disp: 90 tablet, Rfl: 1  •  methenamine hippurate (HIPREX) 1 g tablet, 3 g 2 (two) times a day with meals, Disp: , Rfl:   •  methocarbamol (ROBAXIN) 500 mg tablet, Take 1 tablet (500 mg total) by mouth every 8 (eight) hours as needed for muscle spasms, Disp: 90 tablet, Rfl: 0  •  Multiple Vitamins-Minerals (ZINC PO), Take by mouth  , Disp: , Rfl:   •  nystatin-triamcinolone (MYCOLOG-II) ointment, Apply to vulva twice daily for two weeks and to skin rash as needed  , Disp: 30 g, Rfl: 3  •  omeprazole (PriLOSEC) 40 MG capsule, Take 40 mg by mouth daily, Disp: , Rfl:   •  ondansetron (ZOFRAN) 4 mg tablet, Take 1 tablet (4 mg total) by mouth every 8 (eight) hours as needed for nausea or vomiting, Disp: 12 tablet, Rfl: 0  •  oxybutynin (DITROPAN) 5 mg tablet, Take 1 tablet (5 mg total) by mouth 3 (three) times a day as needed (bladder spasm/pain) (Patient not taking: Reported on 11/21/2022), Disp: 30 tablet, Rfl: 0  •  PARoxetine (PAXIL) 40 MG tablet, Take by mouth (Patient not taking: Reported on 11/21/2022), Disp: , Rfl:   •  phenazopyridine (PYRIDIUM) 100 mg tablet, Take 1 tablet (100 mg total) by mouth 3 (three) times a day as needed for bladder spasms (painful urination) (Patient not taking: Reported on 11/21/2022), Disp: 20 tablet, Rfl: 0  •  SUMAtriptan (IMITREX) 50 mg tablet, Take 1 tablet (50 mg total) by mouth once as needed for migraine for up to 1 dose May repeat in 2 hours  No more than 200 mg per day , Disp: 10 tablet, Rfl: 2    Review of Systems   Constitutional: Negative for appetite change, chills, fatigue, fever and unexpected weight change  HENT: Negative for congestion, hearing loss and postnasal drip  Respiratory: Negative for cough and shortness of breath  Cardiovascular: Negative for leg swelling  Musculoskeletal: Negative for gait problem  Skin: Positive for rash (Lower abdomen)  Negative for wound  Neurological: Negative for numbness  Hematological: Does not bruise/bleed easily  Psychiatric/Behavioral: Negative  Objective:  /98   Pulse 72   Temp 98 1 °F (36 7 °C)   Resp 18   LMP  (LMP Unknown)   Pain Score: 0-No pain     Physical Exam  Vitals and nursing note reviewed  Constitutional:       Appearance: Normal appearance  She is well-developed and normal weight  HENT:      Head: Normocephalic and atraumatic  Cardiovascular:      Rate and Rhythm: Normal rate  Pulmonary:      Effort: Pulmonary effort is normal    Abdominal:          Comments: Area of irritation in the abdominal fold  No open areas at this time  Recently improved intertrigo  Skin:     General: Skin is warm and dry  Findings: Wound present  Neurological:      Mental Status: She is alert and oriented to person, place, and time  Psychiatric:         Attention and Perception: Attention normal          Mood and Affect: Mood and affect normal          Behavior: Behavior is cooperative  Cognition and Memory: Cognition normal                        Wound Instructions:  Wash carefully and use baby wipes  Air dry  Nystatin powder twice a day  Return to clinic as needed  Consider using Interdry especially in the summertime when there is increased perspiration  Luciana Matthews MD, CHT, CWS    Portions of the record may have been created with voice recognition software  Occasional wrong word or "sound alike" substitutions may have occurred due to the inherent limitations of voice recognition software  Read the chart carefully and recognize, using context, where substitutions have occurred

## 2023-01-03 ENCOUNTER — TELEPHONE (OUTPATIENT)
Dept: PAIN MEDICINE | Facility: CLINIC | Age: 63
End: 2023-01-03

## 2023-01-03 NOTE — TELEPHONE ENCOUNTER
Caller: patient     Doctor: Dee Mccauley    Reason for call: pt is currently experiencing chronic low back pain & requesting an injection   Please advise, pratik    Call back#:

## 2023-01-04 NOTE — TELEPHONE ENCOUNTER
S/w the patient to review and clarify  She stated she has been having a lot of pain in her lower back area but it is more the Lower back and flank area  She has seen urology and it is not a kidney stone  Inquired about her last injection of the B/L SIJI's and she stated they did help but this is a different area  Inquired about radiation down to the legs because her last L4 TFESI was done last 2/22  She stated she has no radiation down her legs currently  She just has trouble sitting and bending  She has no OVS scheduled  What do you think [facility] [address]? She denies taking blood thinning medications  Please advise   Thanks

## 2023-01-06 ENCOUNTER — TELEPHONE (OUTPATIENT)
Dept: PAIN MEDICINE | Facility: CLINIC | Age: 63
End: 2023-01-06

## 2023-01-06 NOTE — TELEPHONE ENCOUNTER
Will discuss further treatment options at her OV scheduled on 1/11  Can offer a sooner appt if patient feels necessary

## 2023-01-06 NOTE — TELEPHONE ENCOUNTER
Caller: Karmen     Doctor: Derek Doan    Reason for call: patient requesting pain meds or muscle relaxer 10/10 unable to sleep at night please advise     Call back#: [phone]

## 2023-01-06 NOTE — TELEPHONE ENCOUNTER
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

## 2023-01-07 ENCOUNTER — DOCUMENTATION (OUTPATIENT)
Dept: PAIN MEDICINE | Facility: CLINIC | Age: 63
End: 2023-01-07

## 2023-01-07 ENCOUNTER — NURSE TRIAGE (OUTPATIENT)
Dept: OTHER | Facility: OTHER | Age: 63
End: 2023-01-07

## 2023-01-07 DIAGNOSIS — G89.29 CHRONIC LOW BACK PAIN, UNSPECIFIED BACK PAIN LATERALITY, UNSPECIFIED WHETHER SCIATICA PRESENT: Primary | ICD-10-CM

## 2023-01-07 DIAGNOSIS — M54.50 CHRONIC LOW BACK PAIN, UNSPECIFIED BACK PAIN LATERALITY, UNSPECIFIED WHETHER SCIATICA PRESENT: Primary | ICD-10-CM

## 2023-01-07 RX ORDER — METHOCARBAMOL 500 MG/1
500 TABLET, FILM COATED ORAL EVERY 8 HOURS PRN
Qty: 21 TABLET | Refills: 0 | Status: SHIPPED | OUTPATIENT
Start: 2023-01-07 | End: 2023-01-11 | Stop reason: SDUPTHER

## 2023-01-07 NOTE — TELEPHONE ENCOUNTER
called in stating she is in 10/10 back pain  called into office about a refill on methocarbamol yesterday   Note from 1/6/23 states script can be sent in until pt appointment on 1/11 23  refill not received by pharmacy  pt taking Tylenol and not alleviating pain  pt requesting refill or alternative until seen in office   TC out to on call  Seven day supply sent into pharmacy  Called pt  verbalized understanding

## 2023-01-07 NOTE — TELEPHONE ENCOUNTER
Regarding: Rx not sent to pharmacy  ----- Message from Bela Dickinson sent at 1/7/2023  4:25 PM EST -----  "My spine and pain Dr  Was supposed to send methocarbamol yesterday  I was supposed to take this medication over the weekend  Nothing has been sent to my pharmacy  "

## 2023-01-07 NOTE — TELEPHONE ENCOUNTER
Reason for Disposition  • [1] Prescription not at pharmacy AND [2] was prescribed by PCP recently (Exception: triager has access to EMR and prescription is recorded there  Go to Home Care and confirm for pharmacy )    Answer Assessment - Initial Assessment Questions  1  DRUG NAME: "What medicine do you need to have refilled?"      Robaxin  2  REFILLS REMAINING: "How many refills are remaining?" (Note: The label on the medicine or pill bottle will show how many refills are remaining  If there are no refills remaining, then a renewal may be needed )  0    4  PRESCRIBING HCP: "Who prescribed it?" Reason: If prescribed by specialist, call should be referred to that group        S&P    Protocols used: MEDICATION REFILL AND RENEWAL CALL-ADULT-

## 2023-01-09 ENCOUNTER — TELEPHONE (OUTPATIENT)
Dept: UROLOGY | Facility: AMBULATORY SURGERY CENTER | Age: 63
End: 2023-01-09

## 2023-01-09 DIAGNOSIS — R10.9 FLANK PAIN: Primary | ICD-10-CM

## 2023-01-09 NOTE — TELEPHONE ENCOUNTER
Patient under the care of Dr Austen Ontiveros in [facility] from [facility] calling that she needs the current order for CT abdomen pelvis w wo constrast changed to CT abdomen pelvis w/out contrast     [facility] requesting a call back at [phone] once the new order is placed

## 2023-01-09 NOTE — TELEPHONE ENCOUNTER
7 day script of Methocarbamol was sent by on call  Pt has an ov on 1/11 and should have enough medication to get her to her appt

## 2023-01-09 NOTE — PROGRESS NOTES
Assessment:  1  Chronic pain syndrome    2  Lumbar radiculopathy    3  Cervical radiculopathy    4  DDD (degenerative disc disease), lumbar    5  Lumbar spondylosis    6  DDD (degenerative disc disease), cervical    7  Sacroiliitis ([facility] [address] )    8  Spinal stenosis of lumbar region, unspecified whether neurogenic claudication present    9  Chronic low back pain, unspecified back pain laterality, unspecified whether sciatica present        Plan:  1  We will schedule the patient for bilateral L3-5 medial branch blocks with intention of moving forward towards radiofrequency ablation if there is an appropriate diagnostic response  The initial blocks will be performed with 2% lidocaine and if an appropriate response is obtained upon review of the patient's pain diary, a confirmatory block will be scheduled  Complete risks and benefits including bleeding, infection, tissue reaction, nerve injury and allergic reaction were discussed  The patient was agreeable and verbalized an understanding  2  Patient was encouraged to move forward with EMG of the lower extremities as ordered  3  Patient may continue methocarbamol 500 mg every 8 hours as needed  This medication was refilled today  4  We will avoid NSAIDs secondary to CKD and history of previous GI ulcer  5  New gabapentin and duloxetine as prescribed  6  Continue with home exercise program  7  Follow-up pending results of medial branch blocks    History of Present Illness: The patient is a 57 y o  female a history of fibromyalgia last seen on 11/01/2022 who presents for a follow up office visit in regards to chronic low back pain  Patient currently denies any symptoms radiating into the lower extremities  She intermittently has pain into her lower extremities however has never responded to epidural steroid injections  She recently had bilateral SI joint injections on November 22, 2022 with relief for about 4 weeks before the pain returned to baseline    An EMG of the lower extremities has been ordered however not scheduled  She continues on gabapentin 800 mg 3 times a day  She is no longer taking duloxetine and had side effects to pregabalin  She is unable to take NSAIDs secondary to CKD  She rates her pain a 10 out of 10 on the numeric pain rating scale  She constantly has pain throughout the day which is described as burning, dull aching, sharp and throbbing    I have personally reviewed and/or updated the patient's past medical history, past surgical history, family history, social history, current medications, allergies, and vital signs today  Review of Systems:    Review of Systems   Respiratory: Negative for shortness of breath  Cardiovascular: Negative for chest pain  Gastrointestinal: Positive for nausea and vomiting  Negative for constipation and diarrhea  Musculoskeletal: Positive for gait problem  Negative for arthralgias, joint swelling and myalgias  Skin: Negative for rash  Neurological: Positive for dizziness  Negative for seizures and weakness  All other systems reviewed and are negative          Past Medical History:   Diagnosis Date   • Anxiety    • Arthritis     Last assessed 5/3/2011   • Ortega's esophagus    • Cancer ([facility] [address] )     ovarian cancer at age 28 yo- REMOVAL  B/L   • Colon polyp    • DDD (degenerative disc disease), lumbar    • Depression    • Fall     5/2020 right knee meniscus tear- OR repair today 8/3/2020   • Fibromyalgia    • Fibromyalgia, primary    • Fracture of one or more phalanges of foot    • GERD (gastroesophageal reflux disease)    • H/O bladder infections     chronic   • Hypertension    • Kidney infection     occasional   • Migraines    • Obesity    • Ovarian cancer [facility]) 1987    age 27   • Polyneuropathy     Last assessed 6/23/2016 knees to feet   • PONV (postoperative nausea and vomiting)     Patient requests to be pre-medicated prior to surgery prior to surgery   • PONV (postoperative nausea and vomiting) 8/3/2020   • Renal disorder    • Spinal stenosis    • Vertigo    • Wears glasses     reading       Past Surgical History:   Procedure Laterality Date   • ABDOMINAL SURGERY  1986    several   • BACK SURGERY  1990   • CATARACT EXTRACTION, BILATERAL     • CHOLECYSTECTOMY     • COLONOSCOPY     • FOOT FRACTURE SURGERY Bilateral 2004    x 5  surgeries   • KIDNEY SURGERY  1974    at age 13 yo   • KNEE SURGERY Right    • AL ARTHRS KNE SURG W/MENISCECTOMY MED/LAT W/SHVG Right 8/3/2020    Procedure: [address];  Surgeon: Inge Ortega MD;  [facility];  Service: Orthopedics   • AL LAPAROSCOPY SURG CHOLECYSTECTOMY N/A 11/27/2020    Procedure: Gayleen Card;  Surgeon: Jackelin Varela MD;  [facility];  Service: General   • TOTAL ABDOMINAL HYSTERECTOMY  1987    age 27   • TOTAL ABDOMINAL HYSTERECTOMY W/ BILATERAL SALPINGOOPHORECTOMY Bilateral 1987    age 27       Family History   Problem Relation Age of Onset   • Heart disease Mother    • Cancer Mother    • Diabetes Mother    • Arthritis Mother    • Anxiety disorder Mother    • Bleeding Disorder Mother    • Clotting disorder Mother    • Depression Mother    • Hyperlipidemia Mother    • Irritable bowel syndrome Mother    • Hypertension Mother    • Obesity Mother    • Osteoporosis Mother    • Vaginal cancer Mother 27   • Thyroid disease Mother    • Stroke Mother    • Diabetes Father    • Arthritis Father    • Hyperlipidemia Father    • Vesicoureteral reflux Father    • Heart disease Father    • Hypertension Father    • Migraines Father    • Obesity Father    • Hypertension Family    • Arthritis Family    • Arthritis Brother    • Anxiety disorder Brother    • Diabetes Brother    • Hyperlipidemia Brother    • Vesicoureteral reflux Brother    • Heart disease Brother    • Irritable bowel syndrome Brother    • Hypertension Brother    • Migraines Brother    • Thyroid disease Brother    • Pancreatic cancer Brother 54   • Migraines Daughter    • Asthma Son    • Migraines Son    • Diabetes Maternal Grandmother    • Vaginal cancer Maternal Grandmother 48   • Infertility Paternal Grandmother    • Arthritis Maternal Aunt    • Anxiety disorder Maternal Aunt    • Depression Maternal Aunt    • Diabetes Maternal Aunt    • Vaginal cancer Maternal Aunt 50   • Fibroids Paternal Aunt    • No Known Problems Maternal Grandfather    • No Known Problems Paternal Grandfather    • No Known Problems Maternal Aunt    • No Known Problems Maternal Aunt    • No Known Problems Maternal Aunt        Social History     Occupational History   • Occupation: CNA   Tobacco Use   • Smoking status: Never   • Smokeless tobacco: Never   Vaping Use   • Vaping Use: Never used   Substance and Sexual Activity   • Alcohol use: Not Currently   • Drug use: No   • Sexual activity: Not Currently     Birth control/protection: Surgical         Current Outpatient Medications:   •  calcium-vitamin D 250-100 MG-UNIT per tablet, Take 1 tablet by mouth 2 (two) times a day, Disp: , Rfl:   •  cyanocobalamin (VITAMIN B-12) 100 mcg tablet, Take by mouth daily  , Disp: , Rfl:   •  DULoxetine (CYMBALTA) 30 mg delayed release capsule, , Disp: , Rfl:   •  gabapentin (Neurontin) 600 MG tablet, Take 1 tablet (600 mg total) by mouth 3 (three) times a day, Disp: 270 tablet, Rfl: 3  •  methenamine hippurate (HIPREX) 1 g tablet, 3 g 2 (two) times a day with meals, Disp: , Rfl:   •  methocarbamol (ROBAXIN) 500 mg tablet, Take 1 tablet (500 mg total) by mouth every 8 (eight) hours as needed for muscle spasms, Disp: 90 tablet, Rfl: 2  •  nystatin (MYCOSTATIN) powder, Apply topically 2 (two) times a day, Disp: 15 g, Rfl: 1  •  nystatin-triamcinolone (MYCOLOG-II) ointment, Apply to vulva twice daily for two weeks and to skin rash as needed  , Disp: 30 g, Rfl: 3  •  ondansetron (ZOFRAN) 4 mg tablet, Take 1 tablet (4 mg total) by mouth every 8 (eight) hours as needed for nausea or vomiting, Disp: 12 tablet, Rfl: 0  •  SUMAtriptan (IMITREX) 50 mg tablet, Take 1 tablet (50 mg total) by mouth once as needed for migraine for up to 1 dose May repeat in 2 hours  No more than 200 mg per day , Disp: 10 tablet, Rfl: 2  •  halobetasol (ULTRAVATE) 0 05 % ointment, Apply topically 2 (two) times a day For up to 2 weeks  , Disp: 30 g, Rfl: 2  •  hydrOXYzine HCL (ATARAX) 50 mg tablet, , Disp: , Rfl:   •  levothyroxine (Euthyrox) 50 mcg tablet, Take 1 tablet (50 mcg total) by mouth daily in the early morning, Disp: 90 tablet, Rfl: 1  •  Multiple Vitamins-Minerals (ZINC PO), Take by mouth  , Disp: , Rfl:   •  omeprazole (PriLOSEC) 40 MG capsule, Take 40 mg by mouth daily, Disp: , Rfl:   •  oxybutynin (DITROPAN) 5 mg tablet, Take 1 tablet (5 mg total) by mouth 3 (three) times a day as needed (bladder spasm/pain) (Patient not taking: Reported on 11/21/2022), Disp: 30 tablet, Rfl: 0  •  PARoxetine (PAXIL) 40 MG tablet, Take by mouth (Patient not taking: Reported on 11/21/2022), Disp: , Rfl:   •  phenazopyridine (PYRIDIUM) 100 mg tablet, Take 1 tablet (100 mg total) by mouth 3 (three) times a day as needed for bladder spasms (painful urination) (Patient not taking: Reported on 11/21/2022), Disp: 20 tablet, Rfl: 0  No current facility-administered medications for this visit  Allergies   Allergen Reactions   • Morphine      Acts not like herself and feels agitated and restless   • Penicillins Hives     Tongue swells       Physical Exam:    /82   Pulse 81   Ht 5' 7" (1 702 m)   LMP  (LMP Unknown)   BMI 31 32 kg/m²     Constitutional:normal, well developed, well nourished, alert, in no distress and non-toxic and no overt pain behavior    Eyes:anicteric  HEENT:grossly intact  Neck:supple, symmetric, trachea midline and no masses   Pulmonary:even and unlabored  Cardiovascular:No edema or pitting edema present  Skin:Normal without rashes or lesions and well hydrated  Psychiatric:Mood and affect appropriate  Neurologic:Cranial Nerves II-XII grossly intact  Musculoskeletal:antalgic gait    Positive lumbar facet loading maneuvers      Imaging  FL spine and pain procedure    (Results Pending)         Orders Placed This Encounter   Procedures   • FL spine and pain procedure

## 2023-01-10 ENCOUNTER — APPOINTMENT (OUTPATIENT)
Dept: LAB | Age: 63
End: 2023-01-10

## 2023-01-10 ENCOUNTER — TELEPHONE (OUTPATIENT)
Dept: RADIOLOGY | Facility: HOSPITAL | Age: 63
End: 2023-01-10

## 2023-01-10 DIAGNOSIS — R10.9 FLANK PAIN: ICD-10-CM

## 2023-01-10 LAB
ANION GAP SERPL CALCULATED.3IONS-SCNC: 6 MMOL/L (ref 4–13)
BUN SERPL-MCNC: 9 MG/DL (ref 5–25)
CALCIUM SERPL-MCNC: 9.5 MG/DL (ref 8.3–10.1)
CHLORIDE SERPL-SCNC: 105 MMOL/L (ref 96–108)
CO2 SERPL-SCNC: 27 MMOL/L (ref 21–32)
CREAT SERPL-MCNC: 1.02 MG/DL (ref 0.6–1.3)
GFR SERPL CREATININE-BSD FRML MDRD: 58 ML/MIN/1.73SQ M
GLUCOSE SERPL-MCNC: 97 MG/DL (ref 65–140)
POTASSIUM SERPL-SCNC: 4.2 MMOL/L (ref 3.5–5.3)
SODIUM SERPL-SCNC: 138 MMOL/L (ref 135–147)

## 2023-01-10 NOTE — TELEPHONE ENCOUNTER
called Karmen and notified her that BMP was placed in systemand she needs to go today prior to her Ct scan tomorrow she understood

## 2023-01-10 NOTE — TELEPHONE ENCOUNTER
Dot Lance from the [address] Scan Dept at [facility] called in stating pt has an appt tomorrow and she needs blood work

## 2023-01-11 ENCOUNTER — HOSPITAL ENCOUNTER (OUTPATIENT)
Dept: RADIOLOGY | Facility: HOSPITAL | Age: 63
Discharge: HOME | End: 2023-01-11
Attending: UROLOGY

## 2023-01-11 ENCOUNTER — OFFICE VISIT (OUTPATIENT)
Dept: PAIN MEDICINE | Facility: CLINIC | Age: 63
End: 2023-01-11

## 2023-01-11 VITALS
DIASTOLIC BLOOD PRESSURE: 82 MMHG | HEIGHT: 67 IN | HEART RATE: 81 BPM | SYSTOLIC BLOOD PRESSURE: 124 MMHG | BODY MASS INDEX: 31.32 KG/M2

## 2023-01-11 DIAGNOSIS — M50.30 DDD (DEGENERATIVE DISC DISEASE), CERVICAL: ICD-10-CM

## 2023-01-11 DIAGNOSIS — M54.50 CHRONIC LOW BACK PAIN, UNSPECIFIED BACK PAIN LATERALITY, UNSPECIFIED WHETHER SCIATICA PRESENT: ICD-10-CM

## 2023-01-11 DIAGNOSIS — M48.061 SPINAL STENOSIS OF LUMBAR REGION, UNSPECIFIED WHETHER NEUROGENIC CLAUDICATION PRESENT: ICD-10-CM

## 2023-01-11 DIAGNOSIS — G89.4 CHRONIC PAIN SYNDROME: Primary | ICD-10-CM

## 2023-01-11 DIAGNOSIS — M54.12 CERVICAL RADICULOPATHY: ICD-10-CM

## 2023-01-11 DIAGNOSIS — G89.29 CHRONIC LOW BACK PAIN, UNSPECIFIED BACK PAIN LATERALITY, UNSPECIFIED WHETHER SCIATICA PRESENT: ICD-10-CM

## 2023-01-11 DIAGNOSIS — R10.9 FLANK PAIN: ICD-10-CM

## 2023-01-11 DIAGNOSIS — M46.1 SACROILIITIS (HCC): ICD-10-CM

## 2023-01-11 DIAGNOSIS — M47.816 LUMBAR SPONDYLOSIS: ICD-10-CM

## 2023-01-11 DIAGNOSIS — M51.36 DDD (DEGENERATIVE DISC DISEASE), LUMBAR: ICD-10-CM

## 2023-01-11 DIAGNOSIS — M54.16 LUMBAR RADICULOPATHY: ICD-10-CM

## 2023-01-11 RX ORDER — METHOCARBAMOL 500 MG/1
500 TABLET, FILM COATED ORAL EVERY 8 HOURS PRN
Qty: 90 TABLET | Refills: 2 | Status: SHIPPED | OUTPATIENT
Start: 2023-01-11

## 2023-01-25 ENCOUNTER — HOSPITAL ENCOUNTER (OUTPATIENT)
Dept: RADIOLOGY | Facility: CLINIC | Age: 63
Discharge: HOME | End: 2023-01-25
Admitting: ANESTHESIOLOGY

## 2023-01-25 VITALS
OXYGEN SATURATION: 95 % | DIASTOLIC BLOOD PRESSURE: 88 MMHG | SYSTOLIC BLOOD PRESSURE: 125 MMHG | TEMPERATURE: 98.7 F | HEART RATE: 92 BPM | RESPIRATION RATE: 20 BRPM

## 2023-01-25 DIAGNOSIS — M47.816 LUMBAR SPONDYLOSIS: ICD-10-CM

## 2023-01-25 RX ORDER — LIDOCAINE HYDROCHLORIDE 10 MG/ML
10 INJECTION, SOLUTION EPIDURAL; INFILTRATION; INTRACAUDAL; PERINEURAL ONCE
Status: COMPLETED | OUTPATIENT
Start: 2023-01-25 | End: 2023-01-25

## 2023-01-25 RX ADMIN — LIDOCAINE HYDROCHLORIDE 4 ML: 10 INJECTION, SOLUTION EPIDURAL; INFILTRATION; INTRACAUDAL; PERINEURAL at 10:50

## 2023-01-25 RX ADMIN — LIDOCAINE HYDROCHLORIDE 3 ML: 20 INJECTION, SOLUTION EPIDURAL; INFILTRATION; INTRACAUDAL; PERINEURAL at 10:50

## 2023-01-25 NOTE — H&P
History of Present Illness: The patient is a 57 y o  female who presents with complaints of low back pain      Past Medical History:   Diagnosis Date   • Anxiety    • Arthritis     Last assessed 5/3/2011   • Ortega's esophagus    • Cancer ([address] )     ovarian cancer at age 26 yo- REMOVAL  B/L   • Colon polyp    • DDD (degenerative disc disease), lumbar    • Depression    • Fall     5/2020 right knee meniscus tear- OR repair today 8/3/2020   • Fibromyalgia    • Fibromyalgia, primary    • Fracture of one or more phalanges of foot    • GERD (gastroesophageal reflux disease)    • H/O bladder infections     chronic   • Hypertension    • Kidney infection     occasional   • Migraines    • Obesity    • Ovarian cancer [facility]) 1987    age 27   • Polyneuropathy     Last assessed 6/23/2016 knees to feet   • PONV (postoperative nausea and vomiting)     Patient requests to be pre-medicated prior to surgery prior to surgery   • PONV (postoperative nausea and vomiting) 8/3/2020   • Renal disorder    • Spinal stenosis    • Vertigo    • Wears glasses     reading       Past Surgical History:   Procedure Laterality Date   • ABDOMINAL SURGERY  1986    several   • BACK SURGERY  1990   • CATARACT EXTRACTION, BILATERAL     • CHOLECYSTECTOMY     • COLONOSCOPY     • FOOT FRACTURE SURGERY Bilateral 2004    x 5  surgeries   • KIDNEY SURGERY  1974    at age 13 yo   • KNEE SURGERY Right    • [place] ARTHRS KNE SURG W/MENISCECTOMY MED/LAT W/SHVG Right 8/3/2020    Procedure: KNEE ARTHROSCOPY,PARTIAL MEDIAL & LATERAL MENISECTOMIES;  Surgeon: Luis Pantoja MD;  [facility];  Service: Orthopedics   • NV LAPAROSCOPY SURG CHOLECYSTECTOMY N/A 11/27/2020    Procedure: LAPAROSCOPIC CHOLECYSTECTOMY;  Surgeon: Bill Thomas MD;  [facility];  Service: General   • TOTAL ABDOMINAL HYSTERECTOMY  1987    age 27   • TOTAL ABDOMINAL HYSTERECTOMY W/ BILATERAL SALPINGOOPHORECTOMY Bilateral 1987    age 27         Current Outpatient Medications:   • calcium-vitamin D 250-100 MG-UNIT per tablet, Take 1 tablet by mouth 2 (two) times a day, Disp: , Rfl:   •  cyanocobalamin (VITAMIN B-12) 100 mcg tablet, Take by mouth daily  , Disp: , Rfl:   •  DULoxetine (CYMBALTA) 30 mg delayed release capsule, , Disp: , Rfl:   •  gabapentin (Neurontin) 600 MG tablet, Take 1 tablet (600 mg total) by mouth 3 (three) times a day, Disp: 270 tablet, Rfl: 3  •  halobetasol (ULTRAVATE) 0 05 % ointment, Apply topically 2 (two) times a day For up to 2 weeks  , Disp: 30 g, Rfl: 2  •  hydrOXYzine HCL (ATARAX) 50 mg tablet, , Disp: , Rfl:   •  levothyroxine (Euthyrox) 50 mcg tablet, Take 1 tablet (50 mcg total) by mouth daily in the early morning, Disp: 90 tablet, Rfl: 1  •  methenamine hippurate (HIPREX) 1 g tablet, 3 g 2 (two) times a day with meals, Disp: , Rfl:   •  methocarbamol (ROBAXIN) 500 mg tablet, Take 1 tablet (500 mg total) by mouth every 8 (eight) hours as needed for muscle spasms, Disp: 90 tablet, Rfl: 2  •  Multiple Vitamins-Minerals (ZINC PO), Take by mouth  , Disp: , Rfl:   •  nystatin (MYCOSTATIN) powder, Apply topically 2 (two) times a day, Disp: 15 g, Rfl: 1  •  nystatin-triamcinolone (MYCOLOG-II) ointment, Apply to vulva twice daily for two weeks and to skin rash as needed  , Disp: 30 g, Rfl: 3  •  omeprazole (PriLOSEC) 40 MG capsule, Take 40 mg by mouth daily, Disp: , Rfl:   •  ondansetron (ZOFRAN) 4 mg tablet, Take 1 tablet (4 mg total) by mouth every 8 (eight) hours as needed for nausea or vomiting, Disp: 12 tablet, Rfl: 0  •  oxybutynin (DITROPAN) 5 mg tablet, Take 1 tablet (5 mg total) by mouth 3 (three) times a day as needed (bladder spasm/pain) (Patient not taking: Reported on 11/21/2022), Disp: 30 tablet, Rfl: 0  •  PARoxetine (PAXIL) 40 MG tablet, Take by mouth (Patient not taking: Reported on 11/21/2022), Disp: , Rfl:   •  phenazopyridine (PYRIDIUM) 100 mg tablet, Take 1 tablet (100 mg total) by mouth 3 (three) times a day as needed for bladder spasms (painful urination) (Patient not taking: Reported on 11/21/2022), Disp: 20 tablet, Rfl: 0  •  SUMAtriptan (IMITREX) 50 mg tablet, Take 1 tablet (50 mg total) by mouth once as needed for migraine for up to 1 dose May repeat in 2 hours  No more than 200 mg per day , Disp: 10 tablet, Rfl: 2    Current Facility-Administered Medications:   •  lidocaine (PF) (XYLOCAINE-MPF) 1 % injection 10 mL, 10 mL, Other, Once, Karla Buchanan, DO  •  lidocaine (PF) (XYLOCAINE-MPF) 2 % injection 4 mL, 4 mL, Other, Once, Shun Watson,     Allergies   Allergen Reactions   • Morphine      Acts not like herself and feels agitated and restless   • Penicillins Hives     Tongue swells       Physical Exam:   Vitals:    01/25/23 1018   BP: 136/80   Pulse: 81   Resp: 18   Temp: 98 7 °F (37 1 °C)   SpO2: 93%     General: Awake, Alert, Oriented x 3, Mood and affect appropriate  Respiratory: Respirations even and unlabored  Cardiovascular: Peripheral pulses intact; no edema  Musculoskeletal Exam: Bilateral lumbar paraspinals tender to palpation    ASA Score: 2         Assessment:   1   Lumbar spondylosis        Plan: B/L L3-5 MBB

## 2023-01-25 NOTE — DISCHARGE INSTRUCTIONS

## 2023-01-26 DIAGNOSIS — M47.816 LUMBAR SPONDYLOSIS: Primary | ICD-10-CM

## 2023-02-08 ENCOUNTER — HOSPITAL ENCOUNTER (OUTPATIENT)
Dept: RADIOLOGY | Facility: CLINIC | Age: 63
Discharge: HOME | End: 2023-02-08
Admitting: ANESTHESIOLOGY

## 2023-02-08 VITALS
OXYGEN SATURATION: 96 % | TEMPERATURE: 97.8 F | SYSTOLIC BLOOD PRESSURE: 136 MMHG | RESPIRATION RATE: 20 BRPM | DIASTOLIC BLOOD PRESSURE: 84 MMHG | HEART RATE: 86 BPM

## 2023-02-08 DIAGNOSIS — M47.816 LUMBAR SPONDYLOSIS: ICD-10-CM

## 2023-02-08 RX ORDER — BUPIVACAINE HYDROCHLORIDE 5 MG/ML
30 INJECTION, SOLUTION EPIDURAL; INTRACAUDAL ONCE
Status: COMPLETED | OUTPATIENT
Start: 2023-02-08 | End: 2023-02-08

## 2023-02-08 RX ORDER — LIDOCAINE HYDROCHLORIDE 10 MG/ML
10 INJECTION, SOLUTION EPIDURAL; INFILTRATION; INTRACAUDAL; PERINEURAL ONCE
Status: COMPLETED | OUTPATIENT
Start: 2023-02-08 | End: 2023-02-08

## 2023-02-08 RX ADMIN — BUPIVACAINE HYDROCHLORIDE 3 ML: 5 INJECTION, SOLUTION EPIDURAL; INTRACAUDAL; PERINEURAL at 10:38

## 2023-02-08 RX ADMIN — LIDOCAINE HYDROCHLORIDE 4 ML: 10 INJECTION, SOLUTION EPIDURAL; INFILTRATION; INTRACAUDAL; PERINEURAL at 10:38

## 2023-02-08 NOTE — H&P
History of Present Illness: The patient is a 57 y o  female who presents with complaints of low back pain      Past Medical History:   Diagnosis Date   • Anxiety    • Arthritis     Last assessed 5/3/2011   • Ortega's esophagus    • Cancer ([address] )     ovarian cancer at age 26 yo- REMOVAL  B/L   • Colon polyp    • DDD (degenerative disc disease), lumbar    • Depression    • Fall     5/2020 right knee meniscus tear- OR repair today 8/3/2020   • Fibromyalgia    • Fibromyalgia, primary    • Fracture of one or more phalanges of foot    • GERD (gastroesophageal reflux disease)    • H/O bladder infections     chronic   • Hypertension    • Kidney infection     occasional   • Migraines    • Obesity    • Ovarian cancer [facility]) 1987    age 27   • Polyneuropathy     Last assessed 6/23/2016 knees to feet   • PONV (postoperative nausea and vomiting)     Patient requests to be pre-medicated prior to surgery prior to surgery   • PONV (postoperative nausea and vomiting) 8/3/2020   • Renal disorder    • Spinal stenosis    • Vertigo    • Wears glasses     reading       Past Surgical History:   Procedure Laterality Date   • ABDOMINAL SURGERY  1986    several   • BACK SURGERY  1990   • CATARACT EXTRACTION, BILATERAL     • CHOLECYSTECTOMY     • COLONOSCOPY     • FOOT FRACTURE SURGERY Bilateral 2004    x 5  surgeries   • KIDNEY SURGERY  1974    at age 13 yo   • KNEE SURGERY Right    • MA ARTHRS KNE SURG W/MENISCECTOMY MED/LAT W/SHVG Right 8/3/2020    Procedure: KNEE ARTHROSCOPY,PARTIAL MEDIAL & LATERAL MENISECTOMIES;  Surgeon: Iftikhar Dueñas MD;  [facility];  Service: Orthopedics   • MA LAPAROSCOPY SURG CHOLECYSTECTOMY N/A 11/27/2020    Procedure: LAPAROSCOPIC CHOLECYSTECTOMY;  Surgeon: Heather Mcmullen MD;  [facility];  Service: General   • TOTAL ABDOMINAL HYSTERECTOMY  1987    age 27   • TOTAL ABDOMINAL HYSTERECTOMY W/ BILATERAL SALPINGOOPHORECTOMY Bilateral 1987    age 27         Current Outpatient Medications:   • calcium-vitamin D 250-100 MG-UNIT per tablet, Take 1 tablet by mouth 2 (two) times a day, Disp: , Rfl:   •  cyanocobalamin (VITAMIN B-12) 100 mcg tablet, Take by mouth daily  , Disp: , Rfl:   •  DULoxetine (CYMBALTA) 30 mg delayed release capsule, , Disp: , Rfl:   •  gabapentin (Neurontin) 600 MG tablet, Take 1 tablet (600 mg total) by mouth 3 (three) times a day, Disp: 270 tablet, Rfl: 3  •  halobetasol (ULTRAVATE) 0 05 % ointment, Apply topically 2 (two) times a day For up to 2 weeks  , Disp: 30 g, Rfl: 2  •  hydrOXYzine HCL (ATARAX) 50 mg tablet, , Disp: , Rfl:   •  levothyroxine (Euthyrox) 50 mcg tablet, Take 1 tablet (50 mcg total) by mouth daily in the early morning, Disp: 90 tablet, Rfl: 1  •  methenamine hippurate (HIPREX) 1 g tablet, 3 g 2 (two) times a day with meals, Disp: , Rfl:   •  methocarbamol (ROBAXIN) 500 mg tablet, Take 1 tablet (500 mg total) by mouth every 8 (eight) hours as needed for muscle spasms, Disp: 90 tablet, Rfl: 2  •  Multiple Vitamins-Minerals (ZINC PO), Take by mouth  , Disp: , Rfl:   •  nystatin (MYCOSTATIN) powder, Apply topically 2 (two) times a day, Disp: 15 g, Rfl: 1  •  nystatin-triamcinolone (MYCOLOG-II) ointment, Apply to vulva twice daily for two weeks and to skin rash as needed  , Disp: 30 g, Rfl: 3  •  omeprazole (PriLOSEC) 40 MG capsule, Take 40 mg by mouth daily, Disp: , Rfl:   •  ondansetron (ZOFRAN) 4 mg tablet, Take 1 tablet (4 mg total) by mouth every 8 (eight) hours as needed for nausea or vomiting, Disp: 12 tablet, Rfl: 0  •  oxybutynin (DITROPAN) 5 mg tablet, Take 1 tablet (5 mg total) by mouth 3 (three) times a day as needed (bladder spasm/pain) (Patient not taking: Reported on 11/21/2022), Disp: 30 tablet, Rfl: 0  •  PARoxetine (PAXIL) 40 MG tablet, Take by mouth (Patient not taking: Reported on 11/21/2022), Disp: , Rfl:   •  phenazopyridine (PYRIDIUM) 100 mg tablet, Take 1 tablet (100 mg total) by mouth 3 (three) times a day as needed for bladder spasms (painful urination) (Patient not taking: Reported on 11/21/2022), Disp: 20 tablet, Rfl: 0  •  SUMAtriptan (IMITREX) 50 mg tablet, Take 1 tablet (50 mg total) by mouth once as needed for migraine for up to 1 dose May repeat in 2 hours  No more than 200 mg per day , Disp: 10 tablet, Rfl: 2    Allergies   Allergen Reactions   • Morphine      Acts not like herself and feels agitated and restless   • Penicillins Hives     Tongue swells       Physical Exam:   Vitals:    02/08/23 1015   BP: 124/83   Pulse: 85   Resp: 20   Temp: 97 8 °F (36 6 °C)   SpO2: 95%     General: Awake, Alert, Oriented x 3, Mood and affect appropriate  Respiratory: Respirations even and unlabored  Cardiovascular: Peripheral pulses intact; no edema  Musculoskeletal Exam: Bilateral lumbar paraspinals tender to palpation    ASA Score: 2    Patient/Chart Verification  Patient ID Verified: Verbal  ID Band Applied: No  Consents Confirmed: Procedural  H&P( within 30 days) Verified: To be obtained in the Pre-Procedure area  Interval H&P(within 24 hr) Complete (required for Outpatients and Surgery Admit only): To be obtained in the Pre-Procedure area  Allergies Reviewed: Yes  Anticoag/NSAID held?: No  Currently on antibiotics?: No  Pre-op Lab/Test Results Available: N/A  Pregnancy Lab Collected: N/A comment    Assessment:   1   Lumbar spondylosis        Plan: B/L L3,L4,L5 MBB # 2

## 2023-02-08 NOTE — DISCHARGE INSTRUCTIONS

## 2023-02-09 DIAGNOSIS — M47.816 LUMBAR SPONDYLOSIS: Primary | ICD-10-CM

## 2023-02-14 ENCOUNTER — TELEPHONE (OUTPATIENT)
Dept: FAMILY MEDICINE CLINIC | Facility: CLINIC | Age: 63
End: 2023-02-14

## 2023-02-21 ENCOUNTER — TELEPHONE (OUTPATIENT)
Dept: UROLOGY | Facility: MEDICAL CENTER | Age: 63
End: 2023-02-21

## 2023-02-21 ENCOUNTER — APPOINTMENT (OUTPATIENT)
Dept: LAB | Facility: IMAGING CENTER | Age: 63
End: 2023-02-21

## 2023-02-21 DIAGNOSIS — R39.9 UTI SYMPTOMS: Primary | ICD-10-CM

## 2023-02-21 DIAGNOSIS — R39.9 UTI SYMPTOMS: ICD-10-CM

## 2023-02-21 LAB
BACTERIA UR QL AUTO: ABNORMAL /HPF
BILIRUB UR QL STRIP: NEGATIVE
CLARITY UR: CLEAR
COLOR UR: ABNORMAL
GLUCOSE UR STRIP-MCNC: NEGATIVE MG/DL
HGB UR QL STRIP.AUTO: NEGATIVE
HYALINE CASTS #/AREA URNS LPF: ABNORMAL /LPF
KETONES UR STRIP-MCNC: NEGATIVE MG/DL
LEUKOCYTE ESTERASE UR QL STRIP: NEGATIVE
MUCOUS THREADS UR QL AUTO: ABNORMAL
NITRITE UR QL STRIP: NEGATIVE
NON-SQ EPI CELLS URNS QL MICRO: ABNORMAL /HPF
PH UR STRIP.AUTO: 6 [PH]
PROT UR STRIP-MCNC: ABNORMAL MG/DL
RBC #/AREA URNS AUTO: ABNORMAL /HPF
SP GR UR STRIP.AUTO: 1.02 (ref 1–1.03)
UROBILINOGEN UR STRIP-ACNC: <2 MG/DL
WBC #/AREA URNS AUTO: ABNORMAL /HPF

## 2023-02-21 NOTE — TELEPHONE ENCOUNTER
Patient Of Dr Carmen Quesada at [facility]    Patient is having back pain, pain on her bladder and burning when urinating  She thinks she is getting another uti  She would like to have a urine test done      Patient can be reached at [phone]

## 2023-02-21 NOTE — TELEPHONE ENCOUNTER
Called Karmen and told her to try moist heating pad and ibuprofen for her back pain   Orders were place for urine studies

## 2023-02-22 ENCOUNTER — TELEPHONE (OUTPATIENT)
Dept: PAIN MEDICINE | Facility: CLINIC | Age: 63
End: 2023-02-22

## 2023-02-22 ENCOUNTER — HOSPITAL ENCOUNTER (OUTPATIENT)
Dept: RADIOLOGY | Facility: CLINIC | Age: 63
Discharge: HOME | End: 2023-02-22
Admitting: ANESTHESIOLOGY

## 2023-02-22 VITALS
SYSTOLIC BLOOD PRESSURE: 158 MMHG | TEMPERATURE: 98 F | RESPIRATION RATE: 20 BRPM | DIASTOLIC BLOOD PRESSURE: 99 MMHG | HEART RATE: 92 BPM | OXYGEN SATURATION: 92 %

## 2023-02-22 DIAGNOSIS — M47.816 LUMBAR SPONDYLOSIS: ICD-10-CM

## 2023-02-22 LAB — BACTERIA UR CULT: NORMAL

## 2023-02-22 RX ORDER — LIDOCAINE HYDROCHLORIDE 10 MG/ML
10 INJECTION, SOLUTION EPIDURAL; INFILTRATION; INTRACAUDAL; PERINEURAL ONCE
Status: COMPLETED | OUTPATIENT
Start: 2023-02-22 | End: 2023-02-22

## 2023-02-22 RX ORDER — BUPIVACAINE HYDROCHLORIDE 5 MG/ML
30 INJECTION, SOLUTION EPIDURAL; INTRACAUDAL ONCE
Status: COMPLETED | OUTPATIENT
Start: 2023-02-22 | End: 2023-02-22

## 2023-02-22 RX ADMIN — LIDOCAINE HYDROCHLORIDE 9 ML: 10 INJECTION, SOLUTION EPIDURAL; INFILTRATION; INTRACAUDAL; PERINEURAL at 14:37

## 2023-02-22 RX ADMIN — LIDOCAINE HYDROCHLORIDE 3 ML: 20 INJECTION, SOLUTION EPIDURAL; INFILTRATION; INTRACAUDAL; PERINEURAL at 14:37

## 2023-02-22 RX ADMIN — BUPIVACAINE HYDROCHLORIDE 3 ML: 5 INJECTION, SOLUTION EPIDURAL; INTRACAUDAL; PERINEURAL at 14:37

## 2023-02-22 NOTE — H&P
History of Present Illness: The patient is a 57 y o  female who presents with complaints of low back pain      Past Medical History:   Diagnosis Date   • Anxiety    • Arthritis     Last assessed 5/3/2011   • Ortega's esophagus    • Cancer ([address] )     ovarian cancer at age 26 yo- REMOVAL  B/L   • Colon polyp    • DDD (degenerative disc disease), lumbar    • Depression    • Fall     5/2020 right knee meniscus tear- OR repair today 8/3/2020   • Fibromyalgia    • Fibromyalgia, primary    • Fracture of one or more phalanges of foot    • GERD (gastroesophageal reflux disease)    • H/O bladder infections     chronic   • Hypertension    • Kidney infection     occasional   • Migraines    • Obesity    • Ovarian cancer [facility]) 1987    age 27   • Polyneuropathy     Last assessed 6/23/2016 knees to feet   • PONV (postoperative nausea and vomiting)     Patient requests to be pre-medicated prior to surgery prior to surgery   • PONV (postoperative nausea and vomiting) 8/3/2020   • Renal disorder    • Spinal stenosis    • Vertigo    • Wears glasses     reading       Past Surgical History:   Procedure Laterality Date   • ABDOMINAL SURGERY  1986    several   • BACK SURGERY  1990   • CATARACT EXTRACTION, BILATERAL     • CHOLECYSTECTOMY     • COLONOSCOPY     • FOOT FRACTURE SURGERY Bilateral 2004    x 5  surgeries   • KIDNEY SURGERY  1974    at age 13 yo   • KNEE SURGERY Right    • [facility] ARTHRS KNE SURG W/MENISCECTOMY MED/LAT W/SHVG Right 8/3/2020    Procedure: KNEE ARTHROSCOPY,PARTIAL MEDIAL & LATERAL MENISECTOMIES;  Surgeon: Cahcorta Monroy MD;  [facility];  Service: Orthopedics   • VA LAPAROSCOPY SURG CHOLECYSTECTOMY N/A 11/27/2020    Procedure: LAPAROSCOPIC CHOLECYSTECTOMY;  Surgeon: Mihaela Colmenares MD;  [facility];  Service: General   • TOTAL ABDOMINAL HYSTERECTOMY  1987    age 27   • TOTAL ABDOMINAL HYSTERECTOMY W/ BILATERAL SALPINGOOPHORECTOMY Bilateral 1987    age 27         Current Outpatient Medications:   • calcium-vitamin D 250-100 MG-UNIT per tablet, Take 1 tablet by mouth 2 (two) times a day, Disp: , Rfl:   •  cyanocobalamin (VITAMIN B-12) 100 mcg tablet, Take by mouth daily  , Disp: , Rfl:   •  DULoxetine (CYMBALTA) 30 mg delayed release capsule, , Disp: , Rfl:   •  gabapentin (Neurontin) 600 MG tablet, Take 1 tablet (600 mg total) by mouth 3 (three) times a day, Disp: 270 tablet, Rfl: 3  •  halobetasol (ULTRAVATE) 0 05 % ointment, Apply topically 2 (two) times a day For up to 2 weeks  , Disp: 30 g, Rfl: 2  •  hydrOXYzine HCL (ATARAX) 50 mg tablet, , Disp: , Rfl:   •  levothyroxine (Euthyrox) 50 mcg tablet, Take 1 tablet (50 mcg total) by mouth daily in the early morning, Disp: 90 tablet, Rfl: 1  •  methenamine hippurate (HIPREX) 1 g tablet, 3 g 2 (two) times a day with meals, Disp: , Rfl:   •  methocarbamol (ROBAXIN) 500 mg tablet, Take 1 tablet (500 mg total) by mouth every 8 (eight) hours as needed for muscle spasms, Disp: 90 tablet, Rfl: 2  •  Multiple Vitamins-Minerals (ZINC PO), Take by mouth  , Disp: , Rfl:   •  nystatin (MYCOSTATIN) powder, Apply topically 2 (two) times a day, Disp: 15 g, Rfl: 1  •  nystatin-triamcinolone (MYCOLOG-II) ointment, Apply to vulva twice daily for two weeks and to skin rash as needed  , Disp: 30 g, Rfl: 3  •  omeprazole (PriLOSEC) 40 MG capsule, Take 40 mg by mouth daily, Disp: , Rfl:   •  ondansetron (ZOFRAN) 4 mg tablet, Take 1 tablet (4 mg total) by mouth every 8 (eight) hours as needed for nausea or vomiting, Disp: 12 tablet, Rfl: 0  •  oxybutynin (DITROPAN) 5 mg tablet, Take 1 tablet (5 mg total) by mouth 3 (three) times a day as needed (bladder spasm/pain) (Patient not taking: Reported on 11/21/2022), Disp: 30 tablet, Rfl: 0  •  PARoxetine (PAXIL) 40 MG tablet, Take by mouth (Patient not taking: Reported on 11/21/2022), Disp: , Rfl:   •  phenazopyridine (PYRIDIUM) 100 mg tablet, Take 1 tablet (100 mg total) by mouth 3 (three) times a day as needed for bladder spasms (painful urination) (Patient not taking: Reported on 11/21/2022), Disp: 20 tablet, Rfl: 0  •  SUMAtriptan (IMITREX) 50 mg tablet, Take 1 tablet (50 mg total) by mouth once as needed for migraine for up to 1 dose May repeat in 2 hours  No more than 200 mg per day , Disp: 10 tablet, Rfl: 2    Allergies   Allergen Reactions   • Morphine      Acts not like herself and feels agitated and restless   • Penicillins Hives     Tongue swells       Physical Exam:   Vitals:    02/22/23 1416   BP: 148/86   Pulse: 94   Resp: 18   Temp: 98 °F (36 7 °C)   SpO2: 93%     General: Awake, Alert, Oriented x 3, Mood and affect appropriate  Respiratory: Respirations even and unlabored  Cardiovascular: Peripheral pulses intact; no edema  Musculoskeletal Exam: Left lumbar paraspinals tender to palpation    ASA Score: 3    Patient/Chart Verification  Patient ID Verified: Verbal  ID Band Applied: No  Consents Confirmed: Procedural, To be obtained in the Pre-Procedure area  H&P( within 30 days) Verified: To be obtained in the Pre-Procedure area  Interval H&P(within 24 hr) Complete (required for Outpatients and Surgery Admit only): To be obtained in the Pre-Procedure area  Allergies Reviewed: Yes  Anticoag/NSAID held?: NA  Currently on antibiotics?: No  Pregnancy denied?: NA    Assessment:   1   Lumbar spondylosis        Plan: LEFT L3,L4,L5 RFA

## 2023-02-22 NOTE — DISCHARGE INSTR - LAB
Medial Branch Radiofrequency Ablation     WHAT YOU NEED TO KNOW:   Medial branch radiofrequency ablation (RFA) is a procedure used to treat facet joint pain in your neck, mid back, or lower back  Facet joints are found at the back of each vertebra  A needle electrode is used to send electrical currents to the nerves in your facet joint  The electrical currents create heat that damages the nerve so it cannot send pain signals  ACTIVITY  Do not drive or operate machinery today  No strenuous activity today - bending, lifting, etc   You may shower today, but do not sit in a tub of water  Resume normal activities tomorrow as tolerated  CARE OF THE INJECTION SITE  If you have soreness or pain, apply ice to the area today (20 minutes on/20 minutes off)  Starting tomorrow, you may use warm, moist heat or ice if needed  Notify [facility] if you have any of the following: redness, drainage, swelling, or fever above 100°F     SPECIAL INSTRUCTIONS  Our office will call you tomorrow for a progress report and make an appointment for a follow up visit in 4 weeks  If you feel a sunburn-like sensation in the area of your procedure, call our office  MEDICATIONS  Continue to take all routine medications  Our office may have instructed you to hold some medications  As no general anesthesia was used in today's procedure, you should not experience any side effects related to anesthesia  If you have a problem specifically related to your procedure, please call our office at [phone]  Problems not related to your procedure should be directed to your primary care physician

## 2023-02-22 NOTE — TELEPHONE ENCOUNTER
Patient is S/P a Left L3-L5 RFA c/ JW on 2/22/23  Next RFA scheduled for 3/15/23  Please call on 2/23/23 post RFA     Thanks

## 2023-02-27 NOTE — TELEPHONE ENCOUNTER
Patient is calling to find out results of urine tests  If she cannot  her phone she asks that a detailed message be left explaining her results

## 2023-02-27 NOTE — TELEPHONE ENCOUNTER
Please inform patient results of urine testing were negative for infection or microhematuria  Patient may take pyridium as needed, and can increase water intake

## 2023-02-27 NOTE — TELEPHONE ENCOUNTER
LM for Karmen that urine testing was negative for infection and blood in urine    She can take pyridium as needed and she needs to increase water intake

## 2023-03-02 DIAGNOSIS — E03.9 HYPOTHYROIDISM, UNSPECIFIED TYPE: ICD-10-CM

## 2023-03-03 RX ORDER — LEVOTHYROXINE SODIUM 0.05 MG/1
50 TABLET ORAL
Qty: 90 TABLET | Refills: 1 | Status: SHIPPED | OUTPATIENT
Start: 2023-03-03

## 2023-03-10 ENCOUNTER — NURSE TRIAGE (OUTPATIENT)
Dept: OTHER | Facility: OTHER | Age: 63
End: 2023-03-10

## 2023-03-10 ENCOUNTER — TELEPHONE (OUTPATIENT)
Dept: OTHER | Facility: OTHER | Age: 63
End: 2023-03-10

## 2023-03-10 DIAGNOSIS — N39.0 URINARY TRACT INFECTION WITHOUT HEMATURIA, SITE UNSPECIFIED: Primary | ICD-10-CM

## 2023-03-10 NOTE — TELEPHONE ENCOUNTER
Patient called in to report severe pain with urination, low grade fever, and right flank pain  Patient requested new urine labs to be ordered  Reports urine culture on 2/21 showed mixed contaminants and triage RN advised patient to make sure she clean self well before providing specimen  Patient will go to [facility] Saturday 3/11 to provide specimen  Patient had a quesiton for provider regarding medication she took previously to prevent frequent UTI's, methenamine hippurate 1 gr tablet twice a day  Patient reports not being on the medication for "a while" and is inquiring if she should resume taking the medication  Please follow up with patient for lab results and medication inquiry  Reason for Disposition  • All other females with painful urination, or patient wants to be seen    Additional Information  • Pain or burning with passing urine (urination) and female    Answer Assessment - Initial Assessment Questions  1  SYMPTOM: "What's the main symptom you're concerned about?" (e g , frequency, incontinence)      pain with urination, slight fever  2  ONSET: "When did the symptoms  start?"      Through January and February 2023  3  PAIN: "Is there any pain?" If Yes, ask: "How bad is it?" (Scale: 1-10; mild, moderate, severe)      Severe (8)  4  CAUSE: "What do you think is causing the symptoms?"      UTI  5  OTHER SYMPTOMS: "Do you have any other symptoms?" (e g , fever, flank pain, blood in urine, pain with urination)      Right flank pain, does not see blood at this time in urine  6  PREGNANCY: "Is there any chance you are pregnant?" "When was your last menstrual period?"     Post menopausal    Answer Assessment - Initial Assessment Questions  1   SEVERITY: "How bad is the pain?"  (e g , Scale 1-10; mild, moderate, or severe)    - MILD (1-3): complains slightly about urination hurting    - MODERATE (4-7): interferes with normal activities      - SEVERE (8-10): excruciating, unwilling or unable to urinate because of the pain       Severe (8)  2  FREQUENCY: "How many times have you had painful urination today?"       Every episode  3  PATTERN: "Is pain present every time you urinate or just sometimes?"      Every time she voids  4  ONSET: "When did the painful urination start?"       Since January and through February 2023; lab tests 2/20/23  5  FEVER: "Do you have a fever?" If Yes, ask: "What is your temperature, how was it measured, and when did it start?"      Low grade  6  PAST UTI: "Have you had a urine infection before?" If Yes, ask: "When was the last time?" and "What happened that time?"       Yes  7  CAUSE: "What do you think is causing the painful urination?"  (e g , UTI, scratch, Herpes sore)      UTI  8  OTHER SYMPTOMS: "Do you have any other symptoms?" (e g , flank pain, vaginal discharge, genital sores, urgency, blood in urine)      Right flank pain  9   PREGNANCY: "Is there any chance you are pregnant?" "When was your last menstrual period?"      Post menopausal    Protocols used: URINATION PAIN - FEMALE-ADULT-OH, URINARY SYMPTOMS-ADULT-OH

## 2023-03-11 ENCOUNTER — APPOINTMENT (OUTPATIENT)
Dept: LAB | Age: 63
End: 2023-03-11

## 2023-03-11 LAB
BACTERIA UR QL AUTO: ABNORMAL /HPF
BILIRUB UR QL STRIP: NEGATIVE
CLARITY UR: CLEAR
COLOR UR: YELLOW
GLUCOSE UR STRIP-MCNC: NEGATIVE MG/DL
HGB UR QL STRIP.AUTO: NEGATIVE
HYALINE CASTS #/AREA URNS LPF: ABNORMAL /LPF
KETONES UR STRIP-MCNC: NEGATIVE MG/DL
LEUKOCYTE ESTERASE UR QL STRIP: ABNORMAL
NITRITE UR QL STRIP: NEGATIVE
NON-SQ EPI CELLS URNS QL MICRO: ABNORMAL /HPF
PH UR STRIP.AUTO: 6.5 [PH]
PROT UR STRIP-MCNC: ABNORMAL MG/DL
RBC #/AREA URNS AUTO: ABNORMAL /HPF
SP GR UR STRIP.AUTO: 1.02 (ref 1–1.03)
UROBILINOGEN UR STRIP-ACNC: 3 MG/DL
WBC #/AREA URNS AUTO: ABNORMAL /HPF

## 2023-03-12 LAB — BACTERIA UR CULT: NORMAL

## 2023-03-13 ENCOUNTER — TELEPHONE (OUTPATIENT)
Dept: OTHER | Facility: OTHER | Age: 63
End: 2023-03-13

## 2023-03-13 RX ORDER — METHENAMINE HIPPURATE 1000 MG/1
1 TABLET ORAL 2 TIMES DAILY WITH MEALS
Qty: 60 TABLET | Refills: 3 | Status: SHIPPED | OUTPATIENT
Start: 2023-03-13

## 2023-03-13 NOTE — TELEPHONE ENCOUNTER
Recent urine culture was negative for infection  CT scan from 1/11/2023 showed no evidence of renal calculi or hydronephrosis  Recommend patient increase water intake and she may take OTC Azo as needed  Methenamine refill sent to her pharmacy

## 2023-03-15 ENCOUNTER — TELEPHONE (OUTPATIENT)
Dept: PAIN MEDICINE | Facility: MEDICAL CENTER | Age: 63
End: 2023-03-15

## 2023-03-15 ENCOUNTER — HOSPITAL ENCOUNTER (OUTPATIENT)
Dept: RADIOLOGY | Facility: CLINIC | Age: 63
Discharge: HOME | End: 2023-03-15

## 2023-03-15 ENCOUNTER — TELEPHONE (OUTPATIENT)
Dept: PAIN MEDICINE | Facility: CLINIC | Age: 63
End: 2023-03-15

## 2023-03-15 VITALS
TEMPERATURE: 97.2 F | OXYGEN SATURATION: 92 % | HEART RATE: 78 BPM | SYSTOLIC BLOOD PRESSURE: 153 MMHG | RESPIRATION RATE: 20 BRPM | DIASTOLIC BLOOD PRESSURE: 85 MMHG

## 2023-03-15 DIAGNOSIS — M47.816 LUMBAR SPONDYLOSIS: ICD-10-CM

## 2023-03-15 RX ORDER — LIDOCAINE HYDROCHLORIDE 10 MG/ML
10 INJECTION, SOLUTION EPIDURAL; INFILTRATION; INTRACAUDAL; PERINEURAL ONCE
Status: COMPLETED | OUTPATIENT
Start: 2023-03-15 | End: 2023-03-15

## 2023-03-15 RX ORDER — BUPIVACAINE HYDROCHLORIDE 5 MG/ML
30 INJECTION, SOLUTION EPIDURAL; INTRACAUDAL ONCE
Status: COMPLETED | OUTPATIENT
Start: 2023-03-15 | End: 2023-03-15

## 2023-03-15 RX ADMIN — BUPIVACAINE HYDROCHLORIDE 3 ML: 5 INJECTION, SOLUTION EPIDURAL; INTRACAUDAL; PERINEURAL at 14:41

## 2023-03-15 RX ADMIN — LIDOCAINE HYDROCHLORIDE 3 ML: 20 INJECTION, SOLUTION EPIDURAL; INFILTRATION; INTRACAUDAL; PERINEURAL at 14:41

## 2023-03-15 RX ADMIN — LIDOCAINE HYDROCHLORIDE 9 ML: 10 INJECTION, SOLUTION EPIDURAL; INFILTRATION; INTRACAUDAL; PERINEURAL at 14:41

## 2023-03-15 NOTE — DISCHARGE INSTRUCTIONS
Medial Branch Radiofrequency Ablation     WHAT YOU NEED TO KNOW:   Medial branch radiofrequency ablation (RFA) is a procedure used to treat facet joint pain in your neck, mid back, or lower back  Facet joints are found at the back of each vertebra  A needle electrode is used to send electrical currents to the nerves in your facet joint  The electrical currents create heat that damages the nerve so it cannot send pain signals  ACTIVITY  Do not drive or operate machinery today  No strenuous activity today - bending, lifting, etc   You may shower today, but do not sit in a tub of water  Resume normal activities tomorrow as tolerated  CARE OF THE INJECTION SITE  If you have soreness or pain, apply ice to the area today (20 minutes on/20 minutes off)  Starting tomorrow, you may use warm, moist heat or ice if needed  Notify [facility] if you have any of the following: redness, drainage, swelling, or fever above 100°F     SPECIAL INSTRUCTIONS  Our office will call you tomorrow for a progress report and make an appointment for a follow up visit in 4 weeks  If you feel a sunburn-like sensation in the area of your procedure, call our office  MEDICATIONS  Continue to take all routine medications  Our office may have instructed you to hold some medications  As no general anesthesia was used in today's procedure, you should not experience any side effects related to anesthesia  If you have a problem specifically related to your procedure, please call our office at [phone]  Problems not related to your procedure should be directed to your primary care physician

## 2023-03-15 NOTE — H&P
History of Present Illness: The patient is a 57 y o  female who presents with complaints of low back pain      Past Medical History:   Diagnosis Date   • Anxiety    • Arthritis     Last assessed 5/3/2011   • Ortega's esophagus    • Cancer ([address] )     ovarian cancer at age 26 yo- REMOVAL  B/L   • Colon polyp    • DDD (degenerative disc disease), lumbar    • Depression    • Fall     5/2020 right knee meniscus tear- OR repair today 8/3/2020   • Fibromyalgia    • Fibromyalgia, primary    • Fracture of one or more phalanges of foot    • GERD (gastroesophageal reflux disease)    • H/O bladder infections     chronic   • Hypertension    • Kidney infection     occasional   • Migraines    • Obesity    • Ovarian cancer [facility]) 1987    age 27   • Polyneuropathy     Last assessed 6/23/2016 knees to feet   • PONV (postoperative nausea and vomiting)     Patient requests to be pre-medicated prior to surgery prior to surgery   • PONV (postoperative nausea and vomiting) 8/3/2020   • Renal disorder    • Spinal stenosis    • Vertigo    • Wears glasses     reading       Past Surgical History:   Procedure Laterality Date   • ABDOMINAL SURGERY  1986    several   • BACK SURGERY  1990   • CATARACT EXTRACTION, BILATERAL     • CHOLECYSTECTOMY     • COLONOSCOPY     • FOOT FRACTURE SURGERY Bilateral 2004    x 5  surgeries   • KIDNEY SURGERY  1974    at age 13 yo   • KNEE SURGERY Right    • [place] ARTHRS KNE SURG W/MENISCECTOMY MED/LAT W/SHVG Right 8/3/2020    Procedure: KNEE ARTHROSCOPY,PARTIAL MEDIAL & LATERAL MENISECTOMIES;  Surgeon: Sharlotte Castleman, MD;  [facility];  Service: Orthopedics   • [place] LAPAROSCOPY SURG CHOLECYSTECTOMY N/A 11/27/2020    Procedure: LAPAROSCOPIC CHOLECYSTECTOMY;  Surgeon: Praveena Hahn MD;  [facility];  Service: General   • TOTAL ABDOMINAL HYSTERECTOMY  1987    age 27   • TOTAL ABDOMINAL HYSTERECTOMY W/ BILATERAL SALPINGOOPHORECTOMY Bilateral 1987    age 27         Current Outpatient Medications:   • calcium-vitamin D 250-100 MG-UNIT per tablet, Take 1 tablet by mouth 2 (two) times a day, Disp: , Rfl:   •  cyanocobalamin (VITAMIN B-12) 100 mcg tablet, Take by mouth daily  , Disp: , Rfl:   •  DULoxetine (CYMBALTA) 30 mg delayed release capsule, , Disp: , Rfl:   •  gabapentin (Neurontin) 600 MG tablet, Take 1 tablet (600 mg total) by mouth 3 (three) times a day, Disp: 270 tablet, Rfl: 3  •  halobetasol (ULTRAVATE) 0 05 % ointment, Apply topically 2 (two) times a day For up to 2 weeks  , Disp: 30 g, Rfl: 2  •  hydrOXYzine HCL (ATARAX) 50 mg tablet, , Disp: , Rfl:   •  levothyroxine (Euthyrox) 50 mcg tablet, Take 1 tablet (50 mcg total) by mouth daily in the early morning, Disp: 90 tablet, Rfl: 1  •  methenamine hippurate (HIPREX) 1 g tablet, Take 1 tablet (1 g total) by mouth 2 (two) times a day with meals, Disp: 60 tablet, Rfl: 3  •  methocarbamol (ROBAXIN) 500 mg tablet, Take 1 tablet (500 mg total) by mouth every 8 (eight) hours as needed for muscle spasms, Disp: 90 tablet, Rfl: 2  •  Multiple Vitamins-Minerals (ZINC PO), Take by mouth  , Disp: , Rfl:   •  nystatin (MYCOSTATIN) powder, Apply topically 2 (two) times a day, Disp: 15 g, Rfl: 1  •  nystatin-triamcinolone (MYCOLOG-II) ointment, Apply to vulva twice daily for two weeks and to skin rash as needed  , Disp: 30 g, Rfl: 3  •  omeprazole (PriLOSEC) 40 MG capsule, Take 40 mg by mouth daily, Disp: , Rfl:   •  ondansetron (ZOFRAN) 4 mg tablet, Take 1 tablet (4 mg total) by mouth every 8 (eight) hours as needed for nausea or vomiting, Disp: 12 tablet, Rfl: 0  •  oxybutynin (DITROPAN) 5 mg tablet, Take 1 tablet (5 mg total) by mouth 3 (three) times a day as needed (bladder spasm/pain) (Patient not taking: Reported on 11/21/2022), Disp: 30 tablet, Rfl: 0  •  PARoxetine (PAXIL) 40 MG tablet, Take by mouth (Patient not taking: Reported on 11/21/2022), Disp: , Rfl:   •  phenazopyridine (PYRIDIUM) 100 mg tablet, Take 1 tablet (100 mg total) by mouth 3 (three) times a day as needed for bladder spasms (painful urination) (Patient not taking: Reported on 11/21/2022), Disp: 20 tablet, Rfl: 0  •  SUMAtriptan (IMITREX) 50 mg tablet, Take 1 tablet (50 mg total) by mouth once as needed for migraine for up to 1 dose May repeat in 2 hours  No more than 200 mg per day , Disp: 10 tablet, Rfl: 2    Allergies   Allergen Reactions   • Morphine      Acts not like herself and feels agitated and restless   • Penicillins Hives     Tongue swells       Physical Exam:   Vitals:    03/15/23 1419   BP: 135/83   Pulse: 68   Resp: 18   Temp: (!) 97 2 °F (36 2 °C)   SpO2: 96%     General: Awake, Alert, Oriented x 3, Mood and affect appropriate  Respiratory: Respirations even and unlabored  Cardiovascular: Peripheral pulses intact; no edema  Musculoskeletal Exam: Right lumbar paraspinals tender to palpation    ASA Score: 3    Patient/Chart Verification  Patient ID Verified: Verbal  ID Band Applied: No  Consents Confirmed: Procedural, To be obtained in the Pre-Procedure area  Interval H&P(within 24 hr) Complete (required for Outpatients and Surgery Admit only): To be obtained in the Pre-Procedure area  Allergies Reviewed: Yes  Anticoag/NSAID held?: NA  Currently on antibiotics?: No  Pregnancy denied?: NA    Assessment:   1   Lumbar spondylosis        Plan: RIGHT L3,L4,L5 RFA

## 2023-03-15 NOTE — TELEPHONE ENCOUNTER
Patient last seen by Dr Melina Cedillo in December 2022  If she is concerned for possible interstitial cystitis,  would recommend further work-up with cystoscopy with Dr Glynn Zaragoza

## 2023-03-15 NOTE — TELEPHONE ENCOUNTER
Caller: patient    Doctor: Tamika Human    Reason for call: running late, will get there at 215      Call back#:

## 2023-03-15 NOTE — TELEPHONE ENCOUNTER
Spoke with pt and reviewed all results with her  Pt stated she is very unhappy and frustrated because she feels like she has UTI symptoms  Pt acknowledged she has had a "work-up" in the past for interstitial cystitis, but it has been "many, many years"  Pt is requesting further testing to find out what is wrong  Please advise

## 2023-03-16 NOTE — TELEPHONE ENCOUNTER
Pt did well over night no s/s of infection or sunburn sensation  Aware it takes 2 weeks to notice pain relief and 4-6 weeks for full pain effect to be achieved  Aware to medicate as previous for discomfort and may use ice or heat  Confirmed next appt  4/19 with 11:15 arrival   Call if any questions or concerns prior to next appt

## 2023-03-17 ENCOUNTER — OFFICE VISIT (OUTPATIENT)
Dept: FAMILY MEDICINE CLINIC | Facility: CLINIC | Age: 63
End: 2023-03-17

## 2023-03-17 VITALS
OXYGEN SATURATION: 97 % | BODY MASS INDEX: 31.32 KG/M2 | HEIGHT: 67 IN | HEART RATE: 94 BPM | TEMPERATURE: 98.5 F | SYSTOLIC BLOOD PRESSURE: 138 MMHG | DIASTOLIC BLOOD PRESSURE: 86 MMHG

## 2023-03-17 DIAGNOSIS — H81.10 BENIGN PAROXYSMAL POSITIONAL VERTIGO, UNSPECIFIED LATERALITY: Primary | ICD-10-CM

## 2023-03-17 RX ORDER — TRAZODONE HYDROCHLORIDE 100 MG/1
100 TABLET ORAL
COMMUNITY
Start: 2023-03-09

## 2023-03-17 RX ORDER — DULOXETIN HYDROCHLORIDE 60 MG/1
1 CAPSULE, DELAYED RELEASE ORAL DAILY
COMMUNITY
Start: 2023-03-09

## 2023-03-17 NOTE — PROGRESS NOTES
Name: Geni Ratliff      : 1960      MRN: [id number]  Encounter Provider: Giovanny Burden DO  Encounter Date: 3/17/2023   Encounter department: [address]     Chief Complaint   Patient presents with   • Dizziness     X 4 days   BMI Counseling: Body mass index is 31 32 kg/m²  The BMI is above normal  Nutrition recommendations include reducing portion sizes and consuming healthier snacks  Subjective     Dizzy on and off since  Monday - positional   Supine relieves  Had similar 3 weeks ago  3 years ago attended PT for same  Review of Systems   Constitutional: Negative  HENT: Negative  Eyes: Negative  Respiratory: Negative  Cardiovascular: Negative  Gastrointestinal: Negative  Genitourinary: Negative  Musculoskeletal: Negative  Skin: Negative  Neurological: Positive for dizziness  Psychiatric/Behavioral: Negative          Past Medical History:   Diagnosis Date   • Anxiety    • Arthritis     Last assessed 5/3/2011   • Ortega's esophagus    • Cancer ([facility] [address] )     ovarian cancer at age 28 yo- REMOVAL  B/L   • Colon polyp    • DDD (degenerative disc disease), lumbar    • Depression    • Fall     2020 right knee meniscus tear- OR repair today 8/3/2020   • Fibromyalgia    • Fibromyalgia, primary    • Fracture of one or more phalanges of foot    • GERD (gastroesophageal reflux disease)    • H/O bladder infections     chronic   • Hypertension    • Kidney infection     occasional   • Migraines    • Obesity    • Ovarian cancer [facility]) 1987    age 27   • Polyneuropathy     Last assessed 2016 knees to feet   • PONV (postoperative nausea and vomiting)     Patient requests to be pre-medicated prior to surgery prior to surgery   • PONV (postoperative nausea and vomiting) 8/3/2020   • Renal disorder    • Spinal stenosis    • Vertigo    • Wears glasses     reading     Past Surgical History:   Procedure Laterality Date   •  Health system several   • BACK SURGERY  1990   • CATARACT EXTRACTION, BILATERAL     • CHOLECYSTECTOMY     • COLONOSCOPY     • FOOT FRACTURE SURGERY Bilateral 2004    x 5  surgeries   • KIDNEY SURGERY  1974    at age 13 yo   • KNEE SURGERY Right    • [place] ARTHRS KNE SURG W/MENISCECTOMY MED/LAT W/SHVG Right 8/3/2020    Procedure: [address];  Surgeon: Asaf Almeida MD;  [facility];  Service: Orthopedics   • OH LAPAROSCOPY SURG CHOLECYSTECTOMY N/A 11/27/2020    Procedure: Alisa Barney;  Surgeon: Candido Lawton MD;  [facility];  Service: General   • TOTAL ABDOMINAL HYSTERECTOMY  1987    age 27   • TOTAL ABDOMINAL HYSTERECTOMY W/ BILATERAL SALPINGOOPHORECTOMY Bilateral 1987    age 27     Family History   Problem Relation Age of Onset   • Heart disease Mother    • Cancer Mother    • Diabetes Mother    • Arthritis Mother    • Anxiety disorder Mother    • Bleeding Disorder Mother    • Clotting disorder Mother    • Depression Mother    • Hyperlipidemia Mother    • Irritable bowel syndrome Mother    • Hypertension Mother    • Obesity Mother    • Osteoporosis Mother    • Vaginal cancer Mother 27   • Thyroid disease Mother    • Stroke Mother    • Diabetes Father    • Arthritis Father    • Hyperlipidemia Father    • Vesicoureteral reflux Father    • Heart disease Father    • Hypertension Father    • Migraines Father    • Obesity Father    • Hypertension Family    • Arthritis Family    • Arthritis Brother    • Anxiety disorder Brother    • Diabetes Brother    • Hyperlipidemia Brother    • Vesicoureteral reflux Brother    • Heart disease Brother    • Irritable bowel syndrome Brother    • Hypertension Brother    • Migraines Brother    • Thyroid disease Brother    • Pancreatic cancer Brother 54   • Migraines Daughter    • Asthma Son    • Migraines Son    • Diabetes Maternal Grandmother    • Vaginal cancer Maternal Grandmother 48   • Infertility Paternal Grandmother • Arthritis Maternal Aunt    • Anxiety disorder Maternal Aunt    • Depression Maternal Aunt    • Diabetes Maternal Aunt    • Vaginal cancer Maternal Aunt 50   • Fibroids Paternal Aunt    • No Known Problems Maternal Grandfather    • No Known Problems Paternal Grandfather    • No Known Problems Maternal Aunt    • No Known Problems Maternal Aunt    • No Known Problems Maternal Aunt      Social History     Socioeconomic History   • Marital status: /Civil Union     Spouse name: None   • Number of children: None   • Years of education: None   • Highest education level: None   Occupational History   • Occupation: CNA   Tobacco Use   • Smoking status: Never   • Smokeless tobacco: Never   Vaping Use   • Vaping Use: Never used   Substance and Sexual Activity   • Alcohol use: Not Currently   • Drug use: No   • Sexual activity: Not Currently     Birth control/protection: Surgical   Other Topics Concern   • None   Social History Narrative   • None     Social Determinants of Health     Financial Resource Strain: Not on file   Food Insecurity: Not on file   Transportation Needs: Not on file   Physical Activity: Not on file   Stress: Not on file   Social Connections: Not on file   Intimate Partner Violence: Not on file   Housing Stability: Not on file     Current Outpatient Medications on File Prior to Visit   Medication Sig   • calcium-vitamin D 250-100 MG-UNIT per tablet Take 1 tablet by mouth 2 (two) times a day   • cyanocobalamin (VITAMIN B-12) 100 mcg tablet Take by mouth daily     • DULoxetine (CYMBALTA) 30 mg delayed release capsule    • DULoxetine (CYMBALTA) 60 mg delayed release capsule Take 1 capsule by mouth in the morning   • gabapentin (Neurontin) 600 MG tablet Take 1 tablet (600 mg total) by mouth 3 (three) times a day   • halobetasol (ULTRAVATE) 0 05 % ointment Apply topically 2 (two) times a day For up to 2 weeks     • levothyroxine (Euthyrox) 50 mcg tablet Take 1 tablet (50 mcg total) by mouth daily in the early morning   • methenamine hippurate (HIPREX) 1 g tablet Take 1 tablet (1 g total) by mouth 2 (two) times a day with meals   • methocarbamol (ROBAXIN) 500 mg tablet Take 1 tablet (500 mg total) by mouth every 8 (eight) hours as needed for muscle spasms   • Multiple Vitamins-Minerals (ZINC PO) Take by mouth     • nystatin (MYCOSTATIN) powder Apply topically 2 (two) times a day   • nystatin-triamcinolone (MYCOLOG-II) ointment Apply to vulva twice daily for two weeks and to skin rash as needed  • omeprazole (PriLOSEC) 40 MG capsule Take 40 mg by mouth daily   • ondansetron (ZOFRAN) 4 mg tablet Take 1 tablet (4 mg total) by mouth every 8 (eight) hours as needed for nausea or vomiting   • SUMAtriptan (IMITREX) 50 mg tablet Take 1 tablet (50 mg total) by mouth once as needed for migraine for up to 1 dose May repeat in 2 hours  No more than 200 mg per day     • traZODone (DESYREL) 100 mg tablet Take 100 mg by mouth daily at bedtime Take with food   • [DISCONTINUED] hydrOXYzine HCL (ATARAX) 50 mg tablet    • oxybutynin (DITROPAN) 5 mg tablet Take 1 tablet (5 mg total) by mouth 3 (three) times a day as needed (bladder spasm/pain) (Patient not taking: Reported on 11/21/2022)   • PARoxetine (PAXIL) 40 MG tablet Take by mouth (Patient not taking: Reported on 11/21/2022)   • phenazopyridine (PYRIDIUM) 100 mg tablet Take 1 tablet (100 mg total) by mouth 3 (three) times a day as needed for bladder spasms (painful urination) (Patient not taking: Reported on 11/21/2022)     Allergies   Allergen Reactions   • Morphine      Acts not like herself and feels agitated and restless   • Penicillins Hives     Tongue swells     Immunization History   Administered Date(s) Administered   • COVID-19 MODERNA VACC 0 5 ML IM 08/05/2021, 09/02/2021, 02/05/2022   • Tuberculin Skin Test-PPD Intradermal 12/12/2012, 12/26/2012   • Zoster 10/12/2016       Objective     /86 (BP Location: Right arm, Patient Position: Sitting, Cuff Size: Large) Pulse 94   Temp 98 5 °F (36 9 °C) (Temporal)   Ht 5' 7" (1 702 m)   LMP  (LMP Unknown)   SpO2 97%   BMI 31 32 kg/m²     Physical Exam  Constitutional:       Appearance: She is well-developed  HENT:      Head: Normocephalic and atraumatic  Right Ear: Tympanic membrane, ear canal and external ear normal       Left Ear: Tympanic membrane, ear canal and external ear normal       Nose: Nose normal       Mouth/Throat:      Mouth: Mucous membranes are moist       Pharynx: Oropharynx is clear  Eyes:      Conjunctiva/sclera: Conjunctivae normal       Pupils: Pupils are equal, round, and reactive to light  Cardiovascular:      Rate and Rhythm: Normal rate and regular rhythm  Heart sounds: Normal heart sounds  Pulmonary:      Effort: Pulmonary effort is normal       Breath sounds: Normal breath sounds  Musculoskeletal:      Cervical back: Normal range of motion and neck supple  Skin:     General: Skin is warm and dry  Neurological:      Mental Status: She is alert and oriented to person, place, and time  Deep Tendon Reflexes: Reflexes are normal and symmetric  Psychiatric:         Mood and Affect: Mood normal          Behavior: Behavior normal          Thought Content:  Thought content normal          Judgment: Judgment normal        Atha Solar, DO

## 2023-04-18 ENCOUNTER — TELEPHONE (OUTPATIENT)
Dept: PAIN MEDICINE | Facility: CLINIC | Age: 63
End: 2023-04-18

## 2023-04-18 NOTE — TELEPHONE ENCOUNTER
That is not from the procedure considering the last occipital nerve block is almost a year ago. She can be evaluated by her PCP if she cannot make an appointment here.

## 2023-04-18 NOTE — TELEPHONE ENCOUNTER
Caller: patient     Doctor: Remigio Us    Reason for call: patient has swelling on the back of her head where she had the procedure. Needs nurse. Ok to leave [facility].     Call back#: [phone]

## 2023-04-18 NOTE — TELEPHONE ENCOUNTER
S/W pt. Pt stated she had an injection on her head awhile ago (6/22). She has pain at the needle sites on the right side of her head, is swollen and sore. She denies drainage and denies fever. She stated she always scratched both sides at the injection sites b/c it is always itchy- right side worse. She stated she had to cancel her appt with [facility] b/c of the hours. She got a new client and was going to discuss this with [facility]. Please advise.

## 2023-04-24 ENCOUNTER — TELEPHONE (OUTPATIENT)
Dept: OTHER | Facility: OTHER | Age: 63
End: 2023-04-24

## 2023-04-24 NOTE — TELEPHONE ENCOUNTER
Patient called in post request for referral for OV scheduled 4/26/23  Patient reports she was referred to Urology from urology clinic she is currently attending  Patient is confirming OV for 4/26

## 2023-04-26 ENCOUNTER — PROCEDURE VISIT (OUTPATIENT)
Dept: UROLOGY | Facility: CLINIC | Age: 63
End: 2023-04-26

## 2023-04-26 VITALS — SYSTOLIC BLOOD PRESSURE: 130 MMHG | DIASTOLIC BLOOD PRESSURE: 80 MMHG | HEART RATE: 84 BPM

## 2023-04-26 DIAGNOSIS — R39.89 BLADDER PAIN: ICD-10-CM

## 2023-04-26 DIAGNOSIS — N30.10 INTERSTITIAL CYSTITIS: ICD-10-CM

## 2023-04-26 DIAGNOSIS — N39.0 FREQUENT UTI: Primary | ICD-10-CM

## 2023-04-26 LAB
SL AMB  POCT GLUCOSE, UA: NORMAL
SL AMB LEUKOCYTE ESTERASE,UA: NORMAL
SL AMB POCT BILIRUBIN,UA: NORMAL
SL AMB POCT BLOOD,UA: NORMAL
SL AMB POCT CLARITY,UA: CLEAR
SL AMB POCT COLOR,UA: YELLOW
SL AMB POCT KETONES,UA: NORMAL
SL AMB POCT NITRITE,UA: NORMAL
SL AMB POCT PH,UA: NORMAL
SL AMB POCT SPECIFIC GRAVITY,UA: 1.01
SL AMB POCT URINE PROTEIN: NORMAL
SL AMB POCT UROBILINOGEN: NORMAL

## 2023-04-26 RX ORDER — CIPROFLOXACIN 500 MG/1
500 TABLET, FILM COATED ORAL ONCE
Qty: 1 TABLET | Refills: 0 | Status: SHIPPED | OUTPATIENT
Start: 2023-04-26 | End: 2023-04-26

## 2023-04-26 RX ORDER — HYDROXYZINE HYDROCHLORIDE 25 MG/1
25 TABLET, FILM COATED ORAL
Qty: 90 TABLET | Refills: 3 | Status: SHIPPED | OUTPATIENT
Start: 2023-04-26

## 2023-04-26 NOTE — LETTER
2023     Janet Garcia, DO  [address] [facility]  [place] [address]    Patient: Maribel Moyer   YOB: 1960   Date of Visit: 2023       Dear Dr Dionisio Dennis: Thank you for referring Mj Johnson to me for evaluation  Below are my notes for this consultation  If you have questions, please do not hesitate to call me  I look forward to following your patient along with you  Sincerely,        Too Magaña MD        CC: No Recipients  Too Magaña MD  2023 10:26 AM  Sign when Signing Visit  [address] [facility]  [address]  [facility], [address] [place] [id number]  www  [facility]  org      NAME: Karmen Luna  AGE: 57 y o  SEX: female  : 1960   MRN: [id number]    DATE: 2023  TIME: 10:19 AM    Assessment and Plan:    IC/bladder pain- fairly classic presentation  Start Hydroxyzine 25 mg po qhs and continue with Trazodone  Hydrodistension/ Instillations discussed  This is all based on her symptoms  Reassess in 6 months  Chief Complaint   No chief complaint on file  History of Present Illness   47-year-old woman, established patient but new to me-was seen last by Dr Lenka Auguste 2022 with recurrent UTI, fungal panniculitis and persistent flank pain  He recommended wound care consultation for the abdominal panniculitis  She had been seen by Dr Jim Cisneros in the past and tried numerous medications  Here for cystoscopy  She may have had vesicoureteral reflux as a child to the right kidney  She has an atrophic right kidney and has had bladder surgery possibly reimplantation  She recently called being concerned about interstitial cystitis so she was set up for cystoscopy with me  Has hx of cervical CA age 27- had just delivered her 4th baby- had FIONA BSO at that time, became surgically menopausal  No radiation or chemo    She currently has constant bladder pain(pressure/sharp burning) , worse with walking, even sitting sometimes  Pepsi seemed to make it worse, has cut back- was drinking 10 pepsis per day  Has IBS- has BM every time she voids, sometimes painful BMs and causes bladder pain  Has pain that starts mostly in left groin, shoots upward- this occurs suddenly, can even be driving down the road and gets it  Has chronic back pain and has undergone RFA for this  U/a negative today  Nocturia 3x  Sometimes has pressure to void and only voids a small amount  Her daughter has IC and gets hydrodistension  She has had flareups like this that usually last about 6 months then go away over the years  Cystoscopy     Date/Time 4/25/2023 3:54 PM     Performed by  Ha Flores MD     Authorized by Ha Flores MD      Universal Protocol:  Consent: Verbal consent obtained  Written consent obtained  Procedure Details:  Procedure type: cystoscopy    Patient tolerance: Patient tolerated the procedure well with no immediate complications    Additional Procedure Details: Cystoscopy Procedure Note        Pre-operative Diagnosis: Recurrent UTI, possible interstitial cystitis    Post-operative Diagnosis: Same    Procedure: Flexible cystoscopy    Surgeon: Caden Lane MD    Anesthesia: 1% Xylocaine per urethra    EBL: Minimal    Complications: none    Procedure Details   The risks, benefits, complications, treatment options, and expected outcomes were discussed with the patient  The patient concurred with the proposed plan, giving informed consent  Cystoscopy was performed today under local anesthesia, using sterile technique  The patient was placed in the supine position, prepped with Betadine, and draped in the usual sterile fashion  The flexible cystocope was used to inspect both the urethra and bladder    Findings:  Urethra:normal without stricture    Bladder:  Smooth, not trabeculated and there were no stones tumors or other lesions  The orifices were orthotopic and intact  There was pain and pressure with bladder filling             Specimens: None                 Complications:  None           Disposition: To home            Condition:  Stable          The following portions of the patient's history were reviewed and updated as appropriate: allergies, current medications, past family history, past medical history, past social history, past surgical history and problem list   Past Medical History:   Diagnosis Date   • Anxiety    • Arthritis     Last assessed 5/3/2011   • Ortega's esophagus    • Cancer ([address] )     ovarian cancer at age 28 yo- REMOVAL  B/L   • Colon polyp    • DDD (degenerative disc disease), lumbar    • Depression    • Fall     5/2020 right knee meniscus tear- OR repair today 8/3/2020   • Fibromyalgia    • Fibromyalgia, primary    • Fracture of one or more phalanges of foot    • GERD (gastroesophageal reflux disease)    • H/O bladder infections     chronic   • Hypertension    • Kidney infection     occasional   • Migraines    • Obesity    • Ovarian cancer [facility]) 1987    age 27   • Polyneuropathy     Last assessed 6/23/2016 knees to feet   • PONV (postoperative nausea and vomiting)     Patient requests to be pre-medicated prior to surgery prior to surgery   • PONV (postoperative nausea and vomiting) 8/3/2020   • Renal disorder    • Spinal stenosis    • Vertigo    • Wears glasses     reading     Past Surgical History:   Procedure Laterality Date   • ABDOMINAL SURGERY  1986    several   • BACK SURGERY  1990   • CATARACT EXTRACTION, BILATERAL     • CHOLECYSTECTOMY     • COLONOSCOPY     • FOOT FRACTURE SURGERY Bilateral 2004    x 5  surgeries   • KIDNEY SURGERY  1974    at age 13 yo   • KNEE SURGERY Right    • [place] ARTHRS KNE SURG W/MENISCECTOMY MED/LAT W/SHVG Right 8/3/2020    Procedure: KNEE ARTHROSCOPY,PARTIAL MEDIAL & LATERAL MENISECTOMIES;  Surgeon: Asa Genao MD;  [facility];  Service: Orthopedics   • NM LAPAROSCOPY SURG CHOLECYSTECTOMY N/A 11/27/2020    Procedure: LAPAROSCOPIC CHOLECYSTECTOMY;  Surgeon: Marco Álvarez MD;  [facility];  Service: General   • TOTAL ABDOMINAL HYSTERECTOMY  1987    age 27   • TOTAL ABDOMINAL HYSTERECTOMY W/ BILATERAL SALPINGOOPHORECTOMY Bilateral 1987    age 27     shoulder  Review of Systems   Review of Systems   Genitourinary:        As per hpi       Active Problem List     Patient Active Problem List   Diagnosis   • Anxiety   • Ortega esophagus   • Depression   • Lumbar radiculopathy   • DDD (degenerative disc disease), lumbar   • Lumbar spondylosis   • Spinal stenosis of lumbar region   • Other tear of medial meniscus, current injury, right knee, initial encounter   • PONV (postoperative nausea and vomiting)   • Calculus of gallbladder without cholecystitis without obstruction   • Chronic pain syndrome   • DDD (degenerative disc disease), cervical   • Moderate episode of recurrent major depressive disorder (HCC)   • Lichen sclerosus   • Cervical radiculopathy   • GERD (gastroesophageal reflux disease)   • Bilateral occipital neuralgia   • Sacroiliitis (HCC)       Objective   /80   Pulse 84   LMP  (LMP Unknown)     Physical Exam  Vitals reviewed  Constitutional:       Appearance: Normal appearance  HENT:      Head: Normocephalic and atraumatic  Eyes:      Extraocular Movements: Extraocular movements intact  Pulmonary:      Effort: Pulmonary effort is normal    Abdominal:      Palpations: Abdomen is soft  Genitourinary:     General: Normal vulva  Vagina: No vaginal discharge  Comments: Normal introitus  Musculoskeletal:         General: Normal range of motion  Cervical back: Normal range of motion  Skin:     Findings: Rash present  Comments: Lower abdominal rash   Neurological:      General: No focal deficit present  Mental Status: She is alert and oriented to person, place, and time     Psychiatric:         Mood and Affect: Mood normal          Behavior: Behavior normal          Thought Content: Thought content normal          Judgment: Judgment normal              Current Medications     Current Outpatient Medications:   •  calcium-vitamin D 250-100 MG-UNIT per tablet, Take 1 tablet by mouth 2 (two) times a day, Disp: , Rfl:   •  ciprofloxacin (Cipro) 500 mg tablet, Take 1 tablet (500 mg total) by mouth once for 1 dose Take 1 dose after  procedure, Disp: 1 tablet, Rfl: 0  •  cyanocobalamin (VITAMIN B-12) 100 mcg tablet, Take by mouth daily  , Disp: , Rfl:   •  DULoxetine (CYMBALTA) 60 mg delayed release capsule, Take 1 capsule by mouth in the morning, Disp: , Rfl:   •  gabapentin (Neurontin) 600 MG tablet, Take 1 tablet (600 mg total) by mouth 3 (three) times a day, Disp: 270 tablet, Rfl: 3  •  halobetasol (ULTRAVATE) 0 05 % ointment, Apply topically 2 (two) times a day For up to 2 weeks  , Disp: 30 g, Rfl: 2  •  levothyroxine (Euthyrox) 50 mcg tablet, Take 1 tablet (50 mcg total) by mouth daily in the early morning, Disp: 90 tablet, Rfl: 1  •  methocarbamol (ROBAXIN) 500 mg tablet, Take 1 tablet (500 mg total) by mouth every 8 (eight) hours as needed for muscle spasms, Disp: 90 tablet, Rfl: 2  •  Multiple Vitamins-Minerals (ZINC PO), Take by mouth  , Disp: , Rfl:   •  nystatin (MYCOSTATIN) powder, Apply topically 2 (two) times a day, Disp: 15 g, Rfl: 1  •  nystatin-triamcinolone (MYCOLOG-II) ointment, Apply to vulva twice daily for two weeks and to skin rash as needed  , Disp: 30 g, Rfl: 3  •  omeprazole (PriLOSEC) 40 MG capsule, Take 40 mg by mouth daily, Disp: , Rfl:   •  ondansetron (ZOFRAN) 4 mg tablet, Take 1 tablet (4 mg total) by mouth every 8 (eight) hours as needed for nausea or vomiting, Disp: 12 tablet, Rfl: 0  •  SUMAtriptan (IMITREX) 50 mg tablet, Take 1 tablet (50 mg total) by mouth once as needed for migraine for up to 1 dose May repeat in 2 hours    No more than 200 mg per day , Disp: 10 tablet, Rfl: 2  •  traZODone (DESYREL) 100 mg tablet, Take 100 mg by mouth daily at bedtime Take with food, Disp: , Rfl:   •  DULoxetine (CYMBALTA) 30 mg delayed release capsule, , Disp: , Rfl:   •  methenamine hippurate (HIPREX) 1 g tablet, Take 1 tablet (1 g total) by mouth 2 (two) times a day with meals (Patient not taking: Reported on 4/26/2023), Disp: 60 tablet, Rfl: 3  •  oxybutynin (DITROPAN) 5 mg tablet, Take 1 tablet (5 mg total) by mouth 3 (three) times a day as needed (bladder spasm/pain) (Patient not taking: Reported on 11/21/2022), Disp: 30 tablet, Rfl: 0  •  PARoxetine (PAXIL) 40 MG tablet, Take by mouth (Patient not taking: Reported on 11/21/2022), Disp: , Rfl:   •  phenazopyridine (PYRIDIUM) 100 mg tablet, Take 1 tablet (100 mg total) by mouth 3 (three) times a day as needed for bladder spasms (painful urination) (Patient not taking: Reported on 11/21/2022), Disp: 20 tablet, Rfl: 0        Rosa Elena Knox MD

## 2023-05-07 DIAGNOSIS — G89.29 CHRONIC LOW BACK PAIN, UNSPECIFIED BACK PAIN LATERALITY, UNSPECIFIED WHETHER SCIATICA PRESENT: ICD-10-CM

## 2023-05-07 DIAGNOSIS — M54.50 CHRONIC LOW BACK PAIN, UNSPECIFIED BACK PAIN LATERALITY, UNSPECIFIED WHETHER SCIATICA PRESENT: ICD-10-CM

## 2023-05-07 NOTE — TELEPHONE ENCOUNTER
Medication Refill Request     Name methocarbamol (ROBAXIN) 500 mg tablet  Dose/Frequency Take 1 tablet   Quantity 90 tablet   Verified pharmacy   [x]  Verified ordering Provider   [x]  Does patient have enough for the next 3 days?  Yes [] No [x]

## 2023-05-08 RX ORDER — METHOCARBAMOL 500 MG/1
500 TABLET, FILM COATED ORAL EVERY 8 HOURS PRN
Qty: 90 TABLET | Refills: 0 | Status: SHIPPED | OUTPATIENT
Start: 2023-05-08

## 2023-05-08 NOTE — TELEPHONE ENCOUNTER
Pt is requesting a refill of her Methocarbamol 500 mg q 8 hrs prn  Med was last ordered at ov on 1/11/23 with 2 refills  Pt does not have a f/u scheduled at this time  Please advise- Can Methocarbamol be re-ordered?  Thank you

## 2023-05-18 NOTE — TELEPHONE ENCOUNTER
Caller returning call to clinical team.     Connected to [facility]- Connect call to [facility] queue- Route message to provider's clinical support pool   Attempted to call the patient and left a detailed mom in regards to the previous inquiry

## 2023-05-22 ENCOUNTER — TELEPHONE (OUTPATIENT)
Dept: PODIATRY | Facility: CLINIC | Age: 63
End: 2023-05-22

## 2023-05-31 ENCOUNTER — APPOINTMENT (OUTPATIENT)
Dept: RADIOLOGY | Facility: CLINIC | Age: 63
End: 2023-05-31
Payer: MEDICARE

## 2023-05-31 ENCOUNTER — CONSULT (OUTPATIENT)
Dept: PODIATRY | Facility: CLINIC | Age: 63
End: 2023-05-31

## 2023-05-31 VITALS
BODY MASS INDEX: 31.32 KG/M2 | HEART RATE: 79 BPM | DIASTOLIC BLOOD PRESSURE: 86 MMHG | SYSTOLIC BLOOD PRESSURE: 143 MMHG | HEIGHT: 67 IN

## 2023-05-31 DIAGNOSIS — M25.572 PAIN IN JOINT OF LEFT FOOT: ICD-10-CM

## 2023-05-31 DIAGNOSIS — M19.072 PRIMARY OSTEOARTHRITIS OF LEFT FOOT: Primary | ICD-10-CM

## 2023-05-31 PROCEDURE — 73630 X-RAY EXAM OF FOOT: CPT

## 2023-05-31 RX ORDER — TRIAMCINOLONE ACETONIDE 40 MG/ML
20 INJECTION, SUSPENSION INTRA-ARTICULAR; INTRAMUSCULAR
Status: SHIPPED | OUTPATIENT
Start: 2023-05-31

## 2023-05-31 RX ORDER — ROPIVACAINE HYDROCHLORIDE 5 MG/ML
10 INJECTION, SOLUTION EPIDURAL; INFILTRATION; PERINEURAL
Status: SHIPPED | OUTPATIENT
Start: 2023-05-31

## 2023-05-31 RX ADMIN — ROPIVACAINE HYDROCHLORIDE 10 ML: 5 INJECTION, SOLUTION EPIDURAL; INFILTRATION; PERINEURAL at 10:45

## 2023-05-31 RX ADMIN — TRIAMCINOLONE ACETONIDE 20 MG: 40 INJECTION, SUSPENSION INTRA-ARTICULAR; INTRAMUSCULAR at 10:45

## 2023-05-31 NOTE — PROGRESS NOTES
"               PATIENT:  Karmen LANGE [facility]  1960       ASSESSMENT:     1  Primary osteoarthritis of left foot  Small joint arthrocentesis: L intertarsal      2  Pain in joint of left foot  XR foot 3+ vw left            PLAN:  1  Reviewed medical records  Patient was counseled and educated on the condition and the diagnosis  2  X-ray was obtained and personally reviewed  The radiological findings were discussed with the patient  3  The exam and symptoms are consistent with TMTJ arthrosis  The diagnosis, treatment options and prognosis were discussed with the patient  4  Treatment options were discussed and the patient wished to proceed with an injection  See procedure  5  Resting and icing  6  Pt to call the office if her condition does not resolve in 3-4 weeks  Imaging: I have personally reviewed pertinent films in PACS  Labs, pathology, and Other Studies: I have personally reviewed pertinent reports  Small joint arthrocentesis: L intertarsal  Universal Protocol:  Consent: Verbal consent obtained  Risks and benefits: risks, benefits and alternatives were discussed  Consent given by: patient  Time out: Immediately prior to procedure a \"time out\" was called to verify the correct patient, procedure, equipment, support staff and site/side marked as required    Timeout called at: 5/31/2023 11:13 AM   Patient understanding: patient states understanding of the procedure being performed  Site marked: the operative site was marked  Patient identity confirmed: verbally with patient    Supporting Documentation  Indications: pain   Procedure Details  Location: foot - L intertarsal (2nd TMTJ)  Needle size: 25 G  Ultrasound guidance: no  Approach: dorsal  Medications administered: 20 mg triamcinolone acetonide 40 mg/mL; 10 mL ropivacaine 0 5 %    Patient tolerance: patient tolerated the procedure well with no immediate complications            Subjective:       HPI  The patient presents with chief " complaint of left foot pain  Pain started about 3 weeks ago  She has throbbing sensation on dorsal left foot after being on her feet  The patient does not recall any injury  No significant swelling in her foot  No significant weakness or dysfunction  The following portions of the patient's history were reviewed and updated as appropriate: allergies, current medications, past family history, past medical history, past social history, past surgical history and problem list   All pertinent labs and images were reviewed        Past Medical History  Past Medical History:   Diagnosis Date   • Anxiety    • Arthritis     Last assessed 5/3/2011   • Ortega's esophagus    • Cancer ([facility] [address] )     ovarian cancer at age 28 yo- REMOVAL  B/L   • Colon polyp    • DDD (degenerative disc disease), lumbar    • Depression    • Fall     5/2020 right knee meniscus tear- OR repair today 8/3/2020   • Fibromyalgia    • Fibromyalgia, primary    • Fracture of one or more phalanges of foot    • GERD (gastroesophageal reflux disease)    • H/O bladder infections     chronic   • Hypertension    • Kidney infection     occasional   • Migraines    • Obesity    • Ovarian cancer [facility]) 1987    age 27   • Polyneuropathy     Last assessed 6/23/2016 knees to feet   • PONV (postoperative nausea and vomiting)     Patient requests to be pre-medicated prior to surgery prior to surgery   • PONV (postoperative nausea and vomiting) 8/3/2020   • Renal disorder    • Spinal stenosis    • Vertigo    • Wears glasses     reading       Past Surgical History  Past Surgical History:   Procedure Laterality Date   • ABDOMINAL SURGERY  1986    several   • BACK SURGERY  1990   • CATARACT EXTRACTION, BILATERAL     • CHOLECYSTECTOMY     • COLONOSCOPY     • FOOT FRACTURE SURGERY Bilateral 2004    x 5  surgeries   • KIDNEY SURGERY  1974    at age 13 yo   • KNEE SURGERY Right    • KY ARTHRS KNE SURG W/MENISCECTOMY MED/LAT W/SHVG Right 8/3/2020    Procedure: KNEE [address];  Surgeon: Terrence Sadler MD;  Location: [facility];  Service: Orthopedics   • DE LAPAROSCOPY SURG CHOLECYSTECTOMY N/A 11/27/2020    Procedure: Clarice Hartman;  Surgeon: Tim Chahal MD;  Location: [facility];  Service: General   • TOTAL ABDOMINAL HYSTERECTOMY  1987    age 27   • TOTAL ABDOMINAL HYSTERECTOMY W/ BILATERAL SALPINGOOPHORECTOMY Bilateral 1987    age 27        Allergies:  Morphine and Penicillins    Medications:  Current Outpatient Medications   Medication Sig Dispense Refill   • calcium-vitamin D 250-100 MG-UNIT per tablet Take 1 tablet by mouth 2 (two) times a day     • cyanocobalamin (VITAMIN B-12) 100 mcg tablet Take by mouth daily       • DULoxetine (CYMBALTA) 60 mg delayed release capsule Take 1 capsule by mouth in the morning     • gabapentin (Neurontin) 600 MG tablet Take 1 tablet (600 mg total) by mouth 3 (three) times a day 270 tablet 3   • halobetasol (ULTRAVATE) 0 05 % ointment Apply topically 2 (two) times a day For up to 2 weeks  30 g 2   • hydrOXYzine HCL (ATARAX) 25 mg tablet Take 1 tablet (25 mg total) by mouth daily at bedtime 90 tablet 3   • levothyroxine (Euthyrox) 50 mcg tablet Take 1 tablet (50 mcg total) by mouth daily in the early morning 90 tablet 1   • methocarbamol (ROBAXIN) 500 mg tablet Take 1 tablet (500 mg total) by mouth every 8 (eight) hours as needed for muscle spasms 90 tablet 0   • Multiple Vitamins-Minerals (ZINC PO) Take by mouth       • nystatin (MYCOSTATIN) powder Apply topically 2 (two) times a day 15 g 1   • nystatin-triamcinolone (MYCOLOG-II) ointment Apply to vulva twice daily for two weeks and to skin rash as needed   30 g 3   • omeprazole (PriLOSEC) 40 MG capsule Take 40 mg by mouth daily     • ondansetron (ZOFRAN) 4 mg tablet Take 1 tablet (4 mg total) by mouth every 8 (eight) hours as needed for nausea or vomiting 12 tablet 0   • SUMAtriptan (IMITREX) 50 mg tablet Take 1 tablet (50 mg total) by mouth once as needed for migraine for up to 1 dose May repeat in 2 hours  No more than 200 mg per day  10 tablet 2   • traZODone (DESYREL) 100 mg tablet Take 100 mg by mouth daily at bedtime Take with food     • DULoxetine (CYMBALTA) 30 mg delayed release capsule  (Patient not taking: Reported on 4/26/2023)     • methenamine hippurate (HIPREX) 1 g tablet Take 1 tablet (1 g total) by mouth 2 (two) times a day with meals (Patient not taking: Reported on 5/31/2023) 60 tablet 3   • oxybutynin (DITROPAN) 5 mg tablet Take 1 tablet (5 mg total) by mouth 3 (three) times a day as needed (bladder spasm/pain) (Patient not taking: Reported on 11/21/2022) 30 tablet 0   • PARoxetine (PAXIL) 40 MG tablet Take by mouth (Patient not taking: Reported on 11/21/2022)     • phenazopyridine (PYRIDIUM) 100 mg tablet Take 1 tablet (100 mg total) by mouth 3 (three) times a day as needed for bladder spasms (painful urination) (Patient not taking: Reported on 11/21/2022) 20 tablet 0     No current facility-administered medications for this visit         Social History:  Social History     Socioeconomic History   • Marital status: /Civil Union     Spouse name: None   • Number of children: None   • Years of education: None   • Highest education level: None   Occupational History   • Occupation: CNA   Tobacco Use   • Smoking status: Never   • Smokeless tobacco: Never   Vaping Use   • Vaping Use: Never used   Substance and Sexual Activity   • Alcohol use: Not Currently   • Drug use: No   • Sexual activity: Not Currently     Birth control/protection: Surgical   Other Topics Concern   • None   Social History Narrative   • None     Social Determinants of Health     Financial Resource Strain: Not on file   Food Insecurity: Not on file   Transportation Needs: Not on file   Physical Activity: Not on file   Stress: Not on file   Social Connections: Not on file   Intimate Partner Violence: Not on file   Housing Stability: Not on file "      Review of Systems   Constitutional: Negative for chills and fever  Respiratory: Negative for cough and shortness of breath  Cardiovascular: Negative for chest pain  Gastrointestinal: Negative for nausea and vomiting  Musculoskeletal: Positive for arthralgias and back pain  Allergic/Immunologic: Negative for immunocompromised state  Neurological: Negative for weakness  Objective:      /86   Pulse 79   Ht 5' 7\" (1 702 m)   LMP  (LMP Unknown)   BMI 31 32 kg/m²          Physical Exam  Vitals reviewed  Constitutional:       General: She is not in acute distress  Appearance: She is not toxic-appearing or diaphoretic  Cardiovascular:      Rate and Rhythm: Normal rate and regular rhythm  Pulses: Normal pulses  Dorsalis pedis pulses are 2+ on the right side and 2+ on the left side  Posterior tibial pulses are 2+ on the right side and 2+ on the left side  Pulmonary:      Effort: Pulmonary effort is normal  No respiratory distress  Musculoskeletal:         General: Tenderness present  No signs of injury  Right foot: No foot drop  Left foot: No foot drop  Comments: Focal pain around left 2nd TMTJ  Skin:     General: Skin is warm  Capillary Refill: Capillary refill takes less than 2 seconds  Coloration: Skin is not cyanotic or mottled  Findings: No abscess, ecchymosis, erythema or wound  Nails: There is no clubbing  Neurological:      General: No focal deficit present  Mental Status: She is alert and oriented to person, place, and time  Cranial Nerves: No cranial nerve deficit  Sensory: No sensory deficit  Motor: No weakness  Coordination: Coordination normal    Psychiatric:         Mood and Affect: Mood normal          Behavior: Behavior normal          Thought Content:  Thought content normal          Judgment: Judgment normal          "

## 2023-06-21 ENCOUNTER — TELEPHONE (OUTPATIENT)
Dept: NEUROLOGY | Facility: CLINIC | Age: 63
End: 2023-06-21

## 2023-06-21 DIAGNOSIS — R11.2 NAUSEA AND VOMITING, UNSPECIFIED VOMITING TYPE: ICD-10-CM

## 2023-06-21 DIAGNOSIS — N39.0 URINARY TRACT INFECTION WITHOUT HEMATURIA, SITE UNSPECIFIED: ICD-10-CM

## 2023-06-21 RX ORDER — METHENAMINE HIPPURATE 1000 MG/1
TABLET ORAL
Qty: 60 TABLET | Refills: 0 | Status: SHIPPED | OUTPATIENT
Start: 2023-06-21

## 2023-06-21 RX ORDER — ONDANSETRON 4 MG/1
4 TABLET, FILM COATED ORAL EVERY 8 HOURS PRN
Qty: 30 TABLET | Refills: 0 | Status: SHIPPED | OUTPATIENT
Start: 2023-06-21

## 2023-06-21 NOTE — TELEPHONE ENCOUNTER
Patient calling to schedule new patient appointment for dementia  No testing done  Triage intake sent

## 2023-06-26 DIAGNOSIS — R51.9 NONINTRACTABLE EPISODIC HEADACHE, UNSPECIFIED HEADACHE TYPE: ICD-10-CM

## 2023-06-26 RX ORDER — SUMATRIPTAN 50 MG/1
50 TABLET, FILM COATED ORAL ONCE AS NEEDED
Qty: 30 TABLET | Refills: 2 | Status: SHIPPED | OUTPATIENT
Start: 2023-06-26

## 2023-06-30 DIAGNOSIS — B37.2 CANDIDIASIS, INTERTRIGO: ICD-10-CM

## 2023-06-30 RX ORDER — NYSTATIN 100000 [USP'U]/G
POWDER TOPICAL
Qty: 30 G | Refills: 1 | Status: SHIPPED | OUTPATIENT
Start: 2023-06-30

## 2023-07-15 DIAGNOSIS — N39.0 URINARY TRACT INFECTION WITHOUT HEMATURIA, SITE UNSPECIFIED: ICD-10-CM

## 2023-07-16 RX ORDER — METHENAMINE HIPPURATE 1000 MG/1
TABLET ORAL
Qty: 60 TABLET | Refills: 0 | Status: SHIPPED | OUTPATIENT
Start: 2023-07-16

## 2023-08-18 DIAGNOSIS — E03.9 HYPOTHYROIDISM, UNSPECIFIED TYPE: ICD-10-CM

## 2023-08-18 RX ORDER — LEVOTHYROXINE SODIUM 0.05 MG/1
TABLET ORAL
Qty: 90 TABLET | Refills: 0 | OUTPATIENT
Start: 2023-08-18

## 2023-08-21 NOTE — PROGRESS NOTES
Patient ID: Justin Rice is a 57 y.o. female. Assessment/Plan:    Amnesia/memory disorder  Cleveland Vivar is a pleasant  73-year-old female with relevant medical history of anxiety/depression, occipital neuralgia and radiculopathy who presents for cognitive evaluation. She reports having years of forgetfulness, confusion, repetitiveness, depersonalization and difficulty with short and long term memory. She reports worsening in the past 2 months without any clear trigger. She still is able to carry out her normal ADLs, but driving is a concern given she gets lost frequently. She reports her anxiety/depression are under control. She eats and sleeps well and practices crafts as a hobby. She scored a 17/30 on [facility] exam with most notable deficits in attention, concentration and executive function. On physical exam I noticed a monotone slowed speech and a blunted affect. Impression: At this time, her speech pattern raised concern for PD however I did not appreciate any other parkinsonian sx and other than her slowed speech she was not bradykinetic. I am less concerned for Alzheimers disease given patient is still able to complete her ADLs. Potentially could consider a metabolic disturbance especially with her history of hypothyroidism vs a pseudodementia given her psychiatric history.      Plan:  · MRI NeuroQuant for hippocampal assessment   · Neuropsychiatric testing   · Bloodwork for Vitamin Levels and thyroid   · Discussed important factors for healthy cognitive aging including regular follow up with PCP, trying the mediterranean diet which has been shown to have cognitive benefits, and regular cardiovascular exercise which she can try with taking walks with her standard poodle   · Referral to sleep medicine to rule out PAMELA given history of snoring and body habitus, discussed the adverse consequences of untreated sleep apnea  · I will have her follow up with results in 6 months       Diagnoses and all orders for this visit:    Amnesia/memory disorder  -     Ambulatory Referral to Sleep Medicine; Future  -     Vitamin B12; Future  -     Folate; Future  -     Vitamin D 25 hydroxy; Future  -     TSH, 3rd generation with Free T4 reflex; Future  -     MRI brain NeuroQuant wo contrast; Future  -     Ambulatory referral to Neuropsychology; Future    Nonintractable episodic headache, unspecified headache type  -     Ambulatory Referral to Neurology    Nausea and vomiting, unspecified vomiting type  -     Ambulatory Referral to Neurology    Snoring  -     Ambulatory Referral to Sleep Medicine; Future    Vitamin D deficiency  -     Vitamin D 25 hydroxy; Future         Subjective:    HPI     Katt Paredes is a pleasant  78-year-old female who presents for cognitive evaluation. Medical history includes anxiety/depression, bilateral occipital neuralgia, cervical radiculopathy, lumbar radiculopathy, lumbar spondylosis and sacroiliitis. Memory history    History gathered by patient and Albert Waldron  Patients highest level of education: GED and some college  Patients current or past occupation: CNA   Living situation: Lives with her      Onset: Ongoing for a few years, have gotten worse over the past 2 months   Major events: Cannot identify. "A lot going in on with the kids". Has 4 kids, oldest son has not talked to her in 25 years. One daughter with lupus. "Some of this stuff started before problems with children. "  Progression: Gradual, worsening over the past 2 months   Timing throughout day: No   Triggers: No     Examples:   Patient or caregiver reported: Depersonalization, goes into a store and does not feel like it is her going in. "Feels like she is losing herself". When she is driving sometimes feels like she is not behind the wheel, forgets where she is driving. Has not been in a car accident as a result of this. Has had problems with long term therapy.    Forgetfulness: Yes  Word finding difficulties: Yes, very frequent Repetition: Yes     Personality:  Mood disorder: Anxiety/depression follows with psychiatry at concern. Sees counselor. Is treated with cymbalta and trazodone. Feels like this is well controlled  Aggression:  says no, patient says yes. Dementia ROS:  Tremor: with action   Gait/balance issues: Reports having dizziness and being off balance, chronic but worse as of recent. Has had falls as a result but has not hit head. Hallucinations:No   History of stroke: No   Visual disturbances: No   Urinary incontinence: No. Increase frequency with pain. Weakness: Weakness in the legs. Slowed speech    ADLs: Shower: Independently  Dress themselves: Independently  Feed themselves: Independently  Toileting: Independently  Sleep: Sleeps well with trazodone    Hours of sleep: 8 with one interruption between   Feels rested when wakes up?:Yes  Difficulty falling asleep/staying asleep?: No   Acting out dreams: Unsure   Snoring?: Yes,   Problems with bowel or bladder function: Yes as above  Hearing problems: No    Driving: Yes does get lost in familiar places  Finances:  has always done them     Social:  Alcohol: No   Smoking: No   Substance abuse: No     Family history   History of memory problems: Grandmother had dementia, mother at age 58 and 61 starting noticing forgetfulness she  of a massive heart attack    Medication review:     Gabapentin 600 mg takes twice a day for neuropathy. Cymbalta  Trazodone- has been on it for 6-7 months  Pain not under control.      Prior work-up in  including anti-double-stranded DNA, Sjogren's, C3/C4 complement, CCP, RF, YESICA, CRP and ESR were normal.  No prior brain imaging available with exception of a CTH in 2016 which was normal.       The following portions of the patient's history were reviewed and updated as appropriate: allergies, current medications, past family history, past medical history, past social history, past surgical history and problem list and review of systems. Objective:    Blood pressure 118/86, pulse 94, temperature 97.9 °F (36.6 °C), temperature source Temporal, resp. rate 20, height 5' 7" (1.702 m), weight 90.7 kg (200 lb), SpO2 94 %, not currently breastfeeding. Physical Exam  Constitutional:       General: She is awake. Eyes:      General: Lids are normal.      Extraocular Movements: Extraocular movements intact. Pupils: Pupils are equal, round, and reactive to light. Neurological:      Mental Status: She is alert. Deep Tendon Reflexes:      Reflex Scores:       Bicep reflexes are 2+ on the right side and 2+ on the left side. Brachioradialis reflexes are 2+ on the right side and 2+ on the left side. Patellar reflexes are 2+ on the right side and 2+ on the left side. Achilles reflexes are 2+ on the right side and 2+ on the left side. Neurological Exam  Mental Status  Awake and alert. Speech is slowed and monotone with lack of inflection  Decreased concentration   Wojciech cognitive assessment exam: 17/30  Visuospatial/executive 3/5  Naming 2/3  Attention:  1/2, 0/1, 1/3  Language: 1/2, 0/1  Abstraction: 0/2  Delayed recall: 4/5  Orientation: 5/6  . Cranial Nerves  CN II: Visual fields full to confrontation. CN III, IV, VI: Extraocular movements intact bilaterally. Normal lids and orbits bilaterally. Pupils equal round and reactive to light bilaterally. CN V: Facial sensation is normal.  CN VII: Full and symmetric facial movement. CN VIII: Hearing is normal.  CN IX, X: Palate elevates symmetrically  CN XI: Shoulder shrug strength is normal.  CN XII: Tongue midline without atrophy or fasciculations. Decreased facial expressions. Motor   Normal muscle tone. No abnormal involuntary movements. Strength is 5/5 in all four extremities except as noted. 4-/ 5 HF strength bilaterally 2/2 to back problems . Sensory  Light touch is normal in upper and lower extremities.      Reflexes Right                      Left  Brachioradialis                    2+                         2+  Biceps                                 2+                         2+  Patellar                                2+                         2+  Achilles                                2+                         2+    Coordination  Right: Finger-to-nose normal.Left: Finger-to-nose normal.  No tremors appreciated . Gait  Casual gait is normal including stance, stride, and arm swing. Stooped posture, moderate stride length and step height. .        ROS:    Review of Systems    Review of Systems   Constitutional: Negative for appetite change, fatigue and fever. HENT: Negative. Negative for hearing loss, tinnitus, trouble swallowing and voice change. Eyes: Negative. Negative for photophobia, pain and visual disturbance. Respiratory: Negative. Negative for shortness of breath. Cardiovascular: Negative. Negative for palpitations. Gastrointestinal: Negative. Negative for nausea and vomiting. Endocrine: Negative. Negative for cold intolerance. Genitourinary: Negative. Negative for dysuria, frequency and urgency. Musculoskeletal: Positive for gait problem (loses her balance ). Negative for back pain, myalgias and neck pain. Skin: Negative. Negative for rash. Allergic/Immunologic: Negative. Neurological: Positive for dizziness (all the time), tremors (hands), speech difficulty (started about 1-2 months), numbness (legs and feet and has started on BL arms) and headaches (all the time). Negative for seizures, syncope, facial asymmetry, weakness and light-headedness. Hematological: Negative. Does not bruise/bleed easily. Psychiatric/Behavioral: Positive for confusion (forgets too much ). Negative for hallucinations and sleep disturbance. All other systems reviewed and are negative.

## 2023-08-22 ENCOUNTER — OFFICE VISIT (OUTPATIENT)
Dept: NEUROLOGY | Facility: CLINIC | Age: 63
End: 2023-08-22
Payer: MEDICARE

## 2023-08-22 ENCOUNTER — APPOINTMENT (OUTPATIENT)
Dept: LAB | Age: 63
End: 2023-08-22
Payer: MEDICARE

## 2023-08-22 VITALS
OXYGEN SATURATION: 94 % | RESPIRATION RATE: 20 BRPM | HEIGHT: 67 IN | HEART RATE: 94 BPM | WEIGHT: 200 LBS | BODY MASS INDEX: 31.39 KG/M2 | DIASTOLIC BLOOD PRESSURE: 86 MMHG | SYSTOLIC BLOOD PRESSURE: 118 MMHG | TEMPERATURE: 97.9 F

## 2023-08-22 DIAGNOSIS — R41.3 AMNESIA/MEMORY DISORDER: Primary | ICD-10-CM

## 2023-08-22 DIAGNOSIS — R51.9 NONINTRACTABLE EPISODIC HEADACHE, UNSPECIFIED HEADACHE TYPE: ICD-10-CM

## 2023-08-22 DIAGNOSIS — R06.83 SNORING: ICD-10-CM

## 2023-08-22 DIAGNOSIS — E55.9 VITAMIN D DEFICIENCY: ICD-10-CM

## 2023-08-22 DIAGNOSIS — R41.3 AMNESIA/MEMORY DISORDER: ICD-10-CM

## 2023-08-22 DIAGNOSIS — R11.2 NAUSEA AND VOMITING, UNSPECIFIED VOMITING TYPE: ICD-10-CM

## 2023-08-22 LAB
25(OH)D3 SERPL-MCNC: 39.3 NG/ML (ref 30–100)
FOLATE SERPL-MCNC: 8.7 NG/ML
TSH SERPL DL<=0.05 MIU/L-ACNC: 2.9 UIU/ML (ref 0.45–4.5)
VIT B12 SERPL-MCNC: 405 PG/ML (ref 180–914)

## 2023-08-22 PROCEDURE — 82746 ASSAY OF FOLIC ACID SERUM: CPT

## 2023-08-22 PROCEDURE — 82607 VITAMIN B-12: CPT

## 2023-08-22 PROCEDURE — 36415 COLL VENOUS BLD VENIPUNCTURE: CPT

## 2023-08-22 PROCEDURE — 84443 ASSAY THYROID STIM HORMONE: CPT

## 2023-08-22 PROCEDURE — 99205 OFFICE O/P NEW HI 60 MIN: CPT | Performed by: PSYCHIATRY & NEUROLOGY

## 2023-08-22 PROCEDURE — 82306 VITAMIN D 25 HYDROXY: CPT

## 2023-08-22 NOTE — ASSESSMENT & PLAN NOTE
Mckay Jones is a pleasant  57-year-old female with relevant medical history of anxiety/depression, occipital neuralgia and radiculopathy who presents for cognitive evaluation. She reports having years of forgetfulness, confusion, repetitiveness, depersonalization and difficulty with short and long term memory. She reports worsening in the past 2 months without any clear trigger. She still is able to carry out her normal ADLs, but driving is a concern given she gets lost frequently. She reports her anxiety/depression are under control. She eats and sleeps well and practices crafts as a hobby. She scored a 17/30 on [facility] [place] [facility] exam with most notable deficits in attention, concentration and executive function. On physical exam I noticed a monotone slowed speech and a blunted affect. Impression: At this time, her speech pattern raised concern for PD however I did not appreciate any other parkinsonian sx and other than her slowed speech she was not bradykinetic. I am less concerned for Alzheimers disease given patient is still able to complete her ADLs. Potentially could consider a metabolic disturbance especially with her history of hypothyroidism vs a pseudodementia given her psychiatric history.      Plan:  · MRI NeuroQuant for hippocampal assessment   · Neuropsychiatric testing   · Bloodwork for Vitamin Levels and thyroid   · Discussed important factors for healthy cognitive aging including regular follow up with PCP, trying the mediterranean diet which has been shown to have cognitive benefits, and regular cardiovascular exercise which she can try with taking walks with her standard poodle   · Referral to sleep medicine to rule out PAMELA given history of snoring and body habitus, discussed the adverse consequences of untreated sleep apnea  · I will have her follow up with results in 6 months

## 2023-08-22 NOTE — PROGRESS NOTES
Review of Systems   Constitutional: Negative for appetite change, fatigue and fever. HENT: Negative. Negative for hearing loss, tinnitus, trouble swallowing and voice change. Eyes: Negative. Negative for photophobia, pain and visual disturbance. Respiratory: Negative. Negative for shortness of breath. Cardiovascular: Negative. Negative for palpitations. Gastrointestinal: Negative. Negative for nausea and vomiting. Endocrine: Negative. Negative for cold intolerance. Genitourinary: Negative. Negative for dysuria, frequency and urgency. Musculoskeletal: Positive for gait problem (loses her balance ). Negative for back pain, myalgias and neck pain. Skin: Negative. Negative for rash. Allergic/Immunologic: Negative. Neurological: Positive for dizziness (all the time), tremors (hands), speech difficulty (started about 1-2 months), numbness (legs and feet and has started on BL arms) and headaches (all the time). Negative for seizures, syncope, facial asymmetry, weakness and light-headedness. Hematological: Negative. Does not bruise/bleed easily. Psychiatric/Behavioral: Positive for confusion (forgets too much ). Negative for hallucinations and sleep disturbance. All other systems reviewed and are negative.

## 2023-08-28 DIAGNOSIS — N39.0 URINARY TRACT INFECTION WITHOUT HEMATURIA, SITE UNSPECIFIED: ICD-10-CM

## 2023-08-28 RX ORDER — METHENAMINE HIPPURATE 1000 MG/1
TABLET ORAL
Qty: 60 TABLET | Refills: 12 | Status: SHIPPED | OUTPATIENT
Start: 2023-08-28

## 2023-09-03 ENCOUNTER — HOSPITAL ENCOUNTER (OUTPATIENT)
Dept: RADIOLOGY | Facility: HOSPITAL | Age: 63
Discharge: HOME | End: 2023-09-03
Attending: PSYCHIATRY & NEUROLOGY
Payer: MEDICARE

## 2023-09-03 DIAGNOSIS — R41.3 AMNESIA/MEMORY DISORDER: ICD-10-CM

## 2023-09-03 PROCEDURE — 70551 MRI BRAIN STEM W/O DYE: CPT

## 2023-09-03 PROCEDURE — G1004 CDSM NDSC: HCPCS

## 2023-09-08 DIAGNOSIS — E03.9 HYPOTHYROIDISM, UNSPECIFIED TYPE: ICD-10-CM

## 2023-09-08 RX ORDER — LEVOTHYROXINE SODIUM 0.05 MG/1
TABLET ORAL
Qty: 90 TABLET | Refills: 0 | Status: SHIPPED | OUTPATIENT
Start: 2023-09-08

## 2023-09-27 ENCOUNTER — CLINICAL SUPPORT (OUTPATIENT)
Dept: FAMILY MEDICINE CLINIC | Facility: CLINIC | Age: 63
End: 2023-09-27
Payer: MEDICARE

## 2023-09-27 DIAGNOSIS — Z11.1 SCREENING FOR TUBERCULOSIS: Primary | ICD-10-CM

## 2023-09-27 PROCEDURE — 86580 TB INTRADERMAL TEST: CPT | Performed by: FAMILY MEDICINE

## 2023-09-27 RX ORDER — ESOMEPRAZOLE MAGNESIUM 40 MG/1
1 CAPSULE, DELAYED RELEASE ORAL DAILY
COMMUNITY
Start: 2023-09-02

## 2023-09-29 ENCOUNTER — CLINICAL SUPPORT (OUTPATIENT)
Dept: FAMILY MEDICINE CLINIC | Facility: CLINIC | Age: 63
End: 2023-09-29

## 2023-09-29 DIAGNOSIS — Z11.1 ENCOUNTER FOR PPD SKIN TEST READING: Primary | ICD-10-CM

## 2023-09-29 LAB
INDURATION: 0 MM
TB SKIN TEST: NEGATIVE

## 2023-09-29 NOTE — PROGRESS NOTES
Patient is here to have a PPD reading on left forearm, result negative with 0.00 mm. Patient scheduled for her 2nd step PPD on 10/04/23.

## 2023-10-03 ENCOUNTER — OFFICE VISIT (OUTPATIENT)
Dept: UROLOGY | Facility: AMBULATORY SURGERY CENTER | Age: 63
End: 2023-10-03
Payer: MEDICARE

## 2023-10-03 VITALS
HEART RATE: 82 BPM | BODY MASS INDEX: 31.39 KG/M2 | WEIGHT: 200 LBS | RESPIRATION RATE: 18 BRPM | DIASTOLIC BLOOD PRESSURE: 84 MMHG | SYSTOLIC BLOOD PRESSURE: 130 MMHG | OXYGEN SATURATION: 98 % | HEIGHT: 67 IN

## 2023-10-03 DIAGNOSIS — N39.8 VOIDING DYSFUNCTION: Primary | ICD-10-CM

## 2023-10-03 DIAGNOSIS — N39.0 FREQUENT UTI: ICD-10-CM

## 2023-10-03 LAB
POST-VOID RESIDUAL VOLUME, ML POC: 150 ML
SL AMB  POCT GLUCOSE, UA: NORMAL
SL AMB LEUKOCYTE ESTERASE,UA: NORMAL
SL AMB POCT BILIRUBIN,UA: NORMAL
SL AMB POCT BLOOD,UA: NORMAL
SL AMB POCT CLARITY,UA: CLEAR
SL AMB POCT COLOR,UA: YELLOW
SL AMB POCT KETONES,UA: NORMAL
SL AMB POCT NITRITE,UA: NORMAL
SL AMB POCT PH,UA: 5
SL AMB POCT SPECIFIC GRAVITY,UA: 1.01
SL AMB POCT URINE PROTEIN: NORMAL
SL AMB POCT UROBILINOGEN: 0.2

## 2023-10-03 PROCEDURE — 99214 OFFICE O/P EST MOD 30 MIN: CPT | Performed by: UROLOGY

## 2023-10-03 PROCEDURE — 51798 US URINE CAPACITY MEASURE: CPT | Performed by: UROLOGY

## 2023-10-03 PROCEDURE — 81002 URINALYSIS NONAUTO W/O SCOPE: CPT | Performed by: UROLOGY

## 2023-10-03 RX ORDER — OXYBUTYNIN CHLORIDE 10 MG/1
10 TABLET, EXTENDED RELEASE ORAL
Qty: 30 TABLET | Refills: 11 | Status: SHIPPED | OUTPATIENT
Start: 2023-10-03 | End: 2023-11-02

## 2023-10-03 NOTE — PROGRESS NOTES
10/3/2023    Karmen Luna  1960  [id number]        Assessment  Female pelvic pain  Overactive bladder symptoms    Plan  We discussed her symptoms and concerns in detail. She asked about possible InterStim placement and phoned her daughter who is considering this as well. I think that InterStim in her case is likely premature. We discussed technique, risk and benefits of this procedure. We also discussed medical management and behavior modifications. We discussed the importance of hydration as well as bladder diet. She was given a printout of bladder diet as well. We discussed medical management. After discussion of oxybutynin and Myrbetriq I attempted prescription. Risk and benefits and side effects were discussed. We also discussed the mechanism of action of these medications. It appears from our system that Myrbetriq would be quite expensive for her, about $180 per month. She would like to try oxybutynin. We discussed additional risk of dry mouth and constipation. Prescription for oxybutynin extended release 10 mg was sent to her pharmacy. She will monitor her progress. I advised possible follow-up with Dr. Minerva Hooper if desired as he treats pelvic pain syndrome more routinely and is not a focus of my practice. She understands and agrees with the plan. History of Present Illness  Michaela Fernandez is a 57 y.o. female patient with history of recurrent UTI and diagnosed with interstitial cystitis in the past.  She has seen Dr. Lavell Love in the past and has been on numerous medications. She had a noncontrast CT to evaluate flank pain and this did not reveal any obvious urinary tract abnormality. She saw Dr. Minerva Hooper who performed cystoscopy and did not find any pathology. She returns today in follow-up due to persistent overactive bladder symptoms including urgency, frequency, incontinence, occasional pelvic pain. Review of Systems  Review of Systems   Constitutional: Negative.     HENT: Negative. Respiratory: Negative. Cardiovascular: Negative. Gastrointestinal: Negative. Genitourinary:        As per HPI   Musculoskeletal: Negative. Skin: Negative. Neurological: Negative. Hematological: Negative.           Past Medical History  Past Medical History:   Diagnosis Date   • Anxiety    • Arthritis     Last assessed 5/3/2011   • Ortega's esophagus    • Cancer ([address])     ovarian cancer at age 26 yo- REMOVAL  B/L   • Colon polyp    • DDD (degenerative disc disease), lumbar    • Depression    • Fall     5/2020 right knee meniscus tear- OR repair today 8/3/2020   • Fibromyalgia    • Fibromyalgia, primary    • Fracture of one or more phalanges of foot    • GERD (gastroesophageal reflux disease)    • H/O bladder infections     chronic   • Hypertension    • Kidney infection     occasional   • Migraines    • Obesity    • Ovarian cancer [facility] 1987    age 27   • Polyneuropathy     Last assessed 6/23/2016 knees to feet   • PONV (postoperative nausea and vomiting)     Patient requests to be pre-medicated prior to surgery prior to surgery   • PONV (postoperative nausea and vomiting) 8/3/2020   • Renal disorder    • Spinal stenosis    • Vertigo    • Wears glasses     reading       Past Social History  Past Surgical History:   Procedure Laterality Date   • ABDOMINAL SURGERY  1986    several   • BACK SURGERY  1990   • CATARACT EXTRACTION, BILATERAL     • CHOLECYSTECTOMY     • COLONOSCOPY     • FOOT FRACTURE SURGERY Bilateral 2004    x 5  surgeries   • KIDNEY SURGERY  1974    at age 13 yo   • KNEE SURGERY Right    • CO ARTHRS KNE SURG W/MENISCECTOMY MED/LAT W/SHVG Right 8/3/2020    Procedure: KNEE ARTHROSCOPY,PARTIAL MEDIAL & LATERAL MENISECTOMIES;  Surgeon: Sita Peace MD;  Location: [facility];  Service: Orthopedics   • CO LAPAROSCOPY SURG CHOLECYSTECTOMY N/A 11/27/2020    Procedure: LAPAROSCOPIC CHOLECYSTECTOMY;  Surgeon: Teresa Ferrell MD;  Location: [facility];  Service: General   • TOTAL ABDOMINAL HYSTERECTOMY  1987    age 27   • TOTAL ABDOMINAL HYSTERECTOMY W/ BILATERAL SALPINGOOPHORECTOMY Bilateral 1987    age 27       Past Family History  Family History   Problem Relation Age of Onset   • Heart disease Mother    • Cancer Mother    • Diabetes Mother    • Arthritis Mother    • Anxiety disorder Mother    • Bleeding Disorder Mother    • Clotting disorder Mother    • Depression Mother    • Hyperlipidemia Mother    • Irritable bowel syndrome Mother    • Hypertension Mother    • Obesity Mother    • Osteoporosis Mother    • Vaginal cancer Mother 27   • Thyroid disease Mother    • Stroke Mother    • Diabetes Father    • Arthritis Father    • Hyperlipidemia Father    • Vesicoureteral reflux Father    • Heart disease Father    • Hypertension Father    • Migraines Father    • Obesity Father    • Arthritis Brother    • Anxiety disorder Brother    • Diabetes Brother    • Hyperlipidemia Brother    • Vesicoureteral reflux Brother    • Heart disease Brother    • Irritable bowel syndrome Brother    • Hypertension Brother    • Migraines Brother    • Thyroid disease Brother    • Pancreatic cancer Brother 54   • Arthritis Maternal Aunt    • Anxiety disorder Maternal Aunt    • Depression Maternal Aunt    • Diabetes Maternal Aunt    • Vaginal cancer Maternal Aunt 50   • No Known Problems Maternal Aunt    • No Known Problems Maternal Aunt    • No Known Problems Maternal Aunt    • Fibroids Paternal Aunt    • Diabetes Maternal Grandmother    • Vaginal cancer Maternal Grandmother 48   • No Known Problems Maternal Grandfather    • Infertility Paternal Grandmother    • No Known Problems Paternal Grandfather    • Migraines Daughter    • Lupus Daughter    • Asthma Son    • Migraines Son    • Hypertension Family    • Arthritis Family        Past Social history  Social History     Socioeconomic History   • Marital status: /Civil Union     Spouse name: Not on file   • Number of children: Not on file   • Years of education: Not on file   • Highest education level: Not on file   Occupational History   • Occupation: CNA   Tobacco Use   • Smoking status: Never   • Smokeless tobacco: Never   Vaping Use   • Vaping Use: Never used   Substance and Sexual Activity   • Alcohol use: Not Currently   • Drug use: No   • Sexual activity: Not Currently     Birth control/protection: Surgical   Other Topics Concern   • Not on file   Social History Narrative   • Not on file     Social Determinants of Health     Financial Resource Strain: Not on file   Food Insecurity: Not on file   Transportation Needs: Not on file   Physical Activity: Not on file   Stress: Not on file   Social Connections: Not on file   Intimate Partner Violence: Not on file   Housing Stability: Not on file     Social History     Tobacco Use   Smoking Status Never   Smokeless Tobacco Never       Current Medications  Current Outpatient Medications   Medication Sig Dispense Refill   • DULoxetine (CYMBALTA) 60 mg delayed release capsule Take 1 capsule by mouth in the morning     • esomeprazole (NexIUM) 40 MG capsule Take 1 capsule by mouth in the morning     • gabapentin (Neurontin) 600 MG tablet Take 1 tablet (600 mg total) by mouth 3 (three) times a day 270 tablet 3   • levothyroxine 50 mcg tablet TAKE 1 TABLET BY MOUTH ONCE DAILY IN  THE  EARLY  MORNING 90 tablet 0   • methenamine hippurate (HIPREX) 1 g tablet TAKE 1 TABLET BY MOUTH TWICE DAILY WITH MEALS 60 tablet 12   • methocarbamol (ROBAXIN) 500 mg tablet Take 1 tablet (500 mg total) by mouth every 8 (eight) hours as needed for muscle spasms 90 tablet 0   • Multiple Vitamins-Minerals (ZINC PO) Take by mouth     • nystatin (MYCOSTATIN) powder Twice a day as needed. 30 g 1   • nystatin-triamcinolone (MYCOLOG-II) ointment Apply to vulva twice daily for two weeks and to skin rash as needed.  30 g 3   • ondansetron (ZOFRAN) 4 mg tablet Take 1 tablet (4 mg total) by mouth every 8 (eight) hours as needed for nausea or vomiting 30 tablet 0 • oxybutynin (DITROPAN-XL) 10 MG 24 hr tablet Take 1 tablet (10 mg total) by mouth daily at bedtime 30 tablet 11   • traZODone (DESYREL) 100 mg tablet Take 100 mg by mouth daily at bedtime Take with food     • calcium-vitamin D 250-100 MG-UNIT per tablet Take 1 tablet by mouth 2 (two) times a day (Patient not taking: Reported on 8/22/2023)     • cyanocobalamin (VITAMIN B-12) 100 mcg tablet Take by mouth daily   (Patient not taking: Reported on 8/22/2023)     • DULoxetine (CYMBALTA) 30 mg delayed release capsule  (Patient not taking: Reported on 4/26/2023)     • halobetasol (ULTRAVATE) 0.05 % ointment Apply topically 2 (two) times a day For up to 2 weeks. (Patient not taking: Reported on 8/22/2023) 30 g 2   • hydrOXYzine HCL (ATARAX) 25 mg tablet Take 1 tablet (25 mg total) by mouth daily at bedtime (Patient not taking: Reported on 10/3/2023) 90 tablet 3   • omeprazole (PriLOSEC) 40 MG capsule Take 40 mg by mouth daily (Patient not taking: Reported on 8/22/2023)     • PARoxetine (PAXIL) 40 MG tablet Take by mouth (Patient not taking: Reported on 11/21/2022)     • phenazopyridine (PYRIDIUM) 100 mg tablet Take 1 tablet (100 mg total) by mouth 3 (three) times a day as needed for bladder spasms (painful urination) (Patient not taking: Reported on 11/21/2022) 20 tablet 0   • SUMAtriptan (IMITREX) 50 mg tablet Take 1 tablet (50 mg total) by mouth once as needed for migraine May repeat in 2 hours. No more than 200 mg per day.  (Patient not taking: Reported on 8/22/2023) 30 tablet 2     Current Facility-Administered Medications   Medication Dose Route Frequency Provider Last Rate Last Admin   • ropivacaine (NAROPIN) 0.5 % injection 10 mL  10 mL Infiltration  Cleotha Shelter, DPM   10 mL at 05/31/23 1045   • triamcinolone acetonide (KENALOG-40) 40 mg/mL injection 20 mg  20 mg Intra-articular  Cleotha Shelter, DPM   20 mg at 05/31/23 1045       Allergies  Allergies   Allergen Reactions   • Morphine      Acts not like herself and feels agitated and restless   • Penicillins Hives     Tongue swells       Past Medical History, Social History, Family History, medications and allergies were reviewed.     Vitals  Vitals:    10/03/23 1049   BP: 130/84   BP Location: Left arm   Patient Position: Sitting   Cuff Size: Adult   Pulse: 82   Resp: 18   SpO2: 98%   Weight: 90.7 kg (200 lb)   Height: 5' 7" (1.702 m)       Physical Exam  Physical Exam      Results  No results found for: "PSA"  Lab Results   Component Value Date    CALCIUM 9.5 01/10/2023    K 4.2 01/10/2023    CO2 27 01/10/2023     01/10/2023    BUN 9 01/10/2023    CREATININE 1.02 01/10/2023     Lab Results   Component Value Date    WBC 8.94 07/15/2022    HGB 14.3 07/15/2022    HCT 44.5 07/15/2022    MCV 92 07/15/2022     07/15/2022

## 2023-10-04 ENCOUNTER — CLINICAL SUPPORT (OUTPATIENT)
Dept: FAMILY MEDICINE CLINIC | Facility: CLINIC | Age: 63
End: 2023-10-04
Payer: MEDICARE

## 2023-10-04 DIAGNOSIS — Z11.1 SCREENING FOR TUBERCULOSIS: Primary | ICD-10-CM

## 2023-10-04 PROCEDURE — 86580 TB INTRADERMAL TEST: CPT

## 2023-10-06 ENCOUNTER — CLINICAL SUPPORT (OUTPATIENT)
Dept: FAMILY MEDICINE CLINIC | Facility: CLINIC | Age: 63
End: 2023-10-06

## 2023-10-06 DIAGNOSIS — Z11.1 ENCOUNTER FOR PPD SKIN TEST READING: Primary | ICD-10-CM

## 2023-10-06 LAB
INDURATION: 0 MM
TB SKIN TEST: NEGATIVE

## 2023-10-10 ENCOUNTER — OFFICE VISIT (OUTPATIENT)
Dept: PODIATRY | Facility: CLINIC | Age: 63
End: 2023-10-10
Payer: MEDICARE

## 2023-10-10 VITALS
BODY MASS INDEX: 31.32 KG/M2 | HEART RATE: 65 BPM | RESPIRATION RATE: 18 BRPM | SYSTOLIC BLOOD PRESSURE: 123 MMHG | DIASTOLIC BLOOD PRESSURE: 75 MMHG | HEIGHT: 67 IN

## 2023-10-10 DIAGNOSIS — G62.9 PERIPHERAL NERVE DISORDER: Primary | ICD-10-CM

## 2023-10-10 DIAGNOSIS — M19.071 ARTHRITIS OF FOOT, RIGHT: ICD-10-CM

## 2023-10-10 PROCEDURE — 99213 OFFICE O/P EST LOW 20 MIN: CPT | Performed by: PODIATRIST

## 2023-10-10 RX ORDER — METHYLPREDNISOLONE 4 MG/1
TABLET ORAL
Qty: 21 TABLET | Refills: 0 | Status: SHIPPED | OUTPATIENT
Start: 2023-10-10

## 2023-10-10 RX ORDER — GABAPENTIN 600 MG/1
600 TABLET ORAL 3 TIMES DAILY
Qty: 270 TABLET | Refills: 3 | Status: SHIPPED | OUTPATIENT
Start: 2023-10-10 | End: 2024-10-09

## 2023-10-10 NOTE — PROGRESS NOTES
Patient presents for renewal of her prescription for gabapentin 600 mg 3 times daily. This dosage is effective in relieving symptoms of neuropathy for this patient. The patient notes  that she has developed pain throughout the right foot for the past 2 weeks. She states that she was seen by Saadia Fitzgerald in May 2023 for left foot pain. She diagnosed osteoarthritis and injected cortisone at the second metatarsal cuneiform joint. On exam, pedal pulses are palpable. There are multiple trigger points of pain for the right foot. This is at the dorsum of the foot at the second metatarsal cuneiform joint but also along the posterior tibial tendon. I personally reviewed x-rays of the right foot taken today. They are positive for osteoarthritis. Treatment: Explained to patient that she is dealing with osteoarthritis of the right foot along with the neuropathy. Patient states that she has an inability to take aspirin. For this reason she was placed on Medrol Dosepak. She was also placed on gabapentin 600 mg 3 times daily. She will be reassessed in 5 weeks.

## 2023-10-12 DIAGNOSIS — R51.9 NONINTRACTABLE EPISODIC HEADACHE, UNSPECIFIED HEADACHE TYPE: ICD-10-CM

## 2023-10-12 RX ORDER — SUMATRIPTAN 50 MG/1
50 TABLET, FILM COATED ORAL ONCE AS NEEDED
Qty: 30 TABLET | Refills: 2 | Status: SHIPPED | OUTPATIENT
Start: 2023-10-12

## 2023-11-09 ENCOUNTER — OFFICE VISIT (OUTPATIENT)
Dept: FAMILY MEDICINE CLINIC | Facility: CLINIC | Age: 63
End: 2023-11-09
Payer: MEDICARE

## 2023-11-09 VITALS
HEART RATE: 97 BPM | OXYGEN SATURATION: 98 % | WEIGHT: 200 LBS | HEIGHT: 67 IN | BODY MASS INDEX: 31.39 KG/M2 | SYSTOLIC BLOOD PRESSURE: 130 MMHG | TEMPERATURE: 97.8 F | DIASTOLIC BLOOD PRESSURE: 80 MMHG

## 2023-11-09 DIAGNOSIS — Z13.820 OSTEOPOROSIS SCREENING: ICD-10-CM

## 2023-11-09 DIAGNOSIS — S76.019A HIP STRAIN, INITIAL ENCOUNTER: ICD-10-CM

## 2023-11-09 DIAGNOSIS — N18.31 STAGE 3A CHRONIC KIDNEY DISEASE (HCC): ICD-10-CM

## 2023-11-09 DIAGNOSIS — G89.29 CHRONIC LOW BACK PAIN, UNSPECIFIED BACK PAIN LATERALITY, UNSPECIFIED WHETHER SCIATICA PRESENT: Primary | ICD-10-CM

## 2023-11-09 DIAGNOSIS — D58.8: ICD-10-CM

## 2023-11-09 DIAGNOSIS — M54.50 CHRONIC LOW BACK PAIN, UNSPECIFIED BACK PAIN LATERALITY, UNSPECIFIED WHETHER SCIATICA PRESENT: Primary | ICD-10-CM

## 2023-11-09 DIAGNOSIS — M99.05 SOMATIC DYSFUNCTION OF PELVIC REGION: ICD-10-CM

## 2023-11-09 DIAGNOSIS — M81.0 AGE-RELATED OSTEOPOROSIS WITHOUT CURRENT PATHOLOGICAL FRACTURE: ICD-10-CM

## 2023-11-09 DIAGNOSIS — F33.1 MODERATE EPISODE OF RECURRENT MAJOR DEPRESSIVE DISORDER (HCC): ICD-10-CM

## 2023-11-09 PROCEDURE — 99214 OFFICE O/P EST MOD 30 MIN: CPT | Performed by: FAMILY MEDICINE

## 2023-11-09 PROCEDURE — 98925 OSTEOPATH MANJ 1-2 REGIONS: CPT | Performed by: FAMILY MEDICINE

## 2023-11-09 RX ORDER — METHOCARBAMOL 500 MG/1
500 TABLET, FILM COATED ORAL EVERY 8 HOURS PRN
Qty: 90 TABLET | Refills: 0 | Status: SHIPPED | OUTPATIENT
Start: 2023-11-09

## 2023-11-09 NOTE — ASSESSMENT & PLAN NOTE
Exacerbated from work, lifting and helping elderly patients.   - tolerated OMT well, continue home stretching

## 2023-11-09 NOTE — PROGRESS NOTES
Name: Elly Esquivel      : 1960      MRN: [id number]  Encounter Provider: Rodrigo Hartman DO  Encounter Date: 2023   Encounter department: [address] [place] [address] 63     1. Chronic low back pain, unspecified back pain laterality, unspecified whether sciatica present  Assessment & Plan:  Exacerbated from work, lifting and helping elderly patients. - tolerated OMT well, continue home stretching    Orders:  -     methocarbamol (ROBAXIN) 500 mg tablet; Take 1 tablet (500 mg total) by mouth every 8 (eight) hours as needed for muscle spasms  -     DXA bone density spine hip and pelvis; Future; Expected date: 2023  -     OMT    2. 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 Stage 3a chronic kidney disease [facility])  Assessment & Plan:  Lab Results   Component Value Date    EGFR 58 01/10/2023    EGFR 51 07/15/2022    EGFR 52 2022    CREATININE 1.02 01/10/2023    CREATININE 1.13 07/15/2022    CREATININE 1.12 2022   stable      5. Moderate episode of recurrent major depressive disorder ([address])  Assessment & Plan:  Stable  - continue cymbalta      6. Age-related osteoporosis without current pathological fracture  -     DXA bone density spine hip and pelvis; Future; Expected date: 2023    7. Somatic dysfunction of pelvic region  -     OMT    8. 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 Has strain of left lower extremity. Hip Pain   The incident occurred 12 to 24 hours ago. The incident occurred at work. There was no injury mechanism. The pain is present in the left hip and left leg. The quality of the pain is described as aching, cramping and shooting.  The pain is at a severity of 7/10. The pain is moderate. The pain has been Constant since onset. Associated symptoms include muscle weakness. Pertinent negatives include no inability to bear weight, loss of motion, loss of sensation, numbness or tingling. She reports no foreign bodies present. The symptoms are aggravated by movement, palpation and weight bearing. She has tried rest and immobilization for the symptoms. The treatment provided moderate relief. Leg Pain   The incident occurred 12 to 24 hours ago. The incident occurred at work. The pain is present in the left leg and left hip. Associated symptoms include muscle weakness. Pertinent negatives include no inability to bear weight, loss of motion, loss of sensation, numbness or tingling. Review of Systems   Constitutional:  Negative for chills and fever. HENT:  Negative for ear pain and sore throat. Eyes:  Negative for pain and visual disturbance. Respiratory:  Negative for cough and shortness of breath. Cardiovascular:  Negative for chest pain and palpitations. Gastrointestinal:  Negative for abdominal pain and vomiting. Genitourinary:  Negative for dysuria and hematuria. Musculoskeletal:  Positive for arthralgias and back pain. Skin:  Negative for color change and rash. Neurological:  Negative for tingling, seizures, syncope and numbness. All other systems reviewed and are negative.       Current Outpatient Medications on File Prior to Visit   Medication Sig   • DULoxetine (CYMBALTA) 60 mg delayed release capsule Take 1 capsule by mouth in the morning   • esomeprazole (NexIUM) 40 MG capsule Take 1 capsule by mouth in the morning   • gabapentin (Neurontin) 600 MG tablet Take 1 tablet (600 mg total) by mouth 3 (three) times a day   • gabapentin (Neurontin) 600 MG tablet Take 1 tablet (600 mg total) by mouth 3 (three) times a day   • levothyroxine 50 mcg tablet TAKE 1 TABLET BY MOUTH ONCE DAILY IN  THE  EARLY  MORNING   • methenamine hippurate (HIPREX) 1 g tablet TAKE 1 TABLET BY MOUTH TWICE DAILY WITH MEALS   • methylPREDNISolone 4 MG tablet therapy pack Use as directed on package   • Multiple Vitamins-Minerals (ZINC PO) Take by mouth   • nystatin (MYCOSTATIN) powder Twice a day as needed. • nystatin-triamcinolone (MYCOLOG-II) ointment Apply to vulva twice daily for two weeks and to skin rash as needed. • ondansetron (ZOFRAN) 4 mg tablet Take 1 tablet (4 mg total) by mouth every 8 (eight) hours as needed for nausea or vomiting   • oxybutynin (DITROPAN-XL) 10 MG 24 hr tablet Take 1 tablet (10 mg total) by mouth daily at bedtime   • SUMAtriptan (IMITREX) 50 mg tablet Take 1 tablet (50 mg total) by mouth once as needed for migraine May repeat in 2 hours. No more than 200 mg per day. • traZODone (DESYREL) 100 mg tablet Take 100 mg by mouth daily at bedtime Take with food   • [DISCONTINUED] methocarbamol (ROBAXIN) 500 mg tablet Take 1 tablet (500 mg total) by mouth every 8 (eight) hours as needed for muscle spasms   • calcium-vitamin D 250-100 MG-UNIT per tablet Take 1 tablet by mouth 2 (two) times a day (Patient not taking: Reported on 8/22/2023)   • cyanocobalamin (VITAMIN B-12) 100 mcg tablet Take by mouth daily   (Patient not taking: Reported on 8/22/2023)   • DULoxetine (CYMBALTA) 30 mg delayed release capsule  (Patient not taking: Reported on 4/26/2023)   • halobetasol (ULTRAVATE) 0.05 % ointment Apply topically 2 (two) times a day For up to 2 weeks.  (Patient not taking: Reported on 8/22/2023)   • hydrOXYzine HCL (ATARAX) 25 mg tablet Take 1 tablet (25 mg total) by mouth daily at bedtime (Patient not taking: Reported on 10/3/2023)   • omeprazole (PriLOSEC) 40 MG capsule Take 40 mg by mouth daily (Patient not taking: Reported on 8/22/2023)   • PARoxetine (PAXIL) 40 MG tablet Take by mouth (Patient not taking: Reported on 11/21/2022)   • phenazopyridine (PYRIDIUM) 100 mg tablet Take 1 tablet (100 mg total) by mouth 3 (three) times a day as needed for bladder spasms (painful urination) (Patient not taking: Reported on 11/21/2022)       Objective     /80 (BP Location: Left arm, Patient Position: Sitting, Cuff Size: Large)   Pulse 97   Temp 97.8 °F (36.6 °C) (Tympanic)   Ht 5' 7" (1.702 m)   Wt 90.7 kg (200 lb)   LMP  (LMP Unknown)   SpO2 98%   BMI 31.32 kg/m²     Physical Exam  Vitals reviewed. Constitutional:       General: She is not in acute distress. Appearance: Normal appearance. She is obese. She is not ill-appearing or diaphoretic. HENT:      Head: Normocephalic and atraumatic. Nose: Nose normal. No congestion or rhinorrhea. Mouth/Throat:      Mouth: Mucous membranes are moist.      Pharynx: Oropharynx is clear. No oropharyngeal exudate. Eyes:      General: No scleral icterus. Conjunctiva/sclera: Conjunctivae normal.      Pupils: Pupils are equal, round, and reactive to light. Cardiovascular:      Rate and Rhythm: Normal rate and regular rhythm. Pulses: Normal pulses. Heart sounds: No murmur heard. Pulmonary:      Effort: Pulmonary effort is normal.      Breath sounds: Normal breath sounds. No wheezing. Chest:      Chest wall: No tenderness. Abdominal:      General: Bowel sounds are normal.      Palpations: Abdomen is soft. There is no mass. Tenderness: There is no abdominal tenderness. There is no right CVA tenderness or left CVA tenderness. Musculoskeletal:         General: Tenderness present. Normal range of motion. Cervical back: Normal range of motion. Comments: L hip strain, tight IT band, tight gluteal muscles   Skin:     General: Skin is warm. Capillary Refill: Capillary refill takes less than 2 seconds. Findings: No bruising or rash. Neurological:      Mental Status: She is alert and oriented to person, place, and time. Motor: Weakness present.    Psychiatric:         Mood and Affect: Mood normal.         Behavior: Behavior normal. OMT    Performed by: Arielle Barney DO  Authorized by: Arielle Barney DO  Universal Protocol:  Consent: Verbal consent obtained. Risks and benefits: risks, benefits and alternatives were discussed  Patient understanding: patient states understanding of the procedure being performed      Procedure Details:     Region evaluated and treated:  Sacrum/Pelvis    Sacrum/Pelvis details:     Examination Method:  Range of Motion, Contracture, Asymmetry, Misalignment, Crepitation, Defects, Masses, Tenderness, Pain, Tissue Texture Change, Stability, Laxity, Effusions, Tone and Passive    Severity:  Moderate    Osteopathic Findings:  Tight L pelvic region, and hip flexors    Treatment Method:  Balanced Ligamentous Tension, Ligamentous Articular Strain Treatment, Facilitated Positional Release Treatment, Indirect Treatment, Integrated Neuromuscular Release, Ligamentous Articular Strain, Balanced Ligamentous Tension Treatment, Muscle Energy Treatment, Myofascial Release Treatment and Soft Tissue Treatment    Response:  Improved - The somatic dysfunction is improved but not completely resolved.     Total Regions Treated:  [address] [facility],

## 2023-11-09 NOTE — ASSESSMENT & PLAN NOTE
Lab Results   Component Value Date    EGFR 58 01/10/2023    EGFR 51 07/15/2022    EGFR 52 03/25/2022    CREATININE 1.02 01/10/2023    CREATININE 1.13 07/15/2022    CREATININE 1.12 03/25/2022   stable

## 2023-12-02 DIAGNOSIS — E03.9 HYPOTHYROIDISM, UNSPECIFIED TYPE: ICD-10-CM

## 2023-12-04 RX ORDER — LEVOTHYROXINE SODIUM 0.05 MG/1
TABLET ORAL
Qty: 90 TABLET | Refills: 0 | Status: SHIPPED | OUTPATIENT
Start: 2023-12-04

## 2023-12-27 ENCOUNTER — APPOINTMENT (OUTPATIENT)
Dept: RADIOLOGY | Age: 63
End: 2023-12-27
Payer: MEDICARE

## 2023-12-27 ENCOUNTER — OFFICE VISIT (OUTPATIENT)
Dept: URGENT CARE | Age: 63
End: 2023-12-27
Payer: MEDICARE

## 2023-12-27 VITALS
OXYGEN SATURATION: 97 % | RESPIRATION RATE: 18 BRPM | HEART RATE: 106 BPM | TEMPERATURE: 97.6 F | DIASTOLIC BLOOD PRESSURE: 88 MMHG | SYSTOLIC BLOOD PRESSURE: 144 MMHG

## 2023-12-27 DIAGNOSIS — R30.0 DYSURIA: ICD-10-CM

## 2023-12-27 DIAGNOSIS — M54.6 MIDLINE THORACIC BACK PAIN, UNSPECIFIED CHRONICITY: Primary | ICD-10-CM

## 2023-12-27 DIAGNOSIS — M54.6 MIDLINE THORACIC BACK PAIN, UNSPECIFIED CHRONICITY: ICD-10-CM

## 2023-12-27 DIAGNOSIS — R35.0 INCREASED URINARY FREQUENCY: ICD-10-CM

## 2023-12-27 LAB
SL AMB  POCT GLUCOSE, UA: NORMAL
SL AMB LEUKOCYTE ESTERASE,UA: NORMAL
SL AMB POCT BILIRUBIN,UA: NORMAL
SL AMB POCT BLOOD,UA: NORMAL
SL AMB POCT CLARITY,UA: CLEAR
SL AMB POCT COLOR,UA: YELLOW
SL AMB POCT KETONES,UA: NORMAL
SL AMB POCT NITRITE,UA: NORMAL
SL AMB POCT PH,UA: 6
SL AMB POCT SPECIFIC GRAVITY,UA: 1.01
SL AMB POCT URINE PROTEIN: NORMAL
SL AMB POCT UROBILINOGEN: NORMAL

## 2023-12-27 PROCEDURE — 72072 X-RAY EXAM THORAC SPINE 3VWS: CPT

## 2023-12-27 PROCEDURE — 87086 URINE CULTURE/COLONY COUNT: CPT

## 2023-12-27 PROCEDURE — 81002 URINALYSIS NONAUTO W/O SCOPE: CPT

## 2023-12-27 PROCEDURE — G0463 HOSPITAL OUTPT CLINIC VISIT: HCPCS

## 2023-12-27 PROCEDURE — 72100 X-RAY EXAM L-S SPINE 2/3 VWS: CPT

## 2023-12-27 PROCEDURE — 99213 OFFICE O/P EST LOW 20 MIN: CPT

## 2023-12-27 NOTE — PROGRESS NOTES
[facility]        NAME: Karmen Luna is a 62 y.o. female  : 1960    MRN: [id number]  DATE: 2023  TIME: 1:48 PM    Assessment and Plan   Midline thoracic back pain, unspecified chronicity [M54.6]  1. Midline thoracic back pain, unspecified chronicity  XR spine thoracic 3 vw    XR spine lumbar 2 or 3 views injury    POCT urine dip    Urine culture    Ambulatory Referral to [facility]      2. Dysuria        3. Increased urinary frequency  Urine culture        Is a 62-year-old female presenting after a fall 2 days ago.  Confirmed by family in the room fall was witnessed.  Patient denies head strike, no blood thinners, no loss of consciousness.  Patient alert and oriented x 4 patient states she started with dizziness and slight nausea yesterday, with upper thoracic back pain and lower lumbar pain.  Patient denies any changes in her baseline extremity numbness, and physical exam demonstrates normal neurological exam.  Patient reports dizziness is consistent with her vertigo, and has not taken any education for the vertigo symptoms.    Patient with tenderness to palpation to midline upper thoracic back, physical exam shows skin intact, without bruising, swelling, step-off, deformity.  Patient reports no lumbar back pain, negative CVA Tenderness, negative Jasiel's, negative straight legs, negative slump test.  Sulphur-Hallpike maneuver positive on right side for nystagmus.  Will x-ray thoracic spine, and lumbar spine.    Bladder and thoracic spine x-rays read by me: No acute fractures or dislocations identified.    Patient also reports urinary symptoms x 1 week with urinary pain and burning, patient states urinary frequency and urgency.  Denies fever.  States is able to eat and drink okay and normal urinary output.  Patient denies abdominal pain, no tenderness on exam.  Patient denies recent urinary tract infection, however states that she has interstitial cystitis and gets  frequent UTIs.  Will check urine dip.  In office urine dip negative for leuks, nitrates, without blood.  Will send urine for culture.  No antibiotics at this time, will reevaluate once culture is back.    Discussed no acute findings on lumbar or thoracic x-rays.  Will contact patient with official reads.  Referral sent to comprehensive spine and pain.  Discussed with patient she should not drive until cleared with her family doctor due to vertigo.  Patient and family agreeable with plan of care.  Work not provided          Patient Instructions       Follow up with PCP in 3-5 days.  Proceed to  ER if symptoms worsen.    Chief Complaint     Chief Complaint   Patient presents with   • Fall     Patient states that she tripped over her dog on Monday and fell into the [place] tree. She states that she hit a few places during her fall and is unsure if she hit her head. She has had some dizziness since the fall and off balance. She currently notes low back pain but has history of back pain.          History of Present Illness       Pt is a 62 year old female presenting after a fall 2 days go.  Pt states she had a mechanical fall over her dog in a twisting movement, landing on her left side with pain to thoracic and lumbar back areas, with nausea and headache. Pt denies head strike, no blood thinners. Pt denies LOC, new numbness to extremities. Pt reports taking tylenol without relief.  Patient reports history of fibromyalgia and vertigo.    Pt also asking to be check for urinary tract infection- Pt states she has pain with urination for 1 week, with frequency and urgency.  Pt denies fevers.  Patient states she has a history of interstitial cystitis    Fall        Review of Systems   Review of Systems      Current Medications       Current Outpatient Medications:   •  DULoxetine (CYMBALTA) 60 mg delayed release capsule, Take 1 capsule by mouth in the morning, Disp: , Rfl:   •  esomeprazole (NexIUM) 40 MG capsule, Take 1  capsule by mouth in the morning, Disp: , Rfl:   •  gabapentin (Neurontin) 600 MG tablet, Take 1 tablet (600 mg total) by mouth 3 (three) times a day, Disp: 270 tablet, Rfl: 3  •  levothyroxine 50 mcg tablet, TAKE 1 TABLET BY MOUTH ONCE DAILY IN THE EARLY MORNING, Disp: 90 tablet, Rfl: 0  •  methenamine hippurate (HIPREX) 1 g tablet, TAKE 1 TABLET BY MOUTH TWICE DAILY WITH MEALS, Disp: 60 tablet, Rfl: 12  •  methocarbamol (ROBAXIN) 500 mg tablet, Take 1 tablet (500 mg total) by mouth every 8 (eight) hours as needed for muscle spasms, Disp: 90 tablet, Rfl: 0  •  methylPREDNISolone 4 MG tablet therapy pack, Use as directed on package, Disp: 21 tablet, Rfl: 0  •  Multiple Vitamins-Minerals (ZINC PO), Take by mouth, Disp: , Rfl:   •  nystatin (MYCOSTATIN) powder, Twice a day as needed., Disp: 30 g, Rfl: 1  •  nystatin-triamcinolone (MYCOLOG-II) ointment, Apply to vulva twice daily for two weeks and to skin rash as needed., Disp: 30 g, Rfl: 3  •  ondansetron (ZOFRAN) 4 mg tablet, Take 1 tablet (4 mg total) by mouth every 8 (eight) hours as needed for nausea or vomiting, Disp: 30 tablet, Rfl: 0  •  SUMAtriptan (IMITREX) 50 mg tablet, Take 1 tablet (50 mg total) by mouth once as needed for migraine May repeat in 2 hours.  No more than 200 mg per day., Disp: 30 tablet, Rfl: 2  •  traZODone (DESYREL) 100 mg tablet, Take 100 mg by mouth daily at bedtime Take with food, Disp: , Rfl:   •  calcium-vitamin D 250-100 MG-UNIT per tablet, Take 1 tablet by mouth 2 (two) times a day (Patient not taking: Reported on 8/22/2023), Disp: , Rfl:   •  cyanocobalamin (VITAMIN B-12) 100 mcg tablet, Take by mouth daily   (Patient not taking: Reported on 8/22/2023), Disp: , Rfl:   •  DULoxetine (CYMBALTA) 30 mg delayed release capsule, , Disp: , Rfl:   •  gabapentin (Neurontin) 600 MG tablet, Take 1 tablet (600 mg total) by mouth 3 (three) times a day, Disp: 270 tablet, Rfl: 3  •  halobetasol (ULTRAVATE) 0.05 % ointment, Apply topically 2 (two)  times a day For up to 2 weeks. (Patient not taking: Reported on 8/22/2023), Disp: 30 g, Rfl: 2  •  hydrOXYzine HCL (ATARAX) 25 mg tablet, Take 1 tablet (25 mg total) by mouth daily at bedtime (Patient not taking: Reported on 10/3/2023), Disp: 90 tablet, Rfl: 3  •  omeprazole (PriLOSEC) 40 MG capsule, Take 40 mg by mouth daily (Patient not taking: Reported on 8/22/2023), Disp: , Rfl:   •  oxybutynin (DITROPAN-XL) 10 MG 24 hr tablet, Take 1 tablet (10 mg total) by mouth daily at bedtime, Disp: 30 tablet, Rfl: 11  •  PARoxetine (PAXIL) 40 MG tablet, Take by mouth (Patient not taking: Reported on 11/21/2022), Disp: , Rfl:   •  phenazopyridine (PYRIDIUM) 100 mg tablet, Take 1 tablet (100 mg total) by mouth 3 (three) times a day as needed for bladder spasms (painful urination) (Patient not taking: Reported on 11/21/2022), Disp: 20 tablet, Rfl: 0    Current Facility-Administered Medications:   •  ropivacaine (NAROPIN) 0.5 % injection 10 mL, 10 mL, Infiltration, , Jimmy Overton DPM, 10 mL at 05/31/23 1045  •  triamcinolone acetonide (KENALOG-40) 40 mg/mL injection 20 mg, 20 mg, Intra-articular, , Jimmy Overton DPM, 20 mg at 05/31/23 1045    Current Allergies     Allergies as of 12/27/2023 - Reviewed 12/27/2023   Allergen Reaction Noted   • Morphine  12/27/2016   • Penicillins Hives 12/27/2016            The following portions of the patient's history were reviewed and updated as appropriate: allergies, current medications, past family history, past medical history, past social history, past surgical history and problem list.     Past Medical History:   Diagnosis Date   • Anxiety    • Arthritis     Last assessed 5/3/2011   • Ortega's esophagus    • Cancer (HCC)     ovarian cancer at age 25 yo- REMOVAL  B/L   • Colon polyp    • DDD (degenerative disc disease), lumbar    • Depression    • Fall     5/2020 right knee meniscus tear- OR repair today 8/3/2020   • Fibromyalgia    • Fibromyalgia, primary    • Fracture of one or more  phalanges of foot    • GERD (gastroesophageal reflux disease)    • H/O bladder infections     chronic   • Hypertension    • Kidney infection     occasional   • Migraines    • Obesity    • Ovarian cancer (HCC) 1987    age 26   • Polyneuropathy     Last assessed 6/23/2016 knees to feet   • PONV (postoperative nausea and vomiting)     Patient requests to be pre-medicated prior to surgery prior to surgery   • PONV (postoperative nausea and vomiting) 8/3/2020   • Renal disorder    • Spinal stenosis    • Vertigo    • Wears glasses     reading       Past Surgical History:   Procedure Laterality Date   • ABDOMINAL SURGERY  1986    several   • BACK SURGERY  1990   • CATARACT EXTRACTION, BILATERAL     • CHOLECYSTECTOMY     • COLONOSCOPY     • FOOT FRACTURE SURGERY Bilateral 2004    x 5  surgeries   • KIDNEY SURGERY  1974    at age 11 yo   • KNEE SURGERY Right    • AK ARTHRS KNE SURG W/MENISCECTOMY MED/LAT W/SHVG Right 8/3/2020    Procedure: KNEE ARTHROSCOPY,PARTIAL MEDIAL & LATERAL MENISECTOMIES;  Surgeon: Austen Mcguire MD;  Location: [facility];  Service: Orthopedics   • AK LAPAROSCOPY SURG CHOLECYSTECTOMY N/A 11/27/2020    Procedure: LAPAROSCOPIC CHOLECYSTECTOMY;  Surgeon: Justyn Cole MD;  Location: [facility];  Service: General   • TOTAL ABDOMINAL HYSTERECTOMY  1987    age 26   • TOTAL ABDOMINAL HYSTERECTOMY W/ BILATERAL SALPINGOOPHORECTOMY Bilateral 1987    age 26       Family History   Problem Relation Age of Onset   • Heart disease Mother    • Cancer Mother    • Diabetes Mother    • Arthritis Mother    • Anxiety disorder Mother    • Bleeding Disorder Mother    • Clotting disorder Mother    • Depression Mother    • Hyperlipidemia Mother    • Irritable bowel syndrome Mother    • Hypertension Mother    • Obesity Mother    • Osteoporosis Mother    • Vaginal cancer Mother 30   • Thyroid disease Mother    • Stroke Mother    • Diabetes Father    • Arthritis Father    • Hyperlipidemia Father    • Vesicoureteral reflux Father     • Heart disease Father    • Hypertension Father    • Migraines Father    • Obesity Father    • Arthritis Brother    • Anxiety disorder Brother    • Diabetes Brother    • Hyperlipidemia Brother    • Vesicoureteral reflux Brother    • Heart disease Brother    • Irritable bowel syndrome Brother    • Hypertension Brother    • Migraines Brother    • Thyroid disease Brother    • Pancreatic cancer Brother 55   • Arthritis Maternal Aunt    • Anxiety disorder Maternal Aunt    • Depression Maternal Aunt    • Diabetes Maternal Aunt    • Vaginal cancer Maternal Aunt 50   • No Known Problems Maternal Aunt    • No Known Problems Maternal Aunt    • No Known Problems Maternal Aunt    • Fibroids Paternal Aunt    • Diabetes Maternal Grandmother    • Vaginal cancer Maternal Grandmother 50   • No Known Problems Maternal Grandfather    • Infertility Paternal Grandmother    • No Known Problems Paternal Grandfather    • Migraines Daughter    • Lupus Daughter    • Asthma Son    • Migraines Son    • Hypertension Family    • Arthritis Family          Medications have been verified.        Objective   /88   Pulse (!) 106   Temp 97.6 °F (36.4 °C)   Resp 18   LMP  (LMP Unknown)   SpO2 97%   No LMP recorded (lmp unknown). Patient has had a hysterectomy.       Physical Exam     Physical Exam                    Mouth/Throat:      Mouth: Mucous membranes are dry.      Pharynx: Oropharynx is clear.   Eyes:      Extraocular Movements: Extraocular movements intact.      Conjunctiva/sclera: Conjunctivae normal.      Pupils: Pupils are equal, round, and reactive to light.   Cardiovascular:      Rate and Rhythm: Normal rate and regular rhythm.      Pulses: Normal pulses.      Heart sounds: Normal heart sounds.   Pulmonary:      Effort: Pulmonary effort is normal. No respiratory distress.      Breath sounds: Normal breath sounds.   Abdominal:      General: Abdomen is flat. Bowel sounds are normal.      Palpations: Abdomen is soft.      Tenderness: There is no abdominal tenderness.   Musculoskeletal:         General: Normal range of motion.      Cervical back: Normal range of motion and neck supple.   Skin:     General: Skin is warm and dry.   Neurological:      General: No focal deficit present.      Mental Status: She is alert and oriented to person, place, and time. Mental status is at baseline.      Cranial Nerves: No cranial nerve deficit.      Sensory: No sensory deficit.      Motor: No weakness.      Coordination: Coordination normal.      Gait: Gait normal.   Psychiatric:         Mood and Affect: Mood normal.         Behavior: Behavior normal.

## 2023-12-27 NOTE — LETTER
December 27, 2023     Patient: Karmen Luna   YOB: 1960   Date of Visit: 12/27/2023       To Whom it May Concern:    Karmen Lnua was seen in my clinic on 12/27/2023. She may return to work on 12/28/2023.  Do not drive until cleared by family doctor .    If you have any questions or concerns, please don't hesitate to call.         Sincerely,          SONIA Garcia        CC: No Recipients

## 2023-12-27 NOTE — PATIENT INSTRUCTIONS
No acute findings on your xrays.  If the radiologist reads your x-rays any differently differently I will call you  Rest.  Gentle stretching.  Tylenol for pain.    Do not drive until cleared by your family doctor.  Go to the urgency department if dizziness and headache worsen, or pain is not relieved by Tylenol.

## 2023-12-28 ENCOUNTER — TELEPHONE (OUTPATIENT)
Dept: PHYSICAL THERAPY | Facility: OTHER | Age: 63
End: 2023-12-28

## 2023-12-28 LAB — BACTERIA UR CULT: NORMAL

## 2023-12-28 NOTE — TELEPHONE ENCOUNTER
Call placed to the patient per [facility] referral.    Spoke with the patient who is interested in PT eval. However she was at work the time the call was placed, and unable to talk. Patient will call us back when ready. Patient also stated she is established with PM, Dr Watson and may follow up with them as well    [facility] spine clsoed

## 2024-01-08 ENCOUNTER — TELEPHONE (OUTPATIENT)
Dept: PSYCHIATRY | Facility: CLINIC | Age: 64
End: 2024-01-08

## 2024-01-08 PROBLEM — Z13.820 OSTEOPOROSIS SCREENING: Status: RESOLVED | Noted: 2023-11-09 | Resolved: 2024-01-08

## 2024-01-08 NOTE — TELEPHONE ENCOUNTER
Received a callback from pt, stated she would not be able to take that much tome off from work and asked for referral to be closed. Writer informed pt should she change her mind she can contact the intake department at [phone].

## 2024-01-08 NOTE — TELEPHONE ENCOUNTER
Reached out to patient in regards to Neuropsychology referral.    Left VM for pt to contact intake department.

## 2024-01-10 NOTE — PROGRESS NOTES
UROLOGY PROGRESS NOTE   [facility]  [address] [place] [address] [place]  [address]  [place] [address]  [phone]  Fax:[phone]  [email or web address]      NAME: Karmen Luna  AGE: 63 y.o. SEX: female  : 1960   MRN: [id number]    DATE: 1/10/2024  TIME: 3:13 PM    Assessment and Plan:    Urge incontinence, and polydipsia which I think is leading to a lot of these problems.  She is drinking quite a bit of fluids.  I advised her to cut down to maybe 2 bottles of liquids plus which she needs for meals and we will change her oxybutynin XL to 5 mg XL twice daily to get her 24-hour coverage.  Hopefully this will help with her symptoms and she can send me a message through the portal to see how she is doing and if she wishes we can then consider either Botox or InterStim.  However I wish to try to maximize things medically first.  Future possible surgical options could be Botox, that with hydrodistention, or InterStim peripheral nerve evaluation.  We will set up an appointment for 6 months no matter what.               Chief Complaint   No chief complaint on file.      History of Present Illness   63-year-old woman, established patient -last saw Dr. Martin Coronel October 3, 2023 with a history of interstitial cystitis diagnosed in the past-I performed cystoscopy in the past did not find any pathology.  She saw Dr. Coronel with persistent overactive bladder symptoms including urgency frequency incontinence and occasional pelvic pain.  She was inquiring about InterStim.  He felt it was premature for InterStim.  She is here to discuss this.  She actually has been doing worse unfortunately.  She feels like her bladder is on fire sometimes and she complains of pain shooting from the vulvar region to the inner vaginal region up to the bladder.  Also complains of a lot of cramping and generalized hurting in the bladder region itself.  She also complains of getting the urge to run to the bathroom and then  actually starts urinating in her pants before she gets to the toilet.  She has had this before but it has become worse.  This happens about 3 times per day at work.  She only gets up 1 time a night at 3 AM however sometimes she does not make it to the bathroom in time.  She urinates about every hour during the day.  She also describes symptoms consistent with IBS-C and that she will have frequent bowel movements versus constipation.  No gross hematuria.  She does drink a lot.  She drinks about 4 bottles of regular water per day and sparkling water per day for bottles.  She remains on oxybutynin XL 10 mg at bedtime.  She has dry mouth from this.  Unfortunately her insurance will make her pay $440 for a 1 month supply of Myrbetriq.  It is possible that the oxybutynin XL 10 mg at bedtime and the trazodone is not letting her sleep and having a decrease in symptoms.gemtesa is also too expensive.      The following portions of the patient's history were reviewed and updated as appropriate: allergies, current medications, past family history, past medical history, past social history, past surgical history and problem list.  Past Medical History:   Diagnosis Date    Anxiety     Arthritis     Last assessed 5/3/2011    Ortega's esophagus     Cancer (HCC)     ovarian cancer at age 25 yo- REMOVAL  B/L    Colon polyp     DDD (degenerative disc disease), lumbar     Depression     Fall     5/2020 right knee meniscus tear- OR repair today 8/3/2020    Fibromyalgia     Fibromyalgia, primary     Fracture of one or more phalanges of foot     GERD (gastroesophageal reflux disease)     H/O bladder infections     chronic    Hypertension     Kidney infection     occasional    Migraines     Obesity     Ovarian cancer (HCC) 1987    age 26    Polyneuropathy     Last assessed 6/23/2016 knees to feet    PONV (postoperative nausea and vomiting)     Patient requests to be pre-medicated prior to surgery prior to surgery    PONV (postoperative  nausea and vomiting) 8/3/2020    Renal disorder     Spinal stenosis     Vertigo     Wears glasses     reading     Past Surgical History:   Procedure Laterality Date    ABDOMINAL SURGERY  1986    several    BACK SURGERY  1990    CATARACT EXTRACTION, BILATERAL      CHOLECYSTECTOMY      COLONOSCOPY      FOOT FRACTURE SURGERY Bilateral 2004    x 5  surgeries    KIDNEY SURGERY  1974    at age 13 yo    KNEE SURGERY Right     AK ARTHRS KNE SURG W/MENISCECTOMY MED/LAT W/SHVG Right 8/3/2020    Procedure: KNEE ARTHROSCOPY,PARTIAL MEDIAL & LATERAL MENISECTOMIES;  Surgeon: Austen Mcguire MD;  Location: [facility];  Service: Orthopedics    AK LAPAROSCOPY SURG CHOLECYSTECTOMY N/A 11/27/2020    Procedure: LAPAROSCOPIC CHOLECYSTECTOMY;  Surgeon: Justyn Cole MD;  Location: [facility];  Service: General    TOTAL ABDOMINAL HYSTERECTOMY  1987    age 26    TOTAL ABDOMINAL HYSTERECTOMY W/ BILATERAL SALPINGOOPHORECTOMY Bilateral 1987    age 26     shoulder  Review of Systems   Review of Systems   Gastrointestinal:         As per HPI I spent 43-year-old   Genitourinary:         As per hpi       Active Problem List     Patient Active Problem List   Diagnosis    Anxiety    Ortega esophagus    Depression    Lumbar radiculopathy    DDD (degenerative disc disease), lumbar    Lumbar spondylosis    Spinal stenosis of lumbar region    Other tear of medial meniscus, current injury, right knee, initial encounter    PONV (postoperative nausea and vomiting)    Calculus of gallbladder without cholecystitis without obstruction    Chronic pain syndrome    DDD (degenerative disc disease), cervical    Moderate episode of recurrent major depressive disorder (HCC)    Lichen sclerosus    Cervical radiculopathy    GERD (gastroesophageal reflux disease)    Bilateral occipital neuralgia    Sacroiliitis (HCC)    Amnesia/memory disorder    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HCC)    Stage 3a chronic kidney disease (HCC)    Chronic low back pain    Age-related  osteoporosis without current pathological fracture    Somatic dysfunction of pelvic region    Hip strain, initial encounter       Objective   LMP  (LMP Unknown)     Physical Exam  Vitals reviewed.   Constitutional:       Appearance: Normal appearance.   HENT:      Head: Normocephalic and atraumatic.   Eyes:      Extraocular Movements: Extraocular movements intact.   Pulmonary:      Effort: Pulmonary effort is normal.   Musculoskeletal:         General: Normal range of motion.      Cervical back: Normal range of motion.   Skin:     Coloration: Skin is not jaundiced or pale.   Neurological:      General: No focal deficit present.      Mental Status: She is alert and oriented to person, place, and time. Mental status is at baseline.   Psychiatric:         Mood and Affect: Mood normal.         Behavior: Behavior normal.         Thought Content: Thought content normal.         Judgment: Judgment normal.             Current Medications     Current Outpatient Medications:     calcium-vitamin D 250-100 MG-UNIT per tablet, Take 1 tablet by mouth 2 (two) times a day (Patient not taking: Reported on 8/22/2023), Disp: , Rfl:     cyanocobalamin (VITAMIN B-12) 100 mcg tablet, Take by mouth daily   (Patient not taking: Reported on 8/22/2023), Disp: , Rfl:     DULoxetine (CYMBALTA) 30 mg delayed release capsule, , Disp: , Rfl:     DULoxetine (CYMBALTA) 60 mg delayed release capsule, Take 1 capsule by mouth in the morning, Disp: , Rfl:     esomeprazole (NexIUM) 40 MG capsule, Take 1 capsule by mouth in the morning, Disp: , Rfl:     gabapentin (Neurontin) 600 MG tablet, Take 1 tablet (600 mg total) by mouth 3 (three) times a day, Disp: 270 tablet, Rfl: 3    gabapentin (Neurontin) 600 MG tablet, Take 1 tablet (600 mg total) by mouth 3 (three) times a day, Disp: 270 tablet, Rfl: 3    halobetasol (ULTRAVATE) 0.05 % ointment, Apply topically 2 (two) times a day For up to 2 weeks. (Patient not taking: Reported on 8/22/2023), Disp: 30 g,  Rfl: 2    hydrOXYzine HCL (ATARAX) 25 mg tablet, Take 1 tablet (25 mg total) by mouth daily at bedtime (Patient not taking: Reported on 10/3/2023), Disp: 90 tablet, Rfl: 3    levothyroxine 50 mcg tablet, TAKE 1 TABLET BY MOUTH ONCE DAILY IN THE EARLY MORNING, Disp: 90 tablet, Rfl: 0    methenamine hippurate (HIPREX) 1 g tablet, TAKE 1 TABLET BY MOUTH TWICE DAILY WITH MEALS, Disp: 60 tablet, Rfl: 12    methocarbamol (ROBAXIN) 500 mg tablet, Take 1 tablet (500 mg total) by mouth every 8 (eight) hours as needed for muscle spasms, Disp: 90 tablet, Rfl: 0    methylPREDNISolone 4 MG tablet therapy pack, Use as directed on package, Disp: 21 tablet, Rfl: 0    Multiple Vitamins-Minerals (ZINC PO), Take by mouth, Disp: , Rfl:     nystatin (MYCOSTATIN) powder, Twice a day as needed., Disp: 30 g, Rfl: 1    nystatin-triamcinolone (MYCOLOG-II) ointment, Apply to vulva twice daily for two weeks and to skin rash as needed., Disp: 30 g, Rfl: 3    omeprazole (PriLOSEC) 40 MG capsule, Take 40 mg by mouth daily (Patient not taking: Reported on 8/22/2023), Disp: , Rfl:     ondansetron (ZOFRAN) 4 mg tablet, Take 1 tablet (4 mg total) by mouth every 8 (eight) hours as needed for nausea or vomiting, Disp: 30 tablet, Rfl: 0    oxybutynin (DITROPAN-XL) 10 MG 24 hr tablet, Take 1 tablet (10 mg total) by mouth daily at bedtime, Disp: 30 tablet, Rfl: 11    PARoxetine (PAXIL) 40 MG tablet, Take by mouth (Patient not taking: Reported on 11/21/2022), Disp: , Rfl:     phenazopyridine (PYRIDIUM) 100 mg tablet, Take 1 tablet (100 mg total) by mouth 3 (three) times a day as needed for bladder spasms (painful urination) (Patient not taking: Reported on 11/21/2022), Disp: 20 tablet, Rfl: 0    SUMAtriptan (IMITREX) 50 mg tablet, Take 1 tablet (50 mg total) by mouth once as needed for migraine May repeat in 2 hours.  No more than 200 mg per day., Disp: 30 tablet, Rfl: 2    traZODone (DESYREL) 100 mg tablet, Take 100 mg by mouth daily at bedtime Take with  food, Disp: , Rfl:     Current Facility-Administered Medications:     ropivacaine (NAROPIN) 0.5 % injection 10 mL, 10 mL, Infiltration, , Jimmy Overton DPM, 10 mL at 05/31/23 1045    triamcinolone acetonide (KENALOG-40) 40 mg/mL injection 20 mg, 20 mg, Intra-articular, , Jimmy Overton DPM, 20 mg at 05/31/23 1045        Anil Sabillon MD

## 2024-01-11 ENCOUNTER — OFFICE VISIT (OUTPATIENT)
Dept: UROLOGY | Facility: CLINIC | Age: 64
End: 2024-01-11
Payer: MEDICARE

## 2024-01-11 ENCOUNTER — NURSE TRIAGE (OUTPATIENT)
Age: 64
End: 2024-01-11

## 2024-01-11 VITALS — HEART RATE: 86 BPM | SYSTOLIC BLOOD PRESSURE: 140 MMHG | DIASTOLIC BLOOD PRESSURE: 88 MMHG

## 2024-01-11 DIAGNOSIS — N30.10 INTERSTITIAL CYSTITIS: ICD-10-CM

## 2024-01-11 DIAGNOSIS — R39.89 BLADDER PAIN: ICD-10-CM

## 2024-01-11 DIAGNOSIS — N39.41 URGE INCONTINENCE: Primary | ICD-10-CM

## 2024-01-11 PROCEDURE — 99214 OFFICE O/P EST MOD 30 MIN: CPT | Performed by: UROLOGY

## 2024-01-11 RX ORDER — OXYBUTYNIN CHLORIDE 5 MG/1
5 TABLET, EXTENDED RELEASE ORAL 2 TIMES DAILY
Qty: 60 TABLET | Refills: 11 | Status: SHIPPED | OUTPATIENT
Start: 2024-01-11

## 2024-01-11 NOTE — TELEPHONE ENCOUNTER
Pt seen in office today. Noticed that her note from the visit states 73 year old women asking if this can be changed pt is 63

## 2024-01-11 NOTE — PATIENT INSTRUCTIONS
Take the oxybutynin extended release 5 mg twice daily instead of the 10 mg at night.  Decrease your fluid consumption to 2 bottles of water during the daytime and what else you need to drink with meals.  Let me know if this does not control your symptoms well enough and we will consider Botox or InterStim.  You can send me a message through the portal.

## 2024-01-26 ENCOUNTER — OFFICE VISIT (OUTPATIENT)
Dept: FAMILY MEDICINE CLINIC | Facility: CLINIC | Age: 64
End: 2024-01-26
Payer: MEDICARE

## 2024-01-26 VITALS
TEMPERATURE: 97.7 F | RESPIRATION RATE: 18 BRPM | HEART RATE: 69 BPM | DIASTOLIC BLOOD PRESSURE: 82 MMHG | OXYGEN SATURATION: 95 % | HEIGHT: 67 IN | BODY MASS INDEX: 31.32 KG/M2 | SYSTOLIC BLOOD PRESSURE: 128 MMHG

## 2024-01-26 DIAGNOSIS — Z12.31 ENCOUNTER FOR SCREENING MAMMOGRAM FOR BREAST CANCER: ICD-10-CM

## 2024-01-26 DIAGNOSIS — Z12.11 ENCOUNTER FOR SCREENING COLONOSCOPY: ICD-10-CM

## 2024-01-26 DIAGNOSIS — F33.1 MODERATE EPISODE OF RECURRENT MAJOR DEPRESSIVE DISORDER (HCC): ICD-10-CM

## 2024-01-26 DIAGNOSIS — E55.9 VITAMIN D INSUFFICIENCY: ICD-10-CM

## 2024-01-26 DIAGNOSIS — M46.1 SACROILIITIS (HCC): ICD-10-CM

## 2024-01-26 DIAGNOSIS — Z00.00 MEDICARE ANNUAL WELLNESS VISIT, INITIAL: Primary | ICD-10-CM

## 2024-01-26 DIAGNOSIS — F33.0 MILD EPISODE OF RECURRENT MAJOR DEPRESSIVE DISORDER (HCC): ICD-10-CM

## 2024-01-26 DIAGNOSIS — Z11.59 SCREENING FOR VIRAL DISEASE: ICD-10-CM

## 2024-01-26 DIAGNOSIS — Z12.4 PAP SMEAR FOR CERVICAL CANCER SCREENING: ICD-10-CM

## 2024-01-26 DIAGNOSIS — G47.00 INSOMNIA, UNSPECIFIED TYPE: ICD-10-CM

## 2024-01-26 DIAGNOSIS — N18.31 STAGE 3A CHRONIC KIDNEY DISEASE (HCC): ICD-10-CM

## 2024-01-26 PROCEDURE — 1123F ACP DISCUSS/DSCN MKR DOCD: CPT | Performed by: FAMILY MEDICINE

## 2024-01-26 PROCEDURE — G0402 INITIAL PREVENTIVE EXAM: HCPCS | Performed by: FAMILY MEDICINE

## 2024-01-26 RX ORDER — TRAZODONE HYDROCHLORIDE 100 MG/1
100 TABLET ORAL
Qty: 90 TABLET | Refills: 1 | Status: SHIPPED | OUTPATIENT
Start: 2024-01-26 | End: 2024-02-05 | Stop reason: DRUGHIGH

## 2024-01-26 RX ORDER — DULOXETIN HYDROCHLORIDE 60 MG/1
60 CAPSULE, DELAYED RELEASE ORAL DAILY
Qty: 90 CAPSULE | Refills: 1 | Status: SHIPPED | OUTPATIENT
Start: 2024-01-26

## 2024-01-26 NOTE — PATIENT INSTRUCTIONS
Medicare Preventive Visit Patient Instructions  Thank you for completing your Welcome to Medicare Visit or Medicare Annual Wellness Visit today. Your next wellness visit will be due in one year (1/26/2025).  The screening/preventive services that you may require over the next 5-10 years are detailed below. Some tests may not apply to you based off risk factors and/or age. Screening tests ordered at today's visit but not completed yet may show as past due. Also, please note that scanned in results may not display below.  Preventive Screenings:  Service Recommendations Previous Testing/Comments   Colorectal Cancer Screening  * Colonoscopy    * Fecal Occult Blood Test (FOBT)/Fecal Immunochemical Test (FIT)  * Fecal DNA/Cologuard Test  * Flexible Sigmoidoscopy Age: 45-75 years old   Colonoscopy: every 10 years (may be performed more frequently if at higher risk)  OR  FOBT/FIT: every 1 year  OR  Cologuard: every 3 years  OR  Sigmoidoscopy: every 5 years  Screening may be recommended earlier than age 45 if at higher risk for colorectal cancer. Also, an individualized decision between you and your healthcare provider will decide whether screening between the ages of 76-85 would be appropriate. Colonoscopy: 10/01/2018  FOBT/FIT: Not on file  Cologuard: Not on file  Sigmoidoscopy: Not on file    Screening Current     Breast Cancer Screening Age: 40+ years old  Frequency: every 1-2 years  Not required if history of left and right mastectomy Mammogram: 08/23/2022    Screening Current   Cervical Cancer Screening Between the ages of 21-29, pap smear recommended once every 3 years.   Between the ages of 30-65, can perform pap smear with HPV co-testing every 5 years.   Recommendations may differ for women with a history of total hysterectomy, cervical cancer, or abnormal pap smears in past. Pap Smear: Not on file    Screening Not Indicated   Hepatitis C Screening Once for adults born between 1945 and 1965  More frequently in  patients at high risk for Hepatitis C Hep C Antibody: Not on file    Risks and Benefits Discussed   Diabetes Screening 1-2 times per year if you're at risk for diabetes or have pre-diabetes Fasting glucose: 112 mg/dL (7/15/2022)  A1C: No results in last 5 years (No results in last 5 years)      Cholesterol Screening Once every 5 years if you don't have a lipid disorder. May order more often based on risk factors. Lipid panel: 03/25/2022    Screening Current     Other Preventive Screenings Covered by Medicare:  Abdominal Aortic Aneurysm (AAA) Screening: covered once if your at risk. You're considered to be at risk if you have a family history of AAA.  Lung Cancer Screening: covers low dose CT scan once per year if you meet all of the following conditions: (1) Age 55-77; (2) No signs or symptoms of lung cancer; (3) Current smoker or have quit smoking within the last 15 years; (4) You have a tobacco smoking history of at least 20 pack years (packs per day multiplied by number of years you smoked); (5) You get a written order from a healthcare provider.  Glaucoma Screening: covered annually if you're considered high risk: (1) You have diabetes OR (2) Family history of glaucoma OR (3)  aged 50 and older OR (4)  American aged 65 and older  Osteoporosis Screening: covered every 2 years if you meet one of the following conditions: (1) You're estrogen deficient and at risk for osteoporosis based off medical history and other findings; (2) Have a vertebral abnormality; (3) On glucocorticoid therapy for more than 3 months; (4) Have primary hyperparathyroidism; (5) On osteoporosis medications and need to assess response to drug therapy.   Last bone density test (DXA Scan): 11/27/2019.  HIV Screening: covered annually if you're between the age of 15-65. Also covered annually if you are younger than 15 and older than 65 with risk factors for HIV infection. For pregnant patients, it is covered up to 3 times  per pregnancy.    Immunizations:  Immunization Recommendations   Influenza Vaccine Annual influenza vaccination during flu season is recommended for all persons aged >= 6 months who do not have contraindications   Pneumococcal Vaccine   * Pneumococcal conjugate vaccine = PCV13 (Prevnar 13), PCV15 (Vaxneuvance), PCV20 (Prevnar 20)  * Pneumococcal polysaccharide vaccine = PPSV23 (Pneumovax) Adults 19-63 yo with certain risk factors or if 65+ yo  If never received any pneumonia vaccine: recommend Prevnar 20 (PCV20)  Give PCV20 if previously received 1 dose of PCV13 or PPSV23   Hepatitis B Vaccine 3 dose series if at intermediate or high risk (ex: diabetes, end stage renal disease, liver disease)   Respiratory syncytial virus (RSV) Vaccine - COVERED BY MEDICARE PART D  * RSVPreF3 (Arexvy) CDC recommends that adults 60 years of age and older may receive a single dose of RSV vaccine using shared clinical decision-making (SCDM)   Tetanus (Td) Vaccine - COST NOT COVERED BY MEDICARE PART B Following completion of primary series, a booster dose should be given every 10 years to maintain immunity against tetanus. Td may also be given as tetanus wound prophylaxis.   Tdap Vaccine - COST NOT COVERED BY MEDICARE PART B Recommended at least once for all adults. For pregnant patients, recommended with each pregnancy.   Shingles Vaccine (Shingrix) - COST NOT COVERED BY MEDICARE PART B  2 shot series recommended in those 19 years and older who have or will have weakened immune systems or those 50 years and older     Health Maintenance Due:      Topic Date Due   • Hepatitis C Screening  Never done   • HIV Screening  Never done   • Cervical Cancer Screening  Never done   • Breast Cancer Screening: Mammogram  08/23/2023   • Colorectal Cancer Screening  10/01/2023     Immunizations Due:      Topic Date Due   • COVID-19 Vaccine (4 - 2023-24 season) 09/01/2023   • Pneumococcal Vaccine: 65+ Years (1 - PCV) Never done     Advance Directives    What are advance directives?  Advance directives are legal documents that state your wishes and plans for medical care. These plans are made ahead of time in case you lose your ability to make decisions for yourself. Advance directives can apply to any medical decision, such as the treatments you want, and if you want to donate organs.   What are the types of advance directives?  There are many types of advance directives, and each state has rules about how to use them. You may choose a combination of any of the following:  Living will:  This is a written record of the treatment you want. You can also choose which treatments you do not want, which to limit, and which to stop at a certain time. This includes surgery, medicine, IV fluid, and tube feedings.   Durable power of  for healthcare (DPAHC):  This is a written record that states who you want to make healthcare choices for you when you are unable to make them for yourself. This person, called a proxy, is usually a family member or a friend. You may choose more than 1 proxy.  Do not resuscitate (DNR) order:  A DNR order is used in case your heart stops beating or you stop breathing. It is a request not to have certain forms of treatment, such as CPR. A DNR order may be included in other types of advance directives.  Medical directive:  This covers the care that you want if you are in a coma, near death, or unable to make decisions for yourself. You can list the treatments you want for each condition. Treatment may include pain medicine, surgery, blood transfusions, dialysis, IV or tube feedings, and a ventilator (breathing machine).  Values history:  This document has questions about your views, beliefs, and how you feel and think about life. This information can help others choose the care that you would choose.  Why are advance directives important?  An advance directive helps you control your care. Although spoken wishes may be used, it is better to  have your wishes written down. Spoken wishes can be misunderstood, or not followed. Treatments may be given even if you do not want them. An advance directive may make it easier for your family to make difficult choices about your care.   Fall Prevention    Fall prevention  includes ways to make your home and other areas safer. It also includes ways you can move more carefully to prevent a fall. Health conditions that cause changes in your blood pressure, vision, or muscle strength and coordination may increase your risk for falls. Medicines may also increase your risk for falls if they make you dizzy, weak, or sleepy.   Fall prevention tips:   Stand or sit up slowly.    Use assistive devices as directed.    Wear shoes that fit well and have soles that .    Wear a personal alarm.    Stay active.    Manage your medical conditions.    Home Safety Tips:  Add items to prevent falls in the bathroom.    Keep paths clear.    Install bright lights in your home.    Keep items you use often on shelves within reach.    Paint or place reflective tape on the edges of your stairs.    Weight Management   Why it is important to manage your weight:  Being overweight increases your risk of health conditions such as heart disease, high blood pressure, type 2 diabetes, and certain types of cancer. It can also increase your risk for osteoarthritis, sleep apnea, and other respiratory problems. Aim for a slow, steady weight loss. Even a small amount of weight loss can lower your risk of health problems.  How to lose weight safely:  A safe and healthy way to lose weight is to eat fewer calories and get regular exercise. You can lose up about 1 pound a week by decreasing the number of calories you eat by 500 calories each day.   Healthy meal plan for weight management:  A healthy meal plan includes a variety of foods, contains fewer calories, and helps you stay healthy. A healthy meal plan includes the following:  Eat whole-grain foods  more often.  A healthy meal plan should contain fiber. Fiber is the part of grains, fruits, and vegetables that is not broken down by your body. Whole-grain foods are healthy and provide extra fiber in your diet. Some examples of whole-grain foods are whole-wheat breads and pastas, oatmeal, brown rice, and bulgur.  Eat a variety of vegetables every day.  Include dark, leafy greens such as spinach, kale, samantha greens, and mustard greens. Eat yellow and orange vegetables such as carrots, sweet potatoes, and winter squash.   Eat a variety of fruits every day.  Choose fresh or canned fruit (canned in its own juice or light syrup) instead of juice. Fruit juice has very little or no fiber.  Eat low-fat dairy foods.  Drink fat-free (skim) milk or 1% milk. Eat fat-free yogurt and low-fat cottage cheese. Try low-fat cheeses such as mozzarella and other reduced-fat cheeses.  Choose meat and other protein foods that are low in fat.  Choose beans or other legumes such as split peas or lentils. Choose fish, skinless poultry (chicken or turkey), or lean cuts of red meat (beef or pork). Before you cook meat or poultry, cut off any visible fat.   Use less fat and oil.  Try baking foods instead of frying them. Add less fat, such as margarine, sour cream, regular salad dressing and mayonnaise to foods. Eat fewer high-fat foods. Some examples of high-fat foods include french fries, doughnuts, ice cream, and cakes.  Eat fewer sweets.  Limit foods and drinks that are high in sugar. This includes candy, cookies, regular soda, and sweetened drinks.  Exercise:  Exercise at least 30 minutes per day on most days of the week. Some examples of exercise include walking, biking, dancing, and swimming. You can also fit in more physical activity by taking the stairs instead of the elevator or parking farther away from stores. Ask your healthcare provider about the best exercise plan for you.      © Copyright [employer] 2018 Information is  for End User's use only and may not be sold, redistributed or otherwise used for commercial purposes. All illustrations and images included in CareNotes® are the copyrighted property of [employer] or [employer]

## 2024-01-26 NOTE — PROGRESS NOTES
Assessment and Plan:     Problem List Items Addressed This Visit       Depression    Relevant Medications    DULoxetine (CYMBALTA) 60 mg delayed release capsule    traZODone (DESYREL) 100 mg tablet    Moderate episode of recurrent major depressive disorder (HCC)    Relevant Medications    DULoxetine (CYMBALTA) 60 mg delayed release capsule    traZODone (DESYREL) 100 mg tablet    Sacroiliitis (HCC)    Stage 3a chronic kidney disease (HCC)     Other Visit Diagnoses       Medicare annual wellness visit, initial    -  Primary    Relevant Orders    Comprehensive metabolic panel    CBC and Platelet    Lipid panel    Encounter for screening colonoscopy        Screening for viral disease        Relevant Orders    Hepatitis C antibody    HIV 1/2 AG/AB w Reflex [facility] for 2 yr old and above    Encounter for screening mammogram for breast cancer        Relevant Orders    Mammo screening bilateral w 3d & cad    Pap smear for cervical cancer screening        Relevant Orders    Ambulatory Referral to Obstetrics / Gynecology    Insomnia, unspecified type        Relevant Medications    traZODone (DESYREL) 100 mg tablet    Vitamin D insufficiency        Relevant Orders    Vitamin D 25 hydroxy             Preventive health issues were discussed with patient, and age appropriate screening tests were ordered as noted in patient's After Visit Summary.  Personalized health advice and appropriate referrals for health education or preventive services given if needed, as noted in patient's After Visit Summary.     History of Present Illness:     Patient presents for a Medicare Wellness Visit    Medicare PE.    Follows with Psychiatry, request change because of distance.  Request me to take over Med management.  Started Med's when parents passed.       Patient Care Team:  Alphonso Paula DO as PCP - General  MD Alphonso Ewing DO Paul Berger, MD (Urology)     Review of Systems:     Review of Systems   Constitutional: Negative.     HENT: Negative.     Eyes: Negative.    Respiratory: Negative.     Cardiovascular: Negative.    Gastrointestinal: Negative.    Genitourinary: Negative.    Musculoskeletal: Negative.    Skin: Negative.    Neurological: Negative.    Psychiatric/Behavioral: Negative.          Problem List:     Patient Active Problem List   Diagnosis    Anxiety    Ortega esophagus    Depression    Lumbar radiculopathy    DDD (degenerative disc disease), lumbar    Lumbar spondylosis    Spinal stenosis of lumbar region    Other tear of medial meniscus, current injury, right knee, initial encounter    PONV (postoperative nausea and vomiting)    Calculus of gallbladder without cholecystitis without obstruction    Chronic pain syndrome    DDD (degenerative disc disease), cervical    Moderate episode of recurrent major depressive disorder (HCC)    Lichen sclerosus    Cervical radiculopathy    GERD (gastroesophageal reflux disease)    Bilateral occipital neuralgia    Sacroiliitis (HCC)    Amnesia/memory disorder    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HCC)    Stage 3a chronic kidney disease (HCC)    Chronic low back pain    Age-related osteoporosis without current pathological fracture    Somatic dysfunction of pelvic region    Hip strain, initial encounter      Past Medical and Surgical History:     Past Medical History:   Diagnosis Date    Anxiety     Arthritis     Last assessed 5/3/2011    Ortega's esophagus     Cancer (HCC)     ovarian cancer at age 25 yo- REMOVAL  B/L    Colon polyp     DDD (degenerative disc disease), lumbar     Depression     Fall     5/2020 right knee meniscus tear- OR repair today 8/3/2020    Fibromyalgia     Fibromyalgia, primary     Fracture of one or more phalanges of foot     GERD (gastroesophageal reflux disease)     H/O bladder infections     chronic    Hypertension     Kidney infection     occasional    Migraines     Obesity     Ovarian cancer (HCC) 1987    age 26    Polyneuropathy     Last assessed  6/23/2016 knees to feet    PONV (postoperative nausea and vomiting)     Patient requests to be pre-medicated prior to surgery prior to surgery    PONV (postoperative nausea and vomiting) 8/3/2020    Renal disorder     Spinal stenosis     Vertigo     Wears glasses     reading     Past Surgical History:   Procedure Laterality Date    ABDOMINAL SURGERY  1986    several    BACK SURGERY  1990    CATARACT EXTRACTION, BILATERAL      CHOLECYSTECTOMY      COLONOSCOPY      FOOT FRACTURE SURGERY Bilateral 2004    x 5  surgeries    KIDNEY SURGERY  1974    at age 13 yo    KNEE SURGERY Right     KS ARTHRS KNE SURG W/MENISCECTOMY MED/LAT W/SHVG Right 8/3/2020    Procedure: KNEE ARTHROSCOPY,PARTIAL MEDIAL & LATERAL MENISECTOMIES;  Surgeon: Austen Mcguire MD;  Location: [facility];  Service: Orthopedics    KS LAPAROSCOPY SURG CHOLECYSTECTOMY N/A 11/27/2020    Procedure: LAPAROSCOPIC CHOLECYSTECTOMY;  Surgeon: Justyn Cole MD;  Location: [facility];  Service: General    TOTAL ABDOMINAL HYSTERECTOMY  1987    age 26    TOTAL ABDOMINAL HYSTERECTOMY W/ BILATERAL SALPINGOOPHORECTOMY Bilateral 1987    age 26      Family History:     Family History   Problem Relation Age of Onset    Heart disease Mother     Cancer Mother     Diabetes Mother     Arthritis Mother     Anxiety disorder Mother     Bleeding Disorder Mother     Clotting disorder Mother     Depression Mother     Hyperlipidemia Mother     Irritable bowel syndrome Mother     Hypertension Mother     Obesity Mother     Osteoporosis Mother     Vaginal cancer Mother 30    Thyroid disease Mother     Stroke Mother     Diabetes Father     Arthritis Father     Hyperlipidemia Father     Vesicoureteral reflux Father     Heart disease Father     Hypertension Father     Migraines Father     Obesity Father     Arthritis Brother     Anxiety disorder Brother     Diabetes Brother     Hyperlipidemia Brother     Vesicoureteral reflux Brother     Heart disease Brother     Irritable bowel syndrome  Brother     Hypertension Brother     Migraines Brother     Thyroid disease Brother     Pancreatic cancer Brother 55    Arthritis Maternal Aunt     Anxiety disorder Maternal Aunt     Depression Maternal Aunt     Diabetes Maternal Aunt     Vaginal cancer Maternal Aunt 50    No Known Problems Maternal Aunt     No Known Problems Maternal Aunt     No Known Problems Maternal Aunt     Fibroids Paternal Aunt     Diabetes Maternal Grandmother     Vaginal cancer Maternal Grandmother 50    No Known Problems Maternal Grandfather     Infertility Paternal Grandmother     No Known Problems Paternal Grandfather     Migraines Daughter     Lupus Daughter     Asthma Son     Migraines Son     Hypertension Family     Arthritis Family       Social History:     Social History     Socioeconomic History    Marital status: /Civil Union     Spouse name: None    Number of children: None    Years of education: None    Highest education level: None   Occupational History    Occupation: CNA   Tobacco Use    Smoking status: Never    Smokeless tobacco: Never   Vaping Use    Vaping status: Never Used   Substance and Sexual Activity    Alcohol use: Not Currently    Drug use: No    Sexual activity: Not Currently     Birth control/protection: Surgical   Other Topics Concern    None   Social History Narrative    None     Social Determinants of Health     Financial Resource Strain: Low Risk  (1/26/2024)    Overall Financial Resource Strain (CARDIA)     Difficulty of Paying Living Expenses: Not hard at all   Food Insecurity: Not on file   Transportation Needs: No Transportation Needs (1/26/2024)    PRAPARE - Transportation     Lack of Transportation (Medical): No     Lack of Transportation (Non-Medical): No   Physical Activity: Not on file   Stress: Not on file   Social Connections: Not on file   Intimate Partner Violence: Not on file   Housing Stability: Not on file      Medications and Allergies:     Current Outpatient Medications   Medication  Sig Dispense Refill    DULoxetine (CYMBALTA) 60 mg delayed release capsule Take 1 capsule (60 mg total) by mouth in the morning 90 capsule 1    esomeprazole (NexIUM) 40 MG capsule Take 1 capsule by mouth in the morning      gabapentin (Neurontin) 600 MG tablet Take 1 tablet (600 mg total) by mouth 3 (three) times a day 270 tablet 3    halobetasol (ULTRAVATE) 0.05 % ointment Apply topically 2 (two) times a day For up to 2 weeks. 30 g 2    hydrOXYzine HCL (ATARAX) 25 mg tablet Take 1 tablet (25 mg total) by mouth daily at bedtime 90 tablet 3    levothyroxine 50 mcg tablet TAKE 1 TABLET BY MOUTH ONCE DAILY IN THE EARLY MORNING 90 tablet 0    methenamine hippurate (HIPREX) 1 g tablet TAKE 1 TABLET BY MOUTH TWICE DAILY WITH MEALS 60 tablet 12    methocarbamol (ROBAXIN) 500 mg tablet Take 1 tablet (500 mg total) by mouth every 8 (eight) hours as needed for muscle spasms 90 tablet 0    nystatin (MYCOSTATIN) powder Twice a day as needed. 30 g 1    omeprazole (PriLOSEC) 40 MG capsule Take 40 mg by mouth daily      ondansetron (ZOFRAN) 4 mg tablet Take 1 tablet (4 mg total) by mouth every 8 (eight) hours as needed for nausea or vomiting 30 tablet 0    oxybutynin (DITROPAN-XL) 5 mg 24 hr tablet Take 1 tablet (5 mg total) by mouth 2 (two) times a day 60 tablet 11    phenazopyridine (PYRIDIUM) 100 mg tablet Take 1 tablet (100 mg total) by mouth 3 (three) times a day as needed for bladder spasms (painful urination) 20 tablet 0    SUMAtriptan (IMITREX) 50 mg tablet Take 1 tablet (50 mg total) by mouth once as needed for migraine May repeat in 2 hours.  No more than 200 mg per day. 30 tablet 2    traZODone (DESYREL) 100 mg tablet Take 1 tablet (100 mg total) by mouth daily at bedtime Take with food 90 tablet 1    oxybutynin (DITROPAN-XL) 10 MG 24 hr tablet Take 1 tablet (10 mg total) by mouth daily at bedtime 30 tablet 11     Current Facility-Administered Medications   Medication Dose Route Frequency Provider Last Rate Last Admin     ropivacaine (NAROPIN) 0.5 % injection 10 mL  10 mL Infiltration  Jimmy Overton, DPM   10 mL at 05/31/23 1045    triamcinolone acetonide (KENALOG-40) 40 mg/mL injection 20 mg  20 mg Intra-articular  Jimmy Overton DPM   20 mg at 05/31/23 1045     Allergies   Allergen Reactions    Morphine      Acts not like herself and feels agitated and restless    Penicillins Hives     Tongue swells      Immunizations:     Immunization History   Administered Date(s) Administered    COVID-19 MODERNA VACC 0.5 ML IM 08/05/2021, 09/02/2021, 02/05/2022    INFLUENZA 09/16/2023    Respiratory Syncytial Virus Vaccine (Recombinant, Adjuvanted) 09/16/2023    Tuberculin Skin Test-PPD Intradermal 12/12/2012, 12/26/2012, 09/27/2023, 09/27/2023, 10/04/2023    Zoster 10/12/2016    Zoster Vaccine Recombinant 05/14/2023, 07/22/2023      Health Maintenance:         Topic Date Due    Hepatitis C Screening  Never done    HIV Screening  Never done    Cervical Cancer Screening  Never done    Breast Cancer Screening: Mammogram  08/23/2023    Colorectal Cancer Screening  10/01/2023         Topic Date Due    COVID-19 Vaccine (4 - 2023-24 season) 09/01/2023    Pneumococcal Vaccine: 65+ Years (1 - PCV) Never done      Medicare Screening Tests and Risk Assessments:     Karemn is here for her Initial Wellness visit.     Health Risk Assessment:   Patient rates overall health as good. Patient feels that their physical health rating is same. Patient is satisfied with their life. Eyesight was rated as same. Hearing was rated as same. Patient feels that their emotional and mental health rating is same. Patients states they are never, rarely angry. Patient states they are never, rarely unusually tired/fatigued. Pain experienced in the last 7 days has been none. Patient states that she has experienced no weight loss or gain in last 6 months.     Depression Screening:   PHQ-9 Score: 0      Fall Risk Screening:   In the past year, patient has experienced: history of falling in  past year    Number of falls: 1  Injured during fall?: No    Feels unsteady when standing or walking?: No    Worried about falling?: No      Urinary Incontinence Screening:   Patient has not leaked urine accidently in the last six months.     Home Safety:  Patient does not have trouble with stairs inside or outside of their home. Patient has working smoke alarms and has working carbon monoxide detector. Home safety hazards include: none.     Nutrition:   Current diet is Regular.     Medications:   Patient is currently taking over-the-counter supplements. OTC medications include: see medication list. Patient is able to manage medications.     Activities of Daily Living (ADLs)/Instrumental Activities of Daily Living (IADLs):   Walk and transfer into and out of bed and chair?: Yes  Dress and groom yourself?: Yes    Bathe or shower yourself?: Yes    Feed yourself? Yes  Do your laundry/housekeeping?: Yes  Manage your money, pay your bills and track your expenses?: Yes  Make your own meals?: Yes    Do your own shopping?: Yes    Previous Hospitalizations:   Any hospitalizations or ED visits within the last 12 months?: No      Advance Care Planning:   Living will: No    Durable POA for healthcare: No    Advanced directive: No    Advanced directive counseling given: Yes    ACP document given: Yes    End of Life Decisions reviewed with patient: Yes      Cognitive Screening:   Provider or family/friend/caregiver concerned regarding cognition?: No    PREVENTIVE SCREENINGS      Cardiovascular Screening:    General: Screening Current      Diabetes Screening:     General: Screening Not Indicated      Colorectal Cancer Screening:     General: Screening Current      Breast Cancer Screening:     General: Screening Current      Cervical Cancer Screening:    General: Screening Not Indicated      Osteoporosis Screening:    General: Screening Not Indicated and History Osteoporosis      Abdominal Aortic Aneurysm (AAA) Screening:         "General: Screening Not Indicated      Lung Cancer Screening:     General: Screening Not Indicated      Hepatitis C Screening:    General: Risks and Benefits Discussed    Screening, Brief Intervention, and Referral to Treatment (SBIRT)    Screening  Typical number of drinks in a day: 0  Typical number of drinks in a week: 0  Interpretation: Low risk drinking behavior.    AUDIT-C Screenin) How often did you have a drink containing alcohol in the past year? never  2) How many drinks did you have on a typical day when you were drinking in the past year? 0  3) How often did you have 6 or more drinks on one occasion in the past year? never    AUDIT-C Score: 0  Interpretation: Score 0-2 (female): Negative screen for alcohol misuse    Single Item Drug Screening:  How often have you used an illegal drug (including marijuana) or a prescription medication for non-medical reasons in the past year? never    Single Item Drug Screen Score: 0  Interpretation: Negative screen for possible drug use disorder    Other Counseling Topics:   Car/seat belt/driving safety, skin self-exam, sunscreen and calcium and vitamin D intake and regular weightbearing exercise.     No results found.     Physical Exam:     /82 (BP Location: Left arm, Patient Position: Sitting, Cuff Size: Large)   Pulse 69   Temp 97.7 °F (36.5 °C) (Oral)   Resp 18   Ht 5' 7\" (1.702 m)   LMP  (LMP Unknown)   SpO2 95%   BMI 31.32 kg/m²     Physical Exam  Vitals and nursing note reviewed.   Constitutional:       General: She is not in acute distress.     Appearance: She is well-developed.   HENT:      Head: Normocephalic and atraumatic.      Right Ear: External ear normal.      Nose: Nose normal.      Mouth/Throat:      Mouth: Mucous membranes are moist.      Pharynx: Oropharynx is clear.   Eyes:      Conjunctiva/sclera: Conjunctivae normal.   Cardiovascular:      Rate and Rhythm: Normal rate and regular rhythm.      Heart sounds: No murmur " heard.  Pulmonary:      Effort: Pulmonary effort is normal. No respiratory distress.      Breath sounds: Normal breath sounds.   Abdominal:      General: Abdomen is flat. Bowel sounds are normal.      Palpations: Abdomen is soft.      Tenderness: There is no abdominal tenderness.   Musculoskeletal:         General: No swelling.      Cervical back: Neck supple.   Skin:     General: Skin is warm and dry.      Capillary Refill: Capillary refill takes less than 2 seconds.   Neurological:      Mental Status: She is alert.   Psychiatric:         Mood and Affect: Mood normal.         Thought Content: Thought content normal.         Judgment: Judgment normal.          Alphonso Paula, DO

## 2024-01-29 ENCOUNTER — APPOINTMENT (OUTPATIENT)
Dept: LAB | Age: 64
End: 2024-01-29
Payer: MEDICARE

## 2024-01-29 DIAGNOSIS — Z00.00 MEDICARE ANNUAL WELLNESS VISIT, INITIAL: ICD-10-CM

## 2024-01-29 DIAGNOSIS — Z11.59 SCREENING FOR VIRAL DISEASE: ICD-10-CM

## 2024-01-29 DIAGNOSIS — E55.9 VITAMIN D INSUFFICIENCY: ICD-10-CM

## 2024-01-29 LAB
25(OH)D3 SERPL-MCNC: 33.6 NG/ML (ref 30–100)
ALBUMIN SERPL BCP-MCNC: 4.5 G/DL (ref 3.5–5)
ALP SERPL-CCNC: 71 U/L (ref 34–104)
ALT SERPL W P-5'-P-CCNC: 9 U/L (ref 7–52)
ANION GAP SERPL CALCULATED.3IONS-SCNC: 6 MMOL/L
AST SERPL W P-5'-P-CCNC: 13 U/L (ref 13–39)
BILIRUB SERPL-MCNC: 0.71 MG/DL (ref 0.2–1)
BUN SERPL-MCNC: 19 MG/DL (ref 5–25)
CALCIUM SERPL-MCNC: 9.9 MG/DL (ref 8.4–10.2)
CHLORIDE SERPL-SCNC: 104 MMOL/L (ref 96–108)
CHOLEST SERPL-MCNC: 229 MG/DL
CO2 SERPL-SCNC: 30 MMOL/L (ref 21–32)
CREAT SERPL-MCNC: 0.95 MG/DL (ref 0.6–1.3)
ERYTHROCYTE [DISTWIDTH] IN BLOOD BY AUTOMATED COUNT: 13 % (ref 11.6–15.1)
GFR SERPL CREATININE-BSD FRML MDRD: 63 ML/MIN/1.73SQ M
GLUCOSE P FAST SERPL-MCNC: 112 MG/DL (ref 65–99)
HCT VFR BLD AUTO: 42.7 % (ref 34.8–46.1)
HCV AB SER QL: NORMAL
HDLC SERPL-MCNC: 74 MG/DL
HGB BLD-MCNC: 14 G/DL (ref 11.5–15.4)
HIV 1+2 AB+HIV1 P24 AG SERPL QL IA: NORMAL
HIV 2 AB SERPL QL IA: NORMAL
HIV1 AB SERPL QL IA: NORMAL
HIV1 P24 AG SERPL QL IA: NORMAL
LDLC SERPL CALC-MCNC: 137 MG/DL (ref 0–100)
MCH RBC QN AUTO: 30.7 PG (ref 26.8–34.3)
MCHC RBC AUTO-ENTMCNC: 32.8 G/DL (ref 31.4–37.4)
MCV RBC AUTO: 94 FL (ref 82–98)
NONHDLC SERPL-MCNC: 155 MG/DL
PLATELET # BLD AUTO: 244 THOUSANDS/UL (ref 149–390)
PMV BLD AUTO: 10.7 FL (ref 8.9–12.7)
POTASSIUM SERPL-SCNC: 4.4 MMOL/L (ref 3.5–5.3)
PROT SERPL-MCNC: 7.3 G/DL (ref 6.4–8.4)
RBC # BLD AUTO: 4.56 MILLION/UL (ref 3.81–5.12)
SODIUM SERPL-SCNC: 140 MMOL/L (ref 135–147)
TRIGL SERPL-MCNC: 91 MG/DL
WBC # BLD AUTO: 6.77 THOUSAND/UL (ref 4.31–10.16)

## 2024-01-29 PROCEDURE — 86803 HEPATITIS C AB TEST: CPT

## 2024-01-29 PROCEDURE — 36415 COLL VENOUS BLD VENIPUNCTURE: CPT

## 2024-01-29 PROCEDURE — 80061 LIPID PANEL: CPT

## 2024-01-29 PROCEDURE — 82306 VITAMIN D 25 HYDROXY: CPT

## 2024-01-29 PROCEDURE — 85027 COMPLETE CBC AUTOMATED: CPT

## 2024-01-29 PROCEDURE — 87389 HIV-1 AG W/HIV-1&-2 AB AG IA: CPT

## 2024-01-29 PROCEDURE — 80053 COMPREHEN METABOLIC PANEL: CPT

## 2024-01-30 ENCOUNTER — TELEPHONE (OUTPATIENT)
Dept: FAMILY MEDICINE CLINIC | Facility: CLINIC | Age: 64
End: 2024-01-30

## 2024-01-30 ENCOUNTER — TELEPHONE (OUTPATIENT)
Age: 64
End: 2024-01-30

## 2024-01-30 DIAGNOSIS — G47.00 INSOMNIA, UNSPECIFIED TYPE: ICD-10-CM

## 2024-01-30 NOTE — TELEPHONE ENCOUNTER
Patient stated at visit, it was discussed she would be getting Trazodone 150mg, however only 100mg was called in. Patient has already picked up prescription at her pharmacy. Please advise on change.

## 2024-01-31 NOTE — TELEPHONE ENCOUNTER
Cholesterol and sugars are a little high.  Med list: Trazodone 100 mg is listed.   Message sent to patient via [employer]

## 2024-02-01 ENCOUNTER — TELEPHONE (OUTPATIENT)
Age: 64
End: 2024-02-01

## 2024-02-01 NOTE — TELEPHONE ENCOUNTER
T/C to patient to discuss mychart message regarding leg and foot swelling. Patient is at work and not able to discuss at this time. States that she will call back when she is done work after 1 pm.

## 2024-02-01 NOTE — TELEPHONE ENCOUNTER
----- Message from Karmen Luna sent at 2/1/2024  7:44 AM EST -----  Regarding: medication and lab test result  Contact: [phone]  I have been having a lot of swelling in my legs and my feet especially my feet. I have tingly feelings in my arms my hands could I please have more labs done my mother had heart disease and diabetes and I’m worried. Thank you.

## 2024-02-02 ENCOUNTER — TELEPHONE (OUTPATIENT)
Age: 64
End: 2024-02-02

## 2024-02-02 NOTE — TELEPHONE ENCOUNTER
Caller: Karmen     Doctor/Office: [facility]     #: [phone]    Escalation: Appointment Patient is in a lot of pain needs shot there is nothing available until March stated she can only come on a Monday after 1:00 please advise thank you,

## 2024-02-05 ENCOUNTER — OFFICE VISIT (OUTPATIENT)
Dept: FAMILY MEDICINE CLINIC | Facility: CLINIC | Age: 64
End: 2024-02-05
Payer: MEDICARE

## 2024-02-05 VITALS
HEIGHT: 67 IN | HEART RATE: 78 BPM | SYSTOLIC BLOOD PRESSURE: 118 MMHG | RESPIRATION RATE: 18 BRPM | TEMPERATURE: 97.5 F | DIASTOLIC BLOOD PRESSURE: 82 MMHG | BODY MASS INDEX: 31.32 KG/M2 | OXYGEN SATURATION: 97 %

## 2024-02-05 DIAGNOSIS — R73.09 ELEVATED GLUCOSE: Primary | ICD-10-CM

## 2024-02-05 DIAGNOSIS — F33.1 MODERATE EPISODE OF RECURRENT MAJOR DEPRESSIVE DISORDER (HCC): ICD-10-CM

## 2024-02-05 DIAGNOSIS — G47.00 INSOMNIA, UNSPECIFIED TYPE: ICD-10-CM

## 2024-02-05 DIAGNOSIS — N18.31 STAGE 3A CHRONIC KIDNEY DISEASE (HCC): ICD-10-CM

## 2024-02-05 PROCEDURE — 99213 OFFICE O/P EST LOW 20 MIN: CPT | Performed by: FAMILY MEDICINE

## 2024-02-05 RX ORDER — TRAZODONE HYDROCHLORIDE 150 MG/1
150 TABLET ORAL
Qty: 90 TABLET | Refills: 1 | Status: SHIPPED | OUTPATIENT
Start: 2024-02-05

## 2024-02-05 NOTE — PROGRESS NOTES
Name: Karmen Luna      : 1960      MRN: [id number]  Encounter Provider: Alphonso Paula DO  Encounter Date: 2024   Encounter department: [facility]    Assessment & Plan     Explained to Karmen she is not properly hydrated with source of fluids is diet Pepsi.  Hydrate with water at least 64 oz a day.  Compression socks, 7 to 15 mm graded - [employer].        Chief Complaint   Patient presents with    Discuss recent blood test result      1. Elevated glucose  -     Hemoglobin A1C; Future    2. Insomnia, unspecified type  -     traZODone (DESYREL) 150 mg tablet; Take 1 tablet (150 mg total) by mouth daily at bedtime    3. Moderate episode of recurrent major depressive disorder (HCC)    4. Stage 3a chronic kidney disease (HCC)           Subjective     Needs trazodone 150 mg.  SH: Diet pepsi mostly.  Feet can swell when on feet a lot.  Psyc had increased trazodone to 150 mg.      Review of Systems   Constitutional: Negative.    HENT: Negative.     Eyes: Negative.    Respiratory: Negative.     Cardiovascular: Negative.    Gastrointestinal: Negative.    Genitourinary: Negative.    Musculoskeletal: Negative.    Skin: Negative.    Neurological: Negative.    Psychiatric/Behavioral: Negative.         Past Medical History:   Diagnosis Date    Anxiety     Arthritis     Last assessed 5/3/2011    Ortega's esophagus     Cancer (HCC)     ovarian cancer at age 25 yo- REMOVAL  B/L    Colon polyp     DDD (degenerative disc disease), lumbar     Depression     Fall     2020 right knee meniscus tear- OR repair today 8/3/2020    Fibromyalgia     Fibromyalgia, primary     Fracture of one or more phalanges of foot     GERD (gastroesophageal reflux disease)     H/O bladder infections     chronic    Hypertension     Kidney infection     occasional    Migraines     Obesity     Ovarian cancer (HCC) 1987    age 26    Polyneuropathy     Last assessed 2016 knees to feet    PONV (postoperative nausea and vomiting)      Patient requests to be pre-medicated prior to surgery prior to surgery    PONV (postoperative nausea and vomiting) 8/3/2020    Renal disorder     Spinal stenosis     Vertigo     Wears glasses     reading     Past Surgical History:   Procedure Laterality Date    ABDOMINAL SURGERY  1986    several    BACK SURGERY  1990    CATARACT EXTRACTION, BILATERAL      CHOLECYSTECTOMY      COLONOSCOPY      FOOT FRACTURE SURGERY Bilateral 2004    x 5  surgeries    KIDNEY SURGERY  1974    at age 13 yo    KNEE SURGERY Right     WI ARTHRS KNE SURG W/MENISCECTOMY MED/LAT W/SHVG Right 8/3/2020    Procedure: KNEE ARTHROSCOPY,PARTIAL MEDIAL & LATERAL MENISECTOMIES;  Surgeon: Austen Mcguire MD;  [facility];  Service: Orthopedics    WI LAPAROSCOPY SURG CHOLECYSTECTOMY N/A 11/27/2020    Procedure: LAPAROSCOPIC CHOLECYSTECTOMY;  Surgeon: Justyn Cole MD;  [facility];  Service: General    TOTAL ABDOMINAL HYSTERECTOMY  1987    age 26    TOTAL ABDOMINAL HYSTERECTOMY W/ BILATERAL SALPINGOOPHORECTOMY Bilateral 1987    age 26     Family History   Problem Relation Age of Onset    Heart disease Mother     Cancer Mother     Diabetes Mother     Arthritis Mother     Anxiety disorder Mother     Bleeding Disorder Mother     Clotting disorder Mother     Depression Mother     Hyperlipidemia Mother     Irritable bowel syndrome Mother     Hypertension Mother     Obesity Mother     Osteoporosis Mother     Vaginal cancer Mother 30    Thyroid disease Mother     Stroke Mother     Diabetes Father     Arthritis Father     Hyperlipidemia Father     Vesicoureteral reflux Father     Heart disease Father     Hypertension Father     Migraines Father     Obesity Father     Arthritis Brother     Anxiety disorder Brother     Diabetes Brother     Hyperlipidemia Brother     Vesicoureteral reflux Brother     Heart disease Brother     Irritable bowel syndrome Brother     Hypertension Brother     Migraines Brother     Thyroid disease Brother     Pancreatic  cancer Brother 55    Arthritis Maternal Aunt     Anxiety disorder Maternal Aunt     Depression Maternal Aunt     Diabetes Maternal Aunt     Vaginal cancer Maternal Aunt 50    No Known Problems Maternal Aunt     No Known Problems Maternal Aunt     No Known Problems Maternal Aunt     Fibroids Paternal Aunt     Diabetes Maternal Grandmother     Vaginal cancer Maternal Grandmother 50    No Known Problems Maternal Grandfather     Infertility Paternal Grandmother     No Known Problems Paternal Grandfather     Migraines Daughter     Lupus Daughter     Asthma Son     Migraines Son     Hypertension Family     Arthritis Family      Social History     Socioeconomic History    Marital status: /Civil Union     Spouse name: None    Number of children: None    Years of education: None    Highest education level: None   Occupational History    Occupation: CNA   Tobacco Use    Smoking status: Never    Smokeless tobacco: Never   Vaping Use    Vaping status: Never Used   Substance and Sexual Activity    Alcohol use: Not Currently    Drug use: No    Sexual activity: Not Currently     Birth control/protection: Surgical   Other Topics Concern    None   Social History Narrative    None     Social Determinants of Health     Financial Resource Strain: Low Risk  (1/26/2024)    Overall Financial Resource Strain (CARDIA)     Difficulty of Paying Living Expenses: Not hard at all   Food Insecurity: Not on file   Transportation Needs: No Transportation Needs (1/26/2024)    PRAPARE - Transportation     Lack of Transportation (Medical): No     Lack of Transportation (Non-Medical): No   Physical Activity: Not on file   Stress: Not on file   Social Connections: Not on file   Intimate Partner Violence: Not on file   Housing Stability: Not on file     Current Outpatient Medications on File Prior to Visit   Medication Sig    DULoxetine (CYMBALTA) 60 mg delayed release capsule Take 1 capsule (60 mg total) by mouth in the morning    esomeprazole  (NexIUM) 40 MG capsule Take 1 capsule by mouth in the morning    gabapentin (Neurontin) 600 MG tablet Take 1 tablet (600 mg total) by mouth 3 (three) times a day    halobetasol (ULTRAVATE) 0.05 % ointment Apply topically 2 (two) times a day For up to 2 weeks.    hydrOXYzine HCL (ATARAX) 25 mg tablet Take 1 tablet (25 mg total) by mouth daily at bedtime    levothyroxine 50 mcg tablet TAKE 1 TABLET BY MOUTH ONCE DAILY IN THE EARLY MORNING    methenamine hippurate (HIPREX) 1 g tablet TAKE 1 TABLET BY MOUTH TWICE DAILY WITH MEALS    methocarbamol (ROBAXIN) 500 mg tablet Take 1 tablet (500 mg total) by mouth every 8 (eight) hours as needed for muscle spasms    nystatin (MYCOSTATIN) powder Twice a day as needed.    omeprazole (PriLOSEC) 40 MG capsule Take 40 mg by mouth daily    ondansetron (ZOFRAN) 4 mg tablet Take 1 tablet (4 mg total) by mouth every 8 (eight) hours as needed for nausea or vomiting    oxybutynin (DITROPAN-XL) 10 MG 24 hr tablet Take 1 tablet (10 mg total) by mouth daily at bedtime    oxybutynin (DITROPAN-XL) 5 mg 24 hr tablet Take 1 tablet (5 mg total) by mouth 2 (two) times a day    phenazopyridine (PYRIDIUM) 100 mg tablet Take 1 tablet (100 mg total) by mouth 3 (three) times a day as needed for bladder spasms (painful urination)    SUMAtriptan (IMITREX) 50 mg tablet Take 1 tablet (50 mg total) by mouth once as needed for migraine May repeat in 2 hours.  No more than 200 mg per day.    [DISCONTINUED] traZODone (DESYREL) 100 mg tablet Take 1 tablet (100 mg total) by mouth daily at bedtime Take with food     Allergies   Allergen Reactions    Morphine      Acts not like herself and feels agitated and restless    Penicillins Hives     Tongue swells     Immunization History   Administered Date(s) Administered    COVID-19 MODERNA VACC 0.5 ML IM 08/05/2021, 09/02/2021, 02/05/2022    INFLUENZA 09/16/2023    Respiratory Syncytial Virus Vaccine (Recombinant, Adjuvanted) 09/16/2023    Tuberculin Skin Test-PPD  "Intradermal 12/12/2012, 12/26/2012, 09/27/2023, 09/27/2023, 10/04/2023    Zoster 10/12/2016    Zoster Vaccine Recombinant 05/14/2023, 07/22/2023       Objective     /82 (BP Location: Right arm, Patient Position: Sitting, Cuff Size: Large)   Pulse 78   Temp 97.5 °F (36.4 °C) (Oral)   Resp 18   Ht 5' 7\" (1.702 m)   LMP  (LMP Unknown)   SpO2 97%   BMI 31.32 kg/m²     Physical Exam  Constitutional:       Appearance: She is well-developed.   HENT:      Head: Normocephalic and atraumatic.      Right Ear: External ear normal.      Left Ear: External ear normal.      Nose: Nose normal.      Mouth/Throat:      Mouth: Mucous membranes are moist.      Pharynx: Oropharynx is clear.   Eyes:      Conjunctiva/sclera: Conjunctivae normal.      Pupils: Pupils are equal, round, and reactive to light.   Cardiovascular:      Rate and Rhythm: Normal rate and regular rhythm.      Heart sounds: Normal heart sounds.   Pulmonary:      Effort: Pulmonary effort is normal.      Breath sounds: Normal breath sounds.   Musculoskeletal:      Cervical back: Normal range of motion and neck supple.      Right lower leg: No edema.      Left lower leg: No edema.   Skin:     General: Skin is warm and dry.   Neurological:      Mental Status: She is alert and oriented to person, place, and time.      Deep Tendon Reflexes: Reflexes are normal and symmetric.   Psychiatric:         Mood and Affect: Mood normal.         Behavior: Behavior normal.         Thought Content: Thought content normal.         Judgment: Judgment normal.       Alphonso Paula, DO    "

## 2024-02-06 ENCOUNTER — APPOINTMENT (OUTPATIENT)
Dept: LAB | Age: 64
End: 2024-02-06
Payer: MEDICARE

## 2024-02-06 DIAGNOSIS — R73.09 ELEVATED GLUCOSE: ICD-10-CM

## 2024-02-06 LAB
EST. AVERAGE GLUCOSE BLD GHB EST-MCNC: 123 MG/DL
HBA1C MFR BLD: 5.9 %

## 2024-02-06 PROCEDURE — 36415 COLL VENOUS BLD VENIPUNCTURE: CPT

## 2024-02-06 PROCEDURE — 83036 HEMOGLOBIN GLYCOSYLATED A1C: CPT

## 2024-02-12 ENCOUNTER — TELEPHONE (OUTPATIENT)
Dept: NEUROLOGY | Facility: CLINIC | Age: 64
End: 2024-02-12

## 2024-02-20 NOTE — TELEPHONE ENCOUNTER
Spoke to pt to r/s 2/22 appoint due to provider being out of the office. She requested to call back to r/s.

## 2024-02-24 NOTE — PROGRESS NOTES
[facility] PEDIATRIC EMR DEPT  EMERGENCY DEPARTMENT ENCOUNTER      Pt Name: Josh Alberts  MRN: [id number]  Birthdate 2025  Date of evaluation: 2/23/2024  Provider: Lelia Chiu MD    CHIEF COMPLAINT       Chief Complaint   Patient presents with    Emesis         HISTORY OF PRESENT ILLNESS   (Location/Symptom, Timing/Onset, Context/Setting, Quality, Duration, Modifying Factors, Severity)  Note limiting factors.     Patient is an 8-month-old who presents with vomiting x 1 today.  Patient was feeding and fell asleep after eating and was asleep about an hour and then he sat up and had 1 episode of a large amount of nonbilious emesis.  Patient then started coughing while vomiting and seemed to have a choking episode on the vomitus.  Patient turned red in the face but never turned blue.  No episodes of apnea.  No fever.  Patient has some diarrhea.  Mom with some URI symptoms and mastitis as well and also feels like she had the stomach bug earlier this week.  Patient was suction on arrival and they were able to get some secretions out.  Patient is now acting at baseline and doing much better.  Patient came by EMS.    The history is provided by the mother.         Review of External Medical Records:     Nursing Notes were reviewed.    REVIEW OF SYSTEMS    (2-9 systems for level 4, 10 or more for level 5)     Review of Systems    Except as noted above the remainder of the review of systems was reviewed and negative.       PAST MEDICAL HISTORY   History reviewed. No pertinent past medical history.      SURGICAL HISTORY     History reviewed. No pertinent surgical history.      CURRENT MEDICATIONS       Previous Medications    No medications on file       ALLERGIES     Dairycare [lactase-lactobacillus] and Eggs or egg-derived products    FAMILY HISTORY     History reviewed. No pertinent family history.       SOCIAL HISTORY       Social History     Socioeconomic History    Marital status: Single     Spouse name: None     [address] [facility]  [facility]  [address] [facility] [address] [place]  [place], [address] [facility]  [phone]  www  [facility]  org      NAME: Karmen Luna  AGE: 57 y o  SEX: female  : 1960   MRN: [id number]    DATE: 2023  TIME: 10:19 AM    Assessment and Plan:    IC/bladder pain- fairly classic presentation  Start Hydroxyzine 25 mg po qhs and continue with Trazodone  Hydrodistension/ Instillations discussed  This is all based on her symptoms  Reassess in 6 months  Chief Complaint   No chief complaint on file  History of Present Illness   20-year-old woman, established patient but new to me-was seen last by Dr Sarbjit Clement 2022 with recurrent UTI, fungal panniculitis and persistent flank pain  He recommended wound care consultation for the abdominal panniculitis  She had been seen by Dr Saint Clair in the past and tried numerous medications  Here for cystoscopy  She may have had vesicoureteral reflux as a child to the right kidney  She has an atrophic right kidney and has had bladder surgery possibly reimplantation  She recently called being concerned about interstitial cystitis so she was set up for cystoscopy with me  Has hx of cervical CA age 27- had just delivered her 4th baby- had FIONA BSO at that time, became surgically menopausal  No radiation or chemo  She currently has constant bladder pain(pressure/sharp burning) , worse with walking, even sitting sometimes  Pepsi seemed to make it worse, has cut back- was drinking 10 pepsis per day  Has IBS- has BM every time she voids, sometimes painful BMs and causes bladder pain  Has pain that starts mostly in left groin, shoots upward- this occurs suddenly, can even be driving down the road and gets it  Has chronic back pain and has undergone RFA for this  U/a negative today  Nocturia 3x  Sometimes has pressure to void and only voids a small amount  Her daughter has IC and gets hydrodistension   She has had flareups like this that usually last about 6 months then go away over the years  Cystoscopy     Date/Time 4/25/2023 3:54 PM     Performed by  Rosa Elena Knox MD     Authorized by Rosa Elena Knox MD      Universal Protocol:  Consent: Verbal consent obtained  Written consent obtained  Procedure Details:  Procedure type: cystoscopy    Patient tolerance: Patient tolerated the procedure well with no immediate complications    Additional Procedure Details: Cystoscopy Procedure Note        Pre-operative Diagnosis: Recurrent UTI, possible interstitial cystitis    Post-operative Diagnosis: Same    Procedure: Flexible cystoscopy    Surgeon: Zandra Olivares MD    Anesthesia: 1% Xylocaine per urethra    EBL: Minimal    Complications: none    Procedure Details   The risks, benefits, complications, treatment options, and expected outcomes were discussed with the patient  The patient concurred with the proposed plan, giving informed consent  Cystoscopy was performed today under local anesthesia, using sterile technique  The patient was placed in the supine position, prepped with Betadine, and draped in the usual sterile fashion  The flexible cystocope was used to inspect both the urethra and bladder    Findings:  Urethra:normal without stricture    Bladder:  Smooth, not trabeculated and there were no stones tumors or other lesions  The orifices were orthotopic and intact  There was pain and pressure with bladder filling             Specimens: None                 Complications:  None           Disposition: To home            Condition:  Stable          The following portions of the patient's history were reviewed and updated as appropriate: allergies, current medications, past family history, past medical history, past social history, past surgical history and problem list   Past Medical History:   Diagnosis Date   • Anxiety    • Arthritis     Last assessed 5/3/2011   • Ortega's esophagus    • Cancer ([facility] [address] ) ovarian cancer at age 26 yo- REMOVAL  B/L   • Colon polyp    • DDD (degenerative disc disease), lumbar    • Depression    • Fall     5/2020 right knee meniscus tear- OR repair today 8/3/2020   • Fibromyalgia    • Fibromyalgia, primary    • Fracture of one or more phalanges of foot    • GERD (gastroesophageal reflux disease)    • H/O bladder infections     chronic   • Hypertension    • Kidney infection     occasional   • Migraines    • Obesity    • Ovarian cancer [facility]) 1987    age 27   • Polyneuropathy     Last assessed 6/23/2016 knees to feet   • PONV (postoperative nausea and vomiting)     Patient requests to be pre-medicated prior to surgery prior to surgery   • PONV (postoperative nausea and vomiting) 8/3/2020   • Renal disorder    • Spinal stenosis    • Vertigo    • Wears glasses     reading     Past Surgical History:   Procedure Laterality Date   • ABDOMINAL SURGERY  1986    several   • BACK SURGERY  1990   • CATARACT EXTRACTION, BILATERAL     • CHOLECYSTECTOMY     • COLONOSCOPY     • FOOT FRACTURE SURGERY Bilateral 2004    x 5  surgeries   • KIDNEY SURGERY  1974    at age 13 yo   • KNEE SURGERY Right    • [place] ARTHRS KNE SURG W/MENISCECTOMY MED/LAT W/SHVG Right 8/3/2020    Procedure: [address];  Surgeon: Lobo Sanchez MD;  [facility];  Service: Orthopedics   • IL LAPAROSCOPY SURG CHOLECYSTECTOMY N/A 11/27/2020    Procedure: LAPAROSCOPIC CHOLECYSTECTOMY;  Surgeon: Rebecca Del Cid MD;  [facility];  Service: General   • TOTAL ABDOMINAL HYSTERECTOMY  1987    age 27   • TOTAL ABDOMINAL HYSTERECTOMY W/ BILATERAL SALPINGOOPHORECTOMY Bilateral 1987    age 27     shoulder  Review of Systems   Review of Systems   Genitourinary:        As per hpi       Active Problem List     Patient Active Problem List   Diagnosis   • Anxiety   • Ortega esophagus   • Depression   • Lumbar radiculopathy   • DDD (degenerative disc disease), lumbar   • Lumbar spondylosis   • Spinal stenosis of lumbar region   • Other tear of medial meniscus, current injury, right knee, initial encounter   • PONV (postoperative nausea and vomiting)   • Calculus of gallbladder without cholecystitis without obstruction   • Chronic pain syndrome   • DDD (degenerative disc disease), cervical   • Moderate episode of recurrent major depressive disorder (HCC)   • Lichen sclerosus   • Cervical radiculopathy   • GERD (gastroesophageal reflux disease)   • Bilateral occipital neuralgia   • Sacroiliitis (HCC)       Objective   /80   Pulse 84   LMP  (LMP Unknown)     Physical Exam  Vitals reviewed  Constitutional:       Appearance: Normal appearance  HENT:      Head: Normocephalic and atraumatic  Eyes:      Extraocular Movements: Extraocular movements intact  Pulmonary:      Effort: Pulmonary effort is normal    Abdominal:      Palpations: Abdomen is soft  Genitourinary:     General: Normal vulva  Vagina: No vaginal discharge  Comments: Normal introitus  Musculoskeletal:         General: Normal range of motion  Cervical back: Normal range of motion  Skin:     Findings: Rash present  Comments: Lower abdominal rash   Neurological:      General: No focal deficit present  Mental Status: She is alert and oriented to person, place, and time  Psychiatric:         Mood and Affect: Mood normal          Behavior: Behavior normal          Thought Content:  Thought content normal          Judgment: Judgment normal              Current Medications     Current Outpatient Medications:   •  calcium-vitamin D 250-100 MG-UNIT per tablet, Take 1 tablet by mouth 2 (two) times a day, Disp: , Rfl:   •  ciprofloxacin (Cipro) 500 mg tablet, Take 1 tablet (500 mg total) by mouth once for 1 dose Take 1 dose after  procedure, Disp: 1 tablet, Rfl: 0  •  cyanocobalamin (VITAMIN B-12) 100 mcg tablet, Take by mouth daily  , Disp: , Rfl:   •  DULoxetine (CYMBALTA) 60 mg delayed release capsule, Take 1 capsule by mouth in the morning, Disp: , Rfl:   •  gabapentin (Neurontin) 600 MG tablet, Take 1 tablet (600 mg total) by mouth 3 (three) times a day, Disp: 270 tablet, Rfl: 3  •  halobetasol (ULTRAVATE) 0 05 % ointment, Apply topically 2 (two) times a day For up to 2 weeks  , Disp: 30 g, Rfl: 2  •  levothyroxine (Euthyrox) 50 mcg tablet, Take 1 tablet (50 mcg total) by mouth daily in the early morning, Disp: 90 tablet, Rfl: 1  •  methocarbamol (ROBAXIN) 500 mg tablet, Take 1 tablet (500 mg total) by mouth every 8 (eight) hours as needed for muscle spasms, Disp: 90 tablet, Rfl: 2  •  Multiple Vitamins-Minerals (ZINC PO), Take by mouth  , Disp: , Rfl:   •  nystatin (MYCOSTATIN) powder, Apply topically 2 (two) times a day, Disp: 15 g, Rfl: 1  •  nystatin-triamcinolone (MYCOLOG-II) ointment, Apply to vulva twice daily for two weeks and to skin rash as needed  , Disp: 30 g, Rfl: 3  •  omeprazole (PriLOSEC) 40 MG capsule, Take 40 mg by mouth daily, Disp: , Rfl:   •  ondansetron (ZOFRAN) 4 mg tablet, Take 1 tablet (4 mg total) by mouth every 8 (eight) hours as needed for nausea or vomiting, Disp: 12 tablet, Rfl: 0  •  SUMAtriptan (IMITREX) 50 mg tablet, Take 1 tablet (50 mg total) by mouth once as needed for migraine for up to 1 dose May repeat in 2 hours    No more than 200 mg per day , Disp: 10 tablet, Rfl: 2  •  traZODone (DESYREL) 100 mg tablet, Take 100 mg by mouth daily at bedtime Take with food, Disp: , Rfl:   •  DULoxetine (CYMBALTA) 30 mg delayed release capsule, , Disp: , Rfl:   •  methenamine hippurate (HIPREX) 1 g tablet, Take 1 tablet (1 g total) by mouth 2 (two) times a day with meals (Patient not taking: Reported on 4/26/2023), Disp: 60 tablet, Rfl: 3  •  oxybutynin (DITROPAN) 5 mg tablet, Take 1 tablet (5 mg total) by mouth 3 (three) times a day as needed (bladder spasm/pain) (Patient not taking: Reported on 11/21/2022), Disp: 30 tablet, Rfl: 0  •  PARoxetine (PAXIL) 40 MG tablet, Take by mouth (Patient not taking: Reported on 11/21/2022), Disp: , Rfl:   •  phenazopyridine (PYRIDIUM) 100 mg tablet, Take 1 tablet (100 mg total) by mouth 3 (three) times a day as needed for bladder spasms (painful urination) (Patient not taking: Reported on 11/21/2022), Disp: 20 tablet, Rfl: 0        Yamel Fernandez MD

## 2024-02-26 ENCOUNTER — OFFICE VISIT (OUTPATIENT)
Dept: PODIATRY | Facility: CLINIC | Age: 64
End: 2024-02-26
Payer: MEDICARE

## 2024-02-26 VITALS — HEIGHT: 67 IN | BODY MASS INDEX: 31.32 KG/M2 | RESPIRATION RATE: 18 BRPM

## 2024-02-26 DIAGNOSIS — M77.50 TENDONITIS OF FOOT: ICD-10-CM

## 2024-02-26 DIAGNOSIS — M19.072 PRIMARY OSTEOARTHRITIS OF LEFT FOOT: Primary | ICD-10-CM

## 2024-02-26 PROCEDURE — 20550 NJX 1 TENDON SHEATH/LIGAMENT: CPT | Performed by: PODIATRIST

## 2024-02-26 PROCEDURE — 99213 OFFICE O/P EST LOW 20 MIN: CPT | Performed by: PODIATRIST

## 2024-02-26 RX ORDER — LIDOCAINE HYDROCHLORIDE 10 MG/ML
1 INJECTION, SOLUTION INFILTRATION; PERINEURAL
Status: SHIPPED | OUTPATIENT
Start: 2024-02-26

## 2024-02-26 RX ORDER — TRIAMCINOLONE ACETONIDE 40 MG/ML
20 INJECTION, SUSPENSION INTRA-ARTICULAR; INTRAMUSCULAR
Status: SHIPPED | OUTPATIENT
Start: 2024-02-26

## 2024-02-26 RX ADMIN — TRIAMCINOLONE ACETONIDE 20 MG: 40 INJECTION, SUSPENSION INTRA-ARTICULAR; INTRAMUSCULAR at 10:30

## 2024-02-26 RX ADMIN — LIDOCAINE HYDROCHLORIDE 1 ML: 10 INJECTION, SOLUTION INFILTRATION; PERINEURAL at 10:30

## 2024-02-26 NOTE — PROGRESS NOTES
Patient presents with bilateral foot pain.  Patient has known osteoarthritis in the right foot and peripheral neuropathy for which she takes gabapentin.    At last visit the right foot was symptomatic.  Patient was placed on a Medrol Dosepak with questionable success.  She states the right foot locks in the ankle and she has pain both at the medial and lateral aspect of the right ankle.    For the past few months she has had pain in the top of the left foot at the second metatarsal cuneiform joint.  This pain radiates to the left great toe.    I personally reviewed x-rays of the left foot.  They reveal no significant osseous pathology.  There is an evidence of a prior bunionectomy.    On exam, pain with palpation dorsum left foot at second metatarsal cuneiform joint.    Treatment: Injected left foot with 0.5 cc Kenalog 40 along with 1 cc 1% Xylocaine.  No treatment advised at this time for the arthritic right foot.  Reappoint 6 weeks.    Foot/lower extremity injection    Performed by: Yanick Dixon DPM  Authorized by: Yanick Dixon DPM    Procedure:     Other Assisting Provider: No      Verbal consent obtained?: Yes      Risks and benefits: Risks, benefits and alternatives were discussed      Consent given by:  Patient    Patient identity confirmed:  Verbally with patient    Supporting Documentation:     Indications:  Pain    Procedure Details:                Ethyl Chloride was applied      Needle size: 25 G G    Approach:  Dorsal    Laterality:  Left    Location: tendon sheath      Tendon Structures: Extensor Digitorum Longus      Injection Information:       Medications:  1 mL lidocaine 1 %; 20 mg triamcinolone acetonide 40 mg/mL

## 2024-02-28 DIAGNOSIS — E03.9 HYPOTHYROIDISM, UNSPECIFIED TYPE: ICD-10-CM

## 2024-02-28 RX ORDER — LEVOTHYROXINE SODIUM 0.05 MG/1
TABLET ORAL
Qty: 90 TABLET | Refills: 1 | Status: SHIPPED | OUTPATIENT
Start: 2024-02-28

## 2024-03-14 ENCOUNTER — HOSPITAL ENCOUNTER (OUTPATIENT)
Dept: RADIOLOGY | Age: 64
Discharge: HOME | End: 2024-03-14
Payer: MEDICARE

## 2024-03-14 DIAGNOSIS — G89.29 CHRONIC LOW BACK PAIN, UNSPECIFIED BACK PAIN LATERALITY, UNSPECIFIED WHETHER SCIATICA PRESENT: ICD-10-CM

## 2024-03-14 DIAGNOSIS — Z13.820 OSTEOPOROSIS SCREENING: ICD-10-CM

## 2024-03-14 DIAGNOSIS — M54.50 CHRONIC LOW BACK PAIN, UNSPECIFIED BACK PAIN LATERALITY, UNSPECIFIED WHETHER SCIATICA PRESENT: ICD-10-CM

## 2024-03-14 DIAGNOSIS — M81.0 AGE-RELATED OSTEOPOROSIS WITHOUT CURRENT PATHOLOGICAL FRACTURE: ICD-10-CM

## 2024-03-14 PROCEDURE — 77080 DXA BONE DENSITY AXIAL: CPT

## 2024-03-18 ENCOUNTER — OFFICE VISIT (OUTPATIENT)
Dept: FAMILY MEDICINE CLINIC | Facility: CLINIC | Age: 64
End: 2024-03-18
Payer: MEDICARE

## 2024-03-18 VITALS
HEIGHT: 67 IN | DIASTOLIC BLOOD PRESSURE: 76 MMHG | OXYGEN SATURATION: 99 % | SYSTOLIC BLOOD PRESSURE: 118 MMHG | TEMPERATURE: 97.7 F | BODY MASS INDEX: 31.32 KG/M2 | HEART RATE: 68 BPM | RESPIRATION RATE: 18 BRPM

## 2024-03-18 DIAGNOSIS — S16.1XXA CERVICAL STRAIN, INITIAL ENCOUNTER: ICD-10-CM

## 2024-03-18 DIAGNOSIS — M99.01 CERVICAL SOMATIC DYSFUNCTION: ICD-10-CM

## 2024-03-18 DIAGNOSIS — M54.2 NECK PAIN: Primary | ICD-10-CM

## 2024-03-18 PROCEDURE — 99213 OFFICE O/P EST LOW 20 MIN: CPT | Performed by: FAMILY MEDICINE

## 2024-03-18 PROCEDURE — 98925 OSTEOPATH MANJ 1-2 REGIONS: CPT | Performed by: FAMILY MEDICINE

## 2024-03-18 NOTE — PROGRESS NOTES
OMT    Performed by: Alphonso Paula DO  Authorized by: Alphonso Paula DO  Universal Protocol:  Consent: Verbal consent obtained.  Consent given by: patient      Procedure Details:     Region evaluated and treated:  Cervical    Cervical Details:     Examination Method:  Asymmetry, Misalignment, Crepitation, Defects, Masses and Tenderness, Pain    Severity:  Mild    Treatment Method:  Soft Tissue Treatment    Response:  Resolved - The somatic dysfunction is completely resolved without evidence of it ever having been present.    Total Regions Treated:  1

## 2024-03-18 NOTE — PROGRESS NOTES
Name: Karmen Luna      : 1960      MRN: [id number]  Encounter Provider: Alphonso Paula DO  Encounter Date: 3/18/2024   Encounter department: [facility]    Assessment & Plan     Chief Complaint   Patient presents with    swollen glands      1. Neck pain    2. Cervical somatic dysfunction    3. Cervical strain, initial encounter           Subjective     Tender right upper anterio.  This comes and goes past week.  Slept on cough with head in awkward position.      Review of Systems   Constitutional: Negative.    HENT: Negative.     Eyes: Negative.    Respiratory: Negative.     Cardiovascular: Negative.    Gastrointestinal: Negative.    Genitourinary: Negative.    Musculoskeletal:  Positive for neck pain.   Skin: Negative.    Neurological: Negative.    Psychiatric/Behavioral: Negative.         Past Medical History:   Diagnosis Date    Anxiety     Arthritis     Last assessed 5/3/2011    Ortega's esophagus     Cancer (HCC)     ovarian cancer at age 27 yo- REMOVAL  B/L    Colon polyp     DDD (degenerative disc disease), lumbar     Depression     Fall     2020 right knee meniscus tear- OR repair today 8/3/2020    Fibromyalgia     Fibromyalgia, primary     Fracture of one or more phalanges of foot     GERD (gastroesophageal reflux disease)     H/O bladder infections     chronic    Hypertension     Kidney infection     occasional    Migraines     Obesity     Ovarian cancer (HCC) 1987    age 26    Polyneuropathy     Last assessed 2016 knees to feet    PONV (postoperative nausea and vomiting)     Patient requests to be pre-medicated prior to surgery prior to surgery    PONV (postoperative nausea and vomiting) 8/3/2020    Renal disorder     Spinal stenosis     Vertigo     Wears glasses     reading     Past Surgical History:   Procedure Laterality Date    ABDOMINAL SURGERY      several    BACK SURGERY      CATARACT EXTRACTION, BILATERAL      CHOLECYSTECTOMY      COLONOSCOPY      FOOT  FRACTURE SURGERY Bilateral 2004    x 5  surgeries    KIDNEY SURGERY  1974    at age 11 yo    KNEE SURGERY Right     MT ARTHRS KNE SURG W/MENISCECTOMY MED/LAT W/SHVG Right 8/3/2020    Procedure: KNEE ARTHROSCOPY,PARTIAL MEDIAL & LATERAL MENISECTOMIES;  Surgeon: Austen Mcguire MD;  Location: [facility];  Service: Orthopedics    MT LAPAROSCOPY SURG CHOLECYSTECTOMY N/A 11/27/2020    Procedure: LAPAROSCOPIC CHOLECYSTECTOMY;  Surgeon: Justyn Cole MD;  Location: [facility];  Service: General    TOTAL ABDOMINAL HYSTERECTOMY  1987    age 26    TOTAL ABDOMINAL HYSTERECTOMY W/ BILATERAL SALPINGOOPHORECTOMY Bilateral 1987    age 26     Family History   Problem Relation Age of Onset    Heart disease Mother     Cancer Mother     Diabetes Mother     Arthritis Mother     Anxiety disorder Mother     Bleeding Disorder Mother     Clotting disorder Mother     Depression Mother     Hyperlipidemia Mother     Irritable bowel syndrome Mother     Hypertension Mother     Obesity Mother     Osteoporosis Mother     Vaginal cancer Mother 30    Thyroid disease Mother     Stroke Mother     Diabetes Father     Arthritis Father     Hyperlipidemia Father     Vesicoureteral reflux Father     Heart disease Father     Hypertension Father     Migraines Father     Obesity Father     Arthritis Brother     Anxiety disorder Brother     Diabetes Brother     Hyperlipidemia Brother     Vesicoureteral reflux Brother     Heart disease Brother     Irritable bowel syndrome Brother     Hypertension Brother     Migraines Brother     Thyroid disease Brother     Pancreatic cancer Brother 55    Arthritis Maternal Aunt     Anxiety disorder Maternal Aunt     Depression Maternal Aunt     Diabetes Maternal Aunt     Vaginal cancer Maternal Aunt 50    No Known Problems Maternal Aunt     No Known Problems Maternal Aunt     No Known Problems Maternal Aunt     Fibroids Paternal Aunt     Diabetes Maternal Grandmother     Vaginal cancer Maternal Grandmother 50    No Known  Problems Maternal Grandfather     Infertility Paternal Grandmother     No Known Problems Paternal Grandfather     Migraines Daughter     Lupus Daughter     Asthma Son     Migraines Son     Hypertension Family     Arthritis Family      Social History     Socioeconomic History    Marital status: /Civil Union     Spouse name: None    Number of children: None    Years of education: None    Highest education level: None   Occupational History    Occupation: CNA   Tobacco Use    Smoking status: Never    Smokeless tobacco: Never   Vaping Use    Vaping status: Never Used   Substance and Sexual Activity    Alcohol use: Not Currently    Drug use: No    Sexual activity: Not Currently     Birth control/protection: Surgical   Other Topics Concern    None   Social History Narrative    None     Social Determinants of Health     Financial Resource Strain: Low Risk  (1/26/2024)    Overall Financial Resource Strain (CARDIA)     Difficulty of Paying Living Expenses: Not hard at all   Food Insecurity: Not on file   Transportation Needs: No Transportation Needs (1/26/2024)    PRAPARE - Transportation     Lack of Transportation (Medical): No     Lack of Transportation (Non-Medical): No   Physical Activity: Not on file   Stress: Not on file   Social Connections: Not on file   Intimate Partner Violence: Not on file   Housing Stability: Not on file     Current Outpatient Medications on File Prior to Visit   Medication Sig    DULoxetine (CYMBALTA) 60 mg delayed release capsule Take 1 capsule (60 mg total) by mouth in the morning    esomeprazole (NexIUM) 40 MG capsule Take 1 capsule by mouth in the morning    gabapentin (Neurontin) 600 MG tablet Take 1 tablet (600 mg total) by mouth 3 (three) times a day    halobetasol (ULTRAVATE) 0.05 % ointment Apply topically 2 (two) times a day For up to 2 weeks.    hydrOXYzine HCL (ATARAX) 25 mg tablet Take 1 tablet (25 mg total) by mouth daily at bedtime    levothyroxine 50 mcg tablet TAKE 1  "TABLET BY MOUTH ONCE DAILY IN THE EARLY MORNING    methenamine hippurate (HIPREX) 1 g tablet TAKE 1 TABLET BY MOUTH TWICE DAILY WITH MEALS    methocarbamol (ROBAXIN) 500 mg tablet Take 1 tablet (500 mg total) by mouth every 8 (eight) hours as needed for muscle spasms    nystatin (MYCOSTATIN) powder Twice a day as needed.    ondansetron (ZOFRAN) 4 mg tablet Take 1 tablet (4 mg total) by mouth every 8 (eight) hours as needed for nausea or vomiting    oxybutynin (DITROPAN-XL) 5 mg 24 hr tablet Take 1 tablet (5 mg total) by mouth 2 (two) times a day    phenazopyridine (PYRIDIUM) 100 mg tablet Take 1 tablet (100 mg total) by mouth 3 (three) times a day as needed for bladder spasms (painful urination)    SUMAtriptan (IMITREX) 50 mg tablet Take 1 tablet (50 mg total) by mouth once as needed for migraine May repeat in 2 hours.  No more than 200 mg per day.    traZODone (DESYREL) 150 mg tablet Take 1 tablet (150 mg total) by mouth daily at bedtime    omeprazole (PriLOSEC) 40 MG capsule Take 40 mg by mouth daily    oxybutynin (DITROPAN-XL) 10 MG 24 hr tablet Take 1 tablet (10 mg total) by mouth daily at bedtime     Allergies   Allergen Reactions    Morphine      Acts not like herself and feels agitated and restless    Penicillins Hives     Tongue swells     Immunization History   Administered Date(s) Administered    COVID-19 MODERNA VACC 0.5 ML IM 08/05/2021, 09/02/2021, 02/05/2022    INFLUENZA 09/16/2023    Respiratory Syncytial Virus Vaccine (Recombinant, Adjuvanted) 09/16/2023    Tuberculin Skin Test-PPD Intradermal 12/12/2012, 12/26/2012, 09/27/2023, 09/27/2023, 10/04/2023    Zoster 10/12/2016    Zoster Vaccine Recombinant 05/14/2023, 07/22/2023       Objective     /76 (BP Location: Right arm, Patient Position: Sitting, Cuff Size: Large)   Pulse 68   Temp 97.7 °F (36.5 °C) (Oral)   Resp 18   Ht 5' 7\" (1.702 m)   LMP  (LMP Unknown)   SpO2 99%   BMI 31.32 kg/m²     Physical Exam  Constitutional:       " Appearance: She is well-developed.   HENT:      Head: Normocephalic and atraumatic.      Right Ear: Tympanic membrane, ear canal and external ear normal.      Left Ear: Tympanic membrane, ear canal and external ear normal.      Nose: Nose normal.      Mouth/Throat:      Mouth: Mucous membranes are moist.      Pharynx: Oropharynx is clear.   Eyes:      Conjunctiva/sclera: Conjunctivae normal.      Pupils: Pupils are equal, round, and reactive to light.   Cardiovascular:      Rate and Rhythm: Normal rate and regular rhythm.      Heart sounds: Normal heart sounds.   Pulmonary:      Effort: Pulmonary effort is normal.      Breath sounds: Normal breath sounds.   Musculoskeletal:      Cervical back: Normal range of motion and neck supple.      Comments: Supine: C0 rotated left.  Tender C0 right TP.   Skin:     General: Skin is warm and dry.   Neurological:      Mental Status: She is alert and oriented to person, place, and time.      Deep Tendon Reflexes: Reflexes are normal and symmetric.   Psychiatric:         Mood and Affect: Mood normal.         Behavior: Behavior normal.         Thought Content: Thought content normal.         Judgment: Judgment normal.       Alphonso Paula DO

## 2024-04-01 DIAGNOSIS — M54.50 CHRONIC LOW BACK PAIN, UNSPECIFIED BACK PAIN LATERALITY, UNSPECIFIED WHETHER SCIATICA PRESENT: ICD-10-CM

## 2024-04-01 DIAGNOSIS — G89.29 CHRONIC LOW BACK PAIN, UNSPECIFIED BACK PAIN LATERALITY, UNSPECIFIED WHETHER SCIATICA PRESENT: ICD-10-CM

## 2024-04-01 RX ORDER — METHOCARBAMOL 500 MG/1
500 TABLET, FILM COATED ORAL EVERY 8 HOURS PRN
Qty: 90 TABLET | Refills: 0 | Status: CANCELLED | OUTPATIENT
Start: 2024-04-01

## 2024-04-01 NOTE — TELEPHONE ENCOUNTER
Reason for call:   [x] Refill   [] Prior Auth  [] Other:     Office:   [x] PCP/Provider -   [] Specialty/Provider -     Medication: methocarbamol (ROBAXIN) 500 mg tablet     Dose/Frequency:  Take 1 tablet (500 mg total) by mouth every 8 (eight) hours as needed for muscle spasms     Quantity: 90    Pharmacy: [facility] [address] [place] [address] [address] [place] [address] [phone]     Does the patient have enough for 3 days?   [x] Yes   [] No - Send as HP to POD

## 2024-04-02 DIAGNOSIS — M54.50 CHRONIC LOW BACK PAIN, UNSPECIFIED BACK PAIN LATERALITY, UNSPECIFIED WHETHER SCIATICA PRESENT: ICD-10-CM

## 2024-04-02 DIAGNOSIS — G89.29 CHRONIC LOW BACK PAIN, UNSPECIFIED BACK PAIN LATERALITY, UNSPECIFIED WHETHER SCIATICA PRESENT: ICD-10-CM

## 2024-04-02 RX ORDER — METHOCARBAMOL 500 MG/1
500 TABLET, FILM COATED ORAL EVERY 8 HOURS PRN
Qty: 90 TABLET | Refills: 0 | Status: SHIPPED | OUTPATIENT
Start: 2024-04-02

## 2024-04-22 DIAGNOSIS — G62.9 PERIPHERAL NERVE DISORDER: Primary | ICD-10-CM

## 2024-04-22 RX ORDER — GABAPENTIN 600 MG/1
600 TABLET ORAL 3 TIMES DAILY
Qty: 270 TABLET | Refills: 1 | Status: SHIPPED | OUTPATIENT
Start: 2024-04-22 | End: 2024-10-19

## 2024-04-27 ENCOUNTER — APPOINTMENT (OUTPATIENT)
Dept: RADIOLOGY | Age: 64
End: 2024-04-27
Payer: MEDICARE

## 2024-04-27 ENCOUNTER — OFFICE VISIT (OUTPATIENT)
Dept: URGENT CARE | Age: 64
End: 2024-04-27
Payer: MEDICARE

## 2024-04-27 VITALS
BODY MASS INDEX: 31.32 KG/M2 | TEMPERATURE: 98 F | HEART RATE: 74 BPM | DIASTOLIC BLOOD PRESSURE: 62 MMHG | HEIGHT: 67 IN | SYSTOLIC BLOOD PRESSURE: 125 MMHG | OXYGEN SATURATION: 100 %
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
## 2024-04-27 DIAGNOSIS — S93.402A SPRAIN OF LEFT ANKLE, UNSPECIFIED LIGAMENT, INITIAL ENCOUNTER: Primary | ICD-10-CM

## 2024-04-27 PROCEDURE — 99213 OFFICE O/P EST LOW 20 MIN: CPT

## 2024-04-27 PROCEDURE — 73610 X-RAY EXAM OF ANKLE: CPT

## 2024-04-27 PROCEDURE — G0463 HOSPITAL OUTPT CLINIC VISIT: HCPCS

## 2024-04-27 PROCEDURE — 73630 X-RAY EXAM OF FOOT: CPT

## 2024-04-27 NOTE — PATIENT INSTRUCTIONS
Xray left foot: No acute fractures or dislocations identified by me, pending final read.  X-ray left ankle: No acute fractures dislocations identified by me, pending final read  If the radiologist has any positive findings, I will contact you with updates.  Can also monitor MyChart for your results.    Rest, ice, elevate, continue to keep Ace wrap on foot and ankle for additional support.    Follow-up with orthopedics if no improvement in 7 to 10 days.Ankle Sprain   AMBULATORY CARE:   An ankle sprain  happens when 1 or more ligaments in your ankle joint stretch or tear. Ligaments are tough tissues that connect bones. Ligaments support your joints and keep your bones in place.  Common signs and symptoms:   Trouble moving your ankle or foot    Pain when you touch or put weight on your ankle    Bruised, swollen, or misshapen ankle    Seek care immediately if:   You have severe pain in your ankle.    Your foot or toes are cold or numb.    Your ankle becomes more weak or unstable (wobbly).    You are unable to put any weight on your ankle or foot.    Your swelling has increased or returned.    Call your doctor if:   Your pain does not go away, even after treatment.    You have questions or concerns about your condition or care.    Treatment:   Medicines:      NSAIDs , such as ibuprofen, help decrease swelling, pain, and fever. This medicine is available with or without a doctor's order. NSAIDs can cause stomach bleeding or kidney problems in certain people. If you take blood thinner medicine, always ask your healthcare provider if NSAIDs are safe for you. Always read the medicine label and follow directions.    Acetaminophen  decreases pain and fever. It is available without a doctor's order. Ask how much to take and how often to take it. Follow directions. Read the labels of all other medicines you are using to see if they also contain acetaminophen, or ask your doctor or pharmacist. Acetaminophen can cause liver damage  if not taken correctly.    Prescription pain medicine  may be given. Ask your healthcare provider how to take this medicine safely. Some prescription pain medicines contain acetaminophen. Do not take other medicines that contain acetaminophen without talking to your healthcare provider. Too much acetaminophen may cause liver damage. Prescription pain medicine may cause constipation. Ask your healthcare provider how to prevent or treat constipation.    Surgery  may be needed to repair or replace a torn ligament if your sprain does not heal with other treatments. Your healthcare provider may use screws to attach the bones in your ankle together. The screws may help support your ankle and make it stable. Ask your healthcare provider for more information about surgery to treat your ankle sprain.    Self-care:   Use support devices , such as a brace, cast, or splint, to limit your movement and protect your joint. You may need to use crutches to decrease your pain as you move around.    Go to physical therapy  as directed. A physical therapist teaches you exercises to help improve movement and strength, and to decrease pain.    Rest  your ankle so that it can heal. Return to normal activities as directed.    Apply ice  on your ankle for 15 to 20 minutes every hour or as directed. Use an ice pack, or put crushed ice in a plastic bag. Cover the ice pack or bag with a towel before you put it on your injury. Ice helps prevent tissue damage and decreases swelling and pain.    Compress  your ankle. Ask if you should wrap an elastic bandage around your injured ligament. An elastic bandage provides support and helps decrease swelling and movement so your joint can heal. Wear as long as directed.         Elevate  your ankle above the level of your heart as often as you can. This will help decrease swelling and pain. Prop your ankle on pillows or blankets to keep it elevated comfortably.       Prevent another ankle sprain:   Let  your ankle heal.  Find out how long your ligament needs to heal. Do not do any physical activity until your healthcare provider says it is okay. If you start activity too soon, you may develop a more serious injury.    Warm up and stretch before you exercise or play sports.  This helps your joints become strong and flexible.    Use the right equipment.  Always wear shoes that fit well and are made for the activity that you are doing. You may also need ankle supports, elbow and knee pads, or braces.    Follow up with your doctor as directed:  Write down your questions so you remember to ask them during your visits.  © Copyright [employer] 2023 Information is for End User's use only and may not be sold, redistributed or otherwise used for commercial purposes.  The above information is an  only. It is not intended as medical advice for individual conditions or treatments. Talk to your doctor, nurse or pharmacist before following any medical regimen to see if it is safe and effective for you.

## 2024-04-27 NOTE — PROGRESS NOTES
[facility]        NAME: Karmen Luna is a 63 y.o. female  : 1960    MRN: [id number]  DATE: 2024  TIME: 6:41 PM    Assessment and Plan   Sprain of left ankle, unspecified ligament, initial encounter [S93.402A]  1. Sprain of left ankle, unspecified ligament, initial encounter        2. Fall, initial encounter  XR foot 3+ vw left    XR ankle 3+ vw left    Ambulatory Referral to Orthopedic Surgery        Xray left foot: No acute fractures or dislocations identified by me, pending final read.  X-ray left ankle: No acute fractures dislocations identified by me, pending final read  If the radiologist has any positive findings, I will contact you with updates.  Can also monitor Aconexhart for your results.    Rest, ice, elevate, continue to keep Ace wrap on foot and ankle for additional support.    Follow-up with orthopedics if no improvement in 7 to 10 days.Ankle Sprain   AMBULATORY CARE:   An ankle sprain  happens when 1 or more ligaments in your ankle joint stretch or tear. Ligaments are tough tissues that connect bones. Ligaments support your joints and keep your bones in place.  Common signs and symptoms:   Trouble moving your ankle or foot    Pain when you touch or put weight on your ankle    Bruised, swollen, or misshapen ankle    Seek care immediately if:   You have severe pain in your ankle.    Your foot or toes are cold or numb.    Your ankle becomes more weak or unstable (wobbly).    You are unable to put any weight on your ankle or foot.    Your swelling has increased or returned.    Call your doctor if:   Your pain does not go away, even after treatment.    You have questions or concerns about your condition or care.    Treatment:   Medicines:      NSAIDs , such as ibuprofen, help decrease swelling, pain, and fever. This medicine is available with or without a doctor's order. NSAIDs can cause stomach bleeding or kidney problems in certain people. If you take blood thinner medicine,  always ask your healthcare provider if NSAIDs are safe for you. Always read the medicine label and follow directions.    Acetaminophen  decreases pain and fever. It is available without a doctor's order. Ask how much to take and how often to take it. Follow directions. Read the labels of all other medicines you are using to see if they also contain acetaminophen, or ask your doctor or pharmacist. Acetaminophen can cause liver damage if not taken correctly.    Prescription pain medicine  may be given. Ask your healthcare provider how to take this medicine safely. Some prescription pain medicines contain acetaminophen. Do not take other medicines that contain acetaminophen without talking to your healthcare provider. Too much acetaminophen may cause liver damage. Prescription pain medicine may cause constipation. Ask your healthcare provider how to prevent or treat constipation.    Surgery  may be needed to repair or replace a torn ligament if your sprain does not heal with other treatments. Your healthcare provider may use screws to attach the bones in your ankle together. The screws may help support your ankle and make it stable. Ask your healthcare provider for more information about surgery to treat your ankle sprain.    Self-care:   Use support devices , such as a brace, cast, or splint, to limit your movement and protect your joint. You may need to use crutches to decrease your pain as you move around.    Go to physical therapy  as directed. A physical therapist teaches you exercises to help improve movement and strength, and to decrease pain.    Rest  your ankle so that it can heal. Return to normal activities as directed.    Apply ice  on your ankle for 15 to 20 minutes every hour or as directed. Use an ice pack, or put crushed ice in a plastic bag. Cover the ice pack or bag with a towel before you put it on your injury. Ice helps prevent tissue damage and decreases swelling and pain.    Compress  your ankle.  Ask if you should wrap an elastic bandage around your injured ligament. An elastic bandage provides support and helps decrease swelling and movement so your joint can heal. Wear as long as directed.         Elevate  your ankle above the level of your heart as often as you can. This will help decrease swelling and pain. Prop your ankle on pillows or blankets to keep it elevated comfortably.       Prevent another ankle sprain:   Let your ankle heal.  Find out how long your ligament needs to heal. Do not do any physical activity until your healthcare provider says it is okay. If you start activity too soon, you may develop a more serious injury.    Warm up and stretch before you exercise or play sports.  This helps your joints become strong and flexible.    Use the right equipment.  Always wear shoes that fit well and are made for the activity that you are doing. You may also need ankle supports, elbow and knee pads, or braces.    Follow up with your doctor as directed:  Write down your questions so you remember to ask them during your visits.  © Copyright [employer] 2023 Information is for End User's use only and may not be sold, redistributed or otherwise used for commercial purposes.  The above information is an  only. It is not intended as medical advice for individual conditions or treatments. Talk to your doctor, nurse or pharmacist before following any medical regimen to see if it is safe and effective for you.    Patient Instructions       Follow up with PCP in 3-5 days.  Proceed to  ER if symptoms worsen.    If tests have been performed at [facility], our office will contact you with results if changes need to be made to the care plan discussed with you at the visit.  You can review your full results on [facility].    Chief Complaint     Chief Complaint   Patient presents with   • Fall     Pt had a fall about an hour ago while gardening. The pt tripped over her dogs leash. Injuring her left  foot/ankle.          History of Present Illness       Pt is a 63 year old female presenting after a fall approximately 1 hour ago. Pt reporting tripping over the dogs leash and twisting left foot and ankle.  Pt denies taking anything for pain.  Pt reports having previous injuries and surgeries to left ankle.  Pt denies head strike, LOC, no neck or back pain, no blood thinners.     Fall        Review of Systems   Review of Systems   Musculoskeletal:  Positive for gait problem. Negative for joint swelling.   Skin:  Negative for color change, rash and wound.         Current Medications       Current Outpatient Medications:   •  DULoxetine (CYMBALTA) 60 mg delayed release capsule, Take 1 capsule (60 mg total) by mouth in the morning, Disp: 90 capsule, Rfl: 1  •  esomeprazole (NexIUM) 40 MG capsule, Take 1 capsule by mouth in the morning, Disp: , Rfl:   •  gabapentin (Neurontin) 600 MG tablet, Take 1 tablet (600 mg total) by mouth 3 (three) times a day, Disp: 270 tablet, Rfl: 1  •  halobetasol (ULTRAVATE) 0.05 % ointment, Apply topically 2 (two) times a day For up to 2 weeks., Disp: 30 g, Rfl: 2  •  hydrOXYzine HCL (ATARAX) 25 mg tablet, Take 1 tablet (25 mg total) by mouth daily at bedtime, Disp: 90 tablet, Rfl: 3  •  levothyroxine 50 mcg tablet, TAKE 1 TABLET BY MOUTH ONCE DAILY IN THE EARLY MORNING, Disp: 90 tablet, Rfl: 1  •  methenamine hippurate (HIPREX) 1 g tablet, TAKE 1 TABLET BY MOUTH TWICE DAILY WITH MEALS, Disp: 60 tablet, Rfl: 12  •  methocarbamol (ROBAXIN) 500 mg tablet, Take 1 tablet (500 mg total) by mouth every 8 (eight) hours as needed for muscle spasms, Disp: 90 tablet, Rfl: 0  •  nystatin (MYCOSTATIN) powder, Twice a day as needed., Disp: 30 g, Rfl: 1  •  ondansetron (ZOFRAN) 4 mg tablet, Take 1 tablet (4 mg total) by mouth every 8 (eight) hours as needed for nausea or vomiting, Disp: 30 tablet, Rfl: 0  •  oxybutynin (DITROPAN-XL) 5 mg 24 hr tablet, Take 1 tablet (5 mg total) by mouth 2 (two) times a  day, Disp: 60 tablet, Rfl: 11  •  phenazopyridine (PYRIDIUM) 100 mg tablet, Take 1 tablet (100 mg total) by mouth 3 (three) times a day as needed for bladder spasms (painful urination), Disp: 20 tablet, Rfl: 0  •  SUMAtriptan (IMITREX) 50 mg tablet, Take 1 tablet (50 mg total) by mouth once as needed for migraine May repeat in 2 hours.  No more than 200 mg per day., Disp: 30 tablet, Rfl: 2  •  traZODone (DESYREL) 150 mg tablet, Take 1 tablet (150 mg total) by mouth daily at bedtime, Disp: 90 tablet, Rfl: 1  •  gabapentin (Neurontin) 600 MG tablet, Take 1 tablet (600 mg total) by mouth 3 (three) times a day, Disp: 270 tablet, Rfl: 3  •  omeprazole (PriLOSEC) 40 MG capsule, Take 40 mg by mouth daily, Disp: , Rfl:   •  oxybutynin (DITROPAN-XL) 10 MG 24 hr tablet, Take 1 tablet (10 mg total) by mouth daily at bedtime, Disp: 30 tablet, Rfl: 11    Current Facility-Administered Medications:   •  lidocaine (XYLOCAINE) 1 % injection 1 mL, 1 mL, Infiltration, , , 1 mL at 02/26/24 1030  •  ropivacaine (NAROPIN) 0.5 % injection 10 mL, 10 mL, Infiltration, , Jimmy Overton DPM, 10 mL at 05/31/23 1045  •  triamcinolone acetonide (KENALOG-40) 40 mg/mL injection 20 mg, 20 mg, Intra-articular, , Jimmy Overton DPM, 20 mg at 05/31/23 1045  •  triamcinolone acetonide (KENALOG-40) 40 mg/mL injection 20 mg, 20 mg, Infiltration, , , 20 mg at 02/26/24 1030    Current Allergies     Allergies as of 04/27/2024 - Reviewed 04/27/2024   Allergen Reaction Noted   • Morphine  12/27/2016   • Penicillins Hives 12/27/2016            The following portions of the patient's history were reviewed and updated as appropriate: allergies, current medications, past family history, past medical history, past social history, past surgical history and problem list.     Past Medical History:   Diagnosis Date   • Anxiety    • Arthritis     Last assessed 5/3/2011   • Ortega's esophagus    • Cancer (HCC)     ovarian cancer at age 27 yo- REMOVAL  B/L   • Colon polyp    •  DDD (degenerative disc disease), lumbar    • Depression    • Fall     5/2020 right knee meniscus tear- OR repair today 8/3/2020   • Fibromyalgia    • Fibromyalgia, primary    • Fracture of one or more phalanges of foot    • GERD (gastroesophageal reflux disease)    • H/O bladder infections     chronic   • Hypertension    • Kidney infection     occasional   • Migraines    • Obesity    • Ovarian cancer (HCC) 1987    age 26   • Polyneuropathy     Last assessed 6/23/2016 knees to feet   • PONV (postoperative nausea and vomiting)     Patient requests to be pre-medicated prior to surgery prior to surgery   • PONV (postoperative nausea and vomiting) 8/3/2020   • Renal disorder    • Spinal stenosis    • Vertigo    • Wears glasses     reading       Past Surgical History:   Procedure Laterality Date   • ABDOMINAL SURGERY  1986    several   • BACK SURGERY  1990   • CATARACT EXTRACTION, BILATERAL     • CHOLECYSTECTOMY     • COLONOSCOPY     • FOOT FRACTURE SURGERY Bilateral 2004    x 5  surgeries   • KIDNEY SURGERY  1974    at age 11 yo   • KNEE SURGERY Right    • IL ARTHRS KNE SURG W/MENISCECTOMY MED/LAT W/SHVG Right 8/3/2020    Procedure: KNEE ARTHROSCOPY,PARTIAL MEDIAL & LATERAL MENISECTOMIES;  Surgeon: Austen Mcguire MD;  Location: [facility];  Service: Orthopedics   • IL LAPAROSCOPY SURG CHOLECYSTECTOMY N/A 11/27/2020    Procedure: LAPAROSCOPIC CHOLECYSTECTOMY;  Surgeon: Justyn Cole MD;  Location: [facility];  Service: General   • TOTAL ABDOMINAL HYSTERECTOMY  1987    age 26   • TOTAL ABDOMINAL HYSTERECTOMY W/ BILATERAL SALPINGOOPHORECTOMY Bilateral 1987    age 26       Family History   Problem Relation Age of Onset   • Heart disease Mother    • Cancer Mother    • Diabetes Mother    • Arthritis Mother    • Anxiety disorder Mother    • Bleeding Disorder Mother    • Clotting disorder Mother    • Depression Mother    • Hyperlipidemia Mother    • Irritable bowel syndrome Mother    • Hypertension Mother    • Obesity Mother   "  • Osteoporosis Mother    • Vaginal cancer Mother 30   • Thyroid disease Mother    • Stroke Mother    • Diabetes Father    • Arthritis Father    • Hyperlipidemia Father    • Vesicoureteral reflux Father    • Heart disease Father    • Hypertension Father    • Migraines Father    • Obesity Father    • Arthritis Brother    • Anxiety disorder Brother    • Diabetes Brother    • Hyperlipidemia Brother    • Vesicoureteral reflux Brother    • Heart disease Brother    • Irritable bowel syndrome Brother    • Hypertension Brother    • Migraines Brother    • Thyroid disease Brother    • Pancreatic cancer Brother 55   • Arthritis Maternal Aunt    • Anxiety disorder Maternal Aunt    • Depression Maternal Aunt    • Diabetes Maternal Aunt    • Vaginal cancer Maternal Aunt 50   • No Known Problems Maternal Aunt    • No Known Problems Maternal Aunt    • No Known Problems Maternal Aunt    • Fibroids Paternal Aunt    • Diabetes Maternal Grandmother    • Vaginal cancer Maternal Grandmother 50   • No Known Problems Maternal Grandfather    • Infertility Paternal Grandmother    • No Known Problems Paternal Grandfather    • Migraines Daughter    • Lupus Daughter    • Asthma Son    • Migraines Son    • Hypertension Family    • Arthritis Family          Medications have been verified.        Objective   /62   Pulse 74   Temp 98 °F (36.7 °C)   Ht 5' 7\" (1.702 m)   LMP  (LMP Unknown)   SpO2 100%   BMI 31.32 kg/m²   No LMP recorded (lmp unknown). Patient has had a hysterectomy.       Physical Exam     Physical Exam  Vitals and nursing note reviewed.   Constitutional:       Appearance: Normal appearance. She is obese.   Cardiovascular:      Rate and Rhythm: Normal rate.      Pulses: Normal pulses.   Pulmonary:      Effort: Pulmonary effort is normal.      Breath sounds: Normal breath sounds.   Musculoskeletal:      Right foot: Normal.      Left foot: Decreased range of motion (limted due to pain with flexsion, extension, and " rotation). Normal capillary refill. Tenderness present. No swelling, laceration, bony tenderness or crepitus. Normal pulse.        Feet:    Neurological:      Mental Status: She is alert.

## 2024-04-28 ENCOUNTER — NURSE TRIAGE (OUTPATIENT)
Dept: OTHER | Facility: OTHER | Age: 64
End: 2024-04-28

## 2024-04-28 ENCOUNTER — HOSPITAL ENCOUNTER (EMERGENCY)
Facility: HOSPITAL | Age: 64
Discharge: HOME | End: 2024-04-28
Attending: EMERGENCY MEDICINE
Payer: MEDICARE

## 2024-04-28 VITALS
RESPIRATION RATE: 16 BRPM | SYSTOLIC BLOOD PRESSURE: 148 MMHG | TEMPERATURE: 97.5 F | HEART RATE: 72 BPM | DIASTOLIC BLOOD PRESSURE: 85 MMHG | OXYGEN SATURATION: 99 %

## 2024-04-28 DIAGNOSIS — M79.672 LEFT FOOT PAIN: Primary | ICD-10-CM

## 2024-04-28 DIAGNOSIS — M25.572 LEFT ANKLE PAIN: ICD-10-CM

## 2024-04-28 LAB
DME PARACHUTE DELIVERY DATE REQUESTED: NORMAL
DME PARACHUTE DELIVERY DATE REQUESTED: NORMAL
DME PARACHUTE ITEM DESCRIPTION: NORMAL
DME PARACHUTE ITEM DESCRIPTION: NORMAL
DME PARACHUTE ORDER STATUS: NORMAL
DME PARACHUTE ORDER STATUS: NORMAL
DME PARACHUTE SUPPLIER NAME: NORMAL
DME PARACHUTE SUPPLIER NAME: NORMAL
DME PARACHUTE SUPPLIER PHONE: NORMAL
DME PARACHUTE SUPPLIER PHONE: NORMAL

## 2024-04-28 PROCEDURE — 99284 EMERGENCY DEPT VISIT MOD MDM: CPT | Performed by: EMERGENCY MEDICINE

## 2024-04-28 PROCEDURE — 99283 EMERGENCY DEPT VISIT LOW MDM: CPT

## 2024-04-28 PROCEDURE — 96372 THER/PROPH/DIAG INJ SC/IM: CPT

## 2024-04-28 RX ORDER — KETOROLAC TROMETHAMINE 30 MG/ML
15 INJECTION, SOLUTION INTRAMUSCULAR; INTRAVENOUS ONCE
Status: COMPLETED | OUTPATIENT
Start: 2024-04-28 | End: 2024-04-28

## 2024-04-28 RX ORDER — LIDOCAINE 50 MG/G
1 PATCH TOPICAL ONCE
Status: DISCONTINUED | OUTPATIENT
Start: 2024-04-28 | End: 2024-04-28 | Stop reason: HOSPADM

## 2024-04-28 RX ADMIN — KETOROLAC TROMETHAMINE 15 MG: 30 INJECTION, SOLUTION INTRAMUSCULAR; INTRAVENOUS at 11:32

## 2024-04-28 RX ADMIN — LIDOCAINE 1 PATCH: 50 PATCH CUTANEOUS at 11:32

## 2024-04-28 NOTE — Clinical Note
Karmen Luna was seen and treated in our emergency department on 4/28/2024.            Limitations: will require supportive boot and walker for ambulation due to injury    Diagnosis: Medical Problem requiring ER evaluation/treatment    Karmen  is off the rest of the shift today.    She may return on this date:          If you have any questions or concerns, please don't hesitate to call.      Freida Schwartz, DO    ______________________________           _______________          _______________  Hospital Representative                              Date                                Time

## 2024-04-28 NOTE — TELEPHONE ENCOUNTER
"Regarding: Fall  ----- Message from Lydia Murphy sent at 4/28/2024  9:11 AM EDT -----  Pt stated, \" I fell yesterday and went to [facility], my ankle is not broken but it is swollen and it is very painful. The doctors office would not prescribe me any pain medication . 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

## 2024-04-28 NOTE — ED ATTENDING ATTESTATION
4/28/2024  I, Bradford Laura MD, saw and evaluated the patient. I have discussed the patient with the resident/non-physician practitioner and agree with the resident's/non-physician practitioner's findings, Plan of Care, and MDM as documented in the resident's/non-physician practitioner's note, except where noted. All available labs and Radiology studies were reviewed.  I was present for key portions of any procedure(s) performed by the resident/non-physician practitioner and I was immediately available to provide assistance.       At this point I agree with the current assessment done in the Emergency Department.  I have conducted an independent evaluation of this patient a history and physical is as follows:  Patient injured her left foot yesterday while she was walking her dog the leash somehow got wrapped around her legs she fell and twisted her foot she was seen and evaluated and had x-rays of both her foot and ankle both were reviewed no fracture noted  Read by radiology no fracture  She is here with persistent pain in her midfoot  Neurovascular status is intact  No knee tenderness no calf tenderness  Pulses are normal  No bruising noted over the foot  Tenderness is primarily in the midfoot over the metatarsal areas    Pain control  CAM Walker  Follow-up with podiatry  ED Course         Critical Care Time  Procedures

## 2024-04-28 NOTE — TELEPHONE ENCOUNTER
"Reason for Disposition   Sounds like a serious complication to the triager    Answer Assessment - Initial Assessment Questions  1. MAIN CONCERN OR SYMPTOM:  \"What is your main concern right now?\" \"What question do you have?\" \"What's the main symptom you're worried about?\" (e.g., fever, pain, redness, swelling)      Pain in ankle/foot; dog leash wrapped around it    2. ONSET: \"When did the  pain  start?\"      Yesterday    3. BETTER-SAME-WORSE: \"Are you getting better, staying the same, or getting worse compared to how you felt at your last visit to the doctor (most recent medical visit)?\"      Worse - able to bear weight, swollen  Denies discoloration of toes  Numbness - advised to unwrap - reports that did not resolve issue    4. VISIT DATE: \"When were you seen?\" (Date)      4/27    5. VISIT DOCTOR: \"What is the name of the doctor taking care of you now?\"      N/A    6. VISIT DIAGNOSIS:  \"What was the main symptom or problem that you were seen for?\" \"Were you given a diagnosis?\"       Negative for fracture; wrap placed    7. VISIT TREATMENT: \"What treatment did you receive?\" (e.g., staples, sutures, splint, cast)      XR, wrap    8. VISIT MEDICATIONS: \"Did the physician order any new medicines for you to use?\" If Yes, ask: \"Have you filled the prescription and started taking the medicine?\"       IBU/Tylenol, not working    9. NEXT APPOINTMENT: \"Have you scheduled a follow-up appointment with your doctor?\"      Not as of yet    10. PAIN: \"Is there any pain?\" If Yes, ask: \"How bad is it?\"  (Scale 1-10; or mild, moderate, severe)        11/10    11. FEVER: \"Do you have a fever?\" If Yes, ask: \"What is it, how was it measured  and when did it start?\"        Denies    12. OTHER SYMPTOMS: \"Do you have any other symptoms?\"        Denies    Protocols used: Recent Medical Visit for Injury Follow-up Call-ADULT-    Due to pt reporting continued numbness after removal of wrap and inability to wiggle toes, pt referred to ED per " protocol.

## 2024-04-28 NOTE — DISCHARGE INSTRUCTIONS
Follow-up with podiatry soon as possible.  Use cam boot and walker for support.  You can take Tylenol and use lidocaine for symptomatic relief.

## 2024-04-29 ENCOUNTER — VBI (OUTPATIENT)
Dept: FAMILY MEDICINE CLINIC | Facility: CLINIC | Age: 64
End: 2024-04-29

## 2024-04-29 NOTE — TELEPHONE ENCOUNTER
04/29/24 11:18 AM    Patient contacted post ED visit, [facility] department spoke with patient/caregiver and outreach was successful.    Thank you.  Nahomy Cortes  [facility]

## 2024-05-01 NOTE — ED PROVIDER NOTES
History  Chief Complaint   Patient presents with    Foot Injury     Patient states she tripped over the dog leash yesterday. She was seen at urgent care and diagnosed with a sprain of the left ankle. She arrives today with increased pain and tingling in her toes. She states she elevated her foot and iced her foot with no relief. She states she is unable to move her toes.  She would like pain medication and note for work.      HPI  Patient is a 63 y.o. female with PMHx Fibromyalgia, GERD, hypertension, migraines, ovarian cancer-resolved, polyneuropathy, who presents to the ED with family for evaluation of left ankle/foot pain. Patient tripped when her feet got caught in her dog's leash. Patient reports persistent pain and tingling to her entire foot and ankle. She has used ice without relief. States she went to Urgent Care yesterday with negative x-rays but they did not give her a brace or a note for work. States she has a history of neuropathy to the feet that has been at baseline.  Denies any new injury, toes turning blue, fevers or chills, or any other complaints or concerns at this time.    Prior to Admission Medications   Prescriptions Last Dose Informant Patient Reported? Taking?   DULoxetine (CYMBALTA) 60 mg delayed release capsule  Self No No   Sig: Take 1 capsule (60 mg total) by mouth in the morning   SUMAtriptan (IMITREX) 50 mg tablet  Self No No   Sig: Take 1 tablet (50 mg total) by mouth once as needed for migraine May repeat in 2 hours.  No more than 200 mg per day.   esomeprazole (NexIUM) 40 MG capsule  Self Yes No   Sig: Take 1 capsule by mouth in the morning   gabapentin (Neurontin) 600 MG tablet  Self No No   Sig: Take 1 tablet (600 mg total) by mouth 3 (three) times a day   gabapentin (Neurontin) 600 MG tablet   No No   Sig: Take 1 tablet (600 mg total) by mouth 3 (three) times a day   halobetasol (ULTRAVATE) 0.05 % ointment  Self No No   Sig: Apply topically 2 (two) times a day For up to 2 weeks.    hydrOXYzine HCL (ATARAX) 25 mg tablet  Self No No   Sig: Take 1 tablet (25 mg total) by mouth daily at bedtime   levothyroxine 50 mcg tablet  Self No No   Sig: TAKE 1 TABLET BY MOUTH ONCE DAILY IN THE EARLY MORNING   methenamine hippurate (HIPREX) 1 g tablet  Self No No   Sig: TAKE 1 TABLET BY MOUTH TWICE DAILY WITH MEALS   methocarbamol (ROBAXIN) 500 mg tablet   No No   Sig: Take 1 tablet (500 mg total) by mouth every 8 (eight) hours as needed for muscle spasms   nystatin (MYCOSTATIN) powder  Self No No   Sig: Twice a day as needed.   omeprazole (PriLOSEC) 40 MG capsule  Self Yes No   Sig: Take 40 mg by mouth daily   ondansetron (ZOFRAN) 4 mg tablet  Self No No   Sig: Take 1 tablet (4 mg total) by mouth every 8 (eight) hours as needed for nausea or vomiting   oxybutynin (DITROPAN-XL) 10 MG 24 hr tablet   No No   Sig: Take 1 tablet (10 mg total) by mouth daily at bedtime   oxybutynin (DITROPAN-XL) 5 mg 24 hr tablet  Self No No   Sig: Take 1 tablet (5 mg total) by mouth 2 (two) times a day   phenazopyridine (PYRIDIUM) 100 mg tablet  Self No No   Sig: Take 1 tablet (100 mg total) by mouth 3 (three) times a day as needed for bladder spasms (painful urination)   traZODone (DESYREL) 150 mg tablet  Self No No   Sig: Take 1 tablet (150 mg total) by mouth daily at bedtime      Facility-Administered Medications Last Administration Doses Remaining   lidocaine (XYLOCAINE) 1 % injection 1 mL 2/26/2024 10:30 AM    ropivacaine (NAROPIN) 0.5 % injection 10 mL 5/31/2023 10:45 AM    triamcinolone acetonide (KENALOG-40) 40 mg/mL injection 20 mg 5/31/2023 10:45 AM    triamcinolone acetonide (KENALOG-40) 40 mg/mL injection 20 mg 2/26/2024 10:30 AM           Past Medical History:   Diagnosis Date    Anxiety     Arthritis     Last assessed 5/3/2011    Ortega's esophagus     Cancer (HCC)     ovarian cancer at age 27 yo- REMOVAL  B/L    Colon polyp     DDD (degenerative disc disease), lumbar     Depression     Fall     5/2020 right knee  meniscus tear- OR repair today 8/3/2020    Fibromyalgia     Fibromyalgia, primary     Fracture of one or more phalanges of foot     GERD (gastroesophageal reflux disease)     H/O bladder infections     chronic    Hypertension     Kidney infection     occasional    Migraines     Obesity     Ovarian cancer (HCC) 1987    age 26    Polyneuropathy     Last assessed 6/23/2016 knees to feet    PONV (postoperative nausea and vomiting)     Patient requests to be pre-medicated prior to surgery prior to surgery    PONV (postoperative nausea and vomiting) 8/3/2020    Renal disorder     Spinal stenosis     Vertigo     Wears glasses     reading       Past Surgical History:   Procedure Laterality Date    ABDOMINAL SURGERY  1986    several    BACK SURGERY  1990    CATARACT EXTRACTION, BILATERAL      CHOLECYSTECTOMY      COLONOSCOPY      FOOT FRACTURE SURGERY Bilateral 2004    x 5  surgeries    KIDNEY SURGERY  1974    at age 13 yo    KNEE SURGERY Right     MN ARTHRS KNE SURG W/MENISCECTOMY MED/LAT W/SHVG Right 8/3/2020    Procedure: KNEE ARTHROSCOPY,PARTIAL MEDIAL & LATERAL MENISECTOMIES;  Surgeon: Austen Mcguire MD;  Location: [facility];  Service: Orthopedics    MN LAPAROSCOPY SURG CHOLECYSTECTOMY N/A 11/27/2020    Procedure: LAPAROSCOPIC CHOLECYSTECTOMY;  Surgeon: Justyn Cole MD;  Location: [facility];  Service: General    TOTAL ABDOMINAL HYSTERECTOMY  1987    age 26    TOTAL ABDOMINAL HYSTERECTOMY W/ BILATERAL SALPINGOOPHORECTOMY Bilateral 1987    age 26       Family History   Problem Relation Age of Onset    Heart disease Mother     Cancer Mother     Diabetes Mother     Arthritis Mother     Anxiety disorder Mother     Bleeding Disorder Mother     Clotting disorder Mother     Depression Mother     Hyperlipidemia Mother     Irritable bowel syndrome Mother     Hypertension Mother     Obesity Mother     Osteoporosis Mother     Vaginal cancer Mother 30    Thyroid disease Mother     Stroke Mother     Diabetes Father     Arthritis  Father     Hyperlipidemia Father     Vesicoureteral reflux Father     Heart disease Father     Hypertension Father     Migraines Father     Obesity Father     Arthritis Brother     Anxiety disorder Brother     Diabetes Brother     Hyperlipidemia Brother     Vesicoureteral reflux Brother     Heart disease Brother     Irritable bowel syndrome Brother     Hypertension Brother     Migraines Brother     Thyroid disease Brother     Pancreatic cancer Brother 55    Arthritis Maternal Aunt     Anxiety disorder Maternal Aunt     Depression Maternal Aunt     Diabetes Maternal Aunt     Vaginal cancer Maternal Aunt 50    No Known Problems Maternal Aunt     No Known Problems Maternal Aunt     No Known Problems Maternal Aunt     Fibroids Paternal Aunt     Diabetes Maternal Grandmother     Vaginal cancer Maternal Grandmother 50    No Known Problems Maternal Grandfather     Infertility Paternal Grandmother     No Known Problems Paternal Grandfather     Migraines Daughter     Lupus Daughter     Asthma Son     Migraines Son     Hypertension Family     Arthritis Family      I have reviewed and agree with the history as documented.    E-Cigarette/Vaping    E-Cigarette Use Never User      E-Cigarette/Vaping Substances    Nicotine No     THC No     CBD No     Flavoring No     Other No     Unknown No      Social History     Tobacco Use    Smoking status: Never    Smokeless tobacco: Never   Vaping Use    Vaping status: Never Used   Substance Use Topics    Alcohol use: Not Currently    Drug use: No        Review of Systems   Constitutional:  Negative for chills and fever.   HENT:  Negative for congestion and sore throat.    Respiratory:  Negative for shortness of breath.    Cardiovascular:  Negative for chest pain.   Gastrointestinal:  Negative for abdominal pain.   Genitourinary:  Negative for dysuria and hematuria.   Musculoskeletal:  Positive for arthralgias. Negative for back pain.   Skin:  Negative for rash.   Neurological:  Negative  for dizziness and headaches.   All other systems reviewed and are negative.      Physical Exam  ED Triage Vitals   Temperature Pulse Respirations Blood Pressure SpO2   04/28/24 1018 04/28/24 1018 04/28/24 1018 04/28/24 1018 04/28/24 1018   97.5 °F (36.4 °C) 72 16 148/85 99 %      Temp Source Heart Rate Source Patient Position - Orthostatic VS BP Location FiO2 (%)   04/28/24 1018 04/28/24 1018 04/28/24 1018 04/28/24 1018 --   Temporal Monitor Sitting Left arm       Pain Score       04/28/24 1132       10 - Worst Possible Pain             Orthostatic Vital Signs  Vitals:    04/28/24 1018   BP: 148/85   Pulse: 72   Patient Position - Orthostatic VS: Sitting       Physical Exam  Vitals and nursing note reviewed.   Constitutional:       General: She is not in acute distress.  HENT:      Head: Normocephalic and atraumatic.      Mouth/Throat:      Mouth: Mucous membranes are moist.      Pharynx: Oropharynx is clear.   Eyes:      General: No scleral icterus.     Conjunctiva/sclera: Conjunctivae normal.   Cardiovascular:      Rate and Rhythm: Normal rate and regular rhythm.      Pulses: Normal pulses.           Dorsalis pedis pulses are 2+ on the right side and 2+ on the left side.        Posterior tibial pulses are 2+ on the right side and 2+ on the left side.      Heart sounds: Normal heart sounds.   Pulmonary:      Effort: Pulmonary effort is normal. No respiratory distress.      Breath sounds: Normal breath sounds.   Abdominal:      Palpations: Abdomen is soft.      Tenderness: There is no abdominal tenderness. There is no guarding or rebound.   Musculoskeletal:         General: No deformity.      Cervical back: Normal range of motion and neck supple.      Right ankle: Normal.      Left ankle: Swelling (minimal) present. No deformity or ecchymosis. Decreased range of motion (secondary to pain, full passive ROM).      Right foot: Normal.      Left foot: Decreased range of motion (secondary to pain, full passive ROM).  Normal capillary refill. Tenderness (diffuse foot without specific tenderpoint) present. No deformity.   Feet:      Left foot:      Skin integrity: No erythema (or ecchymosis).   Skin:     General: Skin is warm.      Capillary Refill: Capillary refill takes less than 2 seconds.      Findings: No rash.   Neurological:      Mental Status: She is alert. Mental status is at baseline.      Sensory: No sensory deficit (sensation grossly intact).      Motor: Motor function is intact.   Psychiatric:         Mood and Affect: Mood normal.         Behavior: Behavior normal.       ED Medications  Medications   ketorolac (TORADOL) injection 15 mg (15 mg Intramuscular Given 4/28/24 1132)       Diagnostic Studies  Results Reviewed       None                   No orders to display         Procedures  Procedures      ED Course                             SBIRT 22yo+      Flowsheet Row Most Recent Value   Initial Alcohol Screen: US AUDIT-C     1. How often do you have a drink containing alcohol? 0 Filed at: 04/28/2024 1200   2. How many drinks containing alcohol do you have on a typical day you are drinking?  0 Filed at: 04/28/2024 1200   3a. Male UNDER 65: How often do you have five or more drinks on one occasion? 0 Filed at: 04/28/2024 1200   3b. FEMALE Any Age, or MALE 65+: How often do you have 4 or more drinks on one occassion? 0 Filed at: 04/28/2024 1200   Audit-C Score 0 Filed at: 04/28/2024 1200   JENNIFER: How many times in the past year have you...    Used an illegal drug or used a prescription medication for non-medical reasons? Never Filed at: 04/28/2024 1200                  Medical Decision Making  ASSESSMENT: Patient is a 63 y.o. female who presents with left foot pain, hx fibromyalgia and polyneuropathy, negative outpatient x-rays.   DDX includes but not limited to: left ankle sprain, left foot sprain, fibromyalgia/polyneuropathy exacerbation, doubt fracture/dislocation.   PLAN: Radiology report and images reviewed.  Treated with Toradol. Given cam boot and walker.    Discussed findings and plan with patient. Advised on need for outpatient follow up, given information. Given return precautions verbally and in discharge instructions, confirmed with teach back method. Patient given work note. All questions answered. Patient expressed verbal understanding and is agreeable with plan for discharge with outpatient follow up.    Problems Addressed:  Left ankle pain: acute illness or injury  Left foot pain: acute illness or injury    Amount and/or Complexity of Data Reviewed  External Data Reviewed: radiology.     Details: X-ray of left foot and ankle both read as negative for acute fracture    Risk  Prescription drug management.          Disposition  Final diagnoses:   Left foot pain   Left ankle pain     Time reflects when diagnosis was documented in both MDM as applicable and the Disposition within this note       Time User Action Codes Description Comment    4/28/2024 12:14 PM Freida Hill [M79.672] Left foot pain     4/28/2024 12:14 PM Freida Hill [M25.572] Left ankle pain           ED Disposition       ED Disposition   Discharge    Condition   Stable    Date/Time   Sun Apr 28, 2024 1214    Comment   Karmen Luna discharge to home/self care.                   Follow-up Information       Follow up With Specialties Details Why Contact Info Additional Information    Alphonso Paula,  Family Medicine Call   [address] [facility] [address] [phone]       [facility] Emergency Medicine Go to  If symptoms worsen [address] [facility] [address] [phone] [facility] Emergency Department, [address], [place], [address]   [phone]            Discharge Medication List as of 4/28/2024 12:16 PM        CONTINUE these medications which have NOT CHANGED    Details   DULoxetine (CYMBALTA) 60 mg delayed release  capsule Take 1 capsule (60 mg total) by mouth in the morning, Starting Fri 1/26/2024, Normal      esomeprazole (NexIUM) 40 MG capsule Take 1 capsule by mouth in the morning, Starting Sat 9/2/2023, Historical Med      gabapentin (Neurontin) 600 MG tablet Take 1 tablet (600 mg total) by mouth 3 (three) times a day, Starting Mon 4/22/2024, Until Sat 10/19/2024, Normal      halobetasol (ULTRAVATE) 0.05 % ointment Apply topically 2 (two) times a day For up to 2 weeks., Starting Wed 11/23/2022, Normal      hydrOXYzine HCL (ATARAX) 25 mg tablet Take 1 tablet (25 mg total) by mouth daily at bedtime, Starting Wed 4/26/2023, Normal      levothyroxine 50 mcg tablet TAKE 1 TABLET BY MOUTH ONCE DAILY IN THE EARLY MORNING, Normal      methenamine hippurate (HIPREX) 1 g tablet TAKE 1 TABLET BY MOUTH TWICE DAILY WITH MEALS, Normal      methocarbamol (ROBAXIN) 500 mg tablet Take 1 tablet (500 mg total) by mouth every 8 (eight) hours as needed for muscle spasms, Starting Tue 4/2/2024, Normal      nystatin (MYCOSTATIN) powder Twice a day as needed., Normal      omeprazole (PriLOSEC) 40 MG capsule Take 40 mg by mouth daily, Starting Sat 12/1/2018, Historical Med      ondansetron (ZOFRAN) 4 mg tablet Take 1 tablet (4 mg total) by mouth every 8 (eight) hours as needed for nausea or vomiting, Starting Wed 6/21/2023, Normal      oxybutynin (DITROPAN-XL) 5 mg 24 hr tablet Take 1 tablet (5 mg total) by mouth 2 (two) times a day, Starting Thu 1/11/2024, Normal      phenazopyridine (PYRIDIUM) 100 mg tablet Take 1 tablet (100 mg total) by mouth 3 (three) times a day as needed for bladder spasms (painful urination), Starting Mon 10/17/2022, Normal      SUMAtriptan (IMITREX) 50 mg tablet Take 1 tablet (50 mg total) by mouth once as needed for migraine May repeat in 2 hours.  No more than 200 mg per day., Starting Thu 10/12/2023, Normal      traZODone (DESYREL) 150 mg tablet Take 1 tablet (150 mg total) by mouth daily at bedtime, Starting Mon  2/5/2024, Normal           No discharge procedures on file.    PDMP Review         Value Time User    PDMP Reviewed  Yes 12/15/2020 11:25 AM SONIA Restrepo             ED Provider  Attending physically available and evaluated Karmen Luna. I managed the patient along with the ED Attending.    Electronically Signed by           Freida Schwartz DO  05/01/24 1949

## 2024-05-06 ENCOUNTER — TELEPHONE (OUTPATIENT)
Age: 64
End: 2024-05-06

## 2024-05-06 NOTE — TELEPHONE ENCOUNTER
Caller: Patient    Doctor: Foot    Reason for call:     She decided to call her foot dr instead of going thru sports med    Call back#: n/a

## 2024-05-09 ENCOUNTER — OFFICE VISIT (OUTPATIENT)
Dept: OBGYN CLINIC | Facility: CLINIC | Age: 64
End: 2024-05-09
Payer: MEDICARE

## 2024-05-09 VITALS
DIASTOLIC BLOOD PRESSURE: 82 MMHG | BODY MASS INDEX: 31.39 KG/M2 | HEART RATE: 81 BPM | SYSTOLIC BLOOD PRESSURE: 118 MMHG | HEIGHT: 67 IN | WEIGHT: 200 LBS

## 2024-05-09 DIAGNOSIS — M25.672 ANKLE STIFFNESS, LEFT: ICD-10-CM
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
## 2024-05-09 DIAGNOSIS — M25.572 PAIN, JOINT, ANKLE AND FOOT, LEFT: Primary | ICD-10-CM

## 2024-05-09 DIAGNOSIS — R29.898 ANKLE WEAKNESS: ICD-10-CM

## 2024-05-09 DIAGNOSIS — S93.492A SPRAIN OF ANTERIOR TALOFIBULAR LIGAMENT OF LEFT ANKLE, INITIAL ENCOUNTER: ICD-10-CM

## 2024-05-09 PROCEDURE — 99213 OFFICE O/P EST LOW 20 MIN: CPT | Performed by: PHYSICIAN ASSISTANT

## 2024-05-09 NOTE — LETTER
May 9, 2024     Patient: Karmen Luna  YOB: 1960  Date of Visit: 5/9/2024      To Whom it May Concern:    Karmen Luna is under my professional care. Karmen was seen in my office on 5/9/2024. Karmen is on light duty restrictions: No pushing, pulling, or carrying.  Sit as needed due to acute orthopedic injury.  Restrictions in place until next evaluation with foot and ankle specialist.    If you have any questions or concerns, please don't hesitate to call.         Sincerely,          Elia Sharpe PA-C        CC: No Recipients

## 2024-05-09 NOTE — PROGRESS NOTES
Orthopaedic Surgery - Office Note  Karmen Luna (63 y.o. female)   : 1960   MRN: [id number]  Encounter Date: 2024    Chief Complaint   Patient presents with    Left Ankle - Pain    Left Foot - Pain         Assessment/Plan  Diagnoses and all orders for this visit:    Pain, joint, ankle and foot, left  -     Ambulatory Referral to Physical Therapy; Future  -     Durable Medical Equipment    Sprain of anterior talofibular ligament of left ankle, initial encounter  -     Ambulatory Referral to Physical Therapy; Future  -     Durable Medical Equipment    Ankle weakness  -     Ambulatory Referral to Physical Therapy; Future  -     Durable Medical Equipment    Ankle stiffness, left  -     Ambulatory Referral to Physical Therapy; Future  -     Durable Medical Equipment    Fall, subsequent encounter-2024  -     Ambulatory Referral to Orthopedic Surgery  -     Ambulatory Referral to Physical Therapy; Future  -     Durable Medical Equipment    The diagnosis as well as treatment options were reviewed with the patient in the office today.  This condition should not require surgical intervention but will benefit from formal physical therapy to improve range of motion, strength, and decrease symptoms.  She may ice and elevate the ankle for comfort 20 minutes on 1 hour off 3 times a day.  I would not recommend any oral NSAIDs secondary to chronic kidney disease.  Patient may progress from the boot to a lace up ankle support as tolerated.  Patient will be provided light duty restrictions for work.  She will follow-up with her foot and ankle specialist as already scheduled.     Return for Recheck with Dr. Dixon as scheduled.        History of Present Illness  Patient is here today with foot and ankle pain after tripping over a dog leash on 2024.  She has a significant history with this left foot including chronic peripheral neuropathy for which she treats with Dr. Dixon and takes gabapentin.  She also has  "received cortisone injections most recently in February 2024.  She presented to the urgent care the day of the injury and had x-rays taken of the foot and ankle which were negative for fracture.  She then presented to the emergency department the following day requesting pain medication and a note for work.  She has had a prior history of prior bunionectomy at the left great toe.  She was given a cam boot and walker at the emergency department and advised to follow-up with her podiatrist, but asked to be seen earlier due to appointment availability.  She reports continued pain in the lateral ankle.  She has been utilizing the boot since the injury.  She has not been doing any therapy or exercises.    She has a contributing medical history of polyneuropathy, fibromyalgia, spinal stenosis, chronic pain and chronic kidney disease.    Review of Systems  Pertinent items are noted in HPI.  All other systems were reviewed and are negative.    Physical Exam  /82 (BP Location: Right arm, Patient Position: Sitting, Cuff Size: Large)   Pulse 81   Ht 5' 7\" (1.702 m)   Wt 90.7 kg (200 lb)   LMP  (LMP Unknown)   BMI 31.32 kg/m²   Cons: Appears well.  No apparent distress.  Psych: Alert. Oriented x3.  Mood and affect normal.    On examination patient's left ankle is with out skin breakdown lesion or signs of infection.  There is no ecchymosis or soft tissue edema.  She is tender to palpation over the ATFL.  She is nontender over the deltoid and CFL.  She has no tenderness at the medial and lateral malleolus.  She is nontender at the posterior tibial tendon or peroneal tendon.  She is nontender at the fifth metatarsal and midfoot.  She has no tenderness in the calcaneus or Achilles.  She has limited range of motion to dorsiflexion, plantarflexion, inversion, and eversion.  She has pain against resistance to strength testing to inversion and eversion decreased to 4- out of 5.  Dorsiflexion and plantarflexion strength is " "painful at 4+ out of 5.  Her gait is heel-to-toe in the high tide boot today in the office.  There is no calf tenderness and a negative Homans.  There is no sign of DVT.  Distal radial ulnar pulses are +2.  She has no instability to anterior drawer              Studies Reviewed  Narrative & Impression  XR FOOT 3+ VW LEFT     INDICATION: W19.XXXA: Unspecified fall, initial encounter.     COMPARISON: None     FINDINGS:     First metatarsal osteotomy screws.     No acute fracture or dislocation.     No significant degenerative changes.     No lytic or blastic osseous lesion.     Unremarkable soft tissues.     IMPRESSION:     No acute osseous abnormality.     Workstation performed: [id number]  Narrative & Impression  XR ANKLE 3+ VW LEFT     INDICATION: W19.XXXA: Unspecified fall, initial encounter.  \"patient fell today while outside, dog leash was wrapped around left ankle; left ankle pain, left foot metatarsal region pain;\"     COMPARISON: None     FINDINGS:     No acute fracture or dislocation.     No significant degenerative changes.     No lytic or blastic osseous lesion.     Unremarkable soft tissues.     IMPRESSION:     No acute osseous abnormality.           Workstation performed: [id number] independent review of x-rays of the foot and ankle by myself is in agreement with radiologist  With the addition of postsurgical changes with 2 screws noted at the distal first metatarsal consistent with prior bunionectomy surgery        Procedures  No procedures today.    Medical, Surgical, Family, and Social History  The patient's medical history, family history, and social history, were reviewed and updated as appropriate.    Past Medical History:   Diagnosis Date    Anxiety     Arthritis     Last assessed 5/3/2011    Ortega's esophagus     Cancer (HCC)     ovarian cancer at age 25 yo- REMOVAL  B/L    Colon polyp     DDD (degenerative disc disease), lumbar     Depression     Fall     5/2020 right knee meniscus tear- OR " repair today 8/3/2020    Fibromyalgia     Fibromyalgia, primary     Fracture of one or more phalanges of foot     GERD (gastroesophageal reflux disease)     H/O bladder infections     chronic    Hypertension     Kidney infection     occasional    Migraines     Obesity     Ovarian cancer (HCC) 1987    age 26    Polyneuropathy     Last assessed 6/23/2016 knees to feet    PONV (postoperative nausea and vomiting)     Patient requests to be pre-medicated prior to surgery prior to surgery    PONV (postoperative nausea and vomiting) 8/3/2020    Renal disorder     Spinal stenosis     Vertigo     Wears glasses     reading       Past Surgical History:   Procedure Laterality Date    ABDOMINAL SURGERY  1986    several    BACK SURGERY  1990    CATARACT EXTRACTION, BILATERAL      CHOLECYSTECTOMY      COLONOSCOPY      FOOT FRACTURE SURGERY Bilateral 2004    x 5  surgeries    KIDNEY SURGERY  1974    at age 13 yo    KNEE SURGERY Right     IN ARTHRS KNE SURG W/MENISCECTOMY MED/LAT W/SHVG Right 8/3/2020    Procedure: KNEE ARTHROSCOPY,PARTIAL MEDIAL & LATERAL MENISECTOMIES;  Surgeon: Austen Mcguire MD;  Location: [facility];  Service: Orthopedics    IN LAPAROSCOPY SURG CHOLECYSTECTOMY N/A 11/27/2020    Procedure: LAPAROSCOPIC CHOLECYSTECTOMY;  Surgeon: Justyn Cole MD;  Location: [facility];  Service: General    TOTAL ABDOMINAL HYSTERECTOMY  1987    age 26    TOTAL ABDOMINAL HYSTERECTOMY W/ BILATERAL SALPINGOOPHORECTOMY Bilateral 1987    age 26       Family History   Problem Relation Age of Onset    Heart disease Mother     Cancer Mother     Diabetes Mother     Arthritis Mother     Anxiety disorder Mother     Bleeding Disorder Mother     Clotting disorder Mother     Depression Mother     Hyperlipidemia Mother     Irritable bowel syndrome Mother     Hypertension Mother     Obesity Mother     Osteoporosis Mother     Vaginal cancer Mother 30    Thyroid disease Mother     Stroke Mother     Diabetes Father     Arthritis Father      Hyperlipidemia Father     Vesicoureteral reflux Father     Heart disease Father     Hypertension Father     Migraines Father     Obesity Father     Arthritis Brother     Anxiety disorder Brother     Diabetes Brother     Hyperlipidemia Brother     Vesicoureteral reflux Brother     Heart disease Brother     Irritable bowel syndrome Brother     Hypertension Brother     Migraines Brother     Thyroid disease Brother     Pancreatic cancer Brother 55    Arthritis Maternal Aunt     Anxiety disorder Maternal Aunt     Depression Maternal Aunt     Diabetes Maternal Aunt     Vaginal cancer Maternal Aunt 50    No Known Problems Maternal Aunt     No Known Problems Maternal Aunt     No Known Problems Maternal Aunt     Fibroids Paternal Aunt     Diabetes Maternal Grandmother     Vaginal cancer Maternal Grandmother 50    No Known Problems Maternal Grandfather     Infertility Paternal Grandmother     No Known Problems Paternal Grandfather     Migraines Daughter     Lupus Daughter     Asthma Son     Migraines Son     Hypertension Family     Arthritis Family        Social History     Occupational History    Occupation: CNA   Tobacco Use    Smoking status: Never    Smokeless tobacco: Never   Vaping Use    Vaping status: Never Used   Substance and Sexual Activity    Alcohol use: Not Currently    Drug use: No    Sexual activity: Not Currently     Birth control/protection: Surgical       Allergies   Allergen Reactions    Morphine      Acts not like herself and feels agitated and restless    Penicillins Hives     Tongue swells         Current Outpatient Medications:     DULoxetine (CYMBALTA) 60 mg delayed release capsule, Take 1 capsule (60 mg total) by mouth in the morning, Disp: 90 capsule, Rfl: 1    esomeprazole (NexIUM) 40 MG capsule, Take 1 capsule by mouth in the morning, Disp: , Rfl:     gabapentin (Neurontin) 600 MG tablet, Take 1 tablet (600 mg total) by mouth 3 (three) times a day, Disp: 270 tablet, Rfl: 3    gabapentin  (Neurontin) 600 MG tablet, Take 1 tablet (600 mg total) by mouth 3 (three) times a day, Disp: 270 tablet, Rfl: 1    halobetasol (ULTRAVATE) 0.05 % ointment, Apply topically 2 (two) times a day For up to 2 weeks., Disp: 30 g, Rfl: 2    hydrOXYzine HCL (ATARAX) 25 mg tablet, Take 1 tablet (25 mg total) by mouth daily at bedtime, Disp: 90 tablet, Rfl: 3    levothyroxine 50 mcg tablet, TAKE 1 TABLET BY MOUTH ONCE DAILY IN THE EARLY MORNING, Disp: 90 tablet, Rfl: 1    methenamine hippurate (HIPREX) 1 g tablet, TAKE 1 TABLET BY MOUTH TWICE DAILY WITH MEALS, Disp: 60 tablet, Rfl: 12    methocarbamol (ROBAXIN) 500 mg tablet, Take 1 tablet (500 mg total) by mouth every 8 (eight) hours as needed for muscle spasms, Disp: 90 tablet, Rfl: 0    nystatin (MYCOSTATIN) powder, Twice a day as needed., Disp: 30 g, Rfl: 1    omeprazole (PriLOSEC) 40 MG capsule, Take 40 mg by mouth daily, Disp: , Rfl:     ondansetron (ZOFRAN) 4 mg tablet, Take 1 tablet (4 mg total) by mouth every 8 (eight) hours as needed for nausea or vomiting, Disp: 30 tablet, Rfl: 0    oxybutynin (DITROPAN-XL) 10 MG 24 hr tablet, Take 1 tablet (10 mg total) by mouth daily at bedtime, Disp: 30 tablet, Rfl: 11    oxybutynin (DITROPAN-XL) 5 mg 24 hr tablet, Take 1 tablet (5 mg total) by mouth 2 (two) times a day, Disp: 60 tablet, Rfl: 11    phenazopyridine (PYRIDIUM) 100 mg tablet, Take 1 tablet (100 mg total) by mouth 3 (three) times a day as needed for bladder spasms (painful urination), Disp: 20 tablet, Rfl: 0    SUMAtriptan (IMITREX) 50 mg tablet, Take 1 tablet (50 mg total) by mouth once as needed for migraine May repeat in 2 hours.  No more than 200 mg per day., Disp: 30 tablet, Rfl: 2    traZODone (DESYREL) 150 mg tablet, Take 1 tablet (150 mg total) by mouth daily at bedtime, Disp: 90 tablet, Rfl: 1    Current Facility-Administered Medications:     lidocaine (XYLOCAINE) 1 % injection 1 mL, 1 mL, Infiltration, , , 1 mL at 02/26/24 1030    ropivacaine (NAROPIN)  0.5 % injection 10 mL, 10 mL, Infiltration, , Jimmy Overton, VADIM, 10 mL at 05/31/23 1045    triamcinolone acetonide (KENALOG-40) 40 mg/mL injection 20 mg, 20 mg, Intra-articular, , Jimmy Overton DPM, 20 mg at 05/31/23 1045    triamcinolone acetonide (KENALOG-40) 40 mg/mL injection 20 mg, 20 mg, Infiltration, , , 20 mg at 02/26/24 1030      Elia Sharpe PA-C

## 2024-05-13 ENCOUNTER — RA CDI HCC (OUTPATIENT)
Dept: OTHER | Facility: HOSPITAL | Age: 64
End: 2024-05-13

## 2024-05-18 DIAGNOSIS — N30.10 INTERSTITIAL CYSTITIS: ICD-10-CM

## 2024-05-18 DIAGNOSIS — R39.89 BLADDER PAIN: ICD-10-CM

## 2024-05-20 ENCOUNTER — OFFICE VISIT (OUTPATIENT)
Dept: FAMILY MEDICINE CLINIC | Facility: CLINIC | Age: 64
End: 2024-05-20
Payer: MEDICARE

## 2024-05-20 VITALS
OXYGEN SATURATION: 98 % | SYSTOLIC BLOOD PRESSURE: 108 MMHG | DIASTOLIC BLOOD PRESSURE: 70 MMHG | TEMPERATURE: 97.7 F | HEART RATE: 80 BPM

## 2024-05-20 DIAGNOSIS — G89.29 CHRONIC LOW BACK PAIN, UNSPECIFIED BACK PAIN LATERALITY, UNSPECIFIED WHETHER SCIATICA PRESENT: ICD-10-CM

## 2024-05-20 DIAGNOSIS — L23.9 ALLERGIC CONTACT DERMATITIS, UNSPECIFIED TRIGGER: Primary | ICD-10-CM

## 2024-05-20 DIAGNOSIS — F41.9 ANXIETY: ICD-10-CM

## 2024-05-20 DIAGNOSIS — M46.1 SACROILIITIS (HCC): ICD-10-CM

## 2024-05-20 DIAGNOSIS — M54.50 CHRONIC LOW BACK PAIN, UNSPECIFIED BACK PAIN LATERALITY, UNSPECIFIED WHETHER SCIATICA PRESENT: ICD-10-CM

## 2024-05-20 DIAGNOSIS — G47.00 INSOMNIA, UNSPECIFIED TYPE: ICD-10-CM

## 2024-05-20 DIAGNOSIS — E03.9 HYPOTHYROIDISM, UNSPECIFIED TYPE: ICD-10-CM

## 2024-05-20 DIAGNOSIS — N18.31 STAGE 3A CHRONIC KIDNEY DISEASE (HCC): ICD-10-CM

## 2024-05-20 DIAGNOSIS — F33.0 MILD EPISODE OF RECURRENT MAJOR DEPRESSIVE DISORDER (HCC): ICD-10-CM

## 2024-05-20 PROCEDURE — G2211 COMPLEX E/M VISIT ADD ON: HCPCS | Performed by: FAMILY MEDICINE

## 2024-05-20 PROCEDURE — 99214 OFFICE O/P EST MOD 30 MIN: CPT | Performed by: FAMILY MEDICINE

## 2024-05-20 RX ORDER — METHOCARBAMOL 500 MG/1
500 TABLET, FILM COATED ORAL EVERY 8 HOURS PRN
Qty: 90 TABLET | Refills: 0 | Status: SHIPPED | OUTPATIENT
Start: 2024-05-20 | End: 2024-05-21

## 2024-05-20 RX ORDER — TRAZODONE HYDROCHLORIDE 150 MG/1
150 TABLET ORAL
Qty: 90 TABLET | Refills: 1 | Status: SHIPPED | OUTPATIENT
Start: 2024-05-20

## 2024-05-20 RX ORDER — DULOXETIN HYDROCHLORIDE 60 MG/1
60 CAPSULE, DELAYED RELEASE ORAL DAILY
Qty: 90 CAPSULE | Refills: 1 | Status: SHIPPED | OUTPATIENT
Start: 2024-05-20

## 2024-05-20 RX ORDER — LEVOTHYROXINE SODIUM 0.05 MG/1
50 TABLET ORAL DAILY
Qty: 90 TABLET | Refills: 1 | Status: SHIPPED | OUTPATIENT
Start: 2024-05-20

## 2024-05-20 RX ORDER — HYDROXYZINE HYDROCHLORIDE 25 MG/1
25 TABLET, FILM COATED ORAL
Qty: 90 TABLET | Refills: 1 | Status: SHIPPED | OUTPATIENT
Start: 2024-05-20

## 2024-05-20 RX ORDER — METHYLPREDNISOLONE 4 MG/1
TABLET ORAL
Qty: 21 EACH | Refills: 0 | Status: SHIPPED | OUTPATIENT
Start: 2024-05-20

## 2024-05-20 NOTE — PROGRESS NOTES
Ambulatory Visit  Name: Karmen Luna      : 1960      MRN: [id number]  Encounter Provider: Alphonso Paula DO  Encounter Date: 2024   Encounter department: [facility]    Assessment & Plan   1. Allergic contact dermatitis, unspecified trigger  -     methylPREDNISolone 4 MG tablet therapy pack; Use as directed on package  2. Mild episode of recurrent major depressive disorder (HCC)  -     DULoxetine (CYMBALTA) 60 mg delayed release capsule; Take 1 capsule (60 mg total) by mouth in the morning  3. Hypothyroidism, unspecified type  -     levothyroxine 50 mcg tablet; Take 1 tablet (50 mcg total) by mouth daily  -     TSH, 3rd generation; Future  -     T3; Future  -     T4; Future  4. Chronic low back pain, unspecified back pain laterality, unspecified whether sciatica present  -     methocarbamol (ROBAXIN) 500 mg tablet; Take 1 tablet (500 mg total) by mouth every 8 (eight) hours as needed for muscle spasms  5. Insomnia, unspecified type  -     traZODone (DESYREL) 150 mg tablet; Take 1 tablet (150 mg total) by mouth daily at bedtime  6. Sacroiliitis (HCC)  7. Anxiety  8. Stage 3a chronic kidney disease (HCC)       Chief Complaint   Patient presents with    4 month follow up    Rash of face and neck     Burning and itchy. Starting this morning        History of Present Illness     Refill, labs.  Rash on cheeks and right neck started this am - itches and burns.  Was working in the garden past 2 days.  Anxiety, depression controlled.  Low back pain continues.  Records reviewed prior to visit.      I have spent a total time of 34 minutes on 24 in caring for this patient including Diagnostic results, Risks and benefits of tx options, Instructions for management, Patient and family education, Importance of tx compliance, Risk factor reductions, and Impressions.    Review of Systems   Constitutional: Negative.    HENT: Negative.     Eyes: Negative.    Respiratory: Negative.     Cardiovascular:  Negative.    Gastrointestinal: Negative.    Genitourinary: Negative.    Musculoskeletal:  Positive for back pain.   Skin:  Positive for rash.   Neurological: Negative.    Psychiatric/Behavioral: Negative.       Past Medical History:   Diagnosis Date    Anxiety     Arthritis     Last assessed 5/3/2011    Ortega's esophagus     Cancer (HCC)     ovarian cancer at age 27 yo- REMOVAL  B/L    Colon polyp     DDD (degenerative disc disease), lumbar     Depression     Fall     5/2020 right knee meniscus tear- OR repair today 8/3/2020    Fibromyalgia     Fibromyalgia, primary     Fracture of one or more phalanges of foot     GERD (gastroesophageal reflux disease)     H/O bladder infections     chronic    Hypertension     Kidney infection     occasional    Migraines     Obesity     Ovarian cancer (HCC) 1987    age 26    Polyneuropathy     Last assessed 6/23/2016 knees to feet    PONV (postoperative nausea and vomiting)     Patient requests to be pre-medicated prior to surgery prior to surgery    PONV (postoperative nausea and vomiting) 8/3/2020    Renal disorder     Spinal stenosis     Vertigo     Wears glasses     reading     Past Surgical History:   Procedure Laterality Date    ABDOMINAL SURGERY  1986    several    BACK SURGERY  1990    CATARACT EXTRACTION, BILATERAL      CHOLECYSTECTOMY      COLONOSCOPY      FOOT FRACTURE SURGERY Bilateral 2004    x 5  surgeries    KIDNEY SURGERY  1974    at age 13 yo    KNEE SURGERY Right     MA ARTHRS KNE SURG W/MENISCECTOMY MED/LAT W/SHVG Right 8/3/2020    Procedure: KNEE ARTHROSCOPY,PARTIAL MEDIAL & LATERAL MENISECTOMIES;  Surgeon: Austen Mcguire MD;  Location: [facility];  Service: Orthopedics    MA LAPAROSCOPY SURG CHOLECYSTECTOMY N/A 11/27/2020    Procedure: LAPAROSCOPIC CHOLECYSTECTOMY;  Surgeon: Justyn Cole MD;  [facility];  Service: General    TOTAL ABDOMINAL HYSTERECTOMY  1987    age 26    TOTAL ABDOMINAL HYSTERECTOMY W/ BILATERAL SALPINGOOPHORECTOMY Bilateral 1987     age 26     Family History   Problem Relation Age of Onset    Heart disease Mother     Cancer Mother     Diabetes Mother     Arthritis Mother     Anxiety disorder Mother     Bleeding Disorder Mother     Clotting disorder Mother     Depression Mother     Hyperlipidemia Mother     Irritable bowel syndrome Mother     Hypertension Mother     Obesity Mother     Osteoporosis Mother     Vaginal cancer Mother 30    Thyroid disease Mother     Stroke Mother     Diabetes Father     Arthritis Father     Hyperlipidemia Father     Vesicoureteral reflux Father     Heart disease Father     Hypertension Father     Migraines Father     Obesity Father     Arthritis Brother     Anxiety disorder Brother     Diabetes Brother     Hyperlipidemia Brother     Vesicoureteral reflux Brother     Heart disease Brother     Irritable bowel syndrome Brother     Hypertension Brother     Migraines Brother     Thyroid disease Brother     Pancreatic cancer Brother 55    Arthritis Maternal Aunt     Anxiety disorder Maternal Aunt     Depression Maternal Aunt     Diabetes Maternal Aunt     Vaginal cancer Maternal Aunt 50    No Known Problems Maternal Aunt     No Known Problems Maternal Aunt     No Known Problems Maternal Aunt     Fibroids Paternal Aunt     Diabetes Maternal Grandmother     Vaginal cancer Maternal Grandmother 50    No Known Problems Maternal Grandfather     Infertility Paternal Grandmother     No Known Problems Paternal Grandfather     Migraines Daughter     Lupus Daughter     Asthma Son     Migraines Son     Hypertension Family     Arthritis Family      Social History     Tobacco Use    Smoking status: Never    Smokeless tobacco: Never   Vaping Use    Vaping status: Never Used   Substance and Sexual Activity    Alcohol use: Not Currently    Drug use: No    Sexual activity: Not Currently     Birth control/protection: Surgical     Current Outpatient Medications on File Prior to Visit   Medication Sig    esomeprazole (NexIUM) 40 MG capsule  Take 1 capsule by mouth in the morning    gabapentin (Neurontin) 600 MG tablet Take 1 tablet (600 mg total) by mouth 3 (three) times a day    hydrOXYzine HCL (ATARAX) 25 mg tablet TAKE 1 TABLET BY MOUTH ONCE DAILY AT BEDTIME    methenamine hippurate (HIPREX) 1 g tablet TAKE 1 TABLET BY MOUTH TWICE DAILY WITH MEALS    nystatin (MYCOSTATIN) powder Twice a day as needed.    ondansetron (ZOFRAN) 4 mg tablet Take 1 tablet (4 mg total) by mouth every 8 (eight) hours as needed for nausea or vomiting    oxybutynin (DITROPAN-XL) 5 mg 24 hr tablet Take 1 tablet (5 mg total) by mouth 2 (two) times a day    phenazopyridine (PYRIDIUM) 100 mg tablet Take 1 tablet (100 mg total) by mouth 3 (three) times a day as needed for bladder spasms (painful urination)    SUMAtriptan (IMITREX) 50 mg tablet Take 1 tablet (50 mg total) by mouth once as needed for migraine May repeat in 2 hours.  No more than 200 mg per day.    gabapentin (Neurontin) 600 MG tablet Take 1 tablet (600 mg total) by mouth 3 (three) times a day    halobetasol (ULTRAVATE) 0.05 % ointment Apply topically 2 (two) times a day For up to 2 weeks.    omeprazole (PriLOSEC) 40 MG capsule Take 40 mg by mouth daily    oxybutynin (DITROPAN-XL) 10 MG 24 hr tablet Take 1 tablet (10 mg total) by mouth daily at bedtime     Allergies   Allergen Reactions    Morphine      Acts not like herself and feels agitated and restless    Penicillins Hives     Tongue swells     Immunization History   Administered Date(s) Administered    COVID-19 MODERNA VACC 0.5 ML IM 08/05/2021, 09/02/2021, 02/05/2022    INFLUENZA 09/16/2023    Respiratory Syncytial Virus Vaccine (Recombinant, Adjuvanted) 09/16/2023    Tuberculin Skin Test-PPD Intradermal 12/12/2012, 12/26/2012, 09/27/2023, 09/27/2023, 10/04/2023    Zoster 10/12/2016    Zoster Vaccine Recombinant 05/14/2023, 07/22/2023     Objective     /70 (BP Location: Right arm, Patient Position: Sitting, Cuff Size: Large)   Pulse 80   Temp 97.7 °F  (36.5 °C) (Temporal)   LMP  (LMP Unknown)   SpO2 98%     Physical Exam  Constitutional:       Appearance: She is well-developed.   HENT:      Head: Normocephalic and atraumatic.      Right Ear: External ear normal.      Left Ear: External ear normal.      Nose: Nose normal.      Mouth/Throat:      Mouth: Mucous membranes are moist.      Pharynx: Oropharynx is clear.   Eyes:      Conjunctiva/sclera: Conjunctivae normal.      Pupils: Pupils are equal, round, and reactive to light.   Cardiovascular:      Rate and Rhythm: Normal rate and regular rhythm.      Heart sounds: Normal heart sounds.   Pulmonary:      Effort: Pulmonary effort is normal.      Breath sounds: Normal breath sounds.   Musculoskeletal:      Cervical back: Normal range of motion and neck supple.   Skin:     General: Skin is warm and dry.      Comments: Contact derm face, neck.   Neurological:      Mental Status: She is alert and oriented to person, place, and time.      Deep Tendon Reflexes: Reflexes are normal and symmetric.   Psychiatric:         Behavior: Behavior normal.         Thought Content: Thought content normal.         Judgment: Judgment normal.       Administrative Statements   I have spent a total time of 35 minutes on 05/21/24 In caring for this patient including Diagnostic results, Risks and benefits of tx options, Instructions for management, Patient and family education, Importance of tx compliance, Risk factor reductions, and Impressions.

## 2024-05-21 RX ORDER — METHOCARBAMOL 500 MG/1
TABLET, FILM COATED ORAL
Qty: 90 TABLET | Refills: 0 | Status: SHIPPED | OUTPATIENT
Start: 2024-05-21

## 2024-05-21 NOTE — TELEPHONE ENCOUNTER
Please review pharmacy request. Thank you.     Preferred products are:   -[id number] - CYCLOBENZAPR TAB 5MG  [id number] - TIZANIDINE   TAB 2MG  [id number] CYCLOBENZAPR TAB 5MG

## 2024-05-22 ENCOUNTER — EVALUATION (OUTPATIENT)
Dept: PHYSICAL THERAPY | Age: 64
End: 2024-05-22
Payer: MEDICARE

## 2024-05-22 DIAGNOSIS — S93.492A SPRAIN OF ANTERIOR TALOFIBULAR LIGAMENT OF LEFT ANKLE, INITIAL ENCOUNTER: ICD-10-CM
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
## 2024-05-22 DIAGNOSIS — M25.672 ANKLE STIFFNESS, LEFT: ICD-10-CM

## 2024-05-22 DIAGNOSIS — R29.898 ANKLE WEAKNESS: ICD-10-CM

## 2024-05-22 DIAGNOSIS — M25.572 PAIN, JOINT, ANKLE AND FOOT, LEFT: ICD-10-CM

## 2024-05-22 PROCEDURE — 97110 THERAPEUTIC EXERCISES: CPT

## 2024-05-22 PROCEDURE — 97161 PT EVAL LOW COMPLEX 20 MIN: CPT

## 2024-05-22 NOTE — PROGRESS NOTES
PT Evaluation     Today's date: 2024  Patient name: Karmen Luna  : 1960  MRN: [id number]  Referring provider: Elia Sharpe PA*  Dx:   Encounter Diagnosis     ICD-10-CM    1. Pain, joint, ankle and foot, left  M25.572 Ambulatory Referral to Physical Therapy      2. Sprain of anterior talofibular ligament of left ankle, initial encounter  S93.492A Ambulatory Referral to Physical Therapy      3. Ankle weakness  R29.898 Ambulatory Referral to Physical Therapy      4. Ankle stiffness, left  M25.672 Ambulatory Referral to Physical Therapy      5. Fall, subsequent encounter-2024  W19.XXXD Ambulatory Referral to Physical Therapy                     Assessment  Impairments: abnormal gait, abnormal or restricted ROM, impaired balance, impaired physical strength, lacks appropriate home exercise program, pain with function, weight-bearing intolerance and poor posture   Symptom irritability: moderate    Assessment details: Pt is a 63 y.o. female who presents to [facility] with chief c/o L ankle pain s/p mechanical fall about two weeks ago. X-rays at urgent care and ED were negative. Signs and symptoms indicate probable diagnosis of ATFL sprain. she has primary impairments of decreased ankle ROM (2* pain/symptoms), decreased ankle strength, abnormal gait pattern, decreased dynamic stability/balance, and increased pain. She is limited functionally as she has difficulty with prolonged standing, difficulty ambulating, difficulty with transitional movements, difficulty completing ADL's, is unable to perform work duties, and is unable to participate in usual recreational activities. Pt was able to tolerate light ankle strengthening with resistance band and dorsiflexion stretching with towel today. Provided her with an HEP handout with these exercises. Educated pt on diagnosis, prognosis, proper completion of HEP, normal response to exercises and POC. she verbalizes understanding of all education provided. All  questions answered. Pt would benefit from skilled OP PT in order to improve upon impairments and return to PLOF.  Understanding of Dx/Px/POC: good     Prognosis: good    Goals  Short term goals (4-6 weeks)  Pt will improve strength by 1/2 grade in LLE to improve tolerance to ADL's  Pt will improve ROM by 5 degrees in L ankle to improve completion of ADL's  Pt will be independent with phase 1 HEP  Pt will report 50% functional improvement     Long term goals (6-8 weeks)  Pt will be independent with HEP by d/c  Pt will return to PLOF, perform normal leisure activities, and perform normal work duties with a 90% reduction in symptoms  Pt will be able to tolerate ambulating for 1 hour without pain  Pt will be able to standing for 1 hour without pain   Pt will be able to ascend/descend full flight of stairs without pain  Pt will improve FOTO >/= expected           Plan  Patient would benefit from: skilled physical therapy  Other planned modality interventions: Any PRN    Planned therapy interventions: patient education, therapeutic exercise, graded exercise, functional ROM exercises, flexibility, home exercise program, manual therapy, activity modification, strengthening, stretching, joint mobilization, massage, therapeutic activities, neuromuscular re-education, balance and balance/weight bearing training    Frequency: 2x week  Duration in visits: 8  Duration in weeks: 6  Treatment plan discussed with: patient      Subjective Evaluation    History of Present Illness  Mechanism of injury: Pt's dog leash looped around her ankle and she fell. She has been out of work for two weeks. 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 Pain is mostly lateral ankle. Describes pain as aching and sharp. Sometimes her whole foot just goes numb. She does have neuropathy but this seems different. Does not seem to be a pattern to the numbness. Denies LBP at this time. Denies previous hx of injuries to L  ankle and denies previous surgeries. Pt reports that it feels like her ankle locks when she is isn't wearing an ankle brace. Denies clicking/popping in the ankle. Pt was wearing a CAM boot for about 2 weeks but her foot felt comfortable.    Going from sitting to standing and initiating gait. After walking for a while it might go numb and then her ankle will start throbbing.            Not a recurrent problem   Quality of life: good    Patient Goals  Patient goals for therapy: increased strength, decreased pain, return to sport/leisure activities and return to work    Pain  Current pain ratin  At best pain ratin  At worst pain ratin  Quality: sharp and dull ache  Relieving factors: support and rest  Aggravating factors: standing, stair climbing, walking and lifting    Social Support  Lives with: spouse    Employment status: working  Treatments  Current treatment: physical therapy        Objective     Palpation   Left   No palpable tenderness to the anterior tibialis, peroneus and posterior tibialis.     Tenderness   Left Ankle/Foot   Tenderness in the anterior ankle, anterior talofibular ligament, dorsum foot and navicular. No tenderness in the Achilles insertion, lateral malleolus, peroneal tendon, plantar fascia and posterior tibial tendon.     Active Range of Motion   Left Ankle/Foot   Dorsiflexion (ke): 0 degrees with pain  Plantar flexion: 60 degrees   Inversion: 20 degrees with pain  Eversion: 10 degrees     Right Ankle/Foot   Dorsiflexion (ke): 10 degrees   Plantar flexion: 60 degrees   Inversion: 30 degrees   Eversion: 20 degrees     Strength/Myotome Testing     Left Ankle/Foot   Dorsiflexion: 4-  Plantar flexion: 3  Inversion: 3-  Eversion: 4-    Tests   Left Ankle/Foot   Negative for anterior drawer, Tinel's sign (tarsal tunnel) and windlass.     Swelling   Left Ankle/Foot   Malleoli: 27.5 cm             Precautions: L ankle pain s/p mechanical fall,       Manuals             L ankle PROM     "                                                Neuro Re-Ed             BAPS             Biodex weight shifting or LOS             Tandem stance             SL stance             Tandem walking                                       Ther Ex             Nu step or Boost             Ankle 4 way w/ TB X15 ea             PF stretch 30\"x3            H/S stretch             LAQ             SLR's             HR/TR (start seated)             Step ups             Squats             Sled push/pull             Weighted walk outs                                                    Ther Activity                                       Gait Training             Boost TM                           Modalities                                            "

## 2024-05-23 LAB
DME PARACHUTE DELIVERY DATE ACTUAL: NORMAL
DME PARACHUTE DELIVERY DATE REQUESTED: NORMAL
DME PARACHUTE ITEM DESCRIPTION: NORMAL
DME PARACHUTE ORDER STATUS: NORMAL
DME PARACHUTE SUPPLIER NAME: NORMAL
DME PARACHUTE SUPPLIER PHONE: NORMAL

## 2024-05-24 ENCOUNTER — TELEPHONE (OUTPATIENT)
Age: 64
End: 2024-05-24

## 2024-05-24 DIAGNOSIS — L23.5 ALLERGIC DERMATITIS DUE TO OTHER CHEMICAL PRODUCT: Primary | ICD-10-CM

## 2024-05-24 RX ORDER — METHYLPREDNISOLONE 4 MG/1
TABLET ORAL
Qty: 21 EACH | Refills: 0 | Status: SHIPPED | OUTPATIENT
Start: 2024-05-24

## 2024-05-24 NOTE — TELEPHONE ENCOUNTER
Called patient let her know that a refill for a medrol dose stan was sent in and when she finishes th currently one in two days she can  the refill and start them, patient verbalized understandings and will call back if symptoms worsen or do not improve.

## 2024-05-29 ENCOUNTER — OFFICE VISIT (OUTPATIENT)
Dept: FAMILY MEDICINE CLINIC | Facility: CLINIC | Age: 64
End: 2024-05-29
Payer: MEDICARE

## 2024-05-29 VITALS
SYSTOLIC BLOOD PRESSURE: 128 MMHG | TEMPERATURE: 97.5 F | HEART RATE: 67 BPM | DIASTOLIC BLOOD PRESSURE: 80 MMHG | OXYGEN SATURATION: 96 %

## 2024-05-29 DIAGNOSIS — S96.911D MUSCLE STRAIN OF RIGHT FOOT, SUBSEQUENT ENCOUNTER: Primary | ICD-10-CM

## 2024-05-29 PROCEDURE — G2211 COMPLEX E/M VISIT ADD ON: HCPCS | Performed by: FAMILY MEDICINE

## 2024-05-29 PROCEDURE — 99213 OFFICE O/P EST LOW 20 MIN: CPT | Performed by: FAMILY MEDICINE

## 2024-05-29 NOTE — LETTER
May 29, 2024     Patient: Karmen Luna  YOB: 1960  Date of Visit: 5/29/2024      To Whom it May Concern:    Karmen Luna is under my professional care. Karmen was seen in my office on 5/29/2024. Karmen is cleared to go back to work on 5/30/2024.     If you have any questions or concerns, please don't hesitate to call.         Sincerely,              Alphonso Paula, DO

## 2024-05-29 NOTE — PROGRESS NOTES
Ambulatory Visit  Name: Karmen Luna      : 1960      MRN: [id number]  Encounter Provider: Alphonso Paula DO  Encounter Date: 2024   Encounter department: [facility]    Assessment & Plan     Gave note for RTW.        1. Muscle strain of right foot, subsequent encounter    Chief Complaint   Patient presents with    Letter for School/Work           History of Present Illness     No foot pain, request to go back to work.      Review of Systems   Constitutional: Negative.    HENT: Negative.     Eyes: Negative.    Respiratory: Negative.     Cardiovascular: Negative.    Gastrointestinal: Negative.    Genitourinary: Negative.    Musculoskeletal: Negative.  Negative for gait problem.   Skin: Negative.    Neurological: Negative.    Psychiatric/Behavioral: Negative.       Past Medical History:   Diagnosis Date    Anxiety     Arthritis     Last assessed 5/3/2011    Ortega's esophagus     Cancer (HCC)     ovarian cancer at age 25 yo- REMOVAL  B/L    Colon polyp     DDD (degenerative disc disease), lumbar     Depression     Fall     2020 right knee meniscus tear- OR repair today 8/3/2020    Fibromyalgia     Fibromyalgia, primary     Fracture of one or more phalanges of foot     GERD (gastroesophageal reflux disease)     H/O bladder infections     chronic    Hypertension     Kidney infection     occasional    Migraines     Obesity     Ovarian cancer (HCC) 1987    age 26    Polyneuropathy     Last assessed 2016 knees to feet    PONV (postoperative nausea and vomiting)     Patient requests to be pre-medicated prior to surgery prior to surgery    PONV (postoperative nausea and vomiting) 8/3/2020    Renal disorder     Spinal stenosis     Vertigo     Wears glasses     reading     Past Surgical History:   Procedure Laterality Date    ABDOMINAL SURGERY      several    BACK SURGERY      CATARACT EXTRACTION, BILATERAL      CHOLECYSTECTOMY      COLONOSCOPY      FOOT FRACTURE SURGERY Bilateral      x 5  surgeries    KIDNEY SURGERY  1974    at age 11 yo    KNEE SURGERY Right     RI ARTHRS KNE SURG W/MENISCECTOMY MED/LAT W/SHVG Right 8/3/2020    Procedure: KNEE ARTHROSCOPY,PARTIAL MEDIAL & LATERAL MENISECTOMIES;  Surgeon: Austen Mcguire MD;  Location: [facility];  Service: Orthopedics    RI LAPAROSCOPY SURG CHOLECYSTECTOMY N/A 11/27/2020    Procedure: LAPAROSCOPIC CHOLECYSTECTOMY;  Surgeon: Justyn Cole MD;  Location: [facility];  Service: General    TOTAL ABDOMINAL HYSTERECTOMY  1987    age 26    TOTAL ABDOMINAL HYSTERECTOMY W/ BILATERAL SALPINGOOPHORECTOMY Bilateral 1987    age 26     Family History   Problem Relation Age of Onset    Heart disease Mother     Cancer Mother     Diabetes Mother     Arthritis Mother     Anxiety disorder Mother     Bleeding Disorder Mother     Clotting disorder Mother     Depression Mother     Hyperlipidemia Mother     Irritable bowel syndrome Mother     Hypertension Mother     Obesity Mother     Osteoporosis Mother     Vaginal cancer Mother 30    Thyroid disease Mother     Stroke Mother     Diabetes Father     Arthritis Father     Hyperlipidemia Father     Vesicoureteral reflux Father     Heart disease Father     Hypertension Father     Migraines Father     Obesity Father     Arthritis Brother     Anxiety disorder Brother     Diabetes Brother     Hyperlipidemia Brother     Vesicoureteral reflux Brother     Heart disease Brother     Irritable bowel syndrome Brother     Hypertension Brother     Migraines Brother     Thyroid disease Brother     Pancreatic cancer Brother 55    Arthritis Maternal Aunt     Anxiety disorder Maternal Aunt     Depression Maternal Aunt     Diabetes Maternal Aunt     Vaginal cancer Maternal Aunt 50    No Known Problems Maternal Aunt     No Known Problems Maternal Aunt     No Known Problems Maternal Aunt     Fibroids Paternal Aunt     Diabetes Maternal Grandmother     Vaginal cancer Maternal Grandmother 50    No Known Problems Maternal Grandfather      Infertility Paternal Grandmother     No Known Problems Paternal Grandfather     Migraines Daughter     Lupus Daughter     Asthma Son     Migraines Son     Hypertension Family     Arthritis Family      Social History     Tobacco Use    Smoking status: Never    Smokeless tobacco: Never   Vaping Use    Vaping status: Never Used   Substance and Sexual Activity    Alcohol use: Not Currently    Drug use: No    Sexual activity: Not Currently     Birth control/protection: Surgical     Current Outpatient Medications on File Prior to Visit   Medication Sig    DULoxetine (CYMBALTA) 60 mg delayed release capsule Take 1 capsule (60 mg total) by mouth in the morning    esomeprazole (NexIUM) 40 MG capsule Take 1 capsule by mouth in the morning    gabapentin (Neurontin) 600 MG tablet Take 1 tablet (600 mg total) by mouth 3 (three) times a day    hydrOXYzine HCL (ATARAX) 25 mg tablet TAKE 1 TABLET BY MOUTH ONCE DAILY AT BEDTIME    levothyroxine 50 mcg tablet Take 1 tablet (50 mcg total) by mouth daily    methenamine hippurate (HIPREX) 1 g tablet TAKE 1 TABLET BY MOUTH TWICE DAILY WITH MEALS    methocarbamol (ROBAXIN) 500 mg tablet TAKE 1 TABLET BY MOUTH EVERY 8 HOURS AS NEEDED FOR MUSCLE SPASM    methylPREDNISolone 4 MG tablet therapy pack Use as directed on package    methylPREDNISolone 4 MG tablet therapy pack Use as directed on package    nystatin (MYCOSTATIN) powder Twice a day as needed.    ondansetron (ZOFRAN) 4 mg tablet Take 1 tablet (4 mg total) by mouth every 8 (eight) hours as needed for nausea or vomiting    oxybutynin (DITROPAN-XL) 5 mg 24 hr tablet Take 1 tablet (5 mg total) by mouth 2 (two) times a day    phenazopyridine (PYRIDIUM) 100 mg tablet Take 1 tablet (100 mg total) by mouth 3 (three) times a day as needed for bladder spasms (painful urination)    SUMAtriptan (IMITREX) 50 mg tablet Take 1 tablet (50 mg total) by mouth once as needed for migraine May repeat in 2 hours.  No more than 200 mg per day.    traZODone  (DESYREL) 150 mg tablet Take 1 tablet (150 mg total) by mouth daily at bedtime    gabapentin (Neurontin) 600 MG tablet Take 1 tablet (600 mg total) by mouth 3 (three) times a day    halobetasol (ULTRAVATE) 0.05 % ointment Apply topically 2 (two) times a day For up to 2 weeks.    omeprazole (PriLOSEC) 40 MG capsule Take 40 mg by mouth daily    oxybutynin (DITROPAN-XL) 10 MG 24 hr tablet Take 1 tablet (10 mg total) by mouth daily at bedtime     Allergies   Allergen Reactions    Morphine      Acts not like herself and feels agitated and restless    Penicillins Hives     Tongue swells     Immunization History   Administered Date(s) Administered    COVID-19 MODERNA VACC 0.5 ML IM 08/05/2021, 09/02/2021, 02/05/2022    INFLUENZA 09/16/2023    Respiratory Syncytial Virus Vaccine (Recombinant, Adjuvanted) 09/16/2023    Tuberculin Skin Test-PPD Intradermal 12/12/2012, 12/26/2012, 09/27/2023, 09/27/2023, 10/04/2023    Zoster 10/12/2016    Zoster Vaccine Recombinant 05/14/2023, 07/22/2023     Objective     /80 (BP Location: Left arm, Patient Position: Sitting, Cuff Size: Large)   Pulse 67   Temp 97.5 °F (36.4 °C) (Temporal)   LMP  (LMP Unknown)   SpO2 96%     Physical Exam  Constitutional:       Appearance: She is well-developed.   HENT:      Head: Normocephalic and atraumatic.      Right Ear: External ear normal.      Left Ear: External ear normal.      Nose: Nose normal.      Mouth/Throat:      Mouth: Mucous membranes are moist.      Pharynx: Oropharynx is clear.   Eyes:      Conjunctiva/sclera: Conjunctivae normal.      Pupils: Pupils are equal, round, and reactive to light.   Cardiovascular:      Rate and Rhythm: Normal rate and regular rhythm.      Heart sounds: Normal heart sounds.   Pulmonary:      Effort: Pulmonary effort is normal.      Breath sounds: Normal breath sounds.   Musculoskeletal:      Cervical back: Normal range of motion and neck supple.      Comments: Normal gait.   Skin:     General: Skin is  warm and dry.   Neurological:      Mental Status: She is alert and oriented to person, place, and time.      Deep Tendon Reflexes: Reflexes are normal and symmetric.   Psychiatric:         Mood and Affect: Mood normal.         Behavior: Behavior normal.         Thought Content: Thought content normal.         Judgment: Judgment normal.       Administrative Statements

## 2024-06-03 ENCOUNTER — OFFICE VISIT (OUTPATIENT)
Dept: FAMILY MEDICINE CLINIC | Facility: CLINIC | Age: 64
End: 2024-06-03
Payer: MEDICARE

## 2024-06-03 ENCOUNTER — TELEPHONE (OUTPATIENT)
Age: 64
End: 2024-06-03

## 2024-06-03 VITALS
TEMPERATURE: 98.7 F | SYSTOLIC BLOOD PRESSURE: 120 MMHG | HEART RATE: 78 BPM | DIASTOLIC BLOOD PRESSURE: 80 MMHG | OXYGEN SATURATION: 96 %

## 2024-06-03 DIAGNOSIS — K21.9 GASTROESOPHAGEAL REFLUX DISEASE WITHOUT ESOPHAGITIS: ICD-10-CM

## 2024-06-03 DIAGNOSIS — L29.9 ITCHY SKIN: Primary | ICD-10-CM

## 2024-06-03 PROCEDURE — 99213 OFFICE O/P EST LOW 20 MIN: CPT | Performed by: FAMILY MEDICINE

## 2024-06-03 PROCEDURE — G2211 COMPLEX E/M VISIT ADD ON: HCPCS | Performed by: FAMILY MEDICINE

## 2024-06-03 NOTE — PROGRESS NOTES
Ambulatory Visit  Name: Karmen Luna      : 1960      MRN: [id number]  Encounter Provider: Alphonso Paula DO  Encounter Date: 6/3/2024   Encounter department: [facility]    Assessment & Plan     Reviewed skin care, fabric softeners, dryer additives.    1. Itchy skin  2. Gastroesophageal reflux disease without esophagitis       Chief Complaint   Patient presents with    Continious Poison Ivy     Still spreading. Per patient is all over her body. Per patient is mostly on the right side and feels itchy but no rash.     Middle chest pain      Comes and goes. Per patient she have GERD and she don't know can be related.         History of Present Illness     Itching everywhere without rashes.Washes with hot water using a body wash and luffa.  Intermittent reflux with certain foods.      Review of Systems   Constitutional: Negative.    HENT: Negative.     Eyes: Negative.    Respiratory: Negative.     Cardiovascular: Negative.    Gastrointestinal: Negative.    Genitourinary: Negative.    Musculoskeletal: Negative.    Skin: Negative.  Negative for rash.   Neurological: Negative.    Psychiatric/Behavioral: Negative.       Past Medical History:   Diagnosis Date    Anxiety     Arthritis     Last assessed 5/3/2011    Ortega's esophagus     Cancer (HCC)     ovarian cancer at age 25 yo- REMOVAL  B/L    Colon polyp     DDD (degenerative disc disease), lumbar     Depression     Fall     2020 right knee meniscus tear- OR repair today 8/3/2020    Fibromyalgia     Fibromyalgia, primary     Fracture of one or more phalanges of foot     GERD (gastroesophageal reflux disease)     H/O bladder infections     chronic    Hypertension     Kidney infection     occasional    Migraines     Obesity     Ovarian cancer (HCC) 1987    age 26    Polyneuropathy     Last assessed 2016 knees to feet    PONV (postoperative nausea and vomiting)     Patient requests to be pre-medicated prior to surgery prior to surgery    PONV  (postoperative nausea and vomiting) 8/3/2020    Renal disorder     Spinal stenosis     Vertigo     Wears glasses     reading     Past Surgical History:   Procedure Laterality Date    ABDOMINAL SURGERY  1986    several    BACK SURGERY  1990    CATARACT EXTRACTION, BILATERAL      CHOLECYSTECTOMY      COLONOSCOPY      FOOT FRACTURE SURGERY Bilateral 2004    x 5  surgeries    KIDNEY SURGERY  1974    at age 13 yo    KNEE SURGERY Right     MN ARTHRS KNE SURG W/MENISCECTOMY MED/LAT W/SHVG Right 8/3/2020    Procedure: KNEE ARTHROSCOPY,PARTIAL MEDIAL & LATERAL MENISECTOMIES;  Surgeon: Austen Mcguire MD;  Location: [facility];  Service: Orthopedics    MN LAPAROSCOPY SURG CHOLECYSTECTOMY N/A 11/27/2020    Procedure: LAPAROSCOPIC CHOLECYSTECTOMY;  Surgeon: Justyn Cole MD;  Location: [facility];  Service: General    TOTAL ABDOMINAL HYSTERECTOMY  1987    age 26    TOTAL ABDOMINAL HYSTERECTOMY W/ BILATERAL SALPINGOOPHORECTOMY Bilateral 1987    age 26     Family History   Problem Relation Age of Onset    Heart disease Mother     Cancer Mother     Diabetes Mother     Arthritis Mother     Anxiety disorder Mother     Bleeding Disorder Mother     Clotting disorder Mother     Depression Mother     Hyperlipidemia Mother     Irritable bowel syndrome Mother     Hypertension Mother     Obesity Mother     Osteoporosis Mother     Vaginal cancer Mother 30    Thyroid disease Mother     Stroke Mother     Diabetes Father     Arthritis Father     Hyperlipidemia Father     Vesicoureteral reflux Father     Heart disease Father     Hypertension Father     Migraines Father     Obesity Father     Arthritis Brother     Anxiety disorder Brother     Diabetes Brother     Hyperlipidemia Brother     Vesicoureteral reflux Brother     Heart disease Brother     Irritable bowel syndrome Brother     Hypertension Brother     Migraines Brother     Thyroid disease Brother     Pancreatic cancer Brother 55    Arthritis Maternal Aunt     Anxiety disorder Maternal  Aunt     Depression Maternal Aunt     Diabetes Maternal Aunt     Vaginal cancer Maternal Aunt 50    No Known Problems Maternal Aunt     No Known Problems Maternal Aunt     No Known Problems Maternal Aunt     Fibroids Paternal Aunt     Diabetes Maternal Grandmother     Vaginal cancer Maternal Grandmother 50    No Known Problems Maternal Grandfather     Infertility Paternal Grandmother     No Known Problems Paternal Grandfather     Migraines Daughter     Lupus Daughter     Asthma Son     Migraines Son     Hypertension Family     Arthritis Family      Social History     Tobacco Use    Smoking status: Never    Smokeless tobacco: Never   Vaping Use    Vaping status: Never Used   Substance and Sexual Activity    Alcohol use: Not Currently    Drug use: No    Sexual activity: Not Currently     Birth control/protection: Surgical     Current Outpatient Medications on File Prior to Visit   Medication Sig    DULoxetine (CYMBALTA) 60 mg delayed release capsule Take 1 capsule (60 mg total) by mouth in the morning    esomeprazole (NexIUM) 40 MG capsule Take 1 capsule by mouth in the morning    gabapentin (Neurontin) 600 MG tablet Take 1 tablet (600 mg total) by mouth 3 (three) times a day    hydrOXYzine HCL (ATARAX) 25 mg tablet TAKE 1 TABLET BY MOUTH ONCE DAILY AT BEDTIME    levothyroxine 50 mcg tablet Take 1 tablet (50 mcg total) by mouth daily    methenamine hippurate (HIPREX) 1 g tablet TAKE 1 TABLET BY MOUTH TWICE DAILY WITH MEALS    methocarbamol (ROBAXIN) 500 mg tablet TAKE 1 TABLET BY MOUTH EVERY 8 HOURS AS NEEDED FOR MUSCLE SPASM    nystatin (MYCOSTATIN) powder Twice a day as needed.    ondansetron (ZOFRAN) 4 mg tablet Take 1 tablet (4 mg total) by mouth every 8 (eight) hours as needed for nausea or vomiting    oxybutynin (DITROPAN-XL) 5 mg 24 hr tablet Take 1 tablet (5 mg total) by mouth 2 (two) times a day    phenazopyridine (PYRIDIUM) 100 mg tablet Take 1 tablet (100 mg total) by mouth 3 (three) times a day as needed  for bladder spasms (painful urination)    SUMAtriptan (IMITREX) 50 mg tablet Take 1 tablet (50 mg total) by mouth once as needed for migraine May repeat in 2 hours.  No more than 200 mg per day.    traZODone (DESYREL) 150 mg tablet Take 1 tablet (150 mg total) by mouth daily at bedtime    gabapentin (Neurontin) 600 MG tablet Take 1 tablet (600 mg total) by mouth 3 (three) times a day    halobetasol (ULTRAVATE) 0.05 % ointment Apply topically 2 (two) times a day For up to 2 weeks.    omeprazole (PriLOSEC) 40 MG capsule Take 40 mg by mouth daily    oxybutynin (DITROPAN-XL) 10 MG 24 hr tablet Take 1 tablet (10 mg total) by mouth daily at bedtime    [DISCONTINUED] methylPREDNISolone 4 MG tablet therapy pack Use as directed on package    [DISCONTINUED] methylPREDNISolone 4 MG tablet therapy pack Use as directed on package     Allergies   Allergen Reactions    Morphine      Acts not like herself and feels agitated and restless    Penicillins Hives     Tongue swells     Immunization History   Administered Date(s) Administered    COVID-19 MODERNA VACC 0.5 ML IM 08/05/2021, 09/02/2021, 02/05/2022    INFLUENZA 09/16/2023    Respiratory Syncytial Virus Vaccine (Recombinant, Adjuvanted) 09/16/2023    Tuberculin Skin Test-PPD Intradermal 12/12/2012, 12/26/2012, 09/27/2023, 09/27/2023, 10/04/2023    Zoster 10/12/2016    Zoster Vaccine Recombinant 05/14/2023, 07/22/2023     Objective     /80 (BP Location: Right arm, Patient Position: Sitting, Cuff Size: Large)   Pulse 78   Temp 98.7 °F (37.1 °C) (Temporal)   LMP  (LMP Unknown)   SpO2 96%     Physical Exam  Constitutional:       Appearance: She is well-developed.   HENT:      Head: Normocephalic and atraumatic.      Right Ear: External ear normal.      Left Ear: External ear normal.      Nose: Nose normal.      Mouth/Throat:      Mouth: Mucous membranes are moist.      Pharynx: Oropharynx is clear.   Eyes:      Conjunctiva/sclera: Conjunctivae normal.      Pupils: Pupils  are equal, round, and reactive to light.   Cardiovascular:      Rate and Rhythm: Normal rate and regular rhythm.      Heart sounds: Normal heart sounds.   Pulmonary:      Effort: Pulmonary effort is normal.      Breath sounds: Normal breath sounds.   Musculoskeletal:      Cervical back: Normal range of motion and neck supple.   Skin:     General: Skin is warm and dry.      Comments: Skin is dry with scratch areas.   Neurological:      Mental Status: She is alert and oriented to person, place, and time.      Deep Tendon Reflexes: Reflexes are normal and symmetric.   Psychiatric:         Mood and Affect: Mood normal.         Behavior: Behavior normal.         Thought Content: Thought content normal.         Judgment: Judgment normal.       Administrative Statements

## 2024-06-03 NOTE — TELEPHONE ENCOUNTER
Patient had poison ivy 3-4 weeks prior. Received 2 doses of treatment and the poison is gone but the rash is still present.  Patient stating that it has spread, is swelling with the itching.  She cannot sleep and needs to be seen soon.  Contacted triage for the patient due to severity of pain.

## 2024-06-03 NOTE — TELEPHONE ENCOUNTER
Patient states pruritic rash , states pain and swelling as well had poison ivy 2-3 weeks ago , was treated with methylprednisolone 4 mg  but the rash never go away and keeps spreading ,denies signs of infection .   Offered next available in our amb clinic 06/20 but she said is it's a lot of waiting, I advised to see her PCP or go to the urgent care as soon as she can and give us a call if something changes ; I also added her to our cancellation list .   Patient verbally admitted to understand .

## 2024-06-13 ENCOUNTER — OFFICE VISIT (OUTPATIENT)
Dept: PODIATRY | Facility: CLINIC | Age: 64
End: 2024-06-13
Payer: MEDICARE

## 2024-06-13 ENCOUNTER — OFFICE VISIT (OUTPATIENT)
Dept: OBGYN CLINIC | Facility: OTHER | Age: 64
End: 2024-06-13
Payer: MEDICARE

## 2024-06-13 ENCOUNTER — APPOINTMENT (OUTPATIENT)
Dept: RADIOLOGY | Facility: OTHER | Age: 64
End: 2024-06-13
Payer: MEDICARE

## 2024-06-13 VITALS
DIASTOLIC BLOOD PRESSURE: 82 MMHG | WEIGHT: 200 LBS | BODY MASS INDEX: 31.39 KG/M2 | SYSTOLIC BLOOD PRESSURE: 128 MMHG | HEART RATE: 81 BPM | HEIGHT: 67 IN

## 2024-06-13 VITALS
SYSTOLIC BLOOD PRESSURE: 130 MMHG | BODY MASS INDEX: 31.32 KG/M2 | DIASTOLIC BLOOD PRESSURE: 80 MMHG | HEIGHT: 67 IN | RESPIRATION RATE: 18 BRPM | HEART RATE: 81 BPM

## 2024-06-13 DIAGNOSIS — M19.012 PRIMARY OSTEOARTHRITIS OF LEFT SHOULDER: Primary | ICD-10-CM

## 2024-06-13 DIAGNOSIS — M25.512 ACUTE PAIN OF LEFT SHOULDER: ICD-10-CM

## 2024-06-13 DIAGNOSIS — M19.072 PRIMARY OSTEOARTHRITIS OF LEFT FOOT: Primary | ICD-10-CM

## 2024-06-13 PROCEDURE — 20600 DRAIN/INJ JOINT/BURSA W/O US: CPT | Performed by: PODIATRIST

## 2024-06-13 PROCEDURE — 73030 X-RAY EXAM OF SHOULDER: CPT

## 2024-06-13 PROCEDURE — 99204 OFFICE O/P NEW MOD 45 MIN: CPT | Performed by: ORTHOPAEDIC SURGERY

## 2024-06-13 PROCEDURE — 20610 DRAIN/INJ JOINT/BURSA W/O US: CPT | Performed by: ORTHOPAEDIC SURGERY

## 2024-06-13 RX ORDER — TRIAMCINOLONE ACETONIDE 40 MG/ML
20 INJECTION, SUSPENSION INTRA-ARTICULAR; INTRAMUSCULAR
Status: SHIPPED | OUTPATIENT
Start: 2024-06-13

## 2024-06-13 RX ORDER — LIDOCAINE HYDROCHLORIDE 10 MG/ML
1 INJECTION, SOLUTION INFILTRATION; PERINEURAL
Status: SHIPPED | OUTPATIENT
Start: 2024-06-13

## 2024-06-13 RX ORDER — BUPIVACAINE HYDROCHLORIDE 2.5 MG/ML
2 INJECTION, SOLUTION INFILTRATION; PERINEURAL
Status: COMPLETED | OUTPATIENT
Start: 2024-06-13 | End: 2024-06-13

## 2024-06-13 RX ORDER — BETAMETHASONE SODIUM PHOSPHATE AND BETAMETHASONE ACETATE 3; 3 MG/ML; MG/ML
6 INJECTION, SUSPENSION INTRA-ARTICULAR; INTRALESIONAL; INTRAMUSCULAR; SOFT TISSUE
Status: COMPLETED | OUTPATIENT
Start: 2024-06-13 | End: 2024-06-13

## 2024-06-13 RX ADMIN — LIDOCAINE HYDROCHLORIDE 1 ML: 10 INJECTION, SOLUTION INFILTRATION; PERINEURAL at 14:45

## 2024-06-13 RX ADMIN — BUPIVACAINE HYDROCHLORIDE 2 ML: 2.5 INJECTION, SOLUTION INFILTRATION; PERINEURAL at 13:15

## 2024-06-13 RX ADMIN — TRIAMCINOLONE ACETONIDE 20 MG: 40 INJECTION, SUSPENSION INTRA-ARTICULAR; INTRAMUSCULAR at 14:45

## 2024-06-13 RX ADMIN — BETAMETHASONE SODIUM PHOSPHATE AND BETAMETHASONE ACETATE 6 MG: 3; 3 INJECTION, SUSPENSION INTRA-ARTICULAR; INTRALESIONAL; INTRAMUSCULAR; SOFT TISSUE at 13:15

## 2024-06-13 NOTE — PROGRESS NOTES
Patient presents with recurrence of pain in the dorsum of the left foot secondary to osteoarthritis.  Patient was given cortisone shot injection in February 2024 which was very helpful.  Pain has recurred over the past few weeks.  Patient notes that she sprained her left ankle and that has the ankle pain improved the dorsal foot pain began.    On exam, pain with palpation second metatarsal cuneiform joint left foot.    Treatment: Injected left foot with 0.5 cc Kenalog 40 along with 1 cc 1% Xylocaine.  Reappoint as needed    Small joint arthrocentesis: L intertarsal  Universal Protocol:  Consent: Verbal consent obtained.  Risks and benefits: risks, benefits and alternatives were discussed  Consent given by: patient  Patient understanding: patient states understanding of the procedure being performed  Patient identity confirmed: verbally with patient  Supporting Documentation  Indications: pain   Procedure Details  Location: foot - L intertarsal  Needle size: 25 G  Approach: dorsal  Medications administered: 1 mL lidocaine 1 %; 20 mg triamcinolone acetonide 40 mg/mL

## 2024-06-13 NOTE — PROGRESS NOTES
"  Assessment  Diagnoses and all orders for this visit:    Primary osteoarthritis of left shoulder (mild)    Discussion and Plan:    Explained to the patient that her x rays and examination is consistent with mild glenohumeral joint osteoarthritis. The pathoanatomy and natural history of this diagnosis was also explained to the patient in detail today in the office.   She was provided with a cortisone injection into her left glenohumeral joint today in the office. This is documented appropriately below  A referral to formal physical therapy was also provided for the patient.   If her symptoms persist we could consider an MRI scan of her left shoulder   Follow up on an as needed basis    Subjective:   Patient ID: Karmen Luna is a 63 y.o. female presents with a chief complaint of left shoulder pain.   The pain began a few month(s) ago and is not associated with an acute injury. The patient describes the pain as aching and dull in intensity,  intermittent, occurring with increasing frequency in timing, and localizes the pain to the  left glenohumeral joint, deltoid.  The pain is worse with overhead work, overuse, and raising arm over head and relieved by rest, ice, avoiding the painful activities.  The pain is not associated with numbness and tingling.  The pain is not associated with constitutional symptoms. The patient is awoken at night by the pain.    The patient has had no prior treatment.        The following portions of the patient's history were reviewed and updated as appropriate: allergies, current medications, past family history, past medical history, past social history, past surgical history and problem list.    Objective:  /82 (BP Location: Right arm, Patient Position: Sitting, Cuff Size: Standard)   Pulse 81   Ht 5' 7\" (1.702 m)   Wt 90.7 kg (200 lb)   LMP  (LMP Unknown)   BMI 31.32 kg/m²       Left Shoulder Exam     Range of Motion   External rotation:  50   Forward flexion:  150     Muscle " "Strength   Abduction: 4/5   External rotation: 5/5     Tests   Zhang test: negative    Other   Erythema: absent  Sensation: normal  Pulse: present             Physical Exam  Constitutional:       Appearance: She is well-developed.   Eyes:      Pupils: Pupils are equal, round, and reactive to light.   Pulmonary:      Effort: Pulmonary effort is normal.      Breath sounds: Normal breath sounds.   Skin:     General: Skin is warm and dry.   Neurological:      Mental Status: She is alert and oriented to person, place, and time.   Psychiatric:         Behavior: Behavior normal.         Thought Content: Thought content normal.         Judgment: Judgment normal.         Large joint arthrocentesis: L glenohumeral  Universal Protocol:  Consent: Verbal consent obtained.  Risks and benefits: risks, benefits and alternatives were discussed  Consent given by: patient  Time out: Immediately prior to procedure a \"time out\" was called to verify the correct patient, procedure, equipment, support staff and site/side marked as required.  Site marked: the operative site was marked  Radiology Images displayed and confirmed. If images not available, report reviewed: imaging studies available  Patient identity confirmed: verbally with patient  Supporting Documentation  Indications: pain and diagnostic evaluation   Procedure Details  Location: shoulder - L glenohumeral  Preparation: Patient was prepped and draped in the usual sterile fashion  Needle size: 22 G  Ultrasound guidance: no  Approach: posterior  Medications administered: 2 mL bupivacaine 0.25 %; 6 mg betamethasone acetate-betamethasone sodium phosphate 6 (3-3) mg/mL    Patient tolerance: patient tolerated the procedure well with no immediate complications  Dressing:  Sterile dressing applied            I have personally reviewed pertinent films in PACS and my interpretation is as follows.    X Ray Left Shoulder 6/13/24: Mild glenohumeral joint osteoarthritis. No other acute " osseous abnormalities.    Scribe Attestation      I,:  Bandar Zayas am acting as a scribe while in the presence of the attending physician.:       I,:  Sridhar Zhang MD personally performed the services described in this documentation    as scribed in my presence.:

## 2024-06-24 ENCOUNTER — TELEPHONE (OUTPATIENT)
Age: 64
End: 2024-06-24

## 2024-06-24 DIAGNOSIS — N39.0 FREQUENT UTI: Primary | ICD-10-CM

## 2024-06-24 DIAGNOSIS — R39.89 BLADDER PAIN: ICD-10-CM

## 2024-06-24 DIAGNOSIS — N39.41 URGE INCONTINENCE: ICD-10-CM

## 2024-06-24 NOTE — TELEPHONE ENCOUNTER
Patient returned missed call from triage to discuss her urine symptoms.     Pt didn't want to wait on hold. Asking for a returned call.    Call back [phone]

## 2024-06-24 NOTE — TELEPHONE ENCOUNTER
Spoke to patient and she has an appointment in July. She has history of bladder pain and IC and she takes Oxybutynin. She is concerned of increased pelvic pain and pressure with foul odor to urine. She does have to strain to void at times and sometimes the urinary stream varies from good to little amounts. Hematuria is noted also. She has chills. She is hydrating well with water. Denies any N/V fevers. Intermittent Left side flank pain is noted also to a pain level of seven. She was given care advice to hydrate well with water, avoid bladder irritants and carbonated drinks. Placed urine orders. She will go to the lab tomorrow. She is aware of urine testing protocol and expectations and we reviewed ER precautions. Please advise of further recommendations.

## 2024-06-24 NOTE — TELEPHONE ENCOUNTER
Left a voicemail per communication sheet to speak to patient. Informed her to call back and speak to clinical triage for a symptom assessment regarding ongoing urinary incontinence and foul odor to urine. Office phone number given. Urine orders pended.

## 2024-06-25 ENCOUNTER — LAB (OUTPATIENT)
Dept: LAB | Age: 64
End: 2024-06-25
Payer: MEDICARE

## 2024-06-25 DIAGNOSIS — R39.89 BLADDER PAIN: ICD-10-CM

## 2024-06-25 DIAGNOSIS — N39.41 URGE INCONTINENCE: ICD-10-CM

## 2024-06-25 DIAGNOSIS — N39.0 FREQUENT UTI: ICD-10-CM

## 2024-06-25 LAB
BACTERIA UR QL AUTO: ABNORMAL /HPF
BILIRUB UR QL STRIP: NEGATIVE
CLARITY UR: CLEAR
COLOR UR: COLORLESS
GLUCOSE UR STRIP-MCNC: NEGATIVE MG/DL
HGB UR QL STRIP.AUTO: NEGATIVE
KETONES UR STRIP-MCNC: NEGATIVE MG/DL
LEUKOCYTE ESTERASE UR QL STRIP: ABNORMAL
NITRITE UR QL STRIP: NEGATIVE
NON-SQ EPI CELLS URNS QL MICRO: ABNORMAL /HPF
PH UR STRIP.AUTO: 6 [PH]
PROT UR STRIP-MCNC: NEGATIVE MG/DL
RBC #/AREA URNS AUTO: ABNORMAL /HPF
SP GR UR STRIP.AUTO: 1 (ref 1–1.03)
UROBILINOGEN UR STRIP-ACNC: <2 MG/DL
WBC #/AREA URNS AUTO: ABNORMAL /HPF

## 2024-06-25 PROCEDURE — 87086 URINE CULTURE/COLONY COUNT: CPT

## 2024-06-25 PROCEDURE — 81001 URINALYSIS AUTO W/SCOPE: CPT

## 2024-06-25 NOTE — TELEPHONE ENCOUNTER
Tried calling patient to inform her of the provider's recommendations but when pt answered the phone it was immediately disconnected. Will try calling again later.     If pt calls back please inform her that it is recommended to go for urine testing as well as having a renal ultrasound prior to her visit in July per SONIA Antonio.

## 2024-06-25 NOTE — TELEPHONE ENCOUNTER
Agree with urine testing, as patient has not had recent upper or lower urinary tract imaging I have also placed orders for a renal ultrasound.  Please assist with scheduling, this can be reviewed at her follow-up appointment.

## 2024-06-27 LAB — BACTERIA UR CULT: NORMAL

## 2024-06-27 NOTE — RESULT ENCOUNTER NOTE
Please let patient know that her urine culture shows low colony count mixed contaminants.  This is considered negative for infection.  I do not recommend antibiotics.

## 2024-06-28 ENCOUNTER — TELEPHONE (OUTPATIENT)
Dept: UROLOGY | Facility: CLINIC | Age: 64
End: 2024-06-28

## 2024-06-28 NOTE — TELEPHONE ENCOUNTER
Called patient to inform her that her urine culture came back negative for infection and that we are not recommending antibiotics at this time. Informed the pt that we will see her as scheduled on 7/16 with Dr. Sabillon. Patient confirmed understanding and was thankful for the call.

## 2024-06-28 NOTE — TELEPHONE ENCOUNTER
----- Message from SONIA May sent at 6/27/2024  3:45 PM EDT -----  Please let patient know that her urine culture shows low colony count mixed contaminants.  This is considered negative for infection.  I do not recommend antibiotics.

## 2024-07-02 ENCOUNTER — HOSPITAL ENCOUNTER (OUTPATIENT)
Dept: RADIOLOGY | Age: 64
Discharge: HOME | End: 2024-07-02
Payer: MEDICARE

## 2024-07-02 DIAGNOSIS — N39.0 FREQUENT UTI: ICD-10-CM

## 2024-07-02 DIAGNOSIS — R39.89 BLADDER PAIN: ICD-10-CM

## 2024-07-02 DIAGNOSIS — N39.41 URGE INCONTINENCE: ICD-10-CM

## 2024-07-02 PROCEDURE — 76770 US EXAM ABDO BACK WALL COMP: CPT

## 2024-07-15 ENCOUNTER — TELEPHONE (OUTPATIENT)
Age: 64
End: 2024-07-15

## 2024-07-15 NOTE — TELEPHONE ENCOUNTER
Patient calling in and is scheduled to see  tomorrow for follow up. Patient is questioning if this can be virtual. Please advise and call patient.     CB: [phone]

## 2024-07-15 NOTE — TELEPHONE ENCOUNTER
Pt calling to request VV for tomorrow with TRACY Sabillon .. PT is scheduled at 2pm    Please call pt at [phone]

## 2024-07-16 ENCOUNTER — TELEMEDICINE (OUTPATIENT)
Dept: UROLOGY | Facility: CLINIC | Age: 64
End: 2024-07-16
Payer: MEDICARE

## 2024-07-16 DIAGNOSIS — R39.89 BLADDER PAIN: ICD-10-CM

## 2024-07-16 DIAGNOSIS — N30.10 INTERSTITIAL CYSTITIS: ICD-10-CM

## 2024-07-16 DIAGNOSIS — N39.41 URGE INCONTINENCE: Primary | ICD-10-CM

## 2024-07-16 PROCEDURE — 99442 PR PHYS/QHP TELEPHONE EVALUATION 11-20 MIN: CPT | Performed by: UROLOGY

## 2024-07-16 RX ORDER — CIPROFLOXACIN 2 MG/ML
400 INJECTION, SOLUTION INTRAVENOUS ONCE
OUTPATIENT
Start: 2024-07-16 | End: 2024-07-16

## 2024-07-16 NOTE — PROGRESS NOTES
Virtual Brief Visit  Name: Karmen Luna      : 1960      MRN: [id number]  Encounter Provider: Anil Sabillon MD  Encounter Date: 2024   Encounter department: [facility]    This Visit is being completed by telephone. The Patient is located at Home and in the following state in which I hold an active license [place]    The patient was identified by name and date of birth. Karmen Luna was informed that this is a telemedicine visit and that the visit is being conducted through Telephone.  Other methods to assure confidentiality were taken back office. The patient was notified the following individuals were present in the room NP- latricia and out.  She acknowledged consent and understanding of privacy and security of the video platform. The patient has agreed to participate and understands they can discontinue the visit at any time.    Patient is aware this is a billable service.     Assessment & Plan   1. Urge incontinence  2. Interstitial cystitis        History of Present Illness   HPI  63-year-old woman last seen by me 2024 and she had urge incontinence and polydipsia which I think was leading to a lot of these problems with this.  I advised her to cut down to 2 bottles of liquid and change her oxybutynin to 5 mg XL twice daily get 24-hour coverage.  I told her to message me if she wanted to try Botox or InterStim.  I wanted to maximize things medically first with reduction in fluid and the oxybutynin.  She had a kidney bladder ultrasound with PVR 2024 which showed PVR of 18 cc, prevoid volume of 504.6, and the right kidney is severely atrophic the left kidney is very normal.  No hydronephrosis and no stones.  She  had 1 urine culture 2024 which was negative.  She continues to leak at work, and at bedtime, and has bladder pain which is worse than last time.She has dry mouth so she has only increased her PO volume intake. She wet the bed last night 1x, but  running to BR in the middle of the night 2-3x. Discussed the other options and too far away for PTNS-she is interested in Botox the most.  [facility] will [facility] the [facility].  She lives in [place] [facility] [place].  The procedure as well as the risks of bleeding infection botulism and urinary retention were explained to the patient in detail and she gives informed consent.  Will check a urine culture before the procedure.  She wishes to proceed.  Visit Time  Total Visit Duration: 20 minutes

## 2024-07-16 NOTE — LETTER
2024     Alphonso Paula DO  [address] [facility] [place] [address]    Patient: Karmen Luna   YOB: 1960   Date of Visit: 2024       Dear Dr. Paula:    Thank you for referring Karmen Luna to me for evaluation. Below are my notes for this consultation.    If you have questions, please do not hesitate to call me. I look forward to following your patient along with you.         Sincerely,        Anil Sabillon MD        CC: No Recipients    Anil Sabillon MD  2024  2:40 PM  Sign when Signing Visit  Virtual Brief Visit  Name: Karmen Luna      : 1960      MRN: [id number]  Encounter Provider: Anil Sabillon MD  Encounter Date: 2024   Encounter department: [facility] [place]    This Visit is being completed by telephone. The Patient is located at Home and in the following state in which I hold an active license [place]    The patient was identified by name and date of birth. Karmen Luna was informed that this is a telemedicine visit and that the visit is being conducted through Telephone.  Other methods to assure confidentiality were taken back office. The patient was notified the following individuals were present in the room NP- latricia and out.  She acknowledged consent and understanding of privacy and security of the video platform. The patient has agreed to participate and understands they can discontinue the visit at any time.    Patient is aware this is a billable service.     Assessment & Plan  1. Urge incontinence  2. Interstitial cystitis        History of Present Illness  HPI  63-year-old woman last seen by me 2024 and she had urge incontinence and polydipsia which I think was leading to a lot of these problems with this.  I advised her to cut down to 2 bottles of liquid and change her oxybutynin to 5 mg XL twice daily get 24-hour coverage.  I told her to message me if she wanted to try Botox or InterStim.  I wanted to maximize things medically  first with reduction in fluid and the oxybutynin.  She had a kidney bladder ultrasound with PVR July 2, 2024 which showed PVR of 18 cc, prevoid volume of 504.6, and the right kidney is severely atrophic the left kidney is very normal.  No hydronephrosis and no stones.  She  had 1 urine culture June 25, 2024 which was negative.  She continues to leak at work, and at bedtime, and has bladder pain which is worse than last time.She has dry mouth so she has only increased her PO volume intake. She wet the bed last night 1x, but running to BR in the middle of the night 2-3x. Discussed the other options and too far away for PTNS-she is interested in Botox the most.  We will do this at the [facility].  She lives in [place].  The procedure as well as the risks of bleeding infection botulism and urinary retention were explained to the patient in detail and she gives informed consent.  Will check a urine culture before the procedure.  She wishes to proceed.  Visit Time  Total Visit Duration: 20 minutes

## 2024-07-19 ENCOUNTER — TELEPHONE (OUTPATIENT)
Dept: UROLOGY | Facility: CLINIC | Age: 64
End: 2024-07-19

## 2024-07-19 NOTE — TELEPHONE ENCOUNTER
Lmom to c/b for scheduling OR with Dr Sabillon. Advised next available at [facility] 10/17.

## 2024-07-24 ENCOUNTER — TELEPHONE (OUTPATIENT)
Age: 64
End: 2024-07-24

## 2024-07-24 NOTE — TELEPHONE ENCOUNTER
Patient called to ask a medical question.  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 Patient was unaware and is concerned that she can catch this.  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  Patient is not providing any medical needs or helping her shower. Please advise and return patients call.

## 2024-07-29 NOTE — TELEPHONE ENCOUNTER
Called patient made her aware that Hep C is blood transferred, patient verbalized understanding and will call back if needed.

## 2024-07-30 ENCOUNTER — APPOINTMENT (OUTPATIENT)
Dept: LAB | Age: 64
End: 2024-07-30
Payer: MEDICARE

## 2024-07-30 DIAGNOSIS — E03.9 HYPOTHYROIDISM, UNSPECIFIED TYPE: ICD-10-CM

## 2024-07-30 LAB
T3 SERPL-MCNC: 1.4 NG/ML
T4 SERPL-MCNC: 10.16 UG/DL (ref 6.09–12.23)
TSH SERPL DL<=0.05 MIU/L-ACNC: 3.79 UIU/ML (ref 0.45–4.5)

## 2024-07-30 PROCEDURE — 84443 ASSAY THYROID STIM HORMONE: CPT

## 2024-07-30 PROCEDURE — 84480 ASSAY TRIIODOTHYRONINE (T3): CPT

## 2024-07-30 PROCEDURE — 84436 ASSAY OF TOTAL THYROXINE: CPT

## 2024-07-30 PROCEDURE — 36415 COLL VENOUS BLD VENIPUNCTURE: CPT

## 2024-08-08 DIAGNOSIS — G47.00 INSOMNIA, UNSPECIFIED TYPE: ICD-10-CM

## 2024-08-08 RX ORDER — TRAZODONE HYDROCHLORIDE 150 MG/1
150 TABLET ORAL
Qty: 90 TABLET | Refills: 0 | Status: CANCELLED | OUTPATIENT
Start: 2024-08-08

## 2024-08-08 NOTE — TELEPHONE ENCOUNTER
Patient has been contacted and informed there one more refill at her pharmacy. Patient verbalized understanding.

## 2024-08-08 NOTE — TELEPHONE ENCOUNTER
Reason for call:   [x] Refill   [] Prior Auth  [] Other:     Office:   [x] PCP/Provider - Alphonso Paula DO   [] Specialty/Provider -     Medication: traZODone (DESYREL) 150 mg tablet     Dose/Frequency: Take 1 tablet (150 mg total) by mouth daily at bedtime     Quantity: 90    Pharmacy: [facility] [id number] [facility] [address] [facility]     Does the patient have enough for 3 days?   [] Yes   [x] No - Send as HP to POD

## 2024-08-28 ENCOUNTER — TELEPHONE (OUTPATIENT)
Age: 64
End: 2024-08-28

## 2024-08-28 NOTE — TELEPHONE ENCOUNTER
Caller: Karmen Luna    Doctor: Destiny Gaviria    Reason for call: Karmen's left foot is very painful.  She is wondering if she can have another shot as she had one in June. Can someone reach out to her?  Thank you    Call back#: [phone]

## 2024-09-05 ENCOUNTER — OFFICE VISIT (OUTPATIENT)
Dept: PODIATRY | Facility: CLINIC | Age: 64
End: 2024-09-05
Payer: MEDICARE

## 2024-09-05 DIAGNOSIS — M19.072 PRIMARY OSTEOARTHRITIS OF LEFT FOOT: Primary | ICD-10-CM

## 2024-09-05 DIAGNOSIS — M25.572 PAIN IN JOINT OF LEFT FOOT: ICD-10-CM

## 2024-09-05 PROCEDURE — 20600 DRAIN/INJ JOINT/BURSA W/O US: CPT | Performed by: PODIATRIST

## 2024-09-05 PROCEDURE — RECHECK: Performed by: PODIATRIST

## 2024-09-05 RX ORDER — LIDOCAINE HYDROCHLORIDE 10 MG/ML
1 INJECTION, SOLUTION INFILTRATION; PERINEURAL
Status: SHIPPED | OUTPATIENT
Start: 2024-09-05

## 2024-09-05 RX ORDER — TRIAMCINOLONE ACETONIDE 40 MG/ML
20 INJECTION, SUSPENSION INTRA-ARTICULAR; INTRAMUSCULAR
Status: SHIPPED | OUTPATIENT
Start: 2024-09-05

## 2024-09-05 RX ADMIN — TRIAMCINOLONE ACETONIDE 20 MG: 40 INJECTION, SUSPENSION INTRA-ARTICULAR; INTRAMUSCULAR at 16:15

## 2024-09-05 RX ADMIN — LIDOCAINE HYDROCHLORIDE 1 ML: 10 INJECTION, SOLUTION INFILTRATION; PERINEURAL at 16:15

## 2024-09-05 NOTE — PROGRESS NOTES
Patient presents with recurrence of pain in the dorsum of the left foot at the second metatarsal cuneiform joint.  She had responded well to cortisone injection but approximately 2 weeks ago the pain recurred and she states it is severe.    She is also developing new pain with motion of the left great toe joint.  This motion is within normal limits and she does have 60 degrees of dorsiflexion but the pain is present along the lateral aspect of the first MPJ consistent with capsulitis.    Treatment: Injected each site with 0.5 cc Kenalog 40 along with 1 cc 1% Xylocaine.  Reappoint 6 weeks.    Small joint arthrocentesis: L great MTP  Universal Protocol:  procedure performed by consultantConsent: Verbal consent obtained.  Risks and benefits: risks, benefits and alternatives were discussed  Consent given by: patient  Supporting Documentation  Indications: pain   Procedure Details  Location: great toe - L great MTP  Needle size: 25 G  Approach: dorsal  Medications administered: 1 mL lidocaine 1 %; 20 mg triamcinolone acetonide 40 mg/mL

## 2024-09-06 ENCOUNTER — PREP FOR PROCEDURE (OUTPATIENT)
Dept: UROLOGY | Facility: CLINIC | Age: 64
End: 2024-09-06

## 2024-09-06 DIAGNOSIS — Z01.818 ENCOUNTER FOR PREADMISSION TESTING: Primary | ICD-10-CM

## 2024-09-06 DIAGNOSIS — R39.89 SUSPECTED UTI: ICD-10-CM

## 2024-09-18 DIAGNOSIS — N39.0 URINARY TRACT INFECTION WITHOUT HEMATURIA, SITE UNSPECIFIED: ICD-10-CM

## 2024-09-23 ENCOUNTER — OFFICE VISIT (OUTPATIENT)
Dept: FAMILY MEDICINE CLINIC | Facility: CLINIC | Age: 64
End: 2024-09-23
Payer: MEDICARE

## 2024-09-23 VITALS
TEMPERATURE: 96.6 F | OXYGEN SATURATION: 98 % | HEIGHT: 67 IN | BODY MASS INDEX: 31.32 KG/M2 | HEART RATE: 94 BPM | DIASTOLIC BLOOD PRESSURE: 78 MMHG | SYSTOLIC BLOOD PRESSURE: 124 MMHG

## 2024-09-23 DIAGNOSIS — B34.9 VIRAL INFECTION: Primary | ICD-10-CM

## 2024-09-23 PROCEDURE — 99213 OFFICE O/P EST LOW 20 MIN: CPT | Performed by: FAMILY MEDICINE

## 2024-09-23 PROCEDURE — G2211 COMPLEX E/M VISIT ADD ON: HCPCS | Performed by: FAMILY MEDICINE

## 2024-09-23 NOTE — LETTER
September 23, 2024     Patient: Karmen Luna  YOB: 1960  Date of Visit: 9/23/2024      To Whom it May Concern:    Karmen Luna is under my professional care. Karmen was seen in my office on 9/23/2024. Karmen may return to work on 9/26/2024 .    If you have any questions or concerns, please don't hesitate to call.         Sincerely,          Alphonso Paula, DO

## 2024-09-23 NOTE — LETTER
September 25, 2024     Patient: Karmen Luna  YOB: 1960  Date of Visit: 9/23/2024      To Whom it May Concern:    Karmen Luna is under my professional care. Karmen was seen in my office on 9/23/2024. Karmen may return to work on 09/30/2024 .    If you have any questions or concerns, please don't hesitate to call.         Sincerely,       Alphonso Paula, DO

## 2024-09-23 NOTE — PROGRESS NOTES
"Ambulatory Visit  Name: Karmen Luna      : 1960      MRN: [id number]  Encounter Provider: Alphonso Paula DO  Encounter Date: 2024   Encounter department: [facility]    Chief Complaint   Patient presents with    Cough    Chills    Headache    Generalized Body Aches    Dizziness       Assessment & Plan  Viral infection            Symptomatic treatment.        History of Present Illness     Viral symptoms since Saturday night.  Need not for today.  Care giver of the elderly.        History obtained from : patient  Review of Systems   Constitutional:  Positive for chills and fatigue. Negative for fever.   HENT: Negative.     Respiratory:  Positive for cough.    Gastrointestinal:  Positive for diarrhea.   Genitourinary: Negative.    Musculoskeletal:  Positive for arthralgias.   Skin: Negative.    Neurological:  Positive for headaches.   Psychiatric/Behavioral: Negative.             Objective     /78 (BP Location: Left arm, Patient Position: Sitting, Cuff Size: Standard)   Pulse 94   Temp (!) 96.6 °F (35.9 °C) (Temporal)   Ht 5' 7\" (1.702 m)   LMP  (LMP Unknown)   SpO2 98%   BMI 31.32 kg/m²     Physical Exam  Constitutional:       Appearance: She is well-developed.      Comments: Looks Ok.   HENT:      Head: Normocephalic and atraumatic.      Right Ear: External ear normal.      Left Ear: External ear normal.      Nose: Nose normal.      Mouth/Throat:      Mouth: Mucous membranes are moist.      Pharynx: Oropharynx is clear.   Eyes:      Conjunctiva/sclera: Conjunctivae normal.      Pupils: Pupils are equal, round, and reactive to light.   Cardiovascular:      Rate and Rhythm: Normal rate and regular rhythm.      Heart sounds: Normal heart sounds.   Pulmonary:      Effort: Pulmonary effort is normal.      Breath sounds: Normal breath sounds.   Abdominal:      General: Bowel sounds are normal.      Palpations: Abdomen is soft.   Musculoskeletal:      Cervical back: Normal range of " motion and neck supple.   Skin:     General: Skin is warm and dry.   Neurological:      Mental Status: She is alert and oriented to person, place, and time.      Deep Tendon Reflexes: Reflexes are normal and symmetric.   Psychiatric:         Mood and Affect: Mood normal.         Behavior: Behavior normal.         Thought Content: Thought content normal.         Judgment: Judgment normal.

## 2024-09-25 ENCOUNTER — TELEPHONE (OUTPATIENT)
Age: 64
End: 2024-09-25

## 2024-09-25 NOTE — TELEPHONE ENCOUNTER
Patient called because she is still feeling very ill. She said there is not way she can go back to work this week because of how she feels and she works with the elderly. She is requesting a work excuse that says she can return to work on 9/30.  [facility]

## 2024-09-27 RX ORDER — METHENAMINE HIPPURATE 1000 MG/1
TABLET ORAL
Qty: 60 TABLET | Refills: 11 | Status: SHIPPED | OUTPATIENT
Start: 2024-09-27

## 2024-09-27 NOTE — TELEPHONE ENCOUNTER
Reason for call:   [x] Refill   [] Prior Auth  [] Other:     Office:   [x] PCP/Provider - Alphonso Paula, DO  Family Medicine    [] Specialty/Provider -     Medication: methenamine hippurate (HIPREX) 1 g tablet     Dose/Frequency: TAKE 1 TABLET BY MOUTH TWICE DAILY WITH MEALS     Quantity: 60 tablet     Pharmacy: [facility] [id number] [facility] [address] [facility]     Does the patient have enough for 3 days?   [] Yes   [x] No - Send as HP to POD

## 2024-10-02 ENCOUNTER — OFFICE VISIT (OUTPATIENT)
Dept: UROLOGY | Facility: AMBULATORY SURGERY CENTER | Age: 64
End: 2024-10-02

## 2024-10-02 VITALS
WEIGHT: 202 LBS | DIASTOLIC BLOOD PRESSURE: 74 MMHG | OXYGEN SATURATION: 96 % | BODY MASS INDEX: 31.71 KG/M2 | HEART RATE: 73 BPM | SYSTOLIC BLOOD PRESSURE: 126 MMHG | HEIGHT: 67 IN

## 2024-10-02 DIAGNOSIS — N39.41 URGE INCONTINENCE: ICD-10-CM

## 2024-10-02 DIAGNOSIS — N39.0 URINARY TRACT INFECTION WITHOUT HEMATURIA, SITE UNSPECIFIED: Primary | ICD-10-CM

## 2024-10-02 PROCEDURE — 87086 URINE CULTURE/COLONY COUNT: CPT

## 2024-10-02 NOTE — PROGRESS NOTES
"  Assessment and plan:     Urge incontinence  Patient planning to undergo Botox injections by Dr. Sabillon in the operating room on 10/30/2024  Urine culture obtained in office today, will monitor results closely and treat accordingly  Consent obtained and signed today in the office, risks discussed  Plan to proceed to operating room on 10/30/2024 and plan for follow up in the office after procedure based off of Dr. Sabillon's recommendations      History of Present Illness     Karmen Luna is a 63 y.o. female who presents today to the office for a preoperative appointment for upcoming Botox injections into the bladder by Dr. Sabillon.  Procedure is scheduled for 10/30/2024.    Patient was last seen by Dr. Sabillon on 7/16/2024 for a telehealth visit.  I had an in-depth conversation regarding her urge incontinence.  She has been utilizing oxybutynin for her bothersome urinary symptoms.  They did have a further discussion regarding other interventions.  At the end of the discussion she stated that she was interested in proceeding with Botox injections into the bladder to help with her bothersome urinary symptoms.    Today in the office she states she is overall doing okay.  She is still interested in proceeding with the Botox injections.    Laboratory     Lab Results   Component Value Date    BUN 19 01/29/2024    CREATININE 0.95 01/29/2024       No components found for: \"GFR\"    Lab Results   Component Value Date    CALCIUM 9.9 01/29/2024    K 4.4 01/29/2024    CO2 30 01/29/2024     01/29/2024       Lab Results   Component Value Date    WBC 6.77 01/29/2024    HGB 14.0 01/29/2024    HCT 42.7 01/29/2024    MCV 94 01/29/2024     01/29/2024       No results found for: \"PSA\"    No results found for this or any previous visit (from the past 1 hour(s)).    Review of Systems     Review of Systems   Constitutional:  Negative for chills and fever.   Respiratory: Negative.  Negative for cough and shortness of breath.  " "  Cardiovascular:  Negative for chest pain and leg swelling.   Genitourinary:  Negative for dyspareunia, dysuria, flank pain, frequency, hematuria, menstrual problem, pelvic pain, urgency, vaginal bleeding, vaginal discharge and vaginal pain.   Skin:  Negative for rash.   Neurological: Negative.    Hematological:  Negative for adenopathy. Does not bruise/bleed easily.                 Allergies     Allergies   Allergen Reactions    Morphine      Acts not like herself and feels agitated and restless    Penicillins Hives     Tongue swells       Physical Exam     Physical Exam  Vitals reviewed.   Constitutional:       Appearance: Normal appearance.   HENT:      Head: Normocephalic and atraumatic.   Eyes:      Pupils: Pupils are equal, round, and reactive to light.   Cardiovascular:      Rate and Rhythm: Normal rate and regular rhythm.      Pulses: Normal pulses.      Heart sounds: Normal heart sounds.   Pulmonary:      Effort: Pulmonary effort is normal.      Breath sounds: Normal breath sounds.   Abdominal:      General: Abdomen is flat.      Palpations: Abdomen is soft.   Musculoskeletal:      Cervical back: Normal range of motion.   Skin:     General: Skin is warm and dry.   Neurological:      General: No focal deficit present.      Mental Status: She is alert and oriented to person, place, and time. Mental status is at baseline.   Psychiatric:         Mood and Affect: Mood normal.         Behavior: Behavior normal.         Thought Content: Thought content normal.         Judgment: Judgment normal.         Vital Signs     Vitals:    10/02/24 1239   BP: 126/74   BP Location: Left arm   Patient Position: Sitting   Cuff Size: Large   Pulse: 73   SpO2: 96%   Weight: 91.6 kg (202 lb)   Height: 5' 7\" (1.702 m)       Current Medications       Current Outpatient Medications:     DULoxetine (CYMBALTA) 60 mg delayed release capsule, Take 1 capsule (60 mg total) by mouth in the morning, Disp: 90 capsule, Rfl: 1    esomeprazole " (NexIUM) 40 MG capsule, Take 1 capsule by mouth in the morning, Disp: , Rfl:     gabapentin (Neurontin) 600 MG tablet, Take 1 tablet (600 mg total) by mouth 3 (three) times a day, Disp: 270 tablet, Rfl: 1    levothyroxine 50 mcg tablet, Take 1 tablet (50 mcg total) by mouth daily, Disp: 90 tablet, Rfl: 1    methenamine hippurate (HIPREX) 1 g tablet, TAKE 1 TABLET BY MOUTH TWICE DAILY WITH MEALS, Disp: 60 tablet, Rfl: 11    methocarbamol (ROBAXIN) 500 mg tablet, TAKE 1 TABLET BY MOUTH EVERY 8 HOURS AS NEEDED FOR MUSCLE SPASM, Disp: 90 tablet, Rfl: 0    nystatin (MYCOSTATIN) powder, Twice a day as needed., Disp: 30 g, Rfl: 1    ondansetron (ZOFRAN) 4 mg tablet, Take 1 tablet (4 mg total) by mouth every 8 (eight) hours as needed for nausea or vomiting, Disp: 30 tablet, Rfl: 0    oxybutynin (DITROPAN-XL) 5 mg 24 hr tablet, Take 1 tablet (5 mg total) by mouth 2 (two) times a day, Disp: 60 tablet, Rfl: 11    phenazopyridine (PYRIDIUM) 100 mg tablet, Take 1 tablet (100 mg total) by mouth 3 (three) times a day as needed for bladder spasms (painful urination), Disp: 20 tablet, Rfl: 0    SUMAtriptan (IMITREX) 50 mg tablet, Take 1 tablet (50 mg total) by mouth once as needed for migraine May repeat in 2 hours.  No more than 200 mg per day., Disp: 30 tablet, Rfl: 2    traZODone (DESYREL) 150 mg tablet, Take 1 tablet (150 mg total) by mouth daily at bedtime, Disp: 90 tablet, Rfl: 1    hydrOXYzine HCL (ATARAX) 25 mg tablet, TAKE 1 TABLET BY MOUTH ONCE DAILY AT BEDTIME, Disp: 90 tablet, Rfl: 1    Current Facility-Administered Medications:     lidocaine (XYLOCAINE) 1 % injection 1 mL, 1 mL, Infiltration, , , 1 mL at 02/26/24 1030    lidocaine (XYLOCAINE) 1 % injection 1 mL, 1 mL, Injection, , , 1 mL at 06/13/24 1445    lidocaine (XYLOCAINE) 1 % injection 1 mL, 1 mL, Injection, , , 1 mL at 09/05/24 1615    ropivacaine (NAROPIN) 0.5 % injection 10 mL, 10 mL, Infiltration, , Jimmy Overton DPM, 10 mL at 05/31/23 1045    triamcinolone  acetonide (KENALOG-40) 40 mg/mL injection 20 mg, 20 mg, Intra-articular, , SHARATH BeecrraM, 20 mg at 05/31/23 1045    triamcinolone acetonide (KENALOG-40) 40 mg/mL injection 20 mg, 20 mg, Infiltration, , , 20 mg at 02/26/24 1030    triamcinolone acetonide (KENALOG-40) 40 mg/mL injection 20 mg, 20 mg, Infiltration, , , 20 mg at 06/13/24 1445    triamcinolone acetonide (KENALOG-40) 40 mg/mL injection 20 mg, 20 mg, Infiltration, , , 20 mg at 09/05/24 1615    Active Problems     Patient Active Problem List   Diagnosis    Anxiety    Ortega esophagus    Depression    Lumbar radiculopathy    DDD (degenerative disc disease), lumbar    Lumbar spondylosis    Spinal stenosis of lumbar region    Other tear of medial meniscus, current injury, right knee, initial encounter    PONV (postoperative nausea and vomiting)    Calculus of gallbladder without cholecystitis without obstruction    Chronic pain syndrome    DDD (degenerative disc disease), cervical    Moderate episode of recurrent major depressive disorder (HCC)    Lichen sclerosus    Cervical radiculopathy    GERD (gastroesophageal reflux disease)    Bilateral occipital neuralgia    Sacroiliitis (HCC)    Amnesia/memory disorder    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HCC)    Stage 3a chronic kidney disease (HCC)    Chronic low back pain    Age-related osteoporosis without current pathological fracture    Somatic dysfunction of pelvic region    Hip strain, initial encounter    Fall, subsequent encounter    Ankle weakness    Sprain of anterior talofibular ligament of left ankle, initial encounter    Ankle stiffness, left    Pain, joint, ankle and foot, left    Primary osteoarthritis of left shoulder    Urge incontinence       Past Medical History     Past Medical History:   Diagnosis Date    Anemia     When giving blood    Anxiety     Arthritis     Last assessed 5/3/2011    Ortega's esophagus     Cancer (HCC)     ovarian cancer at age 27 yo- REMOVAL  B/L    Colon polyp      DDD (degenerative disc disease), lumbar     Depression     Diverticulitis of colon     After having a colonoscopy    Fall     5/2020 right knee meniscus tear- OR repair today 8/3/2020    Fibromyalgia     Fibromyalgia, primary     Fracture of one or more phalanges of foot     GERD (gastroesophageal reflux disease)     H/O bladder infections     chronic    Headache(784.0)     Always    Hypertension     Inflammatory bowel disease 1980    Kidney infection     occasional    Migraines     Obesity     Ovarian cancer (HCC) 1987    age 26    Polyneuropathy     Last assessed 6/23/2016 knees to feet    PONV (postoperative nausea and vomiting)     Patient requests to be pre-medicated prior to surgery prior to surgery    PONV (postoperative nausea and vomiting) 08/03/2020    Renal disorder     Spinal stenosis     Urinary tract infection     Vertigo     Wears glasses     reading       Surgical History     Past Surgical History:   Procedure Laterality Date    ABDOMINAL SURGERY  1986    several    BACK SURGERY  1990    CATARACT EXTRACTION, BILATERAL      CHOLECYSTECTOMY      COLONOSCOPY      FOOT FRACTURE SURGERY Bilateral 2004    x 5  surgeries    KIDNEY SURGERY  1974    at age 11 yo    KNEE SURGERY Right     [place] ARTH[facility] KNE SURG W/MENISCECTOMY MED/LAT W/SHVG Right 08/03/2020    Procedure: KNEE ARTHROSCOPY,PARTIAL MEDIAL & LATERAL MENISECTOMIES;  Surgeon: Austen Mcguire MD;  Location: [facility];  Service: Orthopedics    OH LAPAROSCOPY SURG CHOLECYSTECTOMY N/A 11/27/2020    Procedure: LAPAROSCOPIC CHOLECYSTECTOMY;  Surgeon: Justyn Cole MD;  [facility]: [facility];  Service: General    TOTAL ABDOMINAL HYSTERECTOMY  1987    age 26    TOTAL ABDOMINAL HYSTERECTOMY W/ BILATERAL SALPINGOOPHORECTOMY Bilateral 1987    age 26    TUBAL LIGATION  [id number]    I had a tubal ligation after my third child was born then I had a tubal ligation reversal in 86 then I had a tubal ligation after baby number four was born and 85 only to have cancer and  have to have everything removed.       Family History     Family History   Problem Relation Age of Onset    Heart disease Mother     Cancer Mother     Diabetes Mother     Arthritis Mother     Anxiety disorder Mother     Bleeding Disorder Mother     Clotting disorder Mother     Depression Mother     Hyperlipidemia Mother     Irritable bowel syndrome Mother     Hypertension Mother     Obesity Mother     Osteoporosis Mother     Vaginal cancer Mother 30    Thyroid disease Mother     Stroke Mother     Diabetes Father     Arthritis Father     Hyperlipidemia Father     Vesicoureteral reflux Father     Heart disease Father     Hypertension Father     Migraines Father     Obesity Father     Arthritis Brother     Anxiety disorder Brother     Diabetes Brother     Hyperlipidemia Brother     Vesicoureteral reflux Brother     Heart disease Brother     Irritable bowel syndrome Brother     Hypertension Brother     Migraines Brother     Thyroid disease Brother     Pancreatic cancer Brother 55    Arthritis Maternal Aunt     Anxiety disorder Maternal Aunt     Depression Maternal Aunt     Diabetes Maternal Aunt     Vaginal cancer Maternal Aunt 50    No Known Problems Maternal Aunt     No Known Problems Maternal Aunt     No Known Problems Maternal Aunt     Fibroids Paternal Aunt     Diabetes Maternal Grandmother     Vaginal cancer Maternal Grandmother 50    Cancer Maternal Grandmother     No Known Problems Maternal Grandfather     Infertility Paternal Grandmother     No Known Problems Paternal Grandfather     Migraines Daughter     Lupus Daughter     Asthma Son     Migraines Son     Hypertension Family     Arthritis Family     Cancer Maternal Aunt        Social History     Social History     Social History     Tobacco Use   Smoking Status Never   Smokeless Tobacco Never       Past Surgical History:   Procedure Laterality Date    ABDOMINAL SURGERY  1986    several    BACK SURGERY  1990    CATARACT EXTRACTION, BILATERAL       CHOLECYSTECTOMY      COLONOSCOPY      FOOT FRACTURE SURGERY Bilateral 2004    x 5  surgeries    KIDNEY SURGERY  1974    at age 11 yo    KNEE SURGERY Right     [place] [facility] KNE SURG W/MENISCECTOMY MED/LAT W/SHVG Right 08/03/2020    Procedure: KNEE ARTHROSCOPY,PARTIAL MEDIAL & LATERAL MENISECTOMIES;  Surgeon: Austen Mcguire MD;  Location: [facility];  Service: Orthopedics    NC LAPAROSCOPY SURG CHOLECYSTECTOMY N/A 11/27/2020    Procedure: LAPAROSCOPIC CHOLECYSTECTOMY;  Surgeon: Justyn Cole MD;  [facility];  Service: General    TOTAL ABDOMINAL HYSTERECTOMY  1987    age 26    TOTAL ABDOMINAL HYSTERECTOMY W/ BILATERAL SALPINGOOPHORECTOMY Bilateral 1987    age 26    TUBAL LIGATION  [id number]    I had a tubal ligation after my third child was born then I had a tubal ligation reversal in 86 then I had a tubal ligation after baby number four was born and 85 only to have cancer and have to have everything removed.         The following portions of the patient's history were reviewed and updated as appropriate: allergies, current medications, past family history, past medical history, past social history, past surgical history and problem list    Please note :  Voice dictation software has been used to create this document.  There may be inadvertent transcription errors.    SONIA Gonzales

## 2024-10-02 NOTE — ASSESSMENT & PLAN NOTE
Patient planning to undergo Botox injections by Dr. Sabillon in the operating room on 10/30/2024  Urine culture obtained in office today, will monitor results closely and treat accordingly  Consent obtained and signed today in the office, risks discussed  Plan to proceed to operating room on 10/30/2024 and plan for follow up in the office after procedure based off of Dr. Sabillon's recommendations

## 2024-10-02 NOTE — H&P (VIEW-ONLY)
"  Assessment and plan:     Urge incontinence  Patient planning to undergo Botox injections by Dr. Sabillon in the operating room on 10/30/2024  Urine culture obtained in office today, will monitor results closely and treat accordingly  Consent obtained and signed today in the office, risks discussed  Plan to proceed to operating room on 10/30/2024 and plan for follow up in the office after procedure based off of Dr. Sabillon's recommendations      History of Present Illness     Karmen Luna is a 63 y.o. female who presents today to the office for a preoperative appointment for upcoming Botox injections into the bladder by Dr. Sabillon.  Procedure is scheduled for 10/30/2024.    Patient was last seen by Dr. Sabillon on 7/16/2024 for a telehealth visit.  I had an in-depth conversation regarding her urge incontinence.  She has been utilizing oxybutynin for her bothersome urinary symptoms.  They did have a further discussion regarding other interventions.  At the end of the discussion she stated that she was interested in proceeding with Botox injections into the bladder to help with her bothersome urinary symptoms.    Today in the office she states she is overall doing okay.  She is still interested in proceeding with the Botox injections.    Laboratory     Lab Results   Component Value Date    BUN 19 01/29/2024    CREATININE 0.95 01/29/2024       No components found for: \"GFR\"    Lab Results   Component Value Date    CALCIUM 9.9 01/29/2024    K 4.4 01/29/2024    CO2 30 01/29/2024     01/29/2024       Lab Results   Component Value Date    WBC 6.77 01/29/2024    HGB 14.0 01/29/2024    HCT 42.7 01/29/2024    MCV 94 01/29/2024     01/29/2024       No results found for: \"PSA\"    No results found for this or any previous visit (from the past 1 hour(s)).    Review of Systems     Review of Systems   Constitutional:  Negative for chills and fever.   Respiratory: Negative.  Negative for cough and shortness of breath.  " "  Cardiovascular:  Negative for chest pain and leg swelling.   Genitourinary:  Negative for dyspareunia, dysuria, flank pain, frequency, hematuria, menstrual problem, pelvic pain, urgency, vaginal bleeding, vaginal discharge and vaginal pain.   Skin:  Negative for rash.   Neurological: Negative.    Hematological:  Negative for adenopathy. Does not bruise/bleed easily.                 Allergies     Allergies   Allergen Reactions    Morphine      Acts not like herself and feels agitated and restless    Penicillins Hives     Tongue swells       Physical Exam     Physical Exam  Vitals reviewed.   Constitutional:       Appearance: Normal appearance.   HENT:      Head: Normocephalic and atraumatic.   Eyes:      Pupils: Pupils are equal, round, and reactive to light.   Cardiovascular:      Rate and Rhythm: Normal rate and regular rhythm.      Pulses: Normal pulses.      Heart sounds: Normal heart sounds.   Pulmonary:      Effort: Pulmonary effort is normal.      Breath sounds: Normal breath sounds.   Abdominal:      General: Abdomen is flat.      Palpations: Abdomen is soft.   Musculoskeletal:      Cervical back: Normal range of motion.   Skin:     General: Skin is warm and dry.   Neurological:      General: No focal deficit present.      Mental Status: She is alert and oriented to person, place, and time. Mental status is at baseline.   Psychiatric:         Mood and Affect: Mood normal.         Behavior: Behavior normal.         Thought Content: Thought content normal.         Judgment: Judgment normal.         Vital Signs     Vitals:    10/02/24 1239   BP: 126/74   BP Location: Left arm   Patient Position: Sitting   Cuff Size: Large   Pulse: 73   SpO2: 96%   Weight: 91.6 kg (202 lb)   Height: 5' 7\" (1.702 m)       Current Medications       Current Outpatient Medications:     DULoxetine (CYMBALTA) 60 mg delayed release capsule, Take 1 capsule (60 mg total) by mouth in the morning, Disp: 90 capsule, Rfl: 1    esomeprazole " (NexIUM) 40 MG capsule, Take 1 capsule by mouth in the morning, Disp: , Rfl:     gabapentin (Neurontin) 600 MG tablet, Take 1 tablet (600 mg total) by mouth 3 (three) times a day, Disp: 270 tablet, Rfl: 1    levothyroxine 50 mcg tablet, Take 1 tablet (50 mcg total) by mouth daily, Disp: 90 tablet, Rfl: 1    methenamine hippurate (HIPREX) 1 g tablet, TAKE 1 TABLET BY MOUTH TWICE DAILY WITH MEALS, Disp: 60 tablet, Rfl: 11    methocarbamol (ROBAXIN) 500 mg tablet, TAKE 1 TABLET BY MOUTH EVERY 8 HOURS AS NEEDED FOR MUSCLE SPASM, Disp: 90 tablet, Rfl: 0    nystatin (MYCOSTATIN) powder, Twice a day as needed., Disp: 30 g, Rfl: 1    ondansetron (ZOFRAN) 4 mg tablet, Take 1 tablet (4 mg total) by mouth every 8 (eight) hours as needed for nausea or vomiting, Disp: 30 tablet, Rfl: 0    oxybutynin (DITROPAN-XL) 5 mg 24 hr tablet, Take 1 tablet (5 mg total) by mouth 2 (two) times a day, Disp: 60 tablet, Rfl: 11    phenazopyridine (PYRIDIUM) 100 mg tablet, Take 1 tablet (100 mg total) by mouth 3 (three) times a day as needed for bladder spasms (painful urination), Disp: 20 tablet, Rfl: 0    SUMAtriptan (IMITREX) 50 mg tablet, Take 1 tablet (50 mg total) by mouth once as needed for migraine May repeat in 2 hours.  No more than 200 mg per day., Disp: 30 tablet, Rfl: 2    traZODone (DESYREL) 150 mg tablet, Take 1 tablet (150 mg total) by mouth daily at bedtime, Disp: 90 tablet, Rfl: 1    hydrOXYzine HCL (ATARAX) 25 mg tablet, TAKE 1 TABLET BY MOUTH ONCE DAILY AT BEDTIME, Disp: 90 tablet, Rfl: 1    Current Facility-Administered Medications:     lidocaine (XYLOCAINE) 1 % injection 1 mL, 1 mL, Infiltration, , , 1 mL at 02/26/24 1030    lidocaine (XYLOCAINE) 1 % injection 1 mL, 1 mL, Injection, , , 1 mL at 06/13/24 1445    lidocaine (XYLOCAINE) 1 % injection 1 mL, 1 mL, Injection, , , 1 mL at 09/05/24 1615    ropivacaine (NAROPIN) 0.5 % injection 10 mL, 10 mL, Infiltration, , Jimmy Overton DPM, 10 mL at 05/31/23 1045    triamcinolone  acetonide (KENALOG-40) 40 mg/mL injection 20 mg, 20 mg, Intra-articular, , SHARATH BecerraM, 20 mg at 05/31/23 1045    triamcinolone acetonide (KENALOG-40) 40 mg/mL injection 20 mg, 20 mg, Infiltration, , , 20 mg at 02/26/24 1030    triamcinolone acetonide (KENALOG-40) 40 mg/mL injection 20 mg, 20 mg, Infiltration, , , 20 mg at 06/13/24 1445    triamcinolone acetonide (KENALOG-40) 40 mg/mL injection 20 mg, 20 mg, Infiltration, , , 20 mg at 09/05/24 1615    Active Problems     Patient Active Problem List   Diagnosis    Anxiety    Ortega esophagus    Depression    Lumbar radiculopathy    DDD (degenerative disc disease), lumbar    Lumbar spondylosis    Spinal stenosis of lumbar region    Other tear of medial meniscus, current injury, right knee, initial encounter    PONV (postoperative nausea and vomiting)    Calculus of gallbladder without cholecystitis without obstruction    Chronic pain syndrome    DDD (degenerative disc disease), cervical    Moderate episode of recurrent major depressive disorder (HCC)    Lichen sclerosus    Cervical radiculopathy    GERD (gastroesophageal reflux disease)    Bilateral occipital neuralgia    Sacroiliitis (HCC)    Amnesia/memory disorder    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HCC)    Stage 3a chronic kidney disease (HCC)    Chronic low back pain    Age-related osteoporosis without current pathological fracture    Somatic dysfunction of pelvic region    Hip strain, initial encounter    Fall, subsequent encounter    Ankle weakness    Sprain of anterior talofibular ligament of left ankle, initial encounter    Ankle stiffness, left    Pain, joint, ankle and foot, left    Primary osteoarthritis of left shoulder    Urge incontinence       Past Medical History     Past Medical History:   Diagnosis Date    Anemia     When giving blood    Anxiety     Arthritis     Last assessed 5/3/2011    Ortega's esophagus     Cancer (HCC)     ovarian cancer at age 25 yo- REMOVAL  B/L    Colon polyp      DDD (degenerative disc disease), lumbar     Depression     Diverticulitis of colon     After having a colonoscopy    Fall     5/2020 right knee meniscus tear- OR repair today 8/3/2020    Fibromyalgia     Fibromyalgia, primary     Fracture of one or more phalanges of foot     GERD (gastroesophageal reflux disease)     H/O bladder infections     chronic    Headache(784.0)     Always    Hypertension     Inflammatory bowel disease 1980    Kidney infection     occasional    Migraines     Obesity     Ovarian cancer (HCC) 1987    age 26    Polyneuropathy     Last assessed 6/23/2016 knees to feet    PONV (postoperative nausea and vomiting)     Patient requests to be pre-medicated prior to surgery prior to surgery    PONV (postoperative nausea and vomiting) 08/03/2020    Renal disorder     Spinal stenosis     Urinary tract infection     Vertigo     Wears glasses     reading       Surgical History     Past Surgical History:   Procedure Laterality Date    ABDOMINAL SURGERY  1986    several    BACK SURGERY  1990    CATARACT EXTRACTION, BILATERAL      CHOLECYSTECTOMY      COLONOSCOPY      FOOT FRACTURE SURGERY Bilateral 2004    x 5  surgeries    KIDNEY SURGERY  1974    at age 13 yo    KNEE SURGERY Right     MI ARTHRS KNE SURG W/MENISCECTOMY MED/LAT W/SHVG Right 08/03/2020    Procedure: KNEE ARTHROSCOPY,PARTIAL MEDIAL & LATERAL MENISECTOMIES;  Surgeon: Austen Mcguire MD;  Location: [facility];  Service: Orthopedics    MI LAPAROSCOPY SURG CHOLECYSTECTOMY N/A 11/27/2020    Procedure: LAPAROSCOPIC CHOLECYSTECTOMY;  Surgeon: Justyn Cole MD;  [facility];  Service: General    TOTAL ABDOMINAL HYSTERECTOMY  1987    age 26    TOTAL ABDOMINAL HYSTERECTOMY W/ BILATERAL SALPINGOOPHORECTOMY Bilateral 1987    age 26    TUBAL LIGATION  [id number]    I had a tubal ligation after my third child was born then I had a tubal ligation reversal in 86 then I had a tubal ligation after baby number four was born and 85 only to have cancer and  have to have everything removed.       Family History     Family History   Problem Relation Age of Onset    Heart disease Mother     Cancer Mother     Diabetes Mother     Arthritis Mother     Anxiety disorder Mother     Bleeding Disorder Mother     Clotting disorder Mother     Depression Mother     Hyperlipidemia Mother     Irritable bowel syndrome Mother     Hypertension Mother     Obesity Mother     Osteoporosis Mother     Vaginal cancer Mother 30    Thyroid disease Mother     Stroke Mother     Diabetes Father     Arthritis Father     Hyperlipidemia Father     Vesicoureteral reflux Father     Heart disease Father     Hypertension Father     Migraines Father     Obesity Father     Arthritis Brother     Anxiety disorder Brother     Diabetes Brother     Hyperlipidemia Brother     Vesicoureteral reflux Brother     Heart disease Brother     Irritable bowel syndrome Brother     Hypertension Brother     Migraines Brother     Thyroid disease Brother     Pancreatic cancer Brother 55    Arthritis Maternal Aunt     Anxiety disorder Maternal Aunt     Depression Maternal Aunt     Diabetes Maternal Aunt     Vaginal cancer Maternal Aunt 50    No Known Problems Maternal Aunt     No Known Problems Maternal Aunt     No Known Problems Maternal Aunt     Fibroids Paternal Aunt     Diabetes Maternal Grandmother     Vaginal cancer Maternal Grandmother 50    Cancer Maternal Grandmother     No Known Problems Maternal Grandfather     Infertility Paternal Grandmother     No Known Problems Paternal Grandfather     Migraines Daughter     Lupus Daughter     Asthma Son     Migraines Son     Hypertension Family     Arthritis Family     Cancer Maternal Aunt        Social History     Social History     Social History     Tobacco Use   Smoking Status Never   Smokeless Tobacco Never       Past Surgical History:   Procedure Laterality Date    ABDOMINAL SURGERY  1986    several    BACK SURGERY  1990    CATARACT EXTRACTION, BILATERAL       CHOLECYSTECTOMY      COLONOSCOPY      FOOT FRACTURE SURGERY Bilateral 2004    x 5  surgeries    KIDNEY SURGERY  1974    at age 11 yo    KNEE SURGERY Right     LA ARTHRS KNE SURG W/MENISCECTOMY MED/LAT W/SHVG Right 08/03/2020    Procedure: KNEE ARTHROSCOPY,PARTIAL MEDIAL & LATERAL MENISECTOMIES;  Surgeon: Austen Mcguire MD;  [facility];  Service: Orthopedics    LA LAPAROSCOPY SURG CHOLECYSTECTOMY N/A 11/27/2020    Procedure: LAPAROSCOPIC CHOLECYSTECTOMY;  Surgeon: Justyn Cole MD;  [facility];  Service: General    TOTAL ABDOMINAL HYSTERECTOMY  1987    age 26    TOTAL ABDOMINAL HYSTERECTOMY W/ BILATERAL SALPINGOOPHORECTOMY Bilateral 1987    age 26    TUBAL LIGATION  [id number]    I had a tubal ligation after my third child was born then I had a tubal ligation reversal in 86 then I had a tubal ligation after baby number four was born and 85 only to have cancer and have to have everything removed.         The following portions of the patient's history were reviewed and updated as appropriate: allergies, current medications, past family history, past medical history, past social history, past surgical history and problem list    Please note :  Voice dictation software has been used to create this document.  There may be inadvertent transcription errors.    SONIA Gonzales

## 2024-10-04 LAB — BACTERIA UR CULT: NORMAL

## 2024-10-22 NOTE — PRE-PROCEDURE INSTRUCTIONS
Pre-Surgery Instructions:   Medication Instructions    DULoxetine (CYMBALTA) 60 mg delayed release capsule Take day of surgery.    esomeprazole (NexIUM) 40 MG capsule Take day of surgery.    gabapentin (Neurontin) 600 MG tablet Take day of surgery.    hydrOXYzine HCL (ATARAX) 25 mg tablet Take night before surgery    levothyroxine 50 mcg tablet Take day of surgery.    methenamine hippurate (HIPREX) 1 g tablet Hold day of surgery.    methocarbamol (ROBAXIN) 500 mg tablet Uses PRN- OK to take day of surgery    nystatin (MYCOSTATIN) powder Uses PRN- DO NOT take day of surgery    ondansetron (ZOFRAN) 4 mg tablet Uses PRN- OK to take day of surgery    oxybutynin (DITROPAN-XL) 5 mg 24 hr tablet Hold day of surgery.    phenazopyridine (PYRIDIUM) 100 mg tablet Uses PRN- OK to take day of surgery    SUMAtriptan (IMITREX) 50 mg tablet Uses PRN- OK to take day of surgery    traZODone (DESYREL) 150 mg tablet Take night before surgery   Medication instructions for day surgery reviewed. Please use only a sip of water to take your instructed medications. Avoid all over the counter vitamins, supplements and NSAIDS for one week prior to surgery per anesthesia guidelines. Tylenol is ok to take as needed.     You will receive a call one business day prior to surgery with an arrival time and hospital directions. If your surgery is scheduled on a Monday, the hospital will be calling you on the Friday prior to your surgery. If you have not heard from anyone by 8pm, please call the hospital supervisor through the hospital  at [phone]. ([place] [phone] or [place] [phone]).    Do not eat or drink anything after midnight the night before your surgery, including candy, mints, lifesavers, or chewing gum. Do not drink alcohol 24hrs before your surgery. Try not to smoke at least 24hrs before your surgery.       Follow the pre surgery showering instructions as listed in the “My Surgical Experience Booklet” or otherwise  provided by your surgeon's office. Do not use a blade to shave the surgical area 1 week before surgery. It is okay to use a clean electric clippers up to 24 hours before surgery. Do not apply any lotions, creams, including makeup, cologne, deodorant, or perfumes after showering on the day of your surgery. Do not use dry shampoo, hair spray, hair gel, or any type of hair products.     No contact lenses, eye make-up, or artificial eyelashes. Remove nail polish, including gel polish, and any artificial, gel, or acrylic nails if possible. Remove all jewelry including rings and body piercing jewelry.     Wear causal clothing that is easy to take on and off. Consider your type of surgery.    Keep any valuables, jewelry, piercings at home. Please bring any specially ordered equipment (sling, braces) if indicated.    Arrange for a responsible person to drive you to and from the hospital on the day of your surgery. Please confirm the visitor policy for the day of your procedure when you receive your phone call with an arrival time.     Call the surgeon's office with any new illnesses, exposures, or additional questions prior to surgery.    Please reference your “My Surgical Experience Booklet” for additional information to prepare for your upcoming surgery.

## 2024-10-23 ENCOUNTER — APPOINTMENT (OUTPATIENT)
Dept: LAB | Age: 64
End: 2024-10-23
Payer: MEDICARE

## 2024-10-23 DIAGNOSIS — Z01.818 ENCOUNTER FOR PREADMISSION TESTING: ICD-10-CM

## 2024-10-23 LAB
ANION GAP SERPL CALCULATED.3IONS-SCNC: 9 MMOL/L (ref 4–13)
ATRIAL RATE: 72 BPM
BASOPHILS # BLD AUTO: 0.08 THOUSANDS/ΜL (ref 0–0.1)
BASOPHILS NFR BLD AUTO: 1 % (ref 0–1)
BUN SERPL-MCNC: 14 MG/DL (ref 5–25)
CALCIUM SERPL-MCNC: 9.7 MG/DL (ref 8.4–10.2)
CHLORIDE SERPL-SCNC: 102 MMOL/L (ref 96–108)
CO2 SERPL-SCNC: 28 MMOL/L (ref 21–32)
CREAT SERPL-MCNC: 0.93 MG/DL (ref 0.6–1.3)
EOSINOPHIL # BLD AUTO: 0.33 THOUSAND/ΜL (ref 0–0.61)
EOSINOPHIL NFR BLD AUTO: 3 % (ref 0–6)
ERYTHROCYTE [DISTWIDTH] IN BLOOD BY AUTOMATED COUNT: 13.3 % (ref 11.6–15.1)
GFR SERPL CREATININE-BSD FRML MDRD: 64 ML/MIN/1.73SQ M
GLUCOSE SERPL-MCNC: 109 MG/DL (ref 65–140)
HCT VFR BLD AUTO: 40.2 % (ref 34.8–46.1)
HGB BLD-MCNC: 13.3 G/DL (ref 11.5–15.4)
IMM GRANULOCYTES # BLD AUTO: 0.04 THOUSAND/UL (ref 0–0.2)
IMM GRANULOCYTES NFR BLD AUTO: 0 % (ref 0–2)
LYMPHOCYTES # BLD AUTO: 3.32 THOUSANDS/ΜL (ref 0.6–4.47)
LYMPHOCYTES NFR BLD AUTO: 31 % (ref 14–44)
MCH RBC QN AUTO: 31.5 PG (ref 26.8–34.3)
MCHC RBC AUTO-ENTMCNC: 33.1 G/DL (ref 31.4–37.4)
MCV RBC AUTO: 95 FL (ref 82–98)
MONOCYTES # BLD AUTO: 0.74 THOUSAND/ΜL (ref 0.17–1.22)
MONOCYTES NFR BLD AUTO: 7 % (ref 4–12)
NEUTROPHILS # BLD AUTO: 6.32 THOUSANDS/ΜL (ref 1.85–7.62)
NEUTS SEG NFR BLD AUTO: 58 % (ref 43–75)
NRBC BLD AUTO-RTO: 0 /100 WBCS
P AXIS: 107 DEGREES
PLATELET # BLD AUTO: 219 THOUSANDS/UL (ref 149–390)
PMV BLD AUTO: 10 FL (ref 8.9–12.7)
POTASSIUM SERPL-SCNC: 4.3 MMOL/L (ref 3.5–5.3)
PR INTERVAL: 190 MS
QRS AXIS: -13 DEGREES
QRSD INTERVAL: 80 MS
QT INTERVAL: 388 MS
QTC INTERVAL: 424 MS
RBC # BLD AUTO: 4.22 MILLION/UL (ref 3.81–5.12)
SODIUM SERPL-SCNC: 139 MMOL/L (ref 135–147)
T WAVE AXIS: -39 DEGREES
VENTRICULAR RATE: 72 BPM
WBC # BLD AUTO: 10.83 THOUSAND/UL (ref 4.31–10.16)

## 2024-10-23 PROCEDURE — 36415 COLL VENOUS BLD VENIPUNCTURE: CPT

## 2024-10-23 PROCEDURE — 80048 BASIC METABOLIC PNL TOTAL CA: CPT

## 2024-10-23 PROCEDURE — 93005 ELECTROCARDIOGRAM TRACING: CPT

## 2024-10-23 PROCEDURE — 93010 ELECTROCARDIOGRAM REPORT: CPT | Performed by: INTERNAL MEDICINE

## 2024-10-23 PROCEDURE — 85025 COMPLETE CBC W/AUTO DIFF WBC: CPT

## 2024-10-25 LAB — BACTERIA UR CULT: NORMAL

## 2024-10-29 ENCOUNTER — ANESTHESIA EVENT (OUTPATIENT)
Dept: PERIOP | Facility: HOSPITAL | Age: 64
End: 2024-10-29
Payer: MEDICARE

## 2024-10-30 ENCOUNTER — TELEPHONE (OUTPATIENT)
Age: 64
End: 2024-10-30

## 2024-10-30 ENCOUNTER — ANESTHESIA (OUTPATIENT)
Dept: PERIOP | Facility: HOSPITAL | Age: 64
End: 2024-10-30
Payer: MEDICARE

## 2024-10-30 ENCOUNTER — HOSPITAL ENCOUNTER (OUTPATIENT)
Facility: HOSPITAL | Age: 64
Setting detail: OUTPATIENT SURGERY
Discharge: HOME | End: 2024-10-30
Attending: UROLOGY | Admitting: UROLOGY
Payer: MEDICARE

## 2024-10-30 VITALS
WEIGHT: 231.04 LBS | TEMPERATURE: 97.8 F | OXYGEN SATURATION: 97 % | BODY MASS INDEX: 36.26 KG/M2 | RESPIRATION RATE: 18 BRPM | HEART RATE: 78 BPM | HEIGHT: 67 IN | DIASTOLIC BLOOD PRESSURE: 80 MMHG | SYSTOLIC BLOOD PRESSURE: 142 MMHG

## 2024-10-30 DIAGNOSIS — N39.41 URGE INCONTINENCE: ICD-10-CM

## 2024-10-30 DIAGNOSIS — R39.89 BLADDER PAIN: Primary | ICD-10-CM

## 2024-10-30 DIAGNOSIS — N30.10 INTERSTITIAL CYSTITIS: ICD-10-CM

## 2024-10-30 RX ORDER — GLYCOPYRROLATE 0.2 MG/ML
INJECTION INTRAMUSCULAR; INTRAVENOUS AS NEEDED
Status: DISCONTINUED | OUTPATIENT
Start: 2024-10-30 | End: 2024-10-30

## 2024-10-30 RX ORDER — DEXAMETHASONE SODIUM PHOSPHATE 10 MG/ML
INJECTION, SOLUTION INTRAMUSCULAR; INTRAVENOUS AS NEEDED
Status: DISCONTINUED | OUTPATIENT
Start: 2024-10-30 | End: 2024-10-30

## 2024-10-30 RX ORDER — ONDANSETRON 2 MG/ML
INJECTION INTRAMUSCULAR; INTRAVENOUS AS NEEDED
Status: DISCONTINUED | OUTPATIENT
Start: 2024-10-30 | End: 2024-10-30

## 2024-10-30 RX ORDER — FENTANYL CITRATE/PF 50 MCG/ML
25 SYRINGE (ML) INJECTION
Status: DISCONTINUED | OUTPATIENT
Start: 2024-10-30 | End: 2024-10-30 | Stop reason: HOSPADM

## 2024-10-30 RX ORDER — LIDOCAINE HYDROCHLORIDE 20 MG/ML
INJECTION, SOLUTION EPIDURAL; INFILTRATION; INTRACAUDAL; PERINEURAL AS NEEDED
Status: DISCONTINUED | OUTPATIENT
Start: 2024-10-30 | End: 2024-10-30

## 2024-10-30 RX ORDER — PHENAZOPYRIDINE HYDROCHLORIDE 200 MG/1
200 TABLET, FILM COATED ORAL 3 TIMES DAILY PRN
Qty: 10 TABLET | Refills: 0 | Status: SHIPPED | OUTPATIENT
Start: 2024-10-30

## 2024-10-30 RX ORDER — HYDROCODONE BITARTRATE AND ACETAMINOPHEN 5; 325 MG/1; MG/1
1 TABLET ORAL EVERY 6 HOURS PRN
Status: DISCONTINUED | OUTPATIENT
Start: 2024-10-30 | End: 2024-10-30 | Stop reason: HOSPADM

## 2024-10-30 RX ORDER — PROPOFOL 10 MG/ML
INJECTION, EMULSION INTRAVENOUS CONTINUOUS PRN
Status: DISCONTINUED | OUTPATIENT
Start: 2024-10-30 | End: 2024-10-30

## 2024-10-30 RX ORDER — ONDANSETRON 2 MG/ML
4 INJECTION INTRAMUSCULAR; INTRAVENOUS ONCE AS NEEDED
Status: DISCONTINUED | OUTPATIENT
Start: 2024-10-30 | End: 2024-10-30 | Stop reason: HOSPADM

## 2024-10-30 RX ORDER — SODIUM CHLORIDE 9 MG/ML
125 INJECTION, SOLUTION INTRAVENOUS CONTINUOUS
Status: DISCONTINUED | OUTPATIENT
Start: 2024-10-30 | End: 2024-10-30 | Stop reason: HOSPADM

## 2024-10-30 RX ORDER — PHENAZOPYRIDINE HYDROCHLORIDE 100 MG/1
200 TABLET, FILM COATED ORAL ONCE
Status: DISCONTINUED | OUTPATIENT
Start: 2024-10-30 | End: 2024-10-30 | Stop reason: HOSPADM

## 2024-10-30 RX ORDER — MIDAZOLAM HYDROCHLORIDE 2 MG/2ML
INJECTION, SOLUTION INTRAMUSCULAR; INTRAVENOUS AS NEEDED
Status: DISCONTINUED | OUTPATIENT
Start: 2024-10-30 | End: 2024-10-30

## 2024-10-30 RX ORDER — FENTANYL CITRATE 50 UG/ML
INJECTION, SOLUTION INTRAMUSCULAR; INTRAVENOUS AS NEEDED
Status: DISCONTINUED | OUTPATIENT
Start: 2024-10-30 | End: 2024-10-30

## 2024-10-30 RX ORDER — CIPROFLOXACIN 2 MG/ML
400 INJECTION, SOLUTION INTRAVENOUS ONCE
Status: COMPLETED | OUTPATIENT
Start: 2024-10-30 | End: 2024-10-30

## 2024-10-30 RX ADMIN — SODIUM CHLORIDE 125 ML/HR: 0.9 INJECTION, SOLUTION INTRAVENOUS at 08:37

## 2024-10-30 RX ADMIN — SODIUM CHLORIDE 100 UNITS: 9 INJECTION, SOLUTION INTRAMUSCULAR; INTRAVENOUS; SUBCUTANEOUS at 09:29

## 2024-10-30 RX ADMIN — PROPOFOL 100 MCG/KG/MIN: 10 INJECTION, EMULSION INTRAVENOUS at 09:13

## 2024-10-30 RX ADMIN — GLYCOPYRROLATE 0.1 MG: 0.2 INJECTION, SOLUTION INTRAMUSCULAR; INTRAVENOUS at 09:11

## 2024-10-30 RX ADMIN — FENTANYL CITRATE 50 MCG: 50 INJECTION INTRAMUSCULAR; INTRAVENOUS at 09:11

## 2024-10-30 RX ADMIN — ONDANSETRON 4 MG: 2 INJECTION INTRAMUSCULAR; INTRAVENOUS at 09:11

## 2024-10-30 RX ADMIN — LIDOCAINE HYDROCHLORIDE 80 MG: 20 INJECTION, SOLUTION EPIDURAL; INFILTRATION; INTRACAUDAL at 09:13

## 2024-10-30 RX ADMIN — PROPOFOL 20 MG: 10 INJECTION, EMULSION INTRAVENOUS at 09:15

## 2024-10-30 RX ADMIN — DEXAMETHASONE SODIUM PHOSPHATE 10 MG: 10 INJECTION INTRAMUSCULAR; INTRAVENOUS at 09:13

## 2024-10-30 RX ADMIN — PROPOFOL 40 MG: 10 INJECTION, EMULSION INTRAVENOUS at 09:14

## 2024-10-30 RX ADMIN — CIPROFLOXACIN: 2 INJECTION, SOLUTION INTRAVENOUS at 09:05

## 2024-10-30 RX ADMIN — MIDAZOLAM 2 MG: 1 INJECTION INTRAMUSCULAR; INTRAVENOUS at 09:08

## 2024-10-30 NOTE — INTERVAL H&P NOTE
H&P reviewed. After examining the patient I find no changes in the patients condition since the H&P had been written.  Plan cystoscopy with injection of Botox 100 international units into the detrusor muscle.  The risks of bleeding infection botulism and urinary retention explained and she gives informed consent.    Vitals:    10/30/24 0823   BP: 125/61   Pulse: 71   Resp: 17   Temp: (!) 97.3 °F (36.3 °C)   SpO2: 94%

## 2024-10-30 NOTE — TELEPHONE ENCOUNTER
Pt called asking if there was an antibiotic prescribed as on her discharge summary towards the bottom it reads take prescribed antibiotic as prescribed I informed her there was not an antibiotic prescribed that is standard instructions on the summary.

## 2024-10-30 NOTE — ANESTHESIA POSTPROCEDURE EVALUATION
Post-Op Assessment Note    CV Status:  Stable  Pain Score: 0    Pain management: adequate       Mental Status:  Alert and awake   Hydration Status:  Euvolemic   PONV Controlled:  Controlled   Airway Patency:  Patent     Post Op Vitals Reviewed: Yes    No anethesia notable event occurred.    Staff: CRNA           Last Filed PACU Vitals:  Vitals Value Taken Time   Temp 97.4    Pulse 89    /68    Resp 16    SpO2 96% ra        Modified Robbin:  No data recorded

## 2024-10-30 NOTE — ANESTHESIA PREPROCEDURE EVALUATION
Procedure:  INJECTION BOTULINUM TOXIN (BOTOX) 100 IU, cysto(Rigid) (Urethra)    Relevant Problems   ANESTHESIA   (+) PONV (postoperative nausea and vomiting)      GI/HEPATIC   (+) GERD (gastroesophageal reflux disease)      /RENAL   (+) Stage 3a chronic kidney disease (HCC)      HEMATOLOGY   (+) 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HCC)      MUSCULOSKELETAL   (+) Chronic low back pain   (+) DDD (degenerative disc disease), cervical   (+) DDD (degenerative disc disease), lumbar   (+) Hip strain, initial encounter   (+) Lumbar spondylosis   (+) Primary osteoarthritis of left shoulder      NEURO/PSYCH   (+) Anxiety   (+) Bilateral occipital neuralgia   (+) Chronic low back pain   (+) Chronic pain syndrome   (+) Depression   (+) Moderate episode of recurrent major depressive disorder (HCC)        Physical Exam    Airway    Mallampati score: I  TM Distance: >3 FB  Neck ROM: full     Dental       Cardiovascular  Cardiovascular exam normal    Pulmonary  Pulmonary exam normal     Other Findings  post-pubertal.      Anesthesia Plan  ASA Score- 2     Anesthesia Type- general with ASA Monitors.         Additional Monitors:     Airway Plan: LMA.           Plan Factors-Exercise tolerance (METS): >4 METS.    Chart reviewed.   Existing labs reviewed. Patient summary reviewed.    Patient is not a current smoker.      Obstructive sleep apnea risk education given perioperatively.        Induction- intravenous.    Postoperative Plan- Plan for postoperative opioid use.     Perioperative Resuscitation Plan - Level 1 - Full Code.       Informed Consent- Anesthetic plan and risks discussed with patient.

## 2024-10-30 NOTE — DISCHARGE INSTR - AVS FIRST PAGE
You have just undergone cystoscopy of the bladder with injection of 100 international units of Botox.  Expect to see some blood in the urine, which should clear up within 24 to 48 hours.  You also may experience burning urgency and frequency of urination which is normal.  Call for fever greater than 101.5 degrees, severe pain not relieved by over-the-counter medicines, or inability to urinate.  You may resume all normal activities.  Take the Pyridium for irritative symptoms.  Remember that you may not see the beneficial effects of the Botox for at least 2 weeks.

## 2024-10-30 NOTE — OP NOTE
OPERATIVE REPORT  PATIENT NAME: Karmen Luna    :  1960  MRN: [id number]  Pt Location: [facility]    SURGERY DATE: 10/30/2024    Surgeons and Role:     * Anil Sabillon MD - Primary    Preop Diagnosis:  Urge incontinence [N39.41]  Interstitial cystitis [N30.10]  Bladder pain [R39.89]    Post-Op Diagnosis Codes:     * Urge incontinence [N39.41]     * Interstitial cystitis [N30.10]     * Bladder pain [R39.89]    Procedure(s):  INJECTION BOTULINUM TOXIN (BOTOX) 100 IU. cysto(Rigid)    Specimen(s):  * No specimens in log *    Estimated Blood Loss:   Minimal    Drains:  * No LDAs found *    Anesthesia Type:   IV Sedation with Anesthesia    Operative Indications:  Urge incontinence [N39.41]  Interstitial cystitis [N30.10]  Bladder pain [R39.89]      Operative Findings:  Normal bladder, mild cystocele.  100 international units injected into the back wall the bladder per routine in a grid fashion.      Complications:   None    Procedure and Technique:      Karmen Luna is scheduled for Botox 100 international units injected intravesically for nocturia 4 times a night, urinary frequency and bladder pain with spasms causing referring pain to her upper thighs.. The risks of bleeding, infection, damage to the urinary tract and adjacent organs, and Botulism were discussed with the patient and informed consent was given.      The patient was brought to the operating room and identified. After IV sedation anesthesia was induced, the patient was prepared and draped in the dorsolithotomy position in the usual fashion, with standard care taken to protect pressure points. A time out was performed.     Cystoscopy was carried out with a 22 [place] cystoscopy sheath and 30 degree lens.The uretrhra was normal without stricture.  The bladder was smooth, nontrabeculated, and there were no stones, tumors or other lesion. The Merline Needle was used to inject 1cc aliquots for 30 aliquots covering the posterior wall of the bladder an bas  fond, for a total of 100 international units. The Bladder was then drained, the patient awakened, and transferred to the PACU in good condition.                                                                                            I was present for the entire procedure. and A qualified resident physician was not available.    Patient Disposition:  PACU  and hemodynamically stable             SIGNATURE: Anil Sabillon MD  DATE: October 30, 2024  TIME: 9:29 AM

## 2024-10-30 NOTE — ANESTHESIA POSTPROCEDURE EVALUATION
Post-Op Assessment Note    CV Status:  Stable  Pain Score: 1    Pain management: adequate       Mental Status:  Alert and awake   Hydration Status:  Euvolemic   PONV Controlled:  Controlled   Airway Patency:  Patent     Post Op Vitals Reviewed: Yes    No anethesia notable event occurred.    Staff: Anesthesiologist           Last Filed PACU Vitals:  Vitals Value Taken Time   Temp 97.4 °F (36.3 °C) 10/30/24 0938   Pulse 80 10/30/24 0954   /68 10/30/24 0953   Resp 13 10/30/24 0954   SpO2 92 % 10/30/24 0954   Vitals shown include unfiled device data.    Modified Robbin:  Activity: 2 (10/30/2024  9:53 AM)  Respiration: 2 (10/30/2024  9:53 AM)  Circulation: 2 (10/30/2024  9:53 AM)  Consciousness: 2 (10/30/2024  9:53 AM)  Oxygen Saturation: 2 (10/30/2024  9:53 AM)  Modified Robbin Score: 10 (10/30/2024  9:53 AM)

## 2024-10-31 ENCOUNTER — TELEPHONE (OUTPATIENT)
Dept: UROLOGY | Facility: CLINIC | Age: 64
End: 2024-10-31

## 2024-10-31 NOTE — TELEPHONE ENCOUNTER
Post Op Note    Karmen Luna is a 63 y.o. female s/p  INJECTION BOTULINUM TOXIN (BOTOX) 100 IU, cysto(Rigid) (Urethra)  performed 10/30/2024 by Dr. Sabillon.     How would you rate your pain on a scale from 1 to 10, 10 being the worst pain ever? 0    Have you had a fever? No    Have your bowel movements been regular? No- patient has not yet had a bowel movement- she was advised on prn Colace or Miralax     Do you have any difficulty urinating? No    Do you have any other questions or concerns that I can address at this time?     Patient denies issues at this time and states she is feeling well. She confirmed her follow up visit and will contact the office in the meantime with any concerns.

## 2024-11-02 DIAGNOSIS — N30.10 INTERSTITIAL CYSTITIS: ICD-10-CM

## 2024-11-02 DIAGNOSIS — R39.89 BLADDER PAIN: ICD-10-CM

## 2024-11-04 RX ORDER — HYDROXYZINE HYDROCHLORIDE 25 MG/1
25 TABLET, FILM COATED ORAL
Qty: 90 TABLET | Refills: 1 | Status: SHIPPED | OUTPATIENT
Start: 2024-11-04

## 2024-11-10 DIAGNOSIS — E03.9 HYPOTHYROIDISM, UNSPECIFIED TYPE: ICD-10-CM

## 2024-11-11 DIAGNOSIS — R51.9 NONINTRACTABLE EPISODIC HEADACHE, UNSPECIFIED HEADACHE TYPE: ICD-10-CM

## 2024-11-11 RX ORDER — LEVOTHYROXINE SODIUM 50 UG/1
50 TABLET ORAL DAILY
Qty: 90 TABLET | Refills: 1 | Status: SHIPPED | OUTPATIENT
Start: 2024-11-11

## 2024-11-11 RX ORDER — SUMATRIPTAN 50 MG/1
TABLET, FILM COATED ORAL
Qty: 30 TABLET | Refills: 2 | Status: SHIPPED | OUTPATIENT
Start: 2024-11-11

## 2024-11-21 ENCOUNTER — TELEPHONE (OUTPATIENT)
Age: 64
End: 2024-11-21

## 2024-11-21 NOTE — TELEPHONE ENCOUNTER
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

## 2024-11-22 NOTE — TELEPHONE ENCOUNTER
Caller: Patient     Doctor: Destiny     Reason for call: Patient looking to be accommodated for an injection on her foot she states Tuesday's and Thursday's work best for her to schedule she cannot schedule on a Monday or a Thursday she states pain 1-10 is a 10 please contact patient when scheduling please or a detailed voicemail     Please advise     Call back#: [phone]

## 2024-11-26 ENCOUNTER — TELEPHONE (OUTPATIENT)
Dept: UROLOGY | Facility: MEDICAL CENTER | Age: 64
End: 2024-11-26

## 2024-11-26 NOTE — TELEPHONE ENCOUNTER
This patient called to reschedule her appt today with Dr. Nobles in [place] which was a Botox follow up.      The patient has never seen Dr. Nobles and would prefer to see Dr. Sabillon again since she has seen him in the past and he did her Botox surgery.      The patient is asking if she could possibly do a virtual visit with Dr. 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    Please call patient back at [phone].      Thank you for your help.

## 2024-11-26 NOTE — TELEPHONE ENCOUNTER
The patient would like to have her virtual visit with Dr. Sabillon 1/21/25 at 1:15.  It will be a phone call visit at [phone].    I was unable to schedule in that slot which is an urgent slot.  I did not have access to schedule there.    Would you please be able to schedule the appt for this patient.      Thank you so much.

## 2024-11-26 NOTE — TELEPHONE ENCOUNTER
Patient called the RX Refill Line. Message is being forwarded to the office.     Patient asking to rescheduled today's appointment.  Advised I would have someone contact patient back to do so.     Please contact patient at [phone]

## 2024-11-27 ENCOUNTER — APPOINTMENT (EMERGENCY)
Dept: RADIOLOGY | Facility: HOSPITAL | Age: 64
End: 2024-11-27
Payer: MEDICARE

## 2024-11-27 ENCOUNTER — HOSPITAL ENCOUNTER (EMERGENCY)
Facility: HOSPITAL | Age: 64
Discharge: HOME | End: 2024-11-27
Attending: EMERGENCY MEDICINE
Payer: MEDICARE

## 2024-11-27 ENCOUNTER — TELEPHONE (OUTPATIENT)
Dept: FAMILY MEDICINE CLINIC | Facility: CLINIC | Age: 64
End: 2024-11-27

## 2024-11-27 VITALS
OXYGEN SATURATION: 96 % | TEMPERATURE: 97.2 F | BODY MASS INDEX: 37.29 KG/M2 | RESPIRATION RATE: 16 BRPM | SYSTOLIC BLOOD PRESSURE: 104 MMHG | HEART RATE: 85 BPM | DIASTOLIC BLOOD PRESSURE: 56 MMHG | HEIGHT: 66 IN

## 2024-11-27 DIAGNOSIS — S06.0X0A CONCUSSION WITHOUT LOSS OF CONSCIOUSNESS, INITIAL ENCOUNTER: Primary | ICD-10-CM

## 2024-11-27 LAB
ATRIAL RATE: 83 BPM
P AXIS: 57 DEGREES
PR INTERVAL: 156 MS
QRS AXIS: -3 DEGREES
QRSD INTERVAL: 84 MS
QT INTERVAL: 338 MS
QTC INTERVAL: 398 MS
T WAVE AXIS: -14 DEGREES
VENTRICULAR RATE: 83 BPM

## 2024-11-27 PROCEDURE — 93010 ELECTROCARDIOGRAM REPORT: CPT | Performed by: INTERNAL MEDICINE

## 2024-11-27 PROCEDURE — 93005 ELECTROCARDIOGRAM TRACING: CPT

## 2024-11-27 PROCEDURE — 96374 THER/PROPH/DIAG INJ IV PUSH: CPT

## 2024-11-27 PROCEDURE — 70450 CT HEAD/BRAIN W/O DYE: CPT

## 2024-11-27 PROCEDURE — 99284 EMERGENCY DEPT VISIT MOD MDM: CPT

## 2024-11-27 PROCEDURE — 72125 CT NECK SPINE W/O DYE: CPT

## 2024-11-27 PROCEDURE — 99285 EMERGENCY DEPT VISIT HI MDM: CPT | Performed by: EMERGENCY MEDICINE

## 2024-11-27 RX ORDER — ACETAMINOPHEN 325 MG/1
975 TABLET ORAL ONCE
Status: COMPLETED | OUTPATIENT
Start: 2024-11-27 | End: 2024-11-27

## 2024-11-27 RX ORDER — ONDANSETRON 4 MG/1
4 TABLET, FILM COATED ORAL EVERY 6 HOURS
Qty: 12 TABLET | Refills: 0 | Status: SHIPPED | OUTPATIENT
Start: 2024-11-27

## 2024-11-27 RX ORDER — METOCLOPRAMIDE HYDROCHLORIDE 5 MG/ML
10 INJECTION INTRAMUSCULAR; INTRAVENOUS ONCE
Status: COMPLETED | OUTPATIENT
Start: 2024-11-27 | End: 2024-11-27

## 2024-11-27 RX ADMIN — ACETAMINOPHEN 975 MG: 325 TABLET, FILM COATED ORAL at 13:49

## 2024-11-27 RX ADMIN — METOCLOPRAMIDE 10 MG: 5 INJECTION, SOLUTION INTRAMUSCULAR; INTRAVENOUS at 13:50

## 2024-11-27 NOTE — TELEPHONE ENCOUNTER
Patient called requesting for an appointment. 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 Patient was given advised to go to ER for further evaluation

## 2024-11-27 NOTE — ED PROVIDER NOTES
Time reflects when diagnosis was documented in both MDM as applicable and the Disposition within this note       Time User Action Codes Description Comment    11/27/2024  4:08 PM Rex Alegre Add [S06.0X0A] Concussion without loss of consciousness, initial encounter           ED Disposition       ED Disposition   Discharge    Condition   Stable    Date/Time   Wed Nov 27, 2024  4:16 PM    Comment   Karmen NAZARIO [facility] discharge to home/self care.                   Assessment & Plan       Medical Decision Making  63-year-old female with a history of hypertension, CKD, GERD, and DDD who presents for evaluation after head injury that occurred 2 days ago.  Vitals are within the normal limits.  On exam the patient has upper cervical spine tenderness.  The patient has no external signs of trauma and has a normal neurologic exam.  The patient's symptoms are consistent with a concussion.  Will order CT head to rule out intracranial hemorrhage and CT C-spine to rule out fracture and traumatic malalignment.  Will treat the patient's symptoms with Reglan and Tylenol.    CT head shows no acute intracranial abnormalities.  CT C-spine shows chronic anterior listhesis of C3 on C4, but no acute traumatic injuries.  Referral to the concussion clinic is ordered for follow-up.  The patient is instructed to continue symptomatic management.  Return precautions are given and the patient is discharged.    Amount and/or Complexity of Data Reviewed  Radiology: ordered.    Risk  OTC drugs.  Prescription drug management.             Medications   metoclopramide (REGLAN) injection 10 mg (10 mg Intravenous Given 11/27/24 1350)   acetaminophen (TYLENOL) tablet 975 mg (975 mg Oral Given 11/27/24 1349)       ED Risk Strat Scores                           SBIRT 22yo+      Flowsheet Row Most Recent Value   Initial Alcohol Screen: US AUDIT-C     1. How often do you have a drink containing alcohol? 0 Filed at: 11/27/2024 0794   2. How many drinks  containing alcohol do you have on a typical day you are drinking?  0 Filed at: 11/27/2024 1309   3b. FEMALE Any Age, or MALE 65+: How often do you have 4 or more drinks on one occassion? 0 Filed at: 11/27/2024 1309   Audit-C Score 0 Filed at: 11/27/2024 1309   JENNIFER: How many times in the past year have you...    Used an illegal drug or used a prescription medication for non-medical reasons? Never Filed at: 11/27/2024 1309                            History of Present Illness       Chief Complaint   Patient presents with    Head Injury     Per pt she was helping someone get into a car when she banged her head on the door, she feel nauseous, headache,neck pain, dizziness, and sob . +HS, -thinners, -ASA, -LOC         Past Medical History:   Diagnosis Date    Anemia     When giving blood    Anxiety     Arthritis     Last assessed 5/3/2011    Ortega's esophagus     Cancer (HCC)     ovarian cancer at age 25 yo- REMOVAL  B/L    Colon polyp     DDD (degenerative disc disease), lumbar     Depression     Diverticulitis of colon     After having a colonoscopy    Fall     5/2020 right knee meniscus tear- OR repair today 8/3/2020    Fibromyalgia     Fibromyalgia, primary     Fracture of one or more phalanges of foot     GERD (gastroesophageal reflux disease)     H/O bladder infections     chronic    Headache(784.0)     Always    Hypertension     Inflammatory bowel disease 1980    Kidney infection     occasional    Migraines     Obesity     Ovarian cancer (HCC) 1987    age 26    Polyneuropathy     Last assessed 6/23/2016 knees to feet    PONV (postoperative nausea and vomiting)     Patient requests to be pre-medicated prior to surgery prior to surgery    PONV (postoperative nausea and vomiting) 08/03/2020    Renal disorder     Spinal stenosis     Urinary tract infection     Vertigo     Wears glasses     reading      Past Surgical History:   Procedure Laterality Date    ABDOMINAL SURGERY  1986    several    BACK SURGERY  1990     BOTOX INJECTION N/A 10/30/2024    Procedure: INJECTION BOTULINUM TOXIN (BOTOX) 100 IU, cysto(Rigid);  Surgeon: Anil Sabillon MD;  Location: [facility];  Service: Urology    CATARACT EXTRACTION, BILATERAL      CHOLECYSTECTOMY      COLONOSCOPY      FOOT FRACTURE SURGERY Bilateral 2004    x 5  surgeries    KIDNEY SURGERY  1974    at age 13 yo    KNEE SURGERY Right     TX ARTHRS KNE SURG W/MENISCECTOMY MED/LAT W/SHVG Right 08/03/2020    Procedure: KNEE ARTHROSCOPY,PARTIAL MEDIAL & LATERAL MENISECTOMIES;  Surgeon: Austen Mcguire MD;  Location: [facility];  Service: Orthopedics    TX LAPAROSCOPY SURG CHOLECYSTECTOMY N/A 11/27/2020    Procedure: LAPAROSCOPIC CHOLECYSTECTOMY;  Surgeon: Justyn Cole MD;  [facility];  Service: General    TOTAL ABDOMINAL HYSTERECTOMY  1987    age 26    TOTAL ABDOMINAL HYSTERECTOMY W/ BILATERAL SALPINGOOPHORECTOMY Bilateral 1987    age 26    TUBAL LIGATION  [id number]    I had a tubal ligation after my third child was born then I had a tubal ligation reversal in 86 then I had a tubal ligation after baby number four was born and 85 only to have cancer and have to have everything removed.      Family History   Problem Relation Age of Onset    Heart disease Mother     Cancer Mother     Diabetes Mother     Arthritis Mother     Anxiety disorder Mother     Bleeding Disorder Mother     Clotting disorder Mother     Depression Mother     Hyperlipidemia Mother     Irritable bowel syndrome Mother     Hypertension Mother     Obesity Mother     Osteoporosis Mother     Vaginal cancer Mother 30    Thyroid disease Mother     Stroke Mother     Diabetes Father     Arthritis Father     Hyperlipidemia Father     Vesicoureteral reflux Father     Heart disease Father     Hypertension Father     Migraines Father     Obesity Father     Arthritis Brother     Anxiety disorder Brother     Diabetes Brother     Hyperlipidemia Brother     Vesicoureteral reflux Brother     Heart disease Brother     Irritable bowel  syndrome Brother     Hypertension Brother     Migraines Brother     Thyroid disease Brother     Pancreatic cancer Brother 55    Arthritis Maternal Aunt     Anxiety disorder Maternal Aunt     Depression Maternal Aunt     Diabetes Maternal Aunt     Vaginal cancer Maternal Aunt 50    No Known Problems Maternal Aunt     No Known Problems Maternal Aunt     No Known Problems Maternal Aunt     Fibroids Paternal Aunt     Diabetes Maternal Grandmother     Vaginal cancer Maternal Grandmother 50    Cancer Maternal Grandmother     No Known Problems Maternal Grandfather     Infertility Paternal Grandmother     No Known Problems Paternal Grandfather     Migraines Daughter     Lupus Daughter     Asthma Son     Migraines Son     Hypertension Family     Arthritis Family     Cancer Maternal Aunt       Social History     Tobacco Use    Smoking status: Never    Smokeless tobacco: Never   Vaping Use    Vaping status: Never Used   Substance Use Topics    Alcohol use: Not Currently    Drug use: No      E-Cigarette/Vaping    E-Cigarette Use Never User       E-Cigarette/Vaping Substances    Nicotine No     THC No     CBD No     Flavoring No     Other No     Unknown No       I have reviewed and agree with the history as documented.     63-year-old female with a history of hypertension, CKD, GERD, and DDD who presents for evaluation after head injury that occurred 2 days ago.  The patient reports that she was assisting someone into a car when she stood up and struck her head on the roof of the car.  The patient did not lose consciousness.  The patient does not take anticoagulants or antiplatelets.  The patient reports that she has been experiencing a persistent headache since the injury.  Patient additionally reports neck pain.  The patient states that she has felt foggy since the incident and has experienced intermittent nausea but has not vomited.  The patient has been able to ambulate without difficulty and has been able to continue with  her typical activities including work.  The patient denies fever, chills, chest pain, shortness of breath, abdominal pain, diarrhea, numbness or weakness in her extremities.        Review of Systems   Constitutional:  Positive for fatigue. Negative for chills, diaphoresis and fever.   HENT:  Negative for congestion and sore throat.    Eyes:  Negative for pain and redness.   Respiratory:  Negative for cough and shortness of breath.    Cardiovascular:  Negative for chest pain, palpitations and leg swelling.   Gastrointestinal:  Positive for nausea. Negative for abdominal pain, diarrhea and vomiting.   Genitourinary:  Negative for dysuria, flank pain and hematuria.   Musculoskeletal:  Negative for arthralgias and myalgias.   Skin:  Negative for color change, pallor and rash.   Neurological:  Positive for headaches. Negative for dizziness, seizures, syncope, facial asymmetry, speech difficulty, weakness, light-headedness and numbness.   All other systems reviewed and are negative.          Objective       ED Triage Vitals [11/27/24 1307]   Temperature Pulse Blood Pressure Respirations SpO2 Patient Position - Orthostatic VS   (!) 97.2 °F (36.2 °C) 85 104/56 16 96 % Sitting      Temp Source Heart Rate Source BP Location FiO2 (%) Pain Score    Temporal Monitor Left arm -- 9      Vitals      Date and Time Temp Pulse SpO2 Resp BP Pain Score FACES Pain Rating User   11/27/24 1353 -- -- -- -- -- 10 - Worst Possible Pain -- EM   11/27/24 1349 -- -- -- -- -- 10 - Worst Possible Pain -- EM   11/27/24 1307 97.2 °F (36.2 °C) 85 96 % 16 104/56 9 -- AM            Physical Exam  Vitals and nursing note reviewed.   Constitutional:       General: She is not in acute distress.     Appearance: Normal appearance. She is not ill-appearing, toxic-appearing or diaphoretic.   HENT:      Head: Normocephalic and atraumatic.      Nose: Nose normal. No congestion or rhinorrhea.      Mouth/Throat:      Mouth: Mucous membranes are moist.       Pharynx: Oropharynx is clear.   Eyes:      General: No scleral icterus.     Extraocular Movements: Extraocular movements intact.      Conjunctiva/sclera: Conjunctivae normal.      Pupils: Pupils are equal, round, and reactive to light.   Neck:      Comments: Tenderness of the upper cervical spine  Cardiovascular:      Rate and Rhythm: Normal rate and regular rhythm.      Pulses: Normal pulses.      Heart sounds: Normal heart sounds. No murmur heard.     No friction rub. No gallop.   Pulmonary:      Effort: Pulmonary effort is normal.      Breath sounds: Normal breath sounds. No wheezing, rhonchi or rales.   Abdominal:      General: Abdomen is flat.      Palpations: Abdomen is soft.      Tenderness: There is no abdominal tenderness. There is no right CVA tenderness, left CVA tenderness, guarding or rebound.   Musculoskeletal:         General: No swelling, deformity or signs of injury. Normal range of motion.      Cervical back: Normal range of motion and neck supple. Tenderness present. No rigidity.      Right lower leg: No edema.      Left lower leg: No edema.   Lymphadenopathy:      Cervical: No cervical adenopathy.   Skin:     General: Skin is warm and dry.      Capillary Refill: Capillary refill takes less than 2 seconds.      Coloration: Skin is not jaundiced or pale.      Findings: No bruising, erythema, lesion or rash.   Neurological:      General: No focal deficit present.      Mental Status: She is alert and oriented to person, place, and time.      Cranial Nerves: No cranial nerve deficit.      Sensory: No sensory deficit.      Motor: No weakness.      Coordination: Coordination normal.      Gait: Gait normal.         Results Reviewed       None            CT head without contrast   Final Interpretation by Gt Soto MD (11/27 8926)      No acute intracranial hemorrhage or depressed calvarial fracture identified.                  Resident: Benny Garcia I, the attending radiologist, have reviewed the  images and agree with the final report above.      Workstation performed: [id number]         CT cervical spine without contrast   Final Interpretation by Gt Soto MD (11/27 1612)      No interval change. No acute fracture or evidence for traumatic malalignment.            Resident: Benny Garcia I, the attending radiologist, have reviewed the images and agree with the final report above.      Workstation performed: [id number]             Procedures    ED Medication and Procedure Management   Prior to Admission Medications   Prescriptions Last Dose Informant Patient Reported? Taking?   DULoxetine (CYMBALTA) 60 mg delayed release capsule  Self No No   Sig: Take 1 capsule (60 mg total) by mouth in the morning   SUMAtriptan (IMITREX) 50 mg tablet   No No   Sig: TAKE 1 TABLET BY MOUTH ONCE AS NEEDED FOR  MIGRAINE  MAY  REPEAT  IN  2  HOURS.  NO  MORE  THAN  200  MG  PER  DAY   esomeprazole (NexIUM) 40 MG capsule  Self Yes No   Sig: Take 1 capsule by mouth in the morning   gabapentin (Neurontin) 600 MG tablet  Self No No   Sig: Take 1 tablet (600 mg total) by mouth 3 (three) times a day   hydrOXYzine HCL (ATARAX) 25 mg tablet   No No   Sig: TAKE 1 TABLET BY MOUTH ONCE DAILY AT BEDTIME   levothyroxine 50 mcg tablet   No No   Sig: Take 1 tablet by mouth once daily   methenamine hippurate (HIPREX) 1 g tablet   No No   Sig: TAKE 1 TABLET BY MOUTH TWICE DAILY WITH MEALS   methocarbamol (ROBAXIN) 500 mg tablet  Self No No   Sig: TAKE 1 TABLET BY MOUTH EVERY 8 HOURS AS NEEDED FOR MUSCLE SPASM   nystatin (MYCOSTATIN) powder  Self No No   Sig: Twice a day as needed.   ondansetron (ZOFRAN) 4 mg tablet  Self No No   Sig: Take 1 tablet (4 mg total) by mouth every 8 (eight) hours as needed for nausea or vomiting   oxybutynin (DITROPAN-XL) 5 mg 24 hr tablet  Self No No   Sig: Take 1 tablet (5 mg total) by mouth 2 (two) times a day   phenazopyridine (PYRIDIUM) 100 mg tablet  Self No No   Sig: Take 1 tablet (100 mg total) by  mouth 3 (three) times a day as needed for bladder spasms (painful urination)   phenazopyridine (PYRIDIUM) 200 mg tablet   No No   Sig: Take 1 tablet (200 mg total) by mouth 3 (three) times a day as needed for bladder spasms   traZODone (DESYREL) 150 mg tablet  Self No No   Sig: Take 1 tablet (150 mg total) by mouth daily at bedtime      Facility-Administered Medications Last Administration Doses Remaining   lidocaine (XYLOCAINE) 1 % injection 1 mL 2/26/2024 10:30 AM    lidocaine (XYLOCAINE) 1 % injection 1 mL 6/13/2024  2:45 PM    lidocaine (XYLOCAINE) 1 % injection 1 mL 9/5/2024  4:15 PM    ropivacaine (NAROPIN) 0.5 % injection 10 mL 5/31/2023 10:45 AM    triamcinolone acetonide (KENALOG-40) 40 mg/mL injection 20 mg 5/31/2023 10:45 AM    triamcinolone acetonide (KENALOG-40) 40 mg/mL injection 20 mg 2/26/2024 10:30 AM    triamcinolone acetonide (KENALOG-40) 40 mg/mL injection 20 mg 6/13/2024  2:45 PM    triamcinolone acetonide (KENALOG-40) 40 mg/mL injection 20 mg 9/5/2024  4:15 PM         Discharge Medication List as of 11/27/2024  4:16 PM        START taking these medications    Details   !! ondansetron (ZOFRAN) 4 mg tablet Take 1 tablet (4 mg total) by mouth every 6 (six) hours, Starting Wed 11/27/2024, Normal       !! - Potential duplicate medications found. Please discuss with provider.        CONTINUE these medications which have NOT CHANGED    Details   DULoxetine (CYMBALTA) 60 mg delayed release capsule Take 1 capsule (60 mg total) by mouth in the morning, Starting Mon 5/20/2024, Normal      esomeprazole (NexIUM) 40 MG capsule Take 1 capsule by mouth in the morning, Starting Sat 9/2/2023, Historical Med      gabapentin (Neurontin) 600 MG tablet Take 1 tablet (600 mg total) by mouth 3 (three) times a day, Starting Mon 4/22/2024, Until Wed 10/30/2024, Normal      hydrOXYzine HCL (ATARAX) 25 mg tablet TAKE 1 TABLET BY MOUTH ONCE DAILY AT BEDTIME, Starting Mon 11/4/2024, Normal      levothyroxine 50 mcg tablet  Take 1 tablet by mouth once daily, Starting Mon 11/11/2024, Normal      methenamine hippurate (HIPREX) 1 g tablet TAKE 1 TABLET BY MOUTH TWICE DAILY WITH MEALS, Normal      methocarbamol (ROBAXIN) 500 mg tablet TAKE 1 TABLET BY MOUTH EVERY 8 HOURS AS NEEDED FOR MUSCLE SPASM, Normal      nystatin (MYCOSTATIN) powder Twice a day as needed., Normal      !! ondansetron (ZOFRAN) 4 mg tablet Take 1 tablet (4 mg total) by mouth every 8 (eight) hours as needed for nausea or vomiting, Starting Wed 6/21/2023, Normal      oxybutynin (DITROPAN-XL) 5 mg 24 hr tablet Take 1 tablet (5 mg total) by mouth 2 (two) times a day, Starting u 1/11/2024, Normal      !! phenazopyridine (PYRIDIUM) 100 mg tablet Take 1 tablet (100 mg total) by mouth 3 (three) times a day as needed for bladder spasms (painful urination), Starting Mon 10/17/2022, Normal      !! phenazopyridine (PYRIDIUM) 200 mg tablet Take 1 tablet (200 mg total) by mouth 3 (three) times a day as needed for bladder spasms, Starting Wed 10/30/2024, Normal      SUMAtriptan (IMITREX) 50 mg tablet TAKE 1 TABLET BY MOUTH ONCE AS NEEDED FOR  MIGRAINE  MAY  REPEAT  IN  2  HOURS.  NO  MORE  THAN  200  MG  PER  DAY, Normal      traZODone (DESYREL) 150 mg tablet Take 1 tablet (150 mg total) by mouth daily at bedtime, Starting Mon 5/20/2024, Normal       !! - Potential duplicate medications found. Please discuss with provider.          ED SEPSIS DOCUMENTATION   Time reflects when diagnosis was documented in both MDM as applicable and the Disposition within this note       Time User Action Codes Description Comment    11/27/2024  4:08 PM Rex Alegre Add [S06.0X0A] Concussion without loss of consciousness, initial encounter                  Rex Alegre DO  11/27/24 1569

## 2024-11-27 NOTE — DISCHARGE INSTRUCTIONS
You were seen in the emergency department the patient for symptoms consistent with a concussion after a head injury.  Your CT scans did not show any concerning findings.  A referral to the concussion clinic was ordered for follow-up.  Take medications such as Tylenol and ibuprofen for headaches.  If you have worsening symptoms or any new concerning symptoms please return to the emergency department.

## 2024-11-27 NOTE — ED ATTENDING ATTESTATION
11/27/2024  I, Harlan Palacios DO, saw and evaluated the patient. I have discussed the patient with the resident/non-physician practitioner and agree with the resident's/non-physician practitioner's findings, Plan of Care, and MDM as documented in the resident's/non-physician practitioner's note, except where noted. All available labs and Radiology studies were reviewed.  I was present for key portions of any procedure(s) performed by the resident/non-physician practitioner and I was immediately available to provide assistance.       At this point I agree with the current assessment done in the Emergency Department.  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  She did not pass out, since then has been having a dull headache, feeling nauseous, slightly harder time concentrating, feeling more emotionally labile.  Her  who accompanies her has not seen any slurred speech, facial droop or focal weakness.  She says that she does not take any blood thinning medications including aspirin even on an as-needed basis.  She called her primary care physician today regarding her symptoms and was advised to go to the ED.    General:  Patient is well-appearing  Head:  Atraumatic  Eyes:  Conjunctiva pink, Extraocular muscle intact, PERRL  ENT:  Mucous membranes are moist  Neck:  Supple, slight tenderness to the upper cervical spine, no step-off or deformity.  Cardiac:  S1-S2, without murmurs  Lungs:  Clear to auscultation bilaterally  Abdomen:  Soft, nontender, normal bowel sounds, no CVA tenderness, no tympany, no rigidity, no guarding  Extremities:  Normal range of motion  Neurologic:  Awake, fluent speech,  normal comprehension. AAOx3. Cranial nerves 2-12 are intact, strength is 5/5 in the bilateral upper & lower extremities, no slurred speech, no facial droop, no deficit on finger-to-nose testing, no pronator drift.  Sensation to light touch is equal and symmetric throughout the whole body  Skin:  Pink warm and dry, no rash  Psychiatric:  Alert, pleasant, cooperative          ED Course  ED Course as of 11/27/24 1656   Wed Nov 27, 2024   1455 ECG to me, sinus rhythm rate of 73, no acute ischemic infarctive changes, no acute change from October 23, 2024     CT head without contrast   Final Result      No acute intracranial hemorrhage or depressed calvarial fracture identified.                  Resident: Benny Garcia I, the attending radiologist, have reviewed the images and agree with the final report above.      Workstation performed: [id number]         CT cervical spine without contrast   Final Result      No interval change. No acute fracture or evidence for traumatic malalignment.            Resident: Benny Garcia I, the attending radiologist, have reviewed the images and agree with the final report above.      Workstation performed: [id number]           I suspect the patient has a mild concussion given her symptoms.  There is no sign of life-threatening intracranial hemorrhage, or subdural.  No sign of bony fracture, no focal neurodeficits, do not believe a cervical spine MRI is indicated.Supportive care, importance of follow-up and return precautions were discussed with patient and , who expressed understanding.    DIAGNOSIS:  Acute closed head injury, acute concussion    MEDICAL DECISION MAKING CODING    COLLECTION AND INTERPRETATION OF DATA  I reviewed prior external notes, including Adrienne 3, 2024 family medicine office visit, October 23, 2024 metabolic panel showing normal renal function    I ordered each unique test  Tests reviewed personally by me:  ECG: See my ED course  Imaging: I  independently interpreted the head CT and found no acute hemorrhage.            Critical Care Time  Procedures

## 2024-11-29 ENCOUNTER — VBI (OUTPATIENT)
Dept: FAMILY MEDICINE CLINIC | Facility: CLINIC | Age: 64
End: 2024-11-29

## 2024-11-29 NOTE — TELEPHONE ENCOUNTER
11/29/24 10:23 AM    Patient contacted post ED visit, [facility] department spoke with patient/caregiver and outreach was successful.    Thank you.  Brandi Barros  [facility]

## 2024-12-01 ENCOUNTER — NURSE TRIAGE (OUTPATIENT)
Dept: OTHER | Facility: OTHER | Age: 64
End: 2024-12-01

## 2024-12-01 NOTE — TELEPHONE ENCOUNTER
"Regarding: exposed to covid-19  ----- Message from Maggi FERNANDEZ sent at 12/1/2024  4:42 PM EST -----  Patient called,\" I was just told that I was exposed to covid and and I am not sure what to do. \"    "

## 2024-12-01 NOTE — TELEPHONE ENCOUNTER
"Reason for Disposition   [1] COVID-19 infection suspected AND [2] mild symptoms (cough, fever, or others) AND [3] negative COVID-19 rapid test    Answer Assessment - Initial Assessment Questions  1. 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 PLACE of CONTACT: \"Where were you when you were exposed to COVID-19?\" (e.g., home, school, medical waiting room; which city?)      Home setting    3. TYPE of CONTACT: \"How much contact was there?\" (e.g., sitting next to, live in same house, work in same office, same building)      Close    4. DURATION of CONTACT: \"How long were you in contact with the COVID-19 patient?\" (e.g., a few seconds, passed by person, a few minutes, 15 minutes or longer, live with the patient)      For about 3 hrs    5. DATE of CONTACT: \"When did you have contact with a COVID-19 patient?\" (e.g., hours, days ago)      11/29/2024    6. MASK: \"Were you wearing a mask?\" \"Was the other person wearing a mask?\" Note: wearing a mask reduces the risk of an otherwise close contact.      No mask    7. SYMPTOMS: \"Do you have any symptoms?\" (e.g., fever, cough, breathing difficulty, loss of taste or smell)      Not sure if is presenting with symptoms but pt has a headache- but pt was dx with a concussion on Tuesday. And thinks she has a sore throat- but states she tends to always have a sore throat due to how she sleeps. No fever or other symptoms.     8. COVID-19 VACCINE: \"Have you had the COVID-19 vaccine?\" If Yes, ask: \"When did you last get it?\"      Yes    9. . HIGH RISK: \"Do you have any heart or lung problems?\" (e.g., asthma, COPD, heart failure) \"Do you have a weak immune system or other risk factors?\" (e.g., HIV positive, chemotherapy, renal failure, diabetes mellitus, sickle cell anemia, obesity)        Denies    Protocols used: COVID-19 - Exposure-Adult-AH, COVID-19 - Diagnosed or " Suspected-Adult-AH      Pt has not tested herself for COVID.

## 2024-12-02 ENCOUNTER — OFFICE VISIT (OUTPATIENT)
Dept: FAMILY MEDICINE CLINIC | Facility: CLINIC | Age: 64
End: 2024-12-02
Payer: MEDICARE

## 2024-12-02 VITALS
RESPIRATION RATE: 18 BRPM | HEART RATE: 76 BPM | OXYGEN SATURATION: 98 % | BODY MASS INDEX: 37.29 KG/M2 | SYSTOLIC BLOOD PRESSURE: 132 MMHG | TEMPERATURE: 97 F | HEIGHT: 66 IN | DIASTOLIC BLOOD PRESSURE: 82 MMHG

## 2024-12-02 DIAGNOSIS — Z20.822 CLOSE EXPOSURE TO COVID-19 VIRUS: ICD-10-CM

## 2024-12-02 DIAGNOSIS — J02.9 SORETHROAT: ICD-10-CM

## 2024-12-02 DIAGNOSIS — R51.9 NONINTRACTABLE HEADACHE, UNSPECIFIED CHRONICITY PATTERN, UNSPECIFIED HEADACHE TYPE: Primary | ICD-10-CM

## 2024-12-02 DIAGNOSIS — E66.01 OBESITY, MORBID (HCC): ICD-10-CM

## 2024-12-02 LAB
SARS-COV-2 AG UPPER RESP QL IA: NEGATIVE
VALID CONTROL: NORMAL

## 2024-12-02 PROCEDURE — 87811 SARS-COV-2 COVID19 W/OPTIC: CPT | Performed by: FAMILY MEDICINE

## 2024-12-02 PROCEDURE — 99213 OFFICE O/P EST LOW 20 MIN: CPT | Performed by: FAMILY MEDICINE

## 2024-12-02 PROCEDURE — G2211 COMPLEX E/M VISIT ADD ON: HCPCS | Performed by: FAMILY MEDICINE

## 2024-12-02 NOTE — PROGRESS NOTES
"Name: Karmen Luna      : 1960      MRN: [id number]  Encounter Provider: Alphonso Paula DO  Encounter Date: 2024   Encounter department: [facility]  :  Assessment & Plan  Nonintractable headache, unspecified chronicity pattern, unspecified headache type    Orders:    POCT Rapid Covid Ag    Sorethroat    Orders:    POCT Rapid Covid Ag    Obesity, morbid (HCC)           Close exposure to COVID-19 virus         Stretch/massage the scalp.       Chief Complaint   Patient presents with    Headache     Seen in ER and was diagnosed for concussion    COVID-19     Exposed to patient who is covid positive 24      History of Present Illness     In office covid is negative.  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  No lumps palpated.  Mild tenderness.      Review of Systems   Constitutional: Negative.    HENT: Negative.     Eyes: Negative.    Respiratory: Negative.     Cardiovascular: Negative.    Gastrointestinal: Negative.    Genitourinary: Negative.    Musculoskeletal: Negative.    Skin: Negative.    Neurological:  Positive for headaches.        Localized top of head hurts.   Psychiatric/Behavioral: Negative.            Objective   /82 (BP Location: Left arm, Patient Position: Sitting, Cuff Size: Large)   Pulse 76   Temp (!) 97 °F (36.1 °C) (Temporal)   Resp 18   Ht 5' 6\" (1.676 m)   LMP  (LMP Unknown)   SpO2 98%   BMI 37.29 kg/m²      Physical Exam  Constitutional:       Appearance: She is well-developed.   HENT:      Head: Normocephalic and atraumatic.      Right Ear: External ear normal.      Left Ear: External ear normal.      Nose: Nose normal.      Mouth/Throat:      Mouth: Mucous membranes are moist.      Pharynx: Oropharynx is clear.   Eyes:      Conjunctiva/sclera: Conjunctivae normal.      Pupils: Pupils are equal, round, and reactive to light.   Cardiovascular:      Rate and Rhythm: Normal rate and regular rhythm.      Heart sounds: Normal heart sounds. "   Pulmonary:      Effort: Pulmonary effort is normal.      Breath sounds: Normal breath sounds.   Musculoskeletal:      Cervical back: Normal range of motion and neck supple.   Skin:     General: Skin is warm and dry.   Neurological:      Mental Status: She is alert and oriented to person, place, and time.      Deep Tendon Reflexes: Reflexes are normal and symmetric.   Psychiatric:         Mood and Affect: Mood normal.         Behavior: Behavior normal.         Thought Content: Thought content normal.         Judgment: Judgment normal.

## 2024-12-27 DIAGNOSIS — G89.29 CHRONIC LOW BACK PAIN, UNSPECIFIED BACK PAIN LATERALITY, UNSPECIFIED WHETHER SCIATICA PRESENT: ICD-10-CM

## 2024-12-27 DIAGNOSIS — M54.50 CHRONIC LOW BACK PAIN, UNSPECIFIED BACK PAIN LATERALITY, UNSPECIFIED WHETHER SCIATICA PRESENT: ICD-10-CM

## 2024-12-27 RX ORDER — METHOCARBAMOL 500 MG/1
500 TABLET, FILM COATED ORAL 2 TIMES DAILY PRN
Qty: 30 TABLET | Refills: 0 | Status: SHIPPED | OUTPATIENT
Start: 2024-12-27

## 2024-12-29 DIAGNOSIS — R39.89 BLADDER PAIN: ICD-10-CM

## 2024-12-29 DIAGNOSIS — N30.10 INTERSTITIAL CYSTITIS: ICD-10-CM

## 2024-12-29 DIAGNOSIS — N39.41 URGE INCONTINENCE: ICD-10-CM

## 2024-12-31 RX ORDER — OXYBUTYNIN CHLORIDE 5 MG/1
5 TABLET, EXTENDED RELEASE ORAL 2 TIMES DAILY
Qty: 60 TABLET | Refills: 0 | Status: SHIPPED | OUTPATIENT
Start: 2024-12-31

## 2025-01-04 DIAGNOSIS — G62.9 PERIPHERAL NERVE DISORDER: ICD-10-CM

## 2025-01-06 ENCOUNTER — TELEPHONE (OUTPATIENT)
Age: 65
End: 2025-01-06

## 2025-01-06 RX ORDER — GABAPENTIN 600 MG/1
600 TABLET ORAL 3 TIMES DAILY
Qty: 270 TABLET | Refills: 1 | Status: SHIPPED | OUTPATIENT
Start: 2025-01-06

## 2025-01-06 NOTE — TELEPHONE ENCOUNTER
Hi there I am sorry we do not do any Prior Auth for Workers comp. Pt would need to call her worker comp & have her pharmacy submit to her Workers com

## 2025-01-08 ENCOUNTER — TELEPHONE (OUTPATIENT)
Age: 65
End: 2025-01-08

## 2025-01-08 NOTE — TELEPHONE ENCOUNTER
PA for gabapentin 600 mg SUBMITTED to [employer]     via    [x]CMM-KEY:  [id number]  []Surescripts-Case ID #    []Availity-Auth ID #   NDC #    []Faxed to plan   []Other website    []Phone call Case ID #      [x]PA sent as URGENT    All office notes, labs and other pertaining documents and studies sent. Clinical questions answered. Awaiting determination from insurance company.     Turnaround time for your insurance to make a decision on your Prior Authorization can take 7-21 business days.

## 2025-01-08 NOTE — TELEPHONE ENCOUNTER
Patient returning the office's call about her gabapentin script. She was told on a voice mail she would have to go through worker comp. She has been seeing Dr. Dixon for years and this is the first time she is being told this? She states it is not worker comp related. She would like a return call for an explanation. Thank you

## 2025-01-08 NOTE — TELEPHONE ENCOUNTER
PA for GABAPENTIN 600 MG  APPROVED     Date(s) approved UNTIL 01/07/2026        Patient advised by          [x]MyChart Message  []Phone call   []LMOM  [x]L/M to call office as no active Communication consent on file  []Unable to leave detailed message as VM not approved on Communication consent       Pharmacy advised by    [x]Fax  []Phone call    Approval letter scanned into Media Yes

## 2025-01-14 ENCOUNTER — OFFICE VISIT (OUTPATIENT)
Dept: FAMILY MEDICINE CLINIC | Facility: CLINIC | Age: 65
End: 2025-01-14
Payer: MEDICARE

## 2025-01-14 VITALS
DIASTOLIC BLOOD PRESSURE: 82 MMHG | RESPIRATION RATE: 18 BRPM | HEART RATE: 88 BPM | SYSTOLIC BLOOD PRESSURE: 122 MMHG | TEMPERATURE: 97.3 F | OXYGEN SATURATION: 95 %

## 2025-01-14 DIAGNOSIS — F33.0 MILD EPISODE OF RECURRENT MAJOR DEPRESSIVE DISORDER (HCC): ICD-10-CM

## 2025-01-14 DIAGNOSIS — F43.9 STRESS AT HOME: ICD-10-CM

## 2025-01-14 DIAGNOSIS — N18.31 STAGE 3A CHRONIC KIDNEY DISEASE (HCC): ICD-10-CM

## 2025-01-14 DIAGNOSIS — D58.8: ICD-10-CM

## 2025-01-14 DIAGNOSIS — L71.9 ROSACEA: Primary | ICD-10-CM

## 2025-01-14 DIAGNOSIS — F33.1 MODERATE EPISODE OF RECURRENT MAJOR DEPRESSIVE DISORDER (HCC): ICD-10-CM

## 2025-01-14 DIAGNOSIS — E66.01 OBESITY, MORBID (HCC): ICD-10-CM

## 2025-01-14 DIAGNOSIS — M46.1 SACROILIITIS (HCC): ICD-10-CM

## 2025-01-14 PROCEDURE — 99213 OFFICE O/P EST LOW 20 MIN: CPT | Performed by: FAMILY MEDICINE

## 2025-01-14 RX ORDER — METRONIDAZOLE 7.5 MG/G
GEL TOPICAL 2 TIMES DAILY
Qty: 45 G | Refills: 1 | Status: SHIPPED | OUTPATIENT
Start: 2025-01-14

## 2025-01-14 NOTE — PROGRESS NOTES
Name: Karmen Luna      : 1960      MRN: [id number]  Encounter Provider: Alphonso Paula DO  Encounter Date: 2025   Encounter department: [facility]  :  Assessment & Plan  Rosacea    Orders:    metroNIDAZOLE (METROGEL) 0.75 % gel; Apply topically 2 (two) times a day    Sacroiliitis (HCC)  Stable       Mild episode of recurrent major depressive disorder (HCC)  Controlled         Moderate episode of recurrent major depressive disorder (HCC)  Controlled         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HCC)  Stable       Obesity, morbid (HCC)  Weight loss recommended.         Stage 3a chronic kidney disease (HCC)  Lab Results   Component Value Date    EGFR 64 10/23/2024    EGFR 63 2024    EGFR 58 01/10/2023    CREATININE 0.93 10/23/2024    CREATININE 0.95 2024    CREATININE 1.02 01/10/2023            Stress at home           Chief Complaint   Patient presents with    Headache     Going on for 2 weeks now, ear pain, sore throat, dizziness, red rash on face, has been taking tylenol and imitrex.           History of Present Illness     Multiple issues.  Fibromyalgia, tiredness, reddish BL cheeks, neck stiff, pressure top of head.-All started around [place].  Admits to lots of or increased stress - at home - starting around [place] time, grandchildren.  Gets colonoscopy q 2 - 3 years.      Review of Systems   Constitutional:  Positive for fatigue.        Tiredness.   HENT: Negative.     Eyes: Negative.    Respiratory: Negative.     Cardiovascular: Negative.    Gastrointestinal: Negative.    Genitourinary: Negative.    Musculoskeletal:  Positive for neck stiffness.   Skin:  Positive for rash.        Rash - cheeks.   Neurological: Negative.    Psychiatric/Behavioral: Negative.         Objective   /82 (BP Location: Left arm, Patient Position: Sitting, Cuff Size: Standard)   Pulse 88   Temp (!) 97.3 °F (36.3 °C) (Temporal)   Resp 18   LMP  (LMP Unknown)   SpO2 95%    BMI  Counseling: There is no height or weight on file to calculate BMI. The BMI is above normal. Nutrition recommendations include reducing portion sizes, consuming healthier snacks, moderation in carbohydrate intake, and increasing intake of lean protein.  Physical Exam  Constitutional:       Appearance: She is well-developed.   HENT:      Head: Normocephalic and atraumatic.      Right Ear: Tympanic membrane, ear canal and external ear normal.      Left Ear: Tympanic membrane, ear canal and external ear normal.      Nose: Nose normal.      Mouth/Throat:      Mouth: Mucous membranes are moist.      Pharynx: Oropharynx is clear.   Eyes:      Conjunctiva/sclera: Conjunctivae normal.      Pupils: Pupils are equal, round, and reactive to light.   Cardiovascular:      Rate and Rhythm: Normal rate and regular rhythm.      Heart sounds: Normal heart sounds.   Pulmonary:      Effort: Pulmonary effort is normal.      Breath sounds: Normal breath sounds.   Abdominal:      General: Abdomen is flat. Bowel sounds are normal.      Palpations: Abdomen is soft.   Musculoskeletal:      Cervical back: Normal range of motion and neck supple.   Skin:     General: Skin is warm and dry.      Comments: BL cheeks reddish, mild on forehead.   Neurological:      Mental Status: She is alert and oriented to person, place, and time.      Deep Tendon Reflexes: Reflexes are normal and symmetric.   Psychiatric:         Mood and Affect: Mood normal.         Behavior: Behavior normal.         Thought Content: Thought content normal.         Judgment: Judgment normal.

## 2025-01-14 NOTE — ASSESSMENT & PLAN NOTE
Lab Results   Component Value Date    EGFR 64 10/23/2024    EGFR 63 01/29/2024    EGFR 58 01/10/2023    CREATININE 0.93 10/23/2024    CREATININE 0.95 01/29/2024    CREATININE 1.02 01/10/2023

## 2025-01-21 ENCOUNTER — TELEMEDICINE (OUTPATIENT)
Dept: UROLOGY | Facility: CLINIC | Age: 65
End: 2025-01-21
Payer: COMMERCIAL

## 2025-01-21 ENCOUNTER — TELEPHONE (OUTPATIENT)
Dept: UROLOGY | Facility: CLINIC | Age: 65
End: 2025-01-21

## 2025-01-21 ENCOUNTER — TELEPHONE (OUTPATIENT)
Dept: FAMILY MEDICINE CLINIC | Facility: CLINIC | Age: 65
End: 2025-01-21

## 2025-01-21 VITALS
HEART RATE: 88 BPM | SYSTOLIC BLOOD PRESSURE: 122 MMHG | HEIGHT: 66 IN | OXYGEN SATURATION: 95 % | TEMPERATURE: 97.3 F | DIASTOLIC BLOOD PRESSURE: 82 MMHG | BODY MASS INDEX: 37.29 KG/M2

## 2025-01-21 DIAGNOSIS — N32.89 BLADDER SPASMS: Primary | ICD-10-CM

## 2025-01-21 PROCEDURE — 99212 OFFICE O/P EST SF 10 MIN: CPT | Performed by: UROLOGY

## 2025-01-21 RX ORDER — OXYBUTYNIN CHLORIDE 15 MG/1
15 TABLET, EXTENDED RELEASE ORAL
Qty: 90 TABLET | Refills: 3 | Status: SHIPPED | OUTPATIENT
Start: 2025-01-21

## 2025-01-21 NOTE — PROGRESS NOTES
"Virtual Regular Visit  Name: Karmen Luna      : 1960      MRN: [id number]  Encounter Provider: Anil Sabillon MD  Encounter Date: 2025   Encounter department: [facility] [place]      Verification of patient location:  Patient is located at Home in the following state in which I hold an active license [place] :  Assessment & Plan    Assessment: Bladder/urethral spasms that radiate down her legs, which she describes as bad orgasms.  Status post Botox for urge incontinence urinary frequency 100 international units 2024 which controlled the spasms up until a week ago, and she still is dry.  We discussed repeating Botox or increasing her medication.  I decided to increase her medication from oxybutynin extended release 5 mg daily to oxybutynin 15 mg extended release daily.  Hopefully this control the spasms in the Botox still working in terms of the urge incontinence.    Follow-up in 3 months.  If the medication does not help her, we may give her an injection of Botox in the meantime.    Encounter provider Anil Sabillon MD    The patient was identified by name and date of birth. Karmen Luna was informed that this is a telemedicine visit and that the visit is being conducted through the Epic Embedded platform. She agrees to proceed..  Other methods to assure confidentiality were taken-in my office, not in public.. The patient was notified the following individuals were present in the room .  She acknowledged consent and understanding of privacy and security of the video platform. The patient has agreed to participate and understands they can discontinue the visit at any time.    Patient is aware this is a billable service.     History of Present Illness     HPI  Review of Systems   Genitourinary:         As per HPI       Objective   /82   Pulse 88   Temp (!) 97.3 °F (36.3 °C)   Ht 5' 6\" (1.676 m)   LMP  (LMP Unknown)   SpO2 95%   BMI 37.29 kg/m²     Physical Exam    Visit " Time  Total Visit Duration: 15 minutes

## 2025-01-21 NOTE — TELEPHONE ENCOUNTER
Called and left a voicemail message for patient to return call to the office to schedule a 3 month follow up.

## 2025-01-21 NOTE — TELEPHONE ENCOUNTER
----- Message from Anil Sabillon MD sent at 1/21/2025  1:33 PM EST -----  Please have patient follow-up in 3 months

## 2025-01-24 NOTE — TELEPHONE ENCOUNTER
Spoke with patient and scheduled her for a telephone visit with SONIA Wagner on 4/29/25 @ 2:20 pm.

## 2025-01-24 NOTE — TELEPHONE ENCOUNTER
Patient called stating she is returning the call to schedule a 3 month follow up with DR. Sabillon in [place].  No open appointments available with DR. Sabillon.  Please review and call patient with the appointment. Not able to schedule.  Please review notes  if ok to schedule with [place] [facility].     Patient can be reached at

## 2025-01-24 NOTE — TELEPHONE ENCOUNTER
Voicemail message left for the patient to call the office to schedule her 3 month appointment.  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

## 2025-01-30 DIAGNOSIS — G47.00 INSOMNIA, UNSPECIFIED TYPE: ICD-10-CM

## 2025-01-30 RX ORDER — TRAZODONE HYDROCHLORIDE 150 MG/1
150 TABLET ORAL
Qty: 90 TABLET | Refills: 1 | Status: SHIPPED | OUTPATIENT
Start: 2025-01-30

## 2025-02-21 ENCOUNTER — TELEPHONE (OUTPATIENT)
Dept: FAMILY MEDICINE CLINIC | Facility: CLINIC | Age: 65
End: 2025-02-21

## 2025-02-21 NOTE — TELEPHONE ENCOUNTER
Medical record request form received from [facility] (P) [phone]; (F) [phone].  Form faxed to O [phone]

## 2025-03-19 ENCOUNTER — OFFICE VISIT (OUTPATIENT)
Dept: FAMILY MEDICINE CLINIC | Facility: CLINIC | Age: 65
End: 2025-03-19
Payer: COMMERCIAL

## 2025-03-19 ENCOUNTER — TELEPHONE (OUTPATIENT)
Dept: FAMILY MEDICINE CLINIC | Facility: CLINIC | Age: 65
End: 2025-03-19

## 2025-03-19 VITALS
DIASTOLIC BLOOD PRESSURE: 76 MMHG | OXYGEN SATURATION: 97 % | TEMPERATURE: 97.9 F | HEART RATE: 106 BPM | BODY MASS INDEX: 37.29 KG/M2 | SYSTOLIC BLOOD PRESSURE: 118 MMHG | HEIGHT: 66 IN

## 2025-03-19 DIAGNOSIS — R42 DIZZINESS: ICD-10-CM

## 2025-03-19 DIAGNOSIS — M54.50 CHRONIC LOW BACK PAIN, UNSPECIFIED BACK PAIN LATERALITY, UNSPECIFIED WHETHER SCIATICA PRESENT: ICD-10-CM

## 2025-03-19 DIAGNOSIS — K21.9 GASTROESOPHAGEAL REFLUX DISEASE, UNSPECIFIED WHETHER ESOPHAGITIS PRESENT: ICD-10-CM

## 2025-03-19 DIAGNOSIS — K57.30 DIVERTICULOSIS OF SIGMOID COLON: Primary | ICD-10-CM

## 2025-03-19 DIAGNOSIS — G89.29 CHRONIC LOW BACK PAIN, UNSPECIFIED BACK PAIN LATERALITY, UNSPECIFIED WHETHER SCIATICA PRESENT: ICD-10-CM

## 2025-03-19 DIAGNOSIS — K63.5 POLYP OF COLON, UNSPECIFIED PART OF COLON, UNSPECIFIED TYPE: ICD-10-CM

## 2025-03-19 DIAGNOSIS — R11.2 NAUSEA AND VOMITING, UNSPECIFIED VOMITING TYPE: ICD-10-CM

## 2025-03-19 DIAGNOSIS — Z12.4 PAP SMEAR FOR CERVICAL CANCER SCREENING: ICD-10-CM

## 2025-03-19 DIAGNOSIS — Z12.31 ENCOUNTER FOR SCREENING MAMMOGRAM FOR BREAST CANCER: ICD-10-CM

## 2025-03-19 PROCEDURE — 99214 OFFICE O/P EST MOD 30 MIN: CPT | Performed by: FAMILY MEDICINE

## 2025-03-19 PROCEDURE — G2211 COMPLEX E/M VISIT ADD ON: HCPCS | Performed by: FAMILY MEDICINE

## 2025-03-19 RX ORDER — ONDANSETRON 4 MG/1
4 TABLET, FILM COATED ORAL EVERY 8 HOURS PRN
Qty: 30 TABLET | Refills: 0 | Status: SHIPPED | OUTPATIENT
Start: 2025-03-19

## 2025-03-19 RX ORDER — METHOCARBAMOL 500 MG/1
500 TABLET, FILM COATED ORAL 2 TIMES DAILY PRN
Qty: 30 TABLET | Refills: 1 | Status: SHIPPED | OUTPATIENT
Start: 2025-03-19 | End: 2025-03-25 | Stop reason: ALTCHOICE

## 2025-03-19 RX ORDER — ESOMEPRAZOLE MAGNESIUM 40 MG/1
40 CAPSULE, DELAYED RELEASE ORAL DAILY
Qty: 100 CAPSULE | Refills: 1 | Status: SHIPPED | OUTPATIENT
Start: 2025-03-19

## 2025-03-19 NOTE — ASSESSMENT & PLAN NOTE
Orders:    esomeprazole (NexIUM) 40 MG capsule; Take 1 capsule (40 mg total) by mouth in the morning    Ambulatory Referral to Gastroenterology; Future

## 2025-03-19 NOTE — TELEPHONE ENCOUNTER
Record request form received from [employer] Record Retrieval Team requesting for records from 1/1/1990 to 01/01/2020  Form faxed to O at [phone]

## 2025-03-19 NOTE — PROGRESS NOTES
Name: Karmen Luna      : 1960      MRN: [id number]  Encounter Provider: Alphonso Paula DO  Encounter Date: 3/19/2025   Encounter department: [facility]  :  Chief Complaint   Patient presents with    Dizziness    Headache       Assessment & Plan  Diverticulosis of sigmoid colon         Polyp of colon, unspecified part of colon, unspecified type    Orders:    Ambulatory Referral to Gastroenterology; Future    Encounter for screening mammogram for breast cancer         Pap smear for cervical cancer screening         Nausea and vomiting, unspecified vomiting type    Orders:    ondansetron (ZOFRAN) 4 mg tablet; Take 1 tablet (4 mg total) by mouth every 8 (eight) hours as needed for nausea or vomiting    Chronic low back pain, unspecified back pain laterality, unspecified whether sciatica present    Orders:    methocarbamol (ROBAXIN) 500 mg tablet; Take 1 tablet (500 mg total) by mouth 2 (two) times a day as needed for muscle spasms    Gastroesophageal reflux disease, unspecified whether esophagitis present    Orders:    esomeprazole (NexIUM) 40 MG capsule; Take 1 capsule (40 mg total) by mouth in the morning    Ambulatory Referral to Gastroenterology; Future    Dizziness         Contact your Urologist, may need something different for the OAB.  Or take drug holidays or skipping some nights.       History of Present Illness   Refill Med's.  Vertigo, has been waking up with dizziness.  Takes Atarax (for overactive bladder) night before which has a side effect of dizziness.  Presently feels fine.  History of colon polys and reflux - GI Dr retired, request new GI Dr.  Chronic low back pain, reflux controlled, nausea on occasion.      Review of Systems   Constitutional: Negative.    HENT: Negative.     Eyes: Negative.    Respiratory: Negative.     Cardiovascular: Negative.    Gastrointestinal: Negative.    Genitourinary: Negative.    Musculoskeletal:  Positive for arthralgias.   Skin: Negative.   "  Neurological:  Positive for dizziness.        Wakes up with Vertigo.   Psychiatric/Behavioral: Negative.         Objective   /76 (BP Location: Left arm, Patient Position: Sitting, Cuff Size: Standard)   Pulse (!) 106   Temp 97.9 °F (36.6 °C) (Temporal)   Ht 5' 6\" (1.676 m)   LMP  (LMP Unknown)   SpO2 97%   BMI 37.29 kg/m²      Physical Exam  Constitutional:       Appearance: She is well-developed.   HENT:      Head: Normocephalic and atraumatic.      Right Ear: Tympanic membrane, ear canal and external ear normal.      Left Ear: Tympanic membrane, ear canal and external ear normal.      Nose: Nose normal.      Mouth/Throat:      Mouth: Mucous membranes are moist.      Pharynx: Oropharynx is clear.   Eyes:      Conjunctiva/sclera: Conjunctivae normal.      Pupils: Pupils are equal, round, and reactive to light.   Cardiovascular:      Rate and Rhythm: Normal rate and regular rhythm.      Heart sounds: Normal heart sounds.   Pulmonary:      Effort: Pulmonary effort is normal.      Breath sounds: Normal breath sounds.   Musculoskeletal:      Cervical back: Normal range of motion and neck supple.   Skin:     General: Skin is warm and dry.   Neurological:      Mental Status: She is alert and oriented to person, place, and time.      Deep Tendon Reflexes: Reflexes are normal and symmetric.   Psychiatric:         Mood and Affect: Mood normal.         Behavior: Behavior normal.         Thought Content: Thought content normal.         Judgment: Judgment normal.         "

## 2025-03-25 ENCOUNTER — TELEPHONE (OUTPATIENT)
Dept: FAMILY MEDICINE CLINIC | Facility: CLINIC | Age: 65
End: 2025-03-25

## 2025-03-25 ENCOUNTER — TELEPHONE (OUTPATIENT)
Age: 65
End: 2025-03-25

## 2025-03-25 DIAGNOSIS — M54.89 OTHER CHRONIC BACK PAIN: Primary | ICD-10-CM

## 2025-03-25 DIAGNOSIS — G89.29 OTHER CHRONIC BACK PAIN: Primary | ICD-10-CM

## 2025-03-25 RX ORDER — CYCLOBENZAPRINE HCL 10 MG
10 TABLET ORAL 2 TIMES DAILY PRN
Qty: 30 TABLET | Refills: 1 | Status: SHIPPED | OUTPATIENT
Start: 2025-03-25

## 2025-03-25 NOTE — TELEPHONE ENCOUNTER
Pt under care of:  Ramandeep   Office Location: [facility] [place]     Last Seen (include Follow Up recommendations of last visit- see Office Visit - Instructions): 1/21/25    Pt calling due to: vertigo from medication     Per patient she states she told her PCP about having vertigo symptoms worsening. Her PCP is wondering if this could be from one of her urology medications. She states she stopped hydroxyzine to see if it would get better and it is not. She is still on Oxybutynin and HIPREX. She is requesting a phone call back about this.     Pt can be reached at: [phone]

## 2025-03-25 NOTE — TELEPHONE ENCOUNTER
Patient called and stated her insurance will not cover the methocarbamol but they would cover one of the following medications. She would like it switched to one that is covered    Baclofen  Tizanidine  Cyclobenzaprine

## 2025-03-26 NOTE — TELEPHONE ENCOUNTER
Called and LMOM for patient to contact the office to further discuss her Urology medications and worsening vertigo.

## 2025-03-26 NOTE — TELEPHONE ENCOUNTER
Hydroxyzine was stopped 3 weeks ago and the Vertigo has improved since stopping it but not resolved    But her urinary symptoms are coming back which include bladder pain, pressure in bladder. She is asking if something else can be prescribed    [facility] on file    CB #[phone],  phone, her is not working, she will have it with her tomorrow

## 2025-03-26 NOTE — TELEPHONE ENCOUNTER
Patient was having issues with her phone. She pulled it in only to get 1% but saw the VM from the office. She was calling back on another phone.     Tried to reach out to the office.But they were not available.     Patient would appreciate a call back on the [phone] to discuss her symptoms further

## 2025-03-26 NOTE — TELEPHONE ENCOUNTER
"Attempted to contact patient on phone number listed in encounter below. Phone just kept ringing, then received a message stating \"the wireless customer you are trying to reach is not available- please try again later\".   "

## 2025-03-27 NOTE — TELEPHONE ENCOUNTER
Spoke with Karmen who is taking the Oxybutynin as ordered, however she is interested in proceeding with another Botox injection at this time. Advised patient I would make Dr. Sabillon aware so he can place a case request, then his surgery scheduler will contact her to schedule the procedure.

## 2025-04-11 DIAGNOSIS — F33.0 MILD EPISODE OF RECURRENT MAJOR DEPRESSIVE DISORDER (HCC): ICD-10-CM

## 2025-04-11 RX ORDER — DULOXETIN HYDROCHLORIDE 60 MG/1
60 CAPSULE, DELAYED RELEASE ORAL EVERY MORNING
Qty: 90 CAPSULE | Refills: 0 | Status: SHIPPED | OUTPATIENT
Start: 2025-04-11

## 2025-04-16 ENCOUNTER — TELEPHONE (OUTPATIENT)
Age: 65
End: 2025-04-16

## 2025-04-16 NOTE — TELEPHONE ENCOUNTER
Patient calls to ask if the TB 2 step can be printed out and a copy given to the patient on 4/17/25. She will stop by the office and pick it up. The patient needs a copy to present to her employer. The patient can be reached at [phone]. The patient had the test performed 10/4/2023 and read on 10/6/23.

## 2025-04-17 NOTE — TELEPHONE ENCOUNTER
Pt called stating she is unable to  the printed copy.    She is requesting it be faxed to her employer.  [employer]  [phone]    Did confirm employer knows TB results from 2023

## 2025-04-21 ENCOUNTER — PREP FOR PROCEDURE (OUTPATIENT)
Dept: UROLOGY | Facility: CLINIC | Age: 65
End: 2025-04-21

## 2025-04-21 ENCOUNTER — TELEPHONE (OUTPATIENT)
Dept: UROLOGY | Facility: CLINIC | Age: 65
End: 2025-04-21

## 2025-04-21 DIAGNOSIS — Z01.818 ENCOUNTER FOR PREADMISSION TESTING: Primary | ICD-10-CM

## 2025-04-21 DIAGNOSIS — R39.89 BLADDER PAIN: Primary | ICD-10-CM

## 2025-04-22 NOTE — TELEPHONE ENCOUNTER
Spoke with patient and confirmed surgery date of: 6/26  Type of surgery: cysto, botox  Operating physician: Dr. Sabillon  Location of surgery: [place]    Verbally went over prep with patient on: 4/22  NPO  Bowel prep? No  Hospital calls afternoon prior with arrival time -Calls Friday afternoon for Monday surgeries  Patient needs ride to and from surgery (outpatient)   Pre-op testing to be done 2 weeks prior to surgery  (CBC, BMP, UC, EKG)  Blood thinners: N/A  Clearances needed: (None)    Mailed to patient on: 4/22  Copy of packet scanned into Media on: 4/22  Labs in packet  Soap / Bowel prep in packet    Consent: on admit

## 2025-04-23 ENCOUNTER — APPOINTMENT (OUTPATIENT)
Dept: LAB | Age: 65
End: 2025-04-23
Payer: COMMERCIAL

## 2025-04-23 DIAGNOSIS — R39.89 BLADDER PAIN: ICD-10-CM

## 2025-04-23 DIAGNOSIS — N39.41 URGE INCONTINENCE: ICD-10-CM

## 2025-04-23 DIAGNOSIS — N30.10 INTERSTITIAL CYSTITIS: ICD-10-CM

## 2025-04-23 LAB
ANION GAP SERPL CALCULATED.3IONS-SCNC: 9 MMOL/L (ref 4–13)
BACTERIA UR QL AUTO: ABNORMAL /HPF
BASOPHILS # BLD AUTO: 0.09 THOUSANDS/ÂΜL (ref 0–0.1)
BASOPHILS NFR BLD AUTO: 1 % (ref 0–1)
BILIRUB UR QL STRIP: NEGATIVE
BUN SERPL-MCNC: 12 MG/DL (ref 5–25)
CALCIUM SERPL-MCNC: 9.6 MG/DL (ref 8.4–10.2)
CHLORIDE SERPL-SCNC: 104 MMOL/L (ref 96–108)
CLARITY UR: CLEAR
CO2 SERPL-SCNC: 29 MMOL/L (ref 21–32)
COLOR UR: ABNORMAL
CREAT SERPL-MCNC: 0.93 MG/DL (ref 0.6–1.3)
EOSINOPHIL # BLD AUTO: 0.3 THOUSAND/ÂΜL (ref 0–0.61)
EOSINOPHIL NFR BLD AUTO: 3 % (ref 0–6)
ERYTHROCYTE [DISTWIDTH] IN BLOOD BY AUTOMATED COUNT: 13.2 % (ref 11.6–15.1)
GFR SERPL CREATININE-BSD FRML MDRD: 64 ML/MIN/1.73SQ M
GLUCOSE P FAST SERPL-MCNC: 112 MG/DL (ref 65–99)
GLUCOSE UR STRIP-MCNC: NEGATIVE MG/DL
HCT VFR BLD AUTO: 40.9 % (ref 34.8–46.1)
HGB BLD-MCNC: 13.6 G/DL (ref 11.5–15.4)
HGB UR QL STRIP.AUTO: NEGATIVE
IMM GRANULOCYTES # BLD AUTO: 0.02 THOUSAND/UL (ref 0–0.2)
IMM GRANULOCYTES NFR BLD AUTO: 0 % (ref 0–2)
KETONES UR STRIP-MCNC: NEGATIVE MG/DL
LEUKOCYTE ESTERASE UR QL STRIP: NEGATIVE
LYMPHOCYTES # BLD AUTO: 3.31 THOUSANDS/ÂΜL (ref 0.6–4.47)
LYMPHOCYTES NFR BLD AUTO: 38 % (ref 14–44)
MCH RBC QN AUTO: 32.2 PG (ref 26.8–34.3)
MCHC RBC AUTO-ENTMCNC: 33.3 G/DL (ref 31.4–37.4)
MCV RBC AUTO: 97 FL (ref 82–98)
MONOCYTES # BLD AUTO: 0.74 THOUSAND/ÂΜL (ref 0.17–1.22)
MONOCYTES NFR BLD AUTO: 8 % (ref 4–12)
NEUTROPHILS # BLD AUTO: 4.34 THOUSANDS/ÂΜL (ref 1.85–7.62)
NEUTS SEG NFR BLD AUTO: 50 % (ref 43–75)
NITRITE UR QL STRIP: NEGATIVE
NON-SQ EPI CELLS URNS QL MICRO: ABNORMAL /HPF
NRBC BLD AUTO-RTO: 0 /100 WBCS
PH UR STRIP.AUTO: 6 [PH]
PLATELET # BLD AUTO: 226 THOUSANDS/UL (ref 149–390)
PMV BLD AUTO: 10.7 FL (ref 8.9–12.7)
POTASSIUM SERPL-SCNC: 3.8 MMOL/L (ref 3.5–5.3)
PROT UR STRIP-MCNC: ABNORMAL MG/DL
RBC # BLD AUTO: 4.23 MILLION/UL (ref 3.81–5.12)
RBC #/AREA URNS AUTO: ABNORMAL /HPF
SODIUM SERPL-SCNC: 142 MMOL/L (ref 135–147)
SP GR UR STRIP.AUTO: 1.02 (ref 1–1.03)
UROBILINOGEN UR STRIP-ACNC: <2 MG/DL
WBC # BLD AUTO: 8.8 THOUSAND/UL (ref 4.31–10.16)
WBC #/AREA URNS AUTO: ABNORMAL /HPF

## 2025-04-23 PROCEDURE — 87086 URINE CULTURE/COLONY COUNT: CPT

## 2025-04-23 PROCEDURE — 36415 COLL VENOUS BLD VENIPUNCTURE: CPT

## 2025-04-23 PROCEDURE — 85025 COMPLETE CBC W/AUTO DIFF WBC: CPT

## 2025-04-23 PROCEDURE — 80048 BASIC METABOLIC PNL TOTAL CA: CPT

## 2025-04-23 PROCEDURE — 81001 URINALYSIS AUTO W/SCOPE: CPT

## 2025-04-24 ENCOUNTER — RESULTS FOLLOW-UP (OUTPATIENT)
Dept: UROLOGY | Facility: CLINIC | Age: 65
End: 2025-04-24

## 2025-04-24 LAB — BACTERIA UR CULT: NORMAL

## 2025-04-24 NOTE — TELEPHONE ENCOUNTER
Patient returning call, informed of message below  SONIA Antonio to [facility]       4/24/25  1:42 PM  Note     Please let patient know that her urine culture is negative.  Mixed contaminants, no antibiotics indicated.

## 2025-04-24 NOTE — TELEPHONE ENCOUNTER
Called and LMOM for patient with negative urine culture results. Advised antibiotics are not warranted at this time per AP.

## 2025-04-24 NOTE — TELEPHONE ENCOUNTER
----- Message from SONIA May sent at 4/24/2025  1:42 PM EDT -----  Please let patient know that her urine culture is negative.  Mixed contaminants, no antibiotics indicated.

## 2025-04-24 NOTE — RESULT ENCOUNTER NOTE
Please let patient know that her urine culture is negative.  Mixed contaminants, no antibiotics indicated.

## 2025-04-28 NOTE — TELEPHONE ENCOUNTER
Patient called today to say that she has received her surgical packet and realized that the surgery is scheduled in [place].    Pt states that she does not want to travel to [place]. Pt is aware a message is being sent to surgery scheduler and will await a returned call.    Call back [phone]

## 2025-04-30 ENCOUNTER — APPOINTMENT (OUTPATIENT)
Dept: LAB | Age: 65
End: 2025-04-30
Payer: COMMERCIAL

## 2025-05-03 ENCOUNTER — APPOINTMENT (OUTPATIENT)
Dept: LAB | Age: 65
End: 2025-05-03
Attending: UROLOGY
Payer: COMMERCIAL

## 2025-05-03 DIAGNOSIS — E03.9 HYPOTHYROIDISM, UNSPECIFIED TYPE: ICD-10-CM

## 2025-05-03 DIAGNOSIS — Z01.818 ENCOUNTER FOR PREADMISSION TESTING: ICD-10-CM

## 2025-05-04 RX ORDER — LEVOTHYROXINE SODIUM 50 UG/1
50 TABLET ORAL DAILY
Qty: 90 TABLET | Refills: 0 | Status: SHIPPED | OUTPATIENT
Start: 2025-05-04

## 2025-05-05 LAB
ATRIAL RATE: 76 BPM
P AXIS: 41 DEGREES
PR INTERVAL: 200 MS
QRS AXIS: -3 DEGREES
QRSD INTERVAL: 88 MS
QT INTERVAL: 422 MS
QTC INTERVAL: 475 MS
T WAVE AXIS: 8 DEGREES
VENTRICULAR RATE: 76 BPM

## 2025-05-05 PROCEDURE — 93010 ELECTROCARDIOGRAM REPORT: CPT | Performed by: INTERNAL MEDICINE

## 2025-05-13 NOTE — PRE-PROCEDURE INSTRUCTIONS
Pre-Surgery Instructions:   Medication Instructions    Cyanocobalamin (VITAMIN B-12 PO) Stop taking    cyclobenzaprine (FLEXERIL) 10 mg tablet Uses PRN- OK to take day of surgery    DULoxetine (CYMBALTA) 60 mg delayed release capsule Take day of surgery.    esomeprazole (NexIUM) 40 MG capsule Take day of surgery.    gabapentin (NEURONTIN) 600 MG tablet Take day of surgery.    hydrOXYzine HCL (ATARAX) 25 mg tablet Take night before surgery if needed    levothyroxine 50 mcg tablet Take day of surgery.    methenamine hippurate (HIPREX) 1 g tablet Take day of surgery.    nystatin (MYCOSTATIN) powder Hold day of surgery.    ondansetron (ZOFRAN) 4 mg tablet Uses PRN- OK to take day of surgery    oxybutynin (DITROPAN XL) 15 MG 24 hr tablet Hold day of surgery.    phenazopyridine (PYRIDIUM) 100 mg tablet Hold day of surgery.    SUMAtriptan (IMITREX) 50 mg tablet Uses PRN- OK to take day of surgery    traZODone (DESYREL) 150 mg tablet Take night before surgery    VITAMIN D PO Stop taking   Medication instructions for day of surgery reviewed. Please take all instructed medications with only a sip of water.       You will receive a call one business day prior to surgery with an arrival time and hospital directions. If your surgery is scheduled on a Monday, the hospital will be calling you on the Friday prior to your surgery. If you have not heard from anyone by 8pm, please call the hospital supervisor through the hospital  at [phone]. ([place] [phone] or [place] [phone]).    Do not eat or drink anything after midnight the night before your surgery, including candy, mints, lifesavers, or chewing gum. Do not drink alcohol 24hrs before your surgery. Try not to smoke at least 24hrs before your surgery.       Follow the pre surgery showering instructions as listed in the “My Surgical Experience Booklet” or otherwise provided by your surgeon's office. Do not use a blade to shave the surgical area 1 week  before surgery. It is okay to use a clean electric clippers up to 24 hours before surgery. Do not apply any lotions, creams, including makeup, cologne, deodorant, or perfumes after showering on the day of your surgery. Do not use dry shampoo, hair spray, hair gel, or any type of hair products.     No contact lenses, eye make-up, or artificial eyelashes. Remove nail polish, including gel polish, and any artificial, gel, or acrylic nails if possible. Remove all jewelry including rings and body piercing jewelry.     Wear causal clothing that is easy to take on and off. Consider your type of surgery.    Keep any valuables, jewelry, piercings at home. Please bring any specially ordered equipment (sling, braces) if indicated.    Arrange for a responsible person to drive you to and from the hospital on the day of your surgery. Please confirm the visitor policy for the day of your procedure when you receive your phone call with an arrival time.     Call the surgeon's office with any new illnesses, exposures, or additional questions prior to surgery.    Please reference your “My Surgical Experience Booklet” for additional information to prepare for your upcoming surgery.

## 2025-05-19 ENCOUNTER — HOSPITAL ENCOUNTER (OUTPATIENT)
Facility: HOSPITAL | Age: 65
Setting detail: OUTPATIENT SURGERY
Discharge: HOME | End: 2025-05-19
Attending: UROLOGY | Admitting: UROLOGY
Payer: COMMERCIAL

## 2025-05-19 ENCOUNTER — ANESTHESIA EVENT (OUTPATIENT)
Dept: PERIOP | Facility: HOSPITAL | Age: 65
End: 2025-05-19
Payer: COMMERCIAL

## 2025-05-19 ENCOUNTER — ANESTHESIA (OUTPATIENT)
Dept: PERIOP | Facility: HOSPITAL | Age: 65
End: 2025-05-19
Payer: COMMERCIAL

## 2025-05-19 VITALS
HEIGHT: 67 IN | RESPIRATION RATE: 18 BRPM | DIASTOLIC BLOOD PRESSURE: 75 MMHG | OXYGEN SATURATION: 97 % | WEIGHT: 220 LBS | SYSTOLIC BLOOD PRESSURE: 108 MMHG | BODY MASS INDEX: 34.53 KG/M2 | HEART RATE: 74 BPM | TEMPERATURE: 97 F

## 2025-05-19 DIAGNOSIS — R39.89 BLADDER PAIN: ICD-10-CM

## 2025-05-19 DIAGNOSIS — N30.10 INTERSTITIAL CYSTITIS: ICD-10-CM

## 2025-05-19 DIAGNOSIS — N39.41 URGE INCONTINENCE: Primary | ICD-10-CM

## 2025-05-19 PROCEDURE — 52287 CYSTOSCOPY CHEMODENERVATION: CPT | Performed by: UROLOGY

## 2025-05-19 PROCEDURE — NC001 PR NO CHARGE: Performed by: UROLOGY

## 2025-05-19 RX ORDER — LIDOCAINE HYDROCHLORIDE 10 MG/ML
INJECTION, SOLUTION EPIDURAL; INFILTRATION; INTRACAUDAL; PERINEURAL AS NEEDED
Status: DISCONTINUED | OUTPATIENT
Start: 2025-05-19 | End: 2025-05-19

## 2025-05-19 RX ORDER — FENTANYL CITRATE 50 UG/ML
INJECTION, SOLUTION INTRAMUSCULAR; INTRAVENOUS AS NEEDED
Status: DISCONTINUED | OUTPATIENT
Start: 2025-05-19 | End: 2025-05-19

## 2025-05-19 RX ORDER — MIDAZOLAM HYDROCHLORIDE 2 MG/2ML
INJECTION, SOLUTION INTRAMUSCULAR; INTRAVENOUS AS NEEDED
Status: DISCONTINUED | OUTPATIENT
Start: 2025-05-19 | End: 2025-05-19

## 2025-05-19 RX ORDER — LEVOFLOXACIN 5 MG/ML
750 INJECTION, SOLUTION INTRAVENOUS ONCE
OUTPATIENT
Start: 2025-05-19

## 2025-05-19 RX ORDER — DEXAMETHASONE SODIUM PHOSPHATE 10 MG/ML
INJECTION, SOLUTION INTRAMUSCULAR; INTRAVENOUS AS NEEDED
Status: DISCONTINUED | OUTPATIENT
Start: 2025-05-19 | End: 2025-05-19

## 2025-05-19 RX ORDER — ONDANSETRON 2 MG/ML
INJECTION INTRAMUSCULAR; INTRAVENOUS AS NEEDED
Status: DISCONTINUED | OUTPATIENT
Start: 2025-05-19 | End: 2025-05-19

## 2025-05-19 RX ORDER — PROPOFOL 10 MG/ML
INJECTION, EMULSION INTRAVENOUS CONTINUOUS PRN
Status: DISCONTINUED | OUTPATIENT
Start: 2025-05-19 | End: 2025-05-19

## 2025-05-19 RX ORDER — PROPOFOL 10 MG/ML
INJECTION, EMULSION INTRAVENOUS AS NEEDED
Status: DISCONTINUED | OUTPATIENT
Start: 2025-05-19 | End: 2025-05-19

## 2025-05-19 RX ORDER — ONDANSETRON 2 MG/ML
4 INJECTION INTRAMUSCULAR; INTRAVENOUS ONCE AS NEEDED
Status: DISCONTINUED | OUTPATIENT
Start: 2025-05-19 | End: 2025-05-19 | Stop reason: HOSPADM

## 2025-05-19 RX ORDER — PHENAZOPYRIDINE HYDROCHLORIDE 200 MG/1
200 TABLET, FILM COATED ORAL 3 TIMES DAILY PRN
Qty: 30 TABLET | Refills: 0 | Status: SHIPPED | OUTPATIENT
Start: 2025-05-19

## 2025-05-19 RX ORDER — SODIUM CHLORIDE, SODIUM LACTATE, POTASSIUM CHLORIDE, CALCIUM CHLORIDE 600; 310; 30; 20 MG/100ML; MG/100ML; MG/100ML; MG/100ML
125 INJECTION, SOLUTION INTRAVENOUS CONTINUOUS
Status: DISCONTINUED | OUTPATIENT
Start: 2025-05-19 | End: 2025-05-19 | Stop reason: HOSPADM

## 2025-05-19 RX ORDER — PHENAZOPYRIDINE HYDROCHLORIDE 100 MG/1
100 TABLET, FILM COATED ORAL 3 TIMES DAILY PRN
Status: DISCONTINUED | OUTPATIENT
Start: 2025-05-19 | End: 2025-05-19 | Stop reason: HOSPADM

## 2025-05-19 RX ORDER — FENTANYL CITRATE/PF 50 MCG/ML
25 SYRINGE (ML) INJECTION
Status: DISCONTINUED | OUTPATIENT
Start: 2025-05-19 | End: 2025-05-19 | Stop reason: HOSPADM

## 2025-05-19 RX ORDER — CIPROFLOXACIN 2 MG/ML
400 INJECTION, SOLUTION INTRAVENOUS ONCE
Status: COMPLETED | OUTPATIENT
Start: 2025-05-19 | End: 2025-05-19

## 2025-05-19 RX ADMIN — CIPROFLOXACIN 400 MG: 400 INJECTION, SOLUTION INTRAVENOUS at 06:59

## 2025-05-19 RX ADMIN — PROPOFOL 20 MG: 10 INJECTION, EMULSION INTRAVENOUS at 07:52

## 2025-05-19 RX ADMIN — FENTANYL CITRATE 25 MCG: 50 INJECTION INTRAMUSCULAR; INTRAVENOUS at 07:59

## 2025-05-19 RX ADMIN — FENTANYL CITRATE 25 MCG: 50 INJECTION INTRAMUSCULAR; INTRAVENOUS at 08:05

## 2025-05-19 RX ADMIN — MIDAZOLAM 2 MG: 1 INJECTION INTRAMUSCULAR; INTRAVENOUS at 07:45

## 2025-05-19 RX ADMIN — FENTANYL CITRATE 25 MCG: 50 INJECTION INTRAMUSCULAR; INTRAVENOUS at 07:52

## 2025-05-19 RX ADMIN — SODIUM CHLORIDE, SODIUM LACTATE, POTASSIUM CHLORIDE, AND CALCIUM CHLORIDE 125 ML/HR: .6; .31; .03; .02 INJECTION, SOLUTION INTRAVENOUS at 06:59

## 2025-05-19 RX ADMIN — FENTANYL CITRATE 25 MCG: 50 INJECTION INTRAMUSCULAR; INTRAVENOUS at 07:54

## 2025-05-19 RX ADMIN — LIDOCAINE HYDROCHLORIDE 50 MG: 10 INJECTION, SOLUTION EPIDURAL; INFILTRATION; INTRACAUDAL; PERINEURAL at 07:52

## 2025-05-19 RX ADMIN — PROPOFOL 120 MCG/KG/MIN: 10 INJECTION, EMULSION INTRAVENOUS at 07:52

## 2025-05-19 RX ADMIN — DEXAMETHASONE SODIUM PHOSPHATE 10 MG: 10 INJECTION, SOLUTION INTRAMUSCULAR; INTRAVENOUS at 07:54

## 2025-05-19 RX ADMIN — ONDANSETRON 4 MG: 2 INJECTION INTRAMUSCULAR; INTRAVENOUS at 07:54

## 2025-05-19 NOTE — DISCHARGE INSTR - AVS FIRST PAGE
You have just undergone cystoscopy(which is looking into the bladder with the scope) and injection of Botox into the bladder wall.  Botox is botulinum toxin which is a substance that paralyzes certain nerves.  Injected into the bladder wall will help decrease spasms of the bladder muscle, may give you more bladder capacity and has a somewhat desensitizing effect of the bladder.  The injection normally last for about 6 months.  Some people it does not last very long and may need repeat injection sooner, and I have had people go as long as 2 years.    Tomorrow, after the anesthetic is worn off, you may resume all normal activities to include driving.  You may resume all your normal diet and medications.  Take over-the-counter remedies to avoid constipation.    Expect to have burning, urgency and frequency of urination, and very often patients do not have a beneficial effect from it for 2 weeks after the procedure.  Also expect to see blood in the urine-often with small clots-for the next 2 weeks or so.    Call for fever greater than 100 degrees, severe pain not relieved by over-the-counter pain medications, inability to urinate, or bleeding so heavily that you are unable to urinate.    Please send me a message through the portal in about 3 weeks to tell me how you are doing.  We can repeat the injection in 6 weeks if necessary, or think about other options if you do not get any relief.

## 2025-05-19 NOTE — ANESTHESIA POSTPROCEDURE EVALUATION
Post-Op Assessment Note    CV Status:  Stable  Pain Score: 0    Pain management: adequate       Mental Status:  Alert and awake   Hydration Status:  Euvolemic   PONV Controlled:  Controlled   Airway Patency:  Patent     Post Op Vitals Reviewed: Yes    No anethesia notable event occurred.    Staff: CRNA           Last Filed PACU Vitals:  Vitals Value Taken Time   Temp 98.2 °F (36.8 °C) 05/19/25 08:22   Pulse 76 05/19/25 08:22   /62 05/19/25 08:22   Resp 15 05/19/25 08:22   SpO2 95 % 05/19/25 08:22

## 2025-05-19 NOTE — ANESTHESIA PREPROCEDURE EVALUATION
Procedure:  INJECTION BOTULINUM TOXIN (BOTOX) (Urethra)    Relevant Problems   ANESTHESIA   (+) PONV (postoperative nausea and vomiting)      GI/HEPATIC   (+) GERD (gastroesophageal reflux disease)      /RENAL   (+) Stage 3a chronic kidney disease (HCC)      HEMATOLOGY   (+) 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HCC)      MUSCULOSKELETAL   (+) Chronic low back pain   (+) Primary osteoarthritis of left shoulder      NEURO/PSYCH   (+) Anxiety   (+) Bilateral occipital neuralgia   (+) Chronic low back pain   (+) Chronic pain syndrome   (+) Depression   (+) Interstitial cystitis   (+) Moderate episode of recurrent major depressive disorder (HCC)        Physical Exam    Airway     Mallampati score: II    Neck ROM: full      Cardiovascular      Dental       Pulmonary      Neurological      Other Findings  post-pubertal.      Anesthesia Plan  ASA Score- 3     Anesthesia Type- IV sedation with anesthesia with ASA Monitors.         Additional Monitors:     Airway Plan:     Comment: I, Dr. Glover, the attending physician, have personally seen and evaluated the patient prior to anesthetic care.  I have reviewed the pre-anesthetic record, and other medical records if appropriate to the anesthetic care.  If a CRNA is involved in the case, I have reviewed the CRNA assessment, if present, and agree.  The patient is in a suitable condition to proceed with my formulated anesthetic plan.  .       Plan Factors-    Chart reviewed.                      Induction- intravenous.    Postoperative Plan-         Informed Consent- Anesthetic plan and risks discussed with patient.  I personally reviewed this patient with the CRNA. Discussed and agreed on the Anesthesia Plan with the CRNA..      NPO Status:  Vitals Value Taken Time   Date of last liquid 05/18/25 05/19/25 06:03   Time of last liquid 2359 05/19/25 06:03   Date of last solid 05/18/25 05/19/25 06:03   Time of last solid 2000 05/19/25 06:03

## 2025-05-19 NOTE — ANESTHESIA POSTPROCEDURE EVALUATION
Post-Op Assessment Note    CV Status:  Stable    Pain management: adequate       Mental Status:  Alert and awake   Hydration Status:  Euvolemic   PONV Controlled:  Controlled   Airway Patency:  Patent     Post Op Vitals Reviewed: Yes    No anethesia notable event occurred.    Staff: Anesthesiologist           Last Filed PACU Vitals:  Vitals Value Taken Time   Temp 98.3 °F (36.8 °C) 05/19/25 08:40   Pulse 71 05/19/25 08:44   /60 05/19/25 08:40   Resp 22 05/19/25 08:44   SpO2 93 % 05/19/25 08:44   Vitals shown include unfiled device data.    Modified Robbin:     Vitals Value Taken Time   Activity 2 05/19/25 08:40   Respiration 2 05/19/25 08:40   Circulation 2 05/19/25 08:40   Consciousness 2 05/19/25 08:40   Oxygen Saturation 2 05/19/25 08:40     Modified Robbin Score: 10

## 2025-05-19 NOTE — H&P
H&P Exam - Urology   Karmen Parisi 1960 [id number]      Assessment/Plan     Assessment:  Severe bladder and urethral spasms that radiate down her legs, overactive bladder.  Urinary incontinence.  Plan:  Cystoscopy, 100 international units of Botox injected into the bladder.    History of Present Illness   HPI:  The patient presents for 100 international units of Botox injection.. The risks of bleeding, infection, urinary retention and need for additional procedures has been explained and informed consent given.  Refractory to oxybutynin.  Last injection was October 30, 2024 which worked quite well.    Past Medical History:   Diagnosis Date    Anemia     When giving blood    Anxiety     Arthritis     Last assessed 5/3/2011    Ortega's esophagus     Cancer (HCC)     ovarian cancer at age 27 yo- REMOVAL  B/L    Colon polyp     DDD (degenerative disc disease), lumbar     Depression     Diverticulitis of colon     After having a colonoscopy    Fall     5/2020 right knee meniscus tear- OR repair today 8/3/2020    Fibromyalgia     Fibromyalgia, primary     Fracture of one or more phalanges of foot     GERD (gastroesophageal reflux disease)     H/O bladder infections     chronic    Headache(784.0)     Always    Hypertension     Inflammatory bowel disease 1980    Kidney infection     occasional    Migraines     Obesity     Ovarian cancer (HCC) 1987    age 26    Polyneuropathy     Last assessed 6/23/2016 knees to feet    PONV (postoperative nausea and vomiting)     Patient requests to be pre-medicated prior to surgery prior to surgery    PONV (postoperative nausea and vomiting) 08/03/2020    Renal disorder     Spinal stenosis     Urinary tract infection     Vertigo     Wears glasses     reading       Past Surgical History:   Procedure Laterality Date    ABDOMINAL SURGERY  1986    several    BACK SURGERY  1990    BOTOX INJECTION N/A 10/30/2024    Procedure: INJECTION BOTULINUM TOXIN (BOTOX) 100 IU, cysto(Rigid);  Surgeon:  Anil Sabillon MD;  Location: [facility];  Service: Urology    CATARACT EXTRACTION, BILATERAL      CHOLECYSTECTOMY      COLONOSCOPY      FOOT FRACTURE SURGERY Bilateral 2004    x 5  surgeries    KIDNEY SURGERY  1974    at age 11 yo    KNEE SURGERY Right     NC ARTHRS KNE SURG W/MENISCECTOMY MED/LAT W/SHVG Right 08/03/2020    Procedure: KNEE ARTHROSCOPY,PARTIAL MEDIAL & LATERAL MENISECTOMIES;  Surgeon: Austen Mcguire MD;  Location: [facility];  Service: Orthopedics    NC LAPAROSCOPY SURG CHOLECYSTECTOMY N/A 11/27/2020    Procedure: LAPAROSCOPIC CHOLECYSTECTOMY;  Surgeon: Justyn Cole MD;  [facility]: [facility];  Service: General    TOTAL ABDOMINAL HYSTERECTOMY  1987    age 26    TOTAL ABDOMINAL HYSTERECTOMY W/ BILATERAL SALPINGOOPHORECTOMY Bilateral 1987    age 26    TUBAL LIGATION  [id number]    I had a tubal ligation after my third child was born then I had a tubal ligation reversal in 86 then I had a tubal ligation after baby number four was born and 85 only to have cancer and have to have everything removed.       shoulder    Review of systems:    General: No fever.  CVS: No chest pain or new SOB.  Abdomen: No diarrhea or blood in stool.  : No new voiding difficulties.  Neuro: No syncope/weakness/loss of sensation/paresis  Ophthalmologic: No new visual changes.  Ortho: No new back/joint pains.    Social History- as per previous notes.        Family History: Family history non-contributory    Meds/Allergies   PTA meds:   Prior to Admission Medications   Prescriptions Last Dose Informant Patient Reported? Taking?   Cyanocobalamin (VITAMIN B-12 PO) Past Week  Yes Yes   Sig: Take by mouth   DULoxetine (CYMBALTA) 60 mg delayed release capsule 5/18/2025  No Yes   Sig: Take 1 capsule by mouth in the morning   SUMAtriptan (IMITREX) 50 mg tablet Past Week Self No Yes   Sig: TAKE 1 TABLET BY MOUTH ONCE AS NEEDED FOR  MIGRAINE  MAY  REPEAT  IN  2  HOURS.  NO  MORE  THAN  200  MG  PER  DAY   VITAMIN D PO Past Week  Yes Yes    Sig: Take by mouth   cyclobenzaprine (FLEXERIL) 10 mg tablet 5/18/2025 Morning  No Yes   Sig: Take 1 tablet (10 mg total) by mouth 2 (two) times a day as needed for muscle spasms   esomeprazole (NexIUM) 40 MG capsule 5/18/2025  No Yes   Sig: Take 1 capsule (40 mg total) by mouth in the morning   gabapentin (NEURONTIN) 600 MG tablet 5/18/2025 Self No Yes   Sig: TAKE 1 TABLET BY MOUTH THREE TIMES DAILY   hydrOXYzine HCL (ATARAX) 25 mg tablet More than a month Self No No   Sig: TAKE 1 TABLET BY MOUTH ONCE DAILY AT BEDTIME   levothyroxine 50 mcg tablet 5/19/2025 Morning  No Yes   Sig: Take 1 tablet by mouth once daily   methenamine hippurate (HIPREX) 1 g tablet 5/18/2025 Morning Self No Yes   Sig: TAKE 1 TABLET BY MOUTH TWICE DAILY WITH MEALS   nystatin (MYCOSTATIN) powder Unknown Self No No   Sig: Twice a day as needed.   ondansetron (ZOFRAN) 4 mg tablet More than a month  No No   Sig: Take 1 tablet (4 mg total) by mouth every 8 (eight) hours as needed for nausea or vomiting   oxybutynin (DITROPAN XL) 15 MG 24 hr tablet Past Month  No Yes   Sig: Take 1 tablet (15 mg total) by mouth daily at bedtime   Patient taking differently: Take 15 mg by mouth daily at bedtime as needed   phenazopyridine (PYRIDIUM) 100 mg tablet Past Week  No Yes   Sig: Take 1 tablet (100 mg total) by mouth 3 (three) times a day as needed for bladder spasms (painful urination)   traZODone (DESYREL) 150 mg tablet 5/18/2025 Evening  No Yes   Sig: TAKE 1 TABLET BY MOUTH ONCE DAILY AT BEDTIME      Facility-Administered Medications Last Administration Doses Remaining   lidocaine (XYLOCAINE) 1 % injection 1 mL 2/26/2024 10:30 AM    lidocaine (XYLOCAINE) 1 % injection 1 mL 6/13/2024  2:45 PM    lidocaine (XYLOCAINE) 1 % injection 1 mL 9/5/2024  4:15 PM    ropivacaine (NAROPIN) 0.5 % injection 10 mL 5/31/2023 10:45 AM    triamcinolone acetonide (KENALOG-40) 40 mg/mL injection 20 mg 5/31/2023 10:45 AM    triamcinolone acetonide (KENALOG-40) 40 mg/mL  "injection 20 mg 2/26/2024 10:30 AM    triamcinolone acetonide (KENALOG-40) 40 mg/mL injection 20 mg 6/13/2024  2:45 PM    triamcinolone acetonide (KENALOG-40) 40 mg/mL injection 20 mg 9/5/2024  4:15 PM             Objective   Vitals: /81   Pulse 79   Temp (!) 96.8 °F (36 °C) (Temporal)   Resp 19   Ht 5' 7\" (1.702 m)   Wt 99.8 kg (220 lb)   LMP  (LMP Unknown)   SpO2 96%   BMI 34.46 kg/m²     No intake/output data recorded.          Physical Exam:    Awake, alert, in NAD.    HEENT: Atraumatic, Normocephalic    Lungs: Normal Respiratory effort, no wheezes,stridor or rales.    CVS- RRR    Abdomen: Nondistended.    Extremiites: Normal ROM, no cyanosis/edema.      Lab Results: I have personally reviewed pertinent reports.    Imaging: Results Review Statement: No pertinent imaging studies reviewed.   "

## 2025-05-19 NOTE — OP NOTE
OPERATIVE REPORT  PATIENT NAME: Karmen Luna    :  1960  MRN: [id number]  Pt Location: [facility] RO[facility] 01    SURGERY DATE: 2025    Surgeons and Role:     * Anil Sabillon MD - Primary    Preop Diagnosis:  Urge incontinence [N39.41]  Interstitial cystitis [N30.10]  Bladder pain [R39.89]  Bladder spasm    Post-Op Diagnosis Codes:     * Urge incontinence [N39.41]     * Interstitial cystitis [N30.10]     * Bladder pain [R39.89]  Bladder spasm    Procedure(s):  INJECTION BOTULINUM TOXIN (BOTOX) 100 international units    Specimen(s):  * No specimens in log *    Estimated Blood Loss:   Minimal    Drains:  * No LDAs found *    Anesthesia Type:   IV Sedation with Anesthesia    Operative Indications:  Urge incontinence [N39.41]  Interstitial cystitis [N30.10]  Bladder pain [R39.89]  Bladder spasm    Operative Findings:  Normal bladder.  100 international units injected into the back wall the bladder in 30 cc mixture normal saline       Complications:   None    Procedure and Technique:     Karmen Luna is scheduled for Botox 100 international units injected intravesically for overactive bladder, urge incontinence, painful bladder spasms.  Last injection was 2024 which was successful.  She now wants to have another injection.  The risks of bleeding, infection, damage to the urinary tract and adjacent organs, and Botulism were discussed with the patient and informed consent was given.      The patient was brought to the operating room and identified. After IV sedation anesthesia was induced, the patient was prepared and draped in the dorsolithotomy position in the usual fashion, with standard care taken to protect pressure points. A time out was performed.     Cystoscopy was carried out with a 22 [place]vian cystoscopy sheath and 30 degree lens.The urethra was normal without stricture. The bladder was smooth, nontrabeculated, and there were no stones, tumors or other lesion. The Merline Needle was used to inject  1cc aliquots for 20 aliquots covering the posterior wall of the bladder an bas fond, 10 aliquots 2 cc each, 10 aliquots 1 cc each for a total of 100 international units. The Bladder was then drained, the patient awakened, and transferred to the PACU in good condition.                                                                                            I was present for the entire procedure. and A qualified resident physician was not available.    Patient Disposition:  PACU  and hemodynamically stable         SIGNATURE: Anil Sabillon MD  DATE: May 19, 2025  TIME: 7:23 AM

## 2025-05-20 ENCOUNTER — TELEPHONE (OUTPATIENT)
Dept: UROLOGY | Facility: CLINIC | Age: 65
End: 2025-05-20

## 2025-05-20 NOTE — TELEPHONE ENCOUNTER
Post Op Note    Karmen Luna is a 64 y.o. female s/p  INJECTION BOTULINUM TOXIN (BOTOX) (Urethra)  performed 5/19/2025 by Dr. Sabillon.     How would you rate your pain on a scale from 1 to 10, 10 being the worst pain ever? 8    Have you had a fever? No    Have your bowel movements been regular? Yes    Do you have any difficulty urinating? No    Do you have any other questions or concerns that I can address at this time?     Patient states she is doing ok at this time. She reports bladder pain and burning with urination. She is taking pyridium as ordered. Patient states she is drinking a lot of soda. She was advised to limit soda/caffeine intake and increase water intake. Advised she can expect some burning with urination, urinary urgency, and urinary frequency for a few days post procedure. She will follow up as scheduled in November in the [place] office.

## 2025-05-28 ENCOUNTER — TELEPHONE (OUTPATIENT)
Dept: FAMILY MEDICINE CLINIC | Facility: CLINIC | Age: 65
End: 2025-05-28

## 2025-05-28 NOTE — TELEPHONE ENCOUNTER
MR request form received from [facility], requesting records from 01/01/1990 to 01/01/2020  Form faxed to O at [phone]

## 2025-06-01 NOTE — TELEPHONE ENCOUNTER
Patient called stating that pharmacist did not receive the imitrex that was sent over on 6/26/23  Call placed to [facility] in [place] [address] for verification - spoke with pharmacist christo and she states that prescription was received on 6/26/23 and that patient is not due for refill until 7/15/23  Last refill was 6/19/23  Patient notified about this   Another option  Provided to patient is to pay out of pocket $90  Offered and provided

## 2025-06-27 ENCOUNTER — PROCEDURE VISIT (OUTPATIENT)
Dept: FAMILY MEDICINE CLINIC | Facility: CLINIC | Age: 65
End: 2025-06-27
Payer: COMMERCIAL

## 2025-06-27 VITALS
HEIGHT: 67 IN | OXYGEN SATURATION: 99 % | RESPIRATION RATE: 18 BRPM | DIASTOLIC BLOOD PRESSURE: 78 MMHG | TEMPERATURE: 97.6 F | HEART RATE: 100 BPM | SYSTOLIC BLOOD PRESSURE: 128 MMHG | BODY MASS INDEX: 34.46 KG/M2

## 2025-06-27 DIAGNOSIS — M50.30 DDD (DEGENERATIVE DISC DISEASE), CERVICAL: Primary | ICD-10-CM

## 2025-06-27 DIAGNOSIS — G89.29 OTHER CHRONIC BACK PAIN: ICD-10-CM

## 2025-06-27 DIAGNOSIS — M54.89 OTHER CHRONIC BACK PAIN: ICD-10-CM

## 2025-06-27 DIAGNOSIS — G47.00 INSOMNIA, UNSPECIFIED TYPE: ICD-10-CM

## 2025-06-27 DIAGNOSIS — K21.9 GASTROESOPHAGEAL REFLUX DISEASE, UNSPECIFIED WHETHER ESOPHAGITIS PRESENT: ICD-10-CM

## 2025-06-27 PROCEDURE — G2211 COMPLEX E/M VISIT ADD ON: HCPCS | Performed by: FAMILY MEDICINE

## 2025-06-27 PROCEDURE — 99213 OFFICE O/P EST LOW 20 MIN: CPT | Performed by: FAMILY MEDICINE

## 2025-06-27 RX ORDER — CYCLOBENZAPRINE HCL 10 MG
10 TABLET ORAL 2 TIMES DAILY PRN
Qty: 30 TABLET | Refills: 1 | Status: SHIPPED | OUTPATIENT
Start: 2025-06-27

## 2025-06-27 RX ORDER — TRAZODONE HYDROCHLORIDE 150 MG/1
150 TABLET ORAL
Qty: 90 TABLET | Refills: 1 | Status: SHIPPED | OUTPATIENT
Start: 2025-06-27

## 2025-06-27 RX ORDER — ESOMEPRAZOLE MAGNESIUM 40 MG/1
40 CAPSULE, DELAYED RELEASE ORAL DAILY
Qty: 100 CAPSULE | Refills: 1 | Status: SHIPPED | OUTPATIENT
Start: 2025-06-27

## 2025-06-27 NOTE — LETTER
June 27, 2025     RE: Karmen Luna         1960        To Whom It May Concern:    Above mentioned patient Karmen Luna, date of birth 1960 is currently under my medical care. Karmen has multiple medical conditions which includes fibromylagia, DDD , Spinal Stenosis of lumbar region. Due to this conditions I find it medically necessary for my patient to avoid taking care of patient with Parkinson's Disease that will require my patient to perform several movements or lifting for this will aggravate her conditions.  Thank you in advance for your kind consideration regarding this letter. If you have any questions or concerns, feel free to contact our office.        Sincerely,        Alphonso Paula, DO

## 2025-06-27 NOTE — ASSESSMENT & PLAN NOTE
Orders:    esomeprazole (NexIUM) 40 MG capsule; Take 1 capsule (40 mg total) by mouth in the morning

## 2025-06-27 NOTE — PROGRESS NOTES
"Name: Karmen Luna      : 1960      MRN: [id number]  Encounter Provider: Alphonso Paula DO  Encounter Date: 2025   Encounter department: [facility]  :  Assessment & Plan  Other chronic back pain    Orders:    cyclobenzaprine (FLEXERIL) 10 mg tablet; Take 1 tablet (10 mg total) by mouth 2 (two) times a day as needed for muscle spasms    Insomnia, unspecified type    Orders:    traZODone (DESYREL) 150 mg tablet; Take 1 tablet (150 mg total) by mouth daily at bedtime    Gastroesophageal reflux disease, unspecified whether esophagitis present    Orders:    esomeprazole (NexIUM) 40 MG capsule; Take 1 capsule (40 mg total) by mouth in the morning    DDD (degenerative disc disease), cervical           Chief Complaint   Patient presents with    Back Pain     10/10 pain scale, lower back pain radiating to lower extremity. Was under the care of specialist and has been getting injections    Chest Pain     Rib pain, 15/10 pain scale , has been going on since patient was 18 years old           History of Present Illness   Chronic pain, has to careful getting too physical - needs note for work.  Lump on left indes - can bother.      Review of Systems   Constitutional: Negative.    HENT: Negative.     Eyes: Negative.    Respiratory: Negative.     Cardiovascular: Negative.    Gastrointestinal: Negative.    Genitourinary: Negative.    Musculoskeletal:  Positive for arthralgias.   Skin: Negative.    Neurological: Negative.    Psychiatric/Behavioral: Negative.         Objective   /78 (BP Location: Right arm, Patient Position: Sitting, Cuff Size: Adult)   Pulse 100   Temp 97.6 °F (36.4 °C) (Temporal)   Resp 18   Ht 5' 7\" (1.702 m)   LMP  (LMP Unknown)   SpO2 99%   BMI 34.46 kg/m²      Physical Exam  Constitutional:       Appearance: She is well-developed.   HENT:      Head: Normocephalic and atraumatic.      Right Ear: External ear normal.      Left Ear: External ear normal.      Nose: Nose " normal.     Eyes:      Conjunctiva/sclera: Conjunctivae normal.      Pupils: Pupils are equal, round, and reactive to light.       Cardiovascular:      Rate and Rhythm: Normal rate and regular rhythm.      Heart sounds: Normal heart sounds.   Pulmonary:      Effort: Pulmonary effort is normal.      Breath sounds: Normal breath sounds.   Abdominal:      General: Bowel sounds are normal.     Musculoskeletal:      Cervical back: Normal range of motion and neck supple.     Skin:     General: Skin is warm and dry.     Neurological:      Mental Status: She is alert and oriented to person, place, and time.      Deep Tendon Reflexes: Reflexes are normal and symmetric.     Psychiatric:         Behavior: Behavior normal.         Thought Content: Thought content normal.         Judgment: Judgment normal.

## 2025-06-27 NOTE — LETTER
June 27, 2025  , Spinal   RE: Karmen Luna         1960        To Whom It May Concern:    Above mentioned patient Karmen Luna, date of birth 1960 is currently under my medical care. Karmen has multiple medical conditions which includes fibromylagia, DDD , Spinal Stenosis of lumbar region. Due to this conditions I find it medically necessary for my patient to avoid taking care of patient with Parkinson's Disease that will require my patient to perform several movements or lifting for this will aggravate her conditions.  Thank you in advance for your kind consideration regarding this letter. If you have any questions or concerns, feel free to contact our office.        Sincerely,        Alphonso Paula, DO

## 2025-06-28 DIAGNOSIS — F33.0 MILD EPISODE OF RECURRENT MAJOR DEPRESSIVE DISORDER (HCC): ICD-10-CM

## 2025-07-01 RX ORDER — DULOXETIN HYDROCHLORIDE 60 MG/1
60 CAPSULE, DELAYED RELEASE ORAL EVERY MORNING
Qty: 90 CAPSULE | Refills: 1 | Status: SHIPPED | OUTPATIENT
Start: 2025-07-01

## 2025-07-03 NOTE — TELEPHONE ENCOUNTER
Patient following up on refill request. Made aware script was approved and sent to pharmacy on 7/1. Patient to follow up with pharmacy.

## 2025-08-02 DIAGNOSIS — E03.9 HYPOTHYROIDISM, UNSPECIFIED TYPE: ICD-10-CM

## 2025-08-05 RX ORDER — LEVOTHYROXINE SODIUM 50 UG/1
50 TABLET ORAL DAILY
Qty: 90 TABLET | Refills: 3 | Status: SHIPPED | OUTPATIENT
Start: 2025-08-05

## 2025-08-07 DIAGNOSIS — M54.89 OTHER CHRONIC BACK PAIN: ICD-10-CM

## 2025-08-07 DIAGNOSIS — G89.29 OTHER CHRONIC BACK PAIN: ICD-10-CM

## 2025-08-08 RX ORDER — CYCLOBENZAPRINE HCL 10 MG
10 TABLET ORAL 2 TIMES DAILY PRN
Qty: 30 TABLET | Refills: 1 | Status: SHIPPED | OUTPATIENT
Start: 2025-08-08

## 2025-08-21 ENCOUNTER — TELEPHONE (OUTPATIENT)
Age: 65
End: 2025-08-21

## (undated) DEVICE — TROCAR APPPLE 5MM EXTENDED LENGTH

## (undated) DEVICE — ELECTRODE LAP J HOOK SPLIT STEM E-Z CLEAN 33CM -0021S

## (undated) DEVICE — NEEDLE 22 G X 1 1/2 SAFETY

## (undated) DEVICE — 4-PORT MANIFOLD: Brand: NEPTUNE 2

## (undated) DEVICE — 3M™ STERI-STRIP™ REINFORCED ADHESIVE SKIN CLOSURES, R1547, 1/2 IN X 4 IN (12 MM X 100 MM), 6 STRIPS/ENVELOPE: Brand: 3M™ STERI-STRIP™

## (undated) DEVICE — ABDOMINAL PAD: Brand: DERMACEA

## (undated) DEVICE — TROCAR: Brand: KII FIOS FIRST ENTRY

## (undated) DEVICE — ALLENTOWN LAP CHOLE APP PACK: Brand: CARDINAL HEALTH

## (undated) DEVICE — VIAL DECANTER

## (undated) DEVICE — SUT MONOCRYL 4-0 PS-2 27 IN Y426H

## (undated) DEVICE — GLOVE SRG BIOGEL 8

## (undated) DEVICE — GLOVE SRG BIOGEL 7.5

## (undated) DEVICE — LIGAMAX 5 MM ENDOSCOPIC MULTIPLE CLIP APPLIER: Brand: LIGAMAX

## (undated) DEVICE — PUDDLE VAC

## (undated) DEVICE — GLOVE SRG BIOGEL ECLIPSE 7

## (undated) DEVICE — COBAN 6 IN STERILE

## (undated) DEVICE — SCD SEQUENTIAL COMPRESSION COMFORT SLEEVE MEDIUM KNEE LENGTH: Brand: KENDALL SCD

## (undated) DEVICE — UROLOGIC DRAIN BAG: Brand: UNBRANDED

## (undated) DEVICE — GLOVE INDICATOR PI UNDERGLOVE SZ 8.5 BLUE

## (undated) DEVICE — TIBURON SPLIT SHEET: Brand: CONVERTORS

## (undated) DEVICE — GLOVE SRG BIOGEL 7

## (undated) DEVICE — TUBING EXTENSION 6 FT CONTIN WAVE

## (undated) DEVICE — BONEE® NEEDLE FOR BLADDER INJECTION CH FR 05, 22G, 35 CM, BOX OF 1: Brand: PORGES COLOPLAST

## (undated) DEVICE — PACK CUSTOM GU CYSTO PACK RF

## (undated) DEVICE — ACE WRAP 6 IN UNSTERILE

## (undated) DEVICE — 3M™ STERI-DRAPE™ U-DRAPE 1015: Brand: STERI-DRAPE™

## (undated) DEVICE — UNDYED BRAIDED (POLYGLACTIN 910), SYNTHETIC ABSORBABLE SUTURE: Brand: COATED VICRYL

## (undated) DEVICE — TISSUE RETRIEVAL SYSTEM: Brand: INZII RETRIEVAL SYSTEM

## (undated) DEVICE — NEEDLE 18 G X 1 1/2

## (undated) DEVICE — LIGHT HANDLE COVER SLEEVE DISP BLUE STELLAR

## (undated) DEVICE — CHLORAPREP HI-LITE 26ML ORANGE

## (undated) DEVICE — TUBING SUCTION 5MM X 12 FT

## (undated) DEVICE — HARMONIC 1100 SHEARS, 36CM SHAFT LENGTH: Brand: HARMONIC

## (undated) DEVICE — ADHESIVE SKIN HIGH VISCOSITY EXOFIN 1ML

## (undated) DEVICE — DRAPE EQUIPMENT RF WAND

## (undated) DEVICE — PAD GROUNDING ADULT

## (undated) DEVICE — SPECIMEN CONTAINER STERILE PEEL PACK

## (undated) DEVICE — IMPERVIOUS STOCKINETTE: Brand: DEROYAL

## (undated) DEVICE — PREMIUM DRY TRAY LF: Brand: MEDLINE INDUSTRIES, INC.

## (undated) DEVICE — STERILE SURGICAL LUBRICANT,  TUBE: Brand: SURGILUBE

## (undated) DEVICE — INTENDED FOR TISSUE SEPARATION, AND OTHER PROCEDURES THAT REQUIRE A SHARP SURGICAL BLADE TO PUNCTURE OR CUT.: Brand: BARD-PARKER ® CARBON RIB-BACK BLADES

## (undated) DEVICE — CYSTO TUBING TUR Y IRRIGATION

## (undated) DEVICE — SUT MONOCRYL 2-0 SH 27 IN Y417H

## (undated) DEVICE — INTENDED FOR TISSUE SEPARATION, AND OTHER PROCEDURES THAT REQUIRE A SHARP SURGICAL BLADE TO PUNCTURE OR CUT.: Brand: BARD-PARKER SAFETY BLADES SIZE 11, STERILE

## (undated) DEVICE — IRRIG ENDO FLO TUBING

## (undated) DEVICE — BETHLEHEM UNIVERSAL  ARTHRO PK: Brand: CARDINAL HEALTH

## (undated) DEVICE — Device: Brand: RIGID NEEDLE 21 GA X 0.5" (0,8 X 13 MM)

## (undated) DEVICE — PACK TUR

## (undated) DEVICE — BLADE SHAVER DISSECTOR 4MM 13CM COOLCUT

## (undated) DEVICE — SYRINGE 20ML LL

## (undated) DEVICE — TUBING SMOKE EVAC W/FILTRATION DEVICE PLUMEPORT ACTIV

## (undated) DEVICE — EXIDINE 4 PCT